# Patient Record
Sex: FEMALE | Race: WHITE | NOT HISPANIC OR LATINO | Employment: FULL TIME | URBAN - METROPOLITAN AREA
[De-identification: names, ages, dates, MRNs, and addresses within clinical notes are randomized per-mention and may not be internally consistent; named-entity substitution may affect disease eponyms.]

---

## 2017-01-24 NOTE — PROGRESS NOTES
"  SUBJECTIVE:                                                    Aileen Priest is a 56 year old female who presents to clinic today for the following health issues:    Gastrointestinal symptoms      Duration: 2 years ago was seen for GERD, but doesn't have many episodes anymore. Feels like her esophagus has pressure on it    Description:           REFLUX SYMPTOMS - nausea, cough and chest pain    Intensity:  mild    Accompanying signs and symptoms:  None  Non smoker  occasinal social alcohol use  rare NSAIDs    History  Previous {similar problem: YES    Aggravating factors: meals, caffeine, alcohol, lying down and stressful situations    Alleviating factors: Prilosec and Zantac    Other Therapies tried: None     Patient is also concerned about her blood pressure    Possible Hypertension       Outpatient blood pressures are being checked at work.  Results are 150/80's.    Low Salt Diet: no added salt     exercsing   Had a very stressful year  Problem list and histories reviewed & adjusted, as indicated.  Additional history: as documented    Problem list, Medication list, Allergies, and Medical/Social/Surgical histories reviewed in EPIC and updated as appropriate.    ROS:  Constitutional, HEENT, cardiovascular, pulmonary, gi and gu systems are negative, except as otherwise noted.    OBJECTIVE:                                                    /96 mmHg  Pulse 103  Temp(Src) 97.1  F (36.2  C) (Oral)  Ht 5' 3\" (1.6 m)  Wt 130 lb 4.8 oz (59.104 kg)  BMI 23.09 kg/m2  SpO2 97%  Body mass index is 23.09 kg/(m^2).  GENERAL: alert, no distress and fit  NECK: no adenopathy, no asymmetry, masses, or scars and thyroid normal to palpation  RESP: lungs clear to auscultation - no rales, rhonchi or wheezes  CV: regular rate and rhythm, normal S1 S2, no S3 or S4, no murmur, click or rub, no peripheral edema and peripheral pulses strong  ABDOMEN: soft, nontender, no hepatosplenomegaly, no masses and bowel sounds " normal  MS: no gross musculoskeletal defects noted, no edema  SKIN: no suspicious lesions or rashes  NEURO: Normal strength and tone, mentation intact and speech normal  PSYCH: mentation appears normal, affect normal/bright  LYMPH: no cervical, supraclavicular, axillary, or inguinal adenopathy    Diagnostic Test Results:  Results for orders placed or performed in visit on 01/27/17   CBC with platelets   Result Value Ref Range    WBC 7.2 4.0 - 11.0 10e9/L    RBC Count 4.22 3.8 - 5.2 10e12/L    Hemoglobin 13.3 11.7 - 15.7 g/dL    Hematocrit 39.1 35.0 - 47.0 %    MCV 93 78 - 100 fl    MCH 31.5 26.5 - 33.0 pg    MCHC 34.0 31.5 - 36.5 g/dL    RDW 12.9 10.0 - 15.0 %    Platelet Count 310 150 - 450 10e9/L     EKG - negative     ASSESSMENT/PLAN:                                                            1. Hypertension goal BP (blood pressure) < 140/90  Needs treat  - EKG 12-lead complete w/read - Clinics  - Comprehensive metabolic panel  - CBC with platelets  - TSH with free T4 reflex  - lisinopril (PRINIVIL/ZESTRIL) 10 MG tablet; Take 1 tablet (10 mg) by mouth daily  Dispense: 90 tablet; Refill: 3  Follow up in 1 month.   2. Gastroesophageal reflux disease, esophagitis presence not specified  Getting better on medications  Cut back alcohol  - H Pylori antigen, stool; Future  - omeprazole (PRILOSEC) 40 MG capsule; Take 1 capsule (40 mg) by mouth daily Take 30-60 minutes before a meal.  Dispense: 90 capsule; Refill: 1  - ranitidine (ZANTAC) 300 MG tablet; Take 1 tablet (300 mg) by mouth At Bedtime  Dispense: 90 tablet; Refill: 1  Follow up in 1 month.  If not improving do endoscopy  3. Screen for colon cancer  Will do    4. Need for hepatitis C screening test  OK test but very low risk  - Hepatitis C Screen Reflex to HCV RNA Quant and Genotype    5. Abdominal pain, epigastric  Take daily  - omeprazole (PRILOSEC) 40 MG capsule; Take 1 capsule (40 mg) by mouth daily Take 30-60 minutes before a meal.  Dispense: 90 capsule;  Refill: 1    Regular exercise  See Patient Instructions    Bucky Hunter MD  [facility] [place]

## 2017-01-27 ENCOUNTER — OFFICE VISIT (OUTPATIENT)
Dept: FAMILY MEDICINE | Facility: CLINIC | Age: 57
End: 2017-01-27
Payer: COMMERCIAL

## 2017-01-27 ENCOUNTER — TELEPHONE (OUTPATIENT)
Dept: FAMILY MEDICINE | Facility: CLINIC | Age: 57
End: 2017-01-27

## 2017-01-27 VITALS
HEART RATE: 103 BPM | OXYGEN SATURATION: 97 % | BODY MASS INDEX: 23.09 KG/M2 | SYSTOLIC BLOOD PRESSURE: 150 MMHG | WEIGHT: 130.3 LBS | DIASTOLIC BLOOD PRESSURE: 96 MMHG | TEMPERATURE: 97.1 F | HEIGHT: 63 IN

## 2017-01-27 DIAGNOSIS — I10 HYPERTENSION GOAL BP (BLOOD PRESSURE) < 140/90: ICD-10-CM

## 2017-01-27 DIAGNOSIS — R10.13 ABDOMINAL PAIN, EPIGASTRIC: ICD-10-CM

## 2017-01-27 DIAGNOSIS — Z11.59 NEED FOR HEPATITIS C SCREENING TEST: ICD-10-CM

## 2017-01-27 DIAGNOSIS — K21.9 GASTROESOPHAGEAL REFLUX DISEASE, ESOPHAGITIS PRESENCE NOT SPECIFIED: ICD-10-CM

## 2017-01-27 DIAGNOSIS — Z12.11 SCREEN FOR COLON CANCER: Primary | ICD-10-CM

## 2017-01-27 LAB
ERYTHROCYTE [DISTWIDTH] IN BLOOD BY AUTOMATED COUNT: 12.9 % (ref 10–15)
HCT VFR BLD AUTO: 39.1 % (ref 35–47)
HGB BLD-MCNC: 13.3 G/DL (ref 11.7–15.7)
MCH RBC QN AUTO: 31.5 PG (ref 26.5–33)
MCHC RBC AUTO-ENTMCNC: 34 G/DL (ref 31.5–36.5)
MCV RBC AUTO: 93 FL (ref 78–100)
PLATELET # BLD AUTO: 310 10E9/L (ref 150–450)
RBC # BLD AUTO: 4.22 10E12/L (ref 3.8–5.2)
WBC # BLD AUTO: 7.2 10E9/L (ref 4–11)

## 2017-01-27 PROCEDURE — 93000 ELECTROCARDIOGRAM COMPLETE: CPT | Performed by: FAMILY MEDICINE

## 2017-01-27 PROCEDURE — 80053 COMPREHEN METABOLIC PANEL: CPT | Performed by: FAMILY MEDICINE

## 2017-01-27 PROCEDURE — 84443 ASSAY THYROID STIM HORMONE: CPT | Performed by: FAMILY MEDICINE

## 2017-01-27 PROCEDURE — 99214 OFFICE O/P EST MOD 30 MIN: CPT | Performed by: FAMILY MEDICINE

## 2017-01-27 PROCEDURE — 36415 COLL VENOUS BLD VENIPUNCTURE: CPT | Performed by: FAMILY MEDICINE

## 2017-01-27 PROCEDURE — 86803 HEPATITIS C AB TEST: CPT | Performed by: FAMILY MEDICINE

## 2017-01-27 PROCEDURE — 85027 COMPLETE CBC AUTOMATED: CPT | Performed by: FAMILY MEDICINE

## 2017-01-27 RX ORDER — LISINOPRIL 10 MG/1
10 TABLET ORAL DAILY
Qty: 90 TABLET | Refills: 3 | Status: SHIPPED | OUTPATIENT
Start: 2017-01-27 | End: 2018-01-18

## 2017-01-27 RX ORDER — OMEPRAZOLE 40 MG/1
40 CAPSULE, DELAYED RELEASE ORAL DAILY
Qty: 90 CAPSULE | Refills: 1 | Status: SHIPPED | OUTPATIENT
Start: 2017-01-27 | End: 2017-04-06

## 2017-01-27 NOTE — NURSING NOTE
"Chief Complaint   Patient presents with     Gastric Problem       Initial /96 mmHg  Pulse 103  Temp(Src) 97.1  F (36.2  C) (Oral)  Ht 5' 3\" (1.6 m)  Wt 130 lb 4.8 oz (59.104 kg)  BMI 23.09 kg/m2  SpO2 97% Estimated body mass index is 23.09 kg/(m^2) as calculated from the following:    Height as of this encounter: 5' 3\" (1.6 m).    Weight as of this encounter: 130 lb 4.8 oz (59.104 kg).  BP completed using cuff size: regular    Trini Lewis MA      "

## 2017-01-28 LAB
ALBUMIN SERPL-MCNC: 3.9 G/DL (ref 3.4–5)
ALP SERPL-CCNC: 98 U/L (ref 40–150)
ALT SERPL W P-5'-P-CCNC: 22 U/L (ref 0–50)
ANION GAP SERPL CALCULATED.3IONS-SCNC: 8 MMOL/L (ref 3–14)
AST SERPL W P-5'-P-CCNC: 14 U/L (ref 0–45)
BILIRUB SERPL-MCNC: 0.4 MG/DL (ref 0.2–1.3)
BUN SERPL-MCNC: 14 MG/DL (ref 7–30)
CALCIUM SERPL-MCNC: 9.3 MG/DL (ref 8.5–10.1)
CHLORIDE SERPL-SCNC: 107 MMOL/L (ref 94–109)
CO2 SERPL-SCNC: 26 MMOL/L (ref 20–32)
CREAT SERPL-MCNC: 0.74 MG/DL (ref 0.52–1.04)
GFR SERPL CREATININE-BSD FRML MDRD: 81 ML/MIN/1.7M2
GLUCOSE SERPL-MCNC: 104 MG/DL (ref 70–99)
POTASSIUM SERPL-SCNC: 4 MMOL/L (ref 3.4–5.3)
PROT SERPL-MCNC: 7.4 G/DL (ref 6.8–8.8)
SODIUM SERPL-SCNC: 141 MMOL/L (ref 133–144)
TSH SERPL DL<=0.005 MIU/L-ACNC: 2.26 MU/L (ref 0.4–4)

## 2017-01-29 DIAGNOSIS — K21.9 GASTROESOPHAGEAL REFLUX DISEASE, ESOPHAGITIS PRESENCE NOT SPECIFIED: ICD-10-CM

## 2017-01-29 PROCEDURE — 87338 HPYLORI STOOL AG IA: CPT | Performed by: FAMILY MEDICINE

## 2017-01-30 LAB — HCV AB SERPL QL IA: NORMAL

## 2017-01-31 LAB
H PYLORI AG STL QL IA: NORMAL
MICRO REPORT STATUS: NORMAL
SPECIMEN SOURCE: NORMAL

## 2017-02-08 PROBLEM — I10 HYPERTENSION, GOAL BELOW 140/90: Status: ACTIVE | Noted: 2017-02-08

## 2017-02-28 ENCOUNTER — OFFICE VISIT (OUTPATIENT)
Dept: FAMILY MEDICINE | Facility: CLINIC | Age: 57
End: 2017-02-28
Payer: COMMERCIAL

## 2017-02-28 VITALS
WEIGHT: 130 LBS | BODY MASS INDEX: 23.04 KG/M2 | OXYGEN SATURATION: 99 % | TEMPERATURE: 97 F | HEART RATE: 99 BPM | DIASTOLIC BLOOD PRESSURE: 72 MMHG | SYSTOLIC BLOOD PRESSURE: 118 MMHG | HEIGHT: 63 IN

## 2017-02-28 DIAGNOSIS — I10 HYPERTENSION, GOAL BELOW 140/90: ICD-10-CM

## 2017-02-28 DIAGNOSIS — K21.9 GASTROESOPHAGEAL REFLUX DISEASE, ESOPHAGITIS PRESENCE NOT SPECIFIED: ICD-10-CM

## 2017-02-28 DIAGNOSIS — Z13.6 CARDIOVASCULAR SCREENING; LDL GOAL LESS THAN 160: Primary | ICD-10-CM

## 2017-02-28 PROCEDURE — 99214 OFFICE O/P EST MOD 30 MIN: CPT | Performed by: FAMILY MEDICINE

## 2017-02-28 NOTE — MR AVS SNAPSHOT
After Visit Summary   2/28/2017    Aileen Priest    MRN: [id number]           Patient Information     Date Of Birth          1960        Visit Information        Provider Department      2/28/2017 3:45 PM Bucky Hunter MD [facility] [place]        Today's Diagnoses     CARDIOVASCULAR SCREENING; LDL GOAL LESS THAN 160    -  1    Hypertension, goal below 140/90        Gastroesophageal reflux disease, esophagitis presence not specified           Follow-ups after your visit        Your next 10 appointments already scheduled     Mar 28, 2017  8:15 AM CDT   Colonoscopy with Rudy Morfin MD   [facility])    [address] [place] [id number] [phone]              Future tests that were ordered for you today     Open Future Orders        Priority Expected Expires Ordered    Lipid Profile Routine  2/28/2018 2/28/2017            Who to contact     If you have questions or need follow up information about today's clinic visit or your schedule please contact [facility] directly at [phone].  Normal or non-critical lab and imaging results will be communicated to you by [employer], letter or phone within 4 business days after the clinic has received the results. If you do not hear from us within 7 days, please contact the clinic through [employer] or phone. If you have a critical or abnormal lab result, we will notify you by phone as soon as possible.  Submit refill requests through [employer] or call your pharmacy and they will forward the refill request to us. Please allow 3 business days for your refill to be completed.          Additional Information About Your Visit        [employer] Information     [employer] gives you secure access to your electronic health record. If you see a primary care provider, you can also send messages to your care team and make appointments. If you have questions, please call  "your primary care clinic.  If you do not have a primary care provider, please call [phone] and they will assist you.        Care EveryWhere ID     This is your Care EveryWhere ID. This could be used by other organizations to access your [place] medical records  [id number]        Your Vitals Were     Pulse Temperature Height Pulse Oximetry BMI (Body Mass Index)       99 97  F (36.1  C) (Oral) 5' 3\" (1.6 m) 99% 23.03 kg/m2        Blood Pressure from Last 3 Encounters:   02/28/17 118/72   01/27/17 (!) 150/96   05/29/15 136/85    Weight from Last 3 Encounters:   02/28/17 130 lb (59 kg)   01/27/17 130 lb 4.8 oz (59.1 kg)   05/29/15 127 lb (57.6 kg)               Primary Care Provider Office Phone # Fax #    Bucky Hunter [phone]       [facility] [address] [place] [id number]        Thank you!     Thank you for choosing [facility]  for your care. Our goal is always to provide you with excellent care. Hearing back from our patients is one way we can continue to improve our services. Please take a few minutes to complete the written survey that you may receive in the mail after your visit with us. Thank you!             Your Updated Medication List - Protect others around you: Learn how to safely use, store and throw away your medicines at www.disposemymeds.org.          This list is accurate as of: 2/28/17  4:15 PM.  Always use your most recent med list.                   Brand Name Dispense Instructions for use    dexamethasone 0.1 MG/ML solution      Take  by mouth daily.       IBUPROFEN PO      Take 800 mg by mouth Reported on 2/28/2017       lisinopril 10 MG tablet    PRINIVIL/ZESTRIL    90 tablet    Take 1 tablet (10 mg) by mouth daily       omeprazole 40 MG capsule    priLOSEC    90 capsule    Take 1 capsule (40 mg) by mouth daily Take 30-60 minutes before a meal.       ranitidine 300 MG tablet    ZANTAC    90 tablet    Take 1 tablet " (300 mg) by mouth At Bedtime       VITAMIN D PO      Take 2,000 Units by mouth daily

## 2017-02-28 NOTE — PROGRESS NOTES
"  SUBJECTIVE:                                                    Aileen Priest is a 56 year old female who presents to clinic today for the following health issues:      Hypertension Follow-up      Outpatient blood pressures are being checked at home.  Results are 110/74, 128/79 103/74.    Low Salt Diet: no added salt       Amount of exercise or physical activity: 4-5 days/week for an average of 30-45 minutes    Problems taking medications regularly: No    Medication side effects: none  Diet: regular (no restrictions)    GERD much improved with zantac/ PPI    Problem list and histories reviewed & adjusted, as indicated.  Additional history: as documented    Labs reviewed in EPIC    Reviewed and updated as needed this visit by clinical staff  Tobacco  Allergies  Meds  Med Hx  Surg Hx  Fam Hx  Soc Hx      Reviewed and updated as needed this visit by Provider         ROS:  Constitutional, HEENT, cardiovascular, pulmonary, gi and gu systems are negative, except as otherwise noted.    OBJECTIVE:                                                    /72 (BP Location: Right arm, Patient Position: Chair, Cuff Size: Adult Regular)  Pulse 99  Temp 97  F (36.1  C) (Oral)  Ht 5' 3\" (1.6 m)  Wt 130 lb (59 kg)  SpO2 99%  BMI 23.03 kg/m2  Body mass index is 23.03 kg/(m^2).  GENERAL: healthy, alert and no distress  EYES: Eyes grossly normal to inspection, PERRL and conjunctivae and sclerae normal  HENT: ear canals and TM's normal, nose and mouth without ulcers or lesions  NECK: no adenopathy, no asymmetry, masses, or scars and thyroid normal to palpation  RESP: lungs clear to auscultation - no rales, rhonchi or wheezes  CV: regular rate and rhythm, normal S1 S2, no S3 or S4, no murmur, click or rub, no peripheral edema and peripheral pulses strong  ABDOMEN: soft, nontender, no hepatosplenomegaly, no masses and bowel sounds normal  LYMPH: no cervical, supraclavicular, axillary, or inguinal adenopathy    Diagnostic Test " Results:  Results for orders placed or performed in visit on 01/29/17   H Pylori antigen, stool   Result Value Ref Range    Specimen Description Feces     H Pylori Antigen       Negative for Helicobacter pylori antigen by enzyme immunoassay. A negative   result indicates the absence of H. pylori antigen or that the level of antigen   is below the level of detection.      Micro Report Status FINAL 01/31/2017         ASSESSMENT/PLAN:                                                            1. CARDIOVASCULAR SCREENING; LDL GOAL LESS THAN 160  Will do when fasting  - Lipid Profile; Future    2. Hypertension, goal below 140/90  Well controlled on lisinopril  Continue Follow up in 3 months.     3. Gastroesophageal reflux disease, esophagitis presence not specified  Well controlled   After 1 month cut back to just zantac then call me       Regular exercise  See Patient Instructions    Bucky Hunter MD  [facility]

## 2017-03-20 ENCOUNTER — TELEPHONE (OUTPATIENT)
Dept: GASTROENTEROLOGY | Facility: OUTPATIENT CENTER | Age: 57
End: 2017-03-20

## 2017-03-20 NOTE — TELEPHONE ENCOUNTER
Patient taking any blood thinners ? Ibuprofen prn    Heart disease ? denies    Lung disease ? denies      Sleep apnea ? denies    Diabetic ? denies    Kidney disease ? denies    Dialysis ? n/a    Electronic implanted medical devices ? denies    Are you taking any narcotic pain medication ? no  What is your daily dosage ?    PTSD ? n/a    Prep instructions reviewed with patient ? Pt. Declined review.  policy, MAC sedation plan reviewed. Advised pt. To have someone stay with her post exam    Pharmacy : [place]    Indication for procedure :microscopic colitis, screening colonoscopy  Referring provider : Dr. Bucky Hunter

## 2017-03-28 ENCOUNTER — TRANSFERRED RECORDS (OUTPATIENT)
Dept: HEALTH INFORMATION MANAGEMENT | Facility: CLINIC | Age: 57
End: 2017-03-28

## 2017-03-30 ENCOUNTER — TELEPHONE (OUTPATIENT)
Dept: ONCOLOGY | Facility: CLINIC | Age: 57
End: 2017-03-30

## 2017-03-30 NOTE — TELEPHONE ENCOUNTER
----- Message from Monet Oalkey GC sent at 3/29/2017 11:14 AM CDT -----  Regarding: RE: 3D mammogram  The 3D mammogram does not replace a breast MRI.      Aileen should come back to the cancer risk program and we will evaluate her family history again. We have many more genes available for testing than we did in 2010.    Please have her come to the cancer risk management program for an appointment with a genetic counselor to talk about genetic testing and breast cancer screening. The number for an appt is 8-.  Thank you,  Monet  ----- Message -----     From: Nelda Grande RN     Sent: 3/24/2017   1:59 PM       To: Monet Oakley GC  Subject: 3D mammogram                                     Hi Roque Gallagher was transferred to the triage line and was wondering if a 3D Mammogram would be better than getting an MRI.  You saw her way back in 2010 and she was recommended to get annual mammograms and MRIs.      Could you please follow up with her?    Thanks,  Henny Grande, RN  [facility] Triage  [phone]

## 2017-04-04 ENCOUNTER — PRE VISIT (OUTPATIENT)
Dept: GASTROENTEROLOGY | Facility: CLINIC | Age: 57
End: 2017-04-04

## 2017-04-04 ENCOUNTER — CARE COORDINATION (OUTPATIENT)
Dept: GASTROENTEROLOGY | Facility: CLINIC | Age: 57
End: 2017-04-04

## 2017-04-04 NOTE — TELEPHONE ENCOUNTER
1.  Date/reason for appt: 4/6/17 - New IBD  2.  Referring provider: Dr. Bucky Hunter  3.  Call to patient (Yes / No - short description): no, internal referral  4.  Previous care at / records requested from: [facility] -- Records, labs and referral in [facility], colonoscopy 3/28/17 report is in Epic

## 2017-04-05 ENCOUNTER — TRANSFERRED RECORDS (OUTPATIENT)
Dept: HEALTH INFORMATION MANAGEMENT | Facility: CLINIC | Age: 57
End: 2017-04-05

## 2017-04-05 ASSESSMENT — ENCOUNTER SYMPTOMS
BOWEL INCONTINENCE: 0
ABDOMINAL PAIN: 1
TASTE DISTURBANCE: 0
HEMOPTYSIS: 0
HEARTBURN: 1
DOUBLE VISION: 0
EYE WATERING: 0
DIARRHEA: 1
SHORTNESS OF BREATH: 0
RESPIRATORY PAIN: 0
COUGH DISTURBING SLEEP: 1
BLOATING: 1
NAUSEA: 1
RECTAL PAIN: 0
HOARSE VOICE: 0
SPUTUM PRODUCTION: 1
DYSPNEA ON EXERTION: 0
SINUS CONGESTION: 0
RECTAL BLEEDING: 0
COUGH: 1
SORE THROAT: 0
WHEEZING: 0
SINUS PAIN: 0
CONSTIPATION: 0
POSTURAL DYSPNEA: 0
EYE IRRITATION: 0
TROUBLE SWALLOWING: 0
EYE PAIN: 0
VOMITING: 0
NECK MASS: 0
EYE REDNESS: 1
JAUNDICE: 0
BLOOD IN STOOL: 0
SNORES LOUDLY: 0

## 2017-04-06 ENCOUNTER — TELEPHONE (OUTPATIENT)
Dept: GASTROENTEROLOGY | Facility: CLINIC | Age: 57
End: 2017-04-06

## 2017-04-06 ENCOUNTER — OFFICE VISIT (OUTPATIENT)
Dept: GASTROENTEROLOGY | Facility: CLINIC | Age: 57
End: 2017-04-06

## 2017-04-06 VITALS
WEIGHT: 127 LBS | BODY MASS INDEX: 22.5 KG/M2 | OXYGEN SATURATION: 97 % | DIASTOLIC BLOOD PRESSURE: 81 MMHG | HEIGHT: 63 IN | SYSTOLIC BLOOD PRESSURE: 119 MMHG | HEART RATE: 94 BPM

## 2017-04-06 DIAGNOSIS — Z87.2 HISTORY OF HIDRADENITIS SUPPURATIVA: ICD-10-CM

## 2017-04-06 DIAGNOSIS — L43.9 LICHEN PLANUS: ICD-10-CM

## 2017-04-06 DIAGNOSIS — K50.018 CROHN'S DISEASE OF SMALL INTESTINE WITH OTHER COMPLICATION (H): Primary | ICD-10-CM

## 2017-04-06 RX ORDER — LORAZEPAM 1 MG/1
1 TABLET ORAL ONCE
Qty: 1 TABLET | Refills: 0 | Status: SHIPPED | OUTPATIENT
Start: 2017-04-06 | End: 2017-04-06

## 2017-04-06 NOTE — TELEPHONE ENCOUNTER
Contacted patient to schedule colonoscopy at [facility] with Dr. Morfin in 2-3 weeks. See order    Monet Shah RN

## 2017-04-06 NOTE — LETTER
"4/6/2017       RE: Aileen Priest  [address]  [place] [address]     Dear Colleague,    Thank you for referring your patient, Aileen Priest, to the [facility] GASTROENTEROLOGY AND IBD at [facility]. Please see a copy of my visit note below.    GI CLINIC VISIT - NEW PATIENT    CC/REFERRING PROVIDER: Bucky Hunter  REASON FOR CONSULTATION: ileitis NOS on colonoscopy 3/2017 - suspicious for ileal Crohn's disease    HPI: 56 year old female w/ h/o suspected ileal Crohn's disease per 3/2017 ileocolonoscopy biopsies, Lichen Planus (oral/perianal involvement, in remission), Hidradenitis Suppurativa s/p excision/drainage 1975, presumed Collagenous Colitis per colonic biopsies 2006 (though suspecting evolving IBD presentation in retrospect following 3/2017 highly-suggestive biopsies of terminal ileum + bland colonic biopsies), presumed GERD (off PPI), lumbar spine surgery 2005, periorbital shingles 2015, chronic tobacco abuse (in remission), among other medical issues - presenting for evaluation of incidentally-found active ileitis on colonoscopy 3/28/17, concern for early/evolving ileal Crohn's disease. Pt is known to me from 3/28 colonoscopy, seen in f/u - interval confirmation of acute-on-chronic inflammation with pyloric metaplasia on ileal biopsies, very suggestive for ileal Crohn's disease (colon o/w endoscopically and histologically spared on segmental colonic biopsies). Reports having 3-4 variable consistency stools/day w/o blood (baseline), +marked urgency w/ insecurity at least 1-2x/wk. +multiple recurrent \"episodes\" of nocturnal abd pain and diarrhea (occurs at least 2-3x/yr). Denies any fecal incontinence or new perianal sxs. Denies any N/V/F/C/HA/NS or other const/syst/cardiopulmonary sxs, no BRBPR/melena/urinary changes, no unintentional wt loss or appetite/satiety changes. No other bowel/bladder habit changes, no dysphagia/odynophagia. No " jaundice/icterus/pruritus, no acholic stools/steatorrhea, no new lumps/bumps, no jt pain/oral ulcer/rash/eye sxs noted. Denies any recent travel/abx/other sick contact exposures, no recent NSAID use.    ROS: 10pt ROS performed and otherwise negative.    PERTINENT PAST MEDICAL HISTORY:  As noted above.    PREVIOUS ABDOMINAL/GYNECOLOGIC SURGERIES:  As noted above.    PREVIOUS ENDOSCOPY:  - Colonoscopy 3/28/17 ([facility]): +active ileal inflammation w/ ulceration, +mild diffuse colonic mucosal congestion but colon was o/w endoscopically-normal. +sigmoid diverticulosis, +IH.    PERTINENT MEDICATIONS:  Medications reviewed with patient today, see Medication List/Assessment for details.  No other NSAID/anticoagulation reported by patient.  No other OTC/herbal/supplements reported by patient.    SOCIAL HISTORY:  Former smoker (prior 1ppd q89-27apf), quit 1987. Reports having 3-6 drinks/wk, o/w negative. Works as L&D RN.    FAMILY HISTORY:  Father w/ Idiopathic Angioedema, mother w/ diverticulitis and breast CA. MGM w/ ovarian CA, brother w/ psoriasis, sister w/ hypothyroidism. No colon/panc/esophageal/other GI CA, no other HNPCC-related Marlon. No IBD/celiac, no other AI/liver disease.    PHYSICAL EXAMINATION (w/ RN Kristi present):  Vitals reviewed, AFVSS  Wt 127# today (stable)  Gen: aaox3, cooperative, pleasant, not diaphoretic, nad  HEENT: ncat, neck supple, no clad/sclad, normal op w/o ulcer/exudate, anicteric, mmm, no active oral lesions noted on this exam  Mallampati Score 1  Resp/CV without acute findings, not dyspneic/tachycardic  Abd: +nabs, soft, nt, nd, no peritoneal s/s noted. No ecchymoses, +lumbar spine surgical scar, +coccygeal/upper gluteal cleft scar from prior HS excision, no CVA/spinal tenderness noted.  Ext: no c/c/e  Skin: warm, perfused, no jaundice  Neuro: grossly intact, no asterixis noted  Perianal: No evidence of recurrent HS at scar, no active perianal LP or Crohn's-like tags    PERTINENT  STUDIES:  Lytes/LFT normal, cr 0.66, alb 3.8  wbc 7, hgb 13.2, plt 332, mcv 91  ESR/CRP negative  ferritin 64, iron profile normal  B12 588, folate 18.5  VitD 24 (normal)  TSH normal  TTG negative    Q-Gold TB negative 4/10/17  HAV Ab negative  HBsAb positive (c/w prior vax), sAg/cAb negative  HCV Ab negative  TPMT 24.5 (normal range)    ASSESSMENT/PLAN:    1. Likely early/evolving ileal Crohn's disease with clinical (but not nutritional/biochemical) impact at this time - warrants moving forward with consideration for Crohn's-directed therapy in the near-future  1. I'm glad that we had the opportunity to meet again today to discuss your recent colonoscopy results, particularly in light of the chronic active ileal inflammation noted on that exam - discussed the likelihood that this may represent early/evolving ileal Crohn's Disease, as well as the next steps to complete the diagnostic evaluation (particularly as this will have bearing on options for treatment).  - No evidence of histologic colonic involvement on 3/2017 biopsies, could represent evolving IBD presentation. Will continue to monitor recurrent colonic activity in ongoing care.  - Recommend moving forward with an MRI Enterography in the next 2-3 weeks to evaluate for more proximal Crohn's small bowel involvement as we discussed today. Will provide a single dose of Ativan to help with any associated claustrophobia.    2. Recommend moving forward with an Upper GI Endoscopy at [facility] under MAC sedation (scheduled with me) in the next 2-3 weeks, specifically to assess your upper GI concerns (chronic GERD) and to rule out Lichen Planus or Crohn's Disease upper GI involvement.    3. Recommend routine studies including nutritional and inflammatory markers as we discussed today.   - Will also obtain your immunosuppression pre-screening studies (Hepatitis serologies with exception of Hepatitis C, TB testing, Chest X-ray, TPMT level) as well. This will  help our next discussions about treatment options.    4. Recommend checking out the Crohn's & Colitis Foundation of [place] website (www.ccfa.org), particularly as this is an excellent resource for patients.  Please feel free to bring any questions you have to clinic for us to discuss together, or you can call/email us as well.    5. Continue to monitor for the danger signs/symptoms we reviewed together today:  worsening abdominal pain, worsening diarrhea, obstructive symptoms (nausea/vomiting, abdominal distention, inability to pass stool/flatus), blood mixed into stools, persistent fevers/chills, progressive anemia (particulary with iron deficiency), unexpected weight loss, etc.  - Contact us via [employer] should these symptoms occur, particularly as we may advise further evaluation accordingly.    2. Colorectal Cancer Screening  No PSC or known FH CRC, will readdress once current diagnostic evaluation is completed.    RTC 4-6 weeks, sooner if symptomatic.      Thank you for this consultation. It was a pleasure to participate in the care of this patient; please contact us with any further questions.    Rudy Morfin MD   of Medicine  [facility] - Department of Medicine  Division of Gastroenterology

## 2017-04-06 NOTE — MR AVS SNAPSHOT
After Visit Summary   4/6/2017    Aileen Priest    MRN: [id number]           Patient Information     Date Of Birth          1960        Visit Information        Provider Department      4/6/2017 2:15 PM Rudy Morfin MD [facility] Gastroenterology and IBD        Today's Diagnoses     Crohn's disease of small intestine with other complication (H)    -  1      Care Instructions    I've included a brief summary of our discussion and care plan from today's visit below.  Please review this information with your primary care provider.  _______________________________________________________________________      1. I'm glad that we had the opportunity to meet again today to discuss your recent colonoscopy results, particularly in light of the chronic active ileal inflammation noted on that exam - discussed the likelihood that this may represent early/evolving ileal Crohn's Disease, as well as the next steps to complete the diagnostic evaluation (particularly as this will have bearing on options for treatment).  - Recommend moving forward with an MRI Enterography in the next 2-3 weeks to evaluate for more proximal Crohn's small bowel involvement as we discussed today. Will provide a single dose of Ativan to help with any associated claustrophobia.    2. Recommend moving forward with an Upper GI Endoscopy at [facility] under MAC sedation (scheduled with me) in the next 2-3 weeks, specifically to assess your upper GI concerns (chronic GERD) and to rule out Lichen Planus or Crohn's Disease upper GI involvement.    3. Recommend routine studies including nutritional and inflammatory markers as we discussed today.   - Will also obtain your immunosuppression pre-screening studies (Hepatitis serologies with exception of Hepatitis C, TB testing, Chest X-ray, TPMT level) as well. This will help our next discussions about treatment options.    4. Recommend checking out the Crohn's & Colitis Foundation of  [place] website (www.ccfa.org), particularly as this is an excellent resource for patients.  Please feel free to bring any questions you have to clinic for us to discuss together, or you can call/email us as well.    5. Continue to monitor for the danger signs/symptoms we reviewed together today:  worsening abdominal pain, worsening diarrhea, obstructive symptoms (nausea/vomiting, abdominal distention, inability to pass stool/flatus), blood mixed into stools, persistent fevers/chills, progressive anemia (particulary with iron deficiency), unexpected weight loss, etc.  - Contact us via Zwipe should these symptoms occur, particularly as we may advise further evaluation accordingly.    6. Return to GI Clinic with me in 4-6 weeks to review your progress, sooner if symptomatic.   - If you are unable to schedule this follow-up appointment today, please contact Bethany Lyons at [phone] within the next week to help set up this necessary appointment.    _______________________________________________________________________    It was a pleasure seeing you in clinic today - please be in touch if there are any further questions that arise following today's visit.  During business hours, you may reach Clinic Nurse Triage Line at [phone].  For urgent/emergent questions after business hours, you may reach the on-call GI Fellow by contacting the [facility]  at [phone].    Any benign/non-urgent test results are usually communicated via letter or Authentiumhart message within 1-2 weeks after completion.  Urgent results (those that require a change in the previously-discussed care plan) are usually communicated via a phone call once available from our clinic staff to discuss the results and the next steps in your evaluation.    I recommend signing up for Zwipe access if you have not already done so and are comfortable with using a computer.  This allows for online access to your lab results and also  helps you communicate efficiently with my clinic should any questions arise in your care.    We have Financial Counseling services available through our clinic.  If you have questions about your insurance coverage or payment responsibilities, particularly before you undergo any tests or procedures, please let us know and we can arrange a consultation accordingly to help you make informed decisions about your healthcare.    Sincerely,    Rudy Morfin MD    [facility] - Department of Medicine  Division of Gastroenterology          Follow-ups after your visit        Additional Services     GASTROENTEROLOGY ADULT REF PROCEDURE ONLY       Last Lab Result: Creatinine (mg/dL)       Date                     Value                 01/27/2017               0.74             ----------  Body mass index is 22.5 kg/(m^2).     Needed:  No  Language:  English    Patient will be contacted to schedule procedure.     Please be aware that coverage of these services is subject to the terms and limitations of your health insurance plan.  Call member services at your health plan with any benefit or coverage questions.  Any procedures must be performed at a [facility] facility OR coordinated by your clinic's referral office.    Please bring the following with you to your appointment:    (1) Any X-Rays, CTs or MRIs which have been performed.  Contact the facility where they were done to arrange for  prior to your scheduled appointment.    (2) List of current medications   (3) This referral request   (4) Any documents/labs given to you for this referral                  Follow-up notes from your care team     Return in about 4 weeks (around 5/4/2017).      Your next 10 appointments already scheduled     May 08, 2017  3:45 PM CDT   [place] Bucky Hunter [place]   [facility] [place]    [address] [place] [address]  [id number] [phone]           Bring a current list of meds and any records pertaining to this visit.  For Physicals, please bring immunization records and any forms needing to be filled out.  Please arrive 10 minutes early to complete paperwork.              Future tests that were ordered for you today     Open Future Orders        Priority Expected Expires Ordered    Vitamin D Deficiency Routine  6/5/2017 4/6/2017    Vitamin B12 Routine  6/5/2017 4/6/2017    Folate Routine  6/5/2017 4/6/2017    Basic metabolic panel Routine  6/5/2017 4/6/2017    CBC with platelets differential Routine  6/5/2017 4/6/2017    Erythrocyte sedimentation rate auto Routine  6/5/2017 4/6/2017    Hepatic panel Routine  6/5/2017 4/6/2017    Tissue transglutaminase farhad IgA and IgG Routine  6/5/2017 4/6/2017    TPMT enzyme and phenotype Routine  6/5/2017 4/6/2017    Hepatitis B core antibody Routine  6/5/2017 4/6/2017    Hepatitis B Surface Antibody Routine  6/5/2017 4/6/2017    Hepatitis B surface antigen Routine  6/5/2017 4/6/2017    Hepatitis Antibody A IgG Routine  6/5/2017 4/6/2017    M Tuberculosis by Quantiferon Routine  6/5/2017 4/6/2017    X-ray Chest 2 vws* Routine 4/6/2017 4/6/2018 4/6/2017    CRP inflammation Routine  6/5/2017 4/6/2017    Ferritin Routine  6/5/2017 4/6/2017    Iron and iron binding capacity Routine  6/5/2017 4/6/2017    MRI Enterography Routine  4/6/2018 4/6/2017            Who to contact     Please call your clinic at [phone] to:    Ask questions about your health    Make or cancel appointments    Discuss your medicines    Learn about your test results    Speak to your doctor   If you have compliments or concerns about an experience at your clinic, or if you wish to file a complaint, please contact [facility] Patient Relations at [phone] or email us at [email or web address]         Additional Information About Your Visit        MyChart Information     MyChart  "gives you secure access to your electronic health record. If you see a primary care provider, you can also send messages to your care team and make appointments. If you have questions, please call your primary care clinic.  If you do not have a primary care provider, please call [phone] and they will assist you.      [employer] is an electronic gateway that provides easy, online access to your medical records. With [employer], you can request a clinic appointment, read your test results, renew a prescription or communicate with your care team.     To access your existing account, please contact your [facility] or call [phone] for assistance.        Care EveryWhere ID     This is your Care EveryWhere ID. This could be used by other organizations to access your [facility] medical records  [id number]        Your Vitals Were     Pulse Height Pulse Oximetry BMI (Body Mass Index)          94 1.6 m (5' 3\") 97% 22.5 kg/m2         Blood Pressure from Last 3 Encounters:   04/06/17 119/81   02/28/17 118/72   01/27/17 (!) 150/96    Weight from Last 3 Encounters:   04/06/17 57.6 kg (127 lb)   02/28/17 59 kg (130 lb)   01/27/17 59.1 kg (130 lb 4.8 oz)              We Performed the Following     GASTROENTEROLOGY ADULT REF PROCEDURE ONLY          Today's Medication Changes          These changes are accurate as of: 4/6/17  3:29 PM.  If you have any questions, ask your nurse or doctor.               Start taking these medicines.        Dose/Directions    LORazepam 1 MG tablet   Commonly known as:  ATIVAN   Used for:  Crohn's disease of small intestine with other complication (H)   Started by:  Rudy Morfin MD        Dose:  1 mg   Take 1 tablet (1 mg) by mouth once for 1 dose   Quantity:  1 tablet   Refills:  0         Stop taking these medicines if you haven't already. Please contact your care team if you have questions.     IBUPROFEN PO   Stopped by:  Rudy Morfin MD           omeprazole 40 " MG capsule   Commonly known as:  priLOSEC   Stopped by:  Rudy Morfin MD                Where to get your medicines      Some of these will need a paper prescription and others can be bought over the counter.  Ask your nurse if you have questions.     Bring a paper prescription for each of these medications     LORazepam 1 MG tablet                Primary Care Provider Office Phone # Fax #    Bucky Marlo Hunter [phone]       [address] [address]        Thank you!     Thank you for choosing [facility] GASTROENTEROLOGY AND IBD  for your care. Our goal is always to provide you with excellent care. Hearing back from our patients is one way we can continue to improve our services. Please take a few minutes to complete the written survey that you may receive in the mail after your visit with us. Thank you!             Your Updated Medication List - Protect others around you: Learn how to safely use, store and throw away your medicines at www.disposemymeds.org.          This list is accurate as of: 4/6/17  3:29 PM.  Always use your most recent med list.                   Brand Name Dispense Instructions for use    [facility] PO          dexamethasone 0.1 MG/ML solution      Take  by mouth daily.       lisinopril 10 MG tablet    PRINIVIL/ZESTRIL    90 tablet    Take 1 tablet (10 mg) by mouth daily       LORazepam 1 MG tablet    ATIVAN    1 tablet    Take 1 tablet (1 mg) by mouth once for 1 dose       ranitidine 300 MG tablet    ZANTAC    90 tablet    Take 1 tablet (300 mg) by mouth At Bedtime       VITAMIN D PO      Take 2,000 Units by mouth daily

## 2017-04-06 NOTE — PATIENT INSTRUCTIONS
I've included a brief summary of our discussion and care plan from today's visit below.  Please review this information with your primary care provider.  _______________________________________________________________________      1. I'm glad that we had the opportunity to meet again today to discuss your recent colonoscopy results, particularly in light of the chronic active ileal inflammation noted on that exam - discussed the likelihood that this may represent early/evolving ileal Crohn's Disease, as well as the next steps to complete the diagnostic evaluation (particularly as this will have bearing on options for treatment).  - Recommend moving forward with an MRI Enterography in the next 2-3 weeks to evaluate for more proximal Crohn's small bowel involvement as we discussed today. Will provide a single dose of Ativan to help with any associated claustrophobia.    2. Recommend moving forward with an Upper GI Endoscopy at [facility] under MAC sedation (scheduled with me) in the next 2-3 weeks, specifically to assess your upper GI concerns (chronic GERD) and to rule out Lichen Planus or Crohn's Disease upper GI involvement.    3. Recommend routine studies including nutritional and inflammatory markers as we discussed today.   - Will also obtain your immunosuppression pre-screening studies (Hepatitis serologies with exception of Hepatitis C, TB testing, Chest X-ray, TPMT level) as well. This will help our next discussions about treatment options.    4. Recommend checking out the Crohn's & Colitis Foundation of [place] website (www.ccfa.org), particularly as this is an excellent resource for patients.  Please feel free to bring any questions you have to clinic for us to discuss together, or you can call/email us as well.    5. Continue to monitor for the danger signs/symptoms we reviewed together today:  worsening abdominal pain, worsening diarrhea, obstructive symptoms (nausea/vomiting, abdominal  distention, inability to pass stool/flatus), blood mixed into stools, persistent fevers/chills, progressive anemia (particulary with iron deficiency), unexpected weight loss, etc.  - Contact us via ChangeTip should these symptoms occur, particularly as we may advise further evaluation accordingly.    6. Return to GI Clinic with me in 4-6 weeks to review your progress, sooner if symptomatic.   - If you are unable to schedule this follow-up appointment today, please contact Bethany Lyons at [phone] within the next week to help set up this necessary appointment.    _______________________________________________________________________    It was a pleasure seeing you in clinic today - please be in touch if there are any further questions that arise following today's visit.  During business hours, you may reach Clinic Nurse Triage Line at [phone].  For urgent/emergent questions after business hours, you may reach the on-call GI Fellow by contacting the [facility]  at [phone].    Any benign/non-urgent test results are usually communicated via letter or ChangeTip message within 1-2 weeks after completion.  Urgent results (those that require a change in the previously-discussed care plan) are usually communicated via a phone call once available from our clinic staff to discuss the results and the next steps in your evaluation.    I recommend signing up for ChangeTip access if you have not already done so and are comfortable with using a computer.  This allows for online access to your lab results and also helps you communicate efficiently with my clinic should any questions arise in your care.    We have Financial Counseling services available through our clinic.  If you have questions about your insurance coverage or payment responsibilities, particularly before you undergo any tests or procedures, please let us know and we can arrange a consultation accordingly to help you make informed  decisions about your healthcare.    Sincerely,    Rudy Morfin MD    [facility] - Department of Medicine  Division of Gastroenterology

## 2017-04-06 NOTE — NURSING NOTE
"Chief Complaint   Patient presents with     Consult     colonoscopy results       Vitals:    04/06/17 1419   BP: 119/81   Pulse: 94   SpO2: 97%   Weight: 57.6 kg (127 lb)   Height: 1.6 m (5' 3\")       Body mass index is 22.5 kg/(m^2).                         "

## 2017-04-08 ENCOUNTER — MYC MEDICAL ADVICE (OUTPATIENT)
Dept: FAMILY MEDICINE | Facility: CLINIC | Age: 57
End: 2017-04-08

## 2017-04-10 DIAGNOSIS — K50.018 CROHN'S DISEASE OF SMALL INTESTINE WITH OTHER COMPLICATION (H): ICD-10-CM

## 2017-04-10 LAB
BASOPHILS # BLD AUTO: 0.1 10E9/L (ref 0–0.2)
BASOPHILS NFR BLD AUTO: 0.7 %
CRP SERPL-MCNC: <2.9 MG/L (ref 0–8)
DIFFERENTIAL METHOD BLD: NORMAL
EOSINOPHIL # BLD AUTO: 0.3 10E9/L (ref 0–0.7)
EOSINOPHIL NFR BLD AUTO: 4.2 %
ERYTHROCYTE [DISTWIDTH] IN BLOOD BY AUTOMATED COUNT: 12.8 % (ref 10–15)
ERYTHROCYTE [SEDIMENTATION RATE] IN BLOOD BY WESTERGREN METHOD: 9 MM/H (ref 0–30)
FOLATE SERPL-MCNC: 18.5 NG/ML
HCT VFR BLD AUTO: 39 % (ref 35–47)
HGB BLD-MCNC: 13.2 G/DL (ref 11.7–15.7)
LYMPHOCYTES # BLD AUTO: 1.7 10E9/L (ref 0.8–5.3)
LYMPHOCYTES NFR BLD AUTO: 24.6 %
MCH RBC QN AUTO: 30.8 PG (ref 26.5–33)
MCHC RBC AUTO-ENTMCNC: 33.8 G/DL (ref 31.5–36.5)
MCV RBC AUTO: 91 FL (ref 78–100)
MONOCYTES # BLD AUTO: 0.7 10E9/L (ref 0–1.3)
MONOCYTES NFR BLD AUTO: 9.3 %
NEUTROPHILS # BLD AUTO: 4.3 10E9/L (ref 1.6–8.3)
NEUTROPHILS NFR BLD AUTO: 61.2 %
PLATELET # BLD AUTO: 332 10E9/L (ref 150–450)
RBC # BLD AUTO: 4.28 10E12/L (ref 3.8–5.2)
VIT B12 SERPL-MCNC: 588 PG/ML (ref 193–986)
WBC # BLD AUTO: 7 10E9/L (ref 4–11)

## 2017-04-10 PROCEDURE — 87340 HEPATITIS B SURFACE AG IA: CPT | Performed by: INTERNAL MEDICINE

## 2017-04-10 PROCEDURE — 82542 COL CHROMOTOGRAPHY QUAL/QUAN: CPT | Mod: 90 | Performed by: INTERNAL MEDICINE

## 2017-04-10 PROCEDURE — 80076 HEPATIC FUNCTION PANEL: CPT | Performed by: INTERNAL MEDICINE

## 2017-04-10 PROCEDURE — 82657 ENZYME CELL ACTIVITY: CPT | Mod: 90 | Performed by: INTERNAL MEDICINE

## 2017-04-10 PROCEDURE — 86704 HEP B CORE ANTIBODY TOTAL: CPT | Performed by: INTERNAL MEDICINE

## 2017-04-10 PROCEDURE — 83516 IMMUNOASSAY NONANTIBODY: CPT | Performed by: INTERNAL MEDICINE

## 2017-04-10 PROCEDURE — 86706 HEP B SURFACE ANTIBODY: CPT | Performed by: INTERNAL MEDICINE

## 2017-04-10 PROCEDURE — 83550 IRON BINDING TEST: CPT | Performed by: INTERNAL MEDICINE

## 2017-04-10 PROCEDURE — 36415 COLL VENOUS BLD VENIPUNCTURE: CPT | Performed by: INTERNAL MEDICINE

## 2017-04-10 PROCEDURE — 82306 VITAMIN D 25 HYDROXY: CPT | Performed by: INTERNAL MEDICINE

## 2017-04-10 PROCEDURE — 80048 BASIC METABOLIC PNL TOTAL CA: CPT | Performed by: INTERNAL MEDICINE

## 2017-04-10 PROCEDURE — 82746 ASSAY OF FOLIC ACID SERUM: CPT | Performed by: INTERNAL MEDICINE

## 2017-04-10 PROCEDURE — 86480 TB TEST CELL IMMUN MEASURE: CPT | Performed by: INTERNAL MEDICINE

## 2017-04-10 PROCEDURE — 86140 C-REACTIVE PROTEIN: CPT | Performed by: INTERNAL MEDICINE

## 2017-04-10 PROCEDURE — 85652 RBC SED RATE AUTOMATED: CPT | Performed by: INTERNAL MEDICINE

## 2017-04-10 PROCEDURE — 82728 ASSAY OF FERRITIN: CPT | Performed by: INTERNAL MEDICINE

## 2017-04-10 PROCEDURE — 82607 VITAMIN B-12: CPT | Performed by: INTERNAL MEDICINE

## 2017-04-10 PROCEDURE — 86708 HEPATITIS A ANTIBODY: CPT | Performed by: INTERNAL MEDICINE

## 2017-04-10 PROCEDURE — 85025 COMPLETE CBC W/AUTO DIFF WBC: CPT | Performed by: INTERNAL MEDICINE

## 2017-04-10 PROCEDURE — 83540 ASSAY OF IRON: CPT | Performed by: INTERNAL MEDICINE

## 2017-04-10 PROCEDURE — 99000 SPECIMEN HANDLING OFFICE-LAB: CPT | Performed by: INTERNAL MEDICINE

## 2017-04-11 LAB
ALBUMIN SERPL-MCNC: 3.8 G/DL (ref 3.4–5)
ALP SERPL-CCNC: 89 U/L (ref 40–150)
ALT SERPL W P-5'-P-CCNC: 19 U/L (ref 0–50)
ANION GAP SERPL CALCULATED.3IONS-SCNC: 6 MMOL/L (ref 3–14)
AST SERPL W P-5'-P-CCNC: 13 U/L (ref 0–45)
BILIRUB DIRECT SERPL-MCNC: 0.1 MG/DL (ref 0–0.2)
BILIRUB SERPL-MCNC: 0.4 MG/DL (ref 0.2–1.3)
BUN SERPL-MCNC: 13 MG/DL (ref 7–30)
CALCIUM SERPL-MCNC: 9.3 MG/DL (ref 8.5–10.1)
CHLORIDE SERPL-SCNC: 105 MMOL/L (ref 94–109)
CO2 SERPL-SCNC: 28 MMOL/L (ref 20–32)
CREAT SERPL-MCNC: 0.66 MG/DL (ref 0.52–1.04)
DEPRECATED CALCIDIOL+CALCIFEROL SERPL-MC: 24 UG/L (ref 20–75)
FERRITIN SERPL-MCNC: 64 NG/ML (ref 8–252)
GFR SERPL CREATININE-BSD FRML MDRD: NORMAL ML/MIN/1.7M2
GLUCOSE SERPL-MCNC: 89 MG/DL (ref 70–99)
HAV IGG SER QL IA: NORMAL
HBV CORE AB SERPL QL IA: NONREACTIVE
HBV SURFACE AB SERPL IA-ACNC: 8.61 M[IU]/ML
HBV SURFACE AG SERPL QL IA: NONREACTIVE
IRON SATN MFR SERPL: 33 % (ref 15–46)
IRON SERPL-MCNC: 107 UG/DL (ref 35–180)
POTASSIUM SERPL-SCNC: 3.8 MMOL/L (ref 3.4–5.3)
PROT SERPL-MCNC: 7.1 G/DL (ref 6.8–8.8)
SODIUM SERPL-SCNC: 139 MMOL/L (ref 133–144)
TIBC SERPL-MCNC: 328 UG/DL (ref 240–430)

## 2017-04-12 LAB
M TB TUBERC IFN-G BLD QL: NEGATIVE
M TB TUBERC IFN-G/MITOGEN IGNF BLD: 0.22 IU/ML
TTG IGA SER-ACNC: 1 U/ML
TTG IGG SER-ACNC: NORMAL U/ML

## 2017-04-13 ENCOUNTER — TELEPHONE (OUTPATIENT)
Dept: GASTROENTEROLOGY | Facility: OUTPATIENT CENTER | Age: 57
End: 2017-04-13

## 2017-04-14 ENCOUNTER — CARE COORDINATION (OUTPATIENT)
Dept: GASTROENTEROLOGY | Facility: CLINIC | Age: 57
End: 2017-04-14

## 2017-04-14 DIAGNOSIS — K21.9 GASTROESOPHAGEAL REFLUX DISEASE, ESOPHAGITIS PRESENCE NOT SPECIFIED: ICD-10-CM

## 2017-04-14 DIAGNOSIS — K21.9 GASTROESOPHAGEAL REFLUX DISEASE WITHOUT ESOPHAGITIS: Primary | ICD-10-CM

## 2017-04-14 RX ORDER — NICOTINE POLACRILEX 4 MG/1
40 GUM, CHEWING ORAL DAILY
Qty: 180 TABLET | Refills: 3 | Status: SHIPPED | OUTPATIENT
Start: 2017-04-14 | End: 2018-07-05

## 2017-04-14 NOTE — PROGRESS NOTES
Rudy Morfin MD Plumhoff, Laura, RN                   OK - continue to monitor symptoms for now, call on-call Fellow over the weekend if more acute symptoms develop.            Previous Messages       ----- Message -----      From: Yaquelin Berry, RN      Sent: 4/14/2017  10:04 AM        To: Rudy Morfin MD   Subject: RE: Worsening symptoms                           EGD 4/18 and MRI 4/21.     ----- Message -----      From: Rudy Morfin MD      Sent: 4/14/2017   9:59 AM        To: Yaquelin Berry RN   Subject: RE: Worsening symptoms                           Should resume omeprazole for now - when is she scheduled for her EGD and MRI?     Rudy

## 2017-04-14 NOTE — PROGRESS NOTES
Patient called to report Nausea, abdominal pain, diarrhea, and fatigue.    PCP discontinued Prilosec 40mg daily prior to colonoscopy the of March. Since then reports having burning sensation after eating, along with nausea.     In addition, she woke up around 2am with worsening abdominal pain accompanied by loose stools. After bowel movement, pain improves but this cycle has continued throughout the morning. 5 loose stools so far. No blood. She is also feeling fatigued.     Will update MD

## 2017-04-15 LAB — LAB SCANNED RESULT: NORMAL

## 2017-04-18 ENCOUNTER — DOCUMENTATION ONLY (OUTPATIENT)
Dept: GASTROENTEROLOGY | Facility: OUTPATIENT CENTER | Age: 57
End: 2017-04-18

## 2017-04-18 ENCOUNTER — TRANSFERRED RECORDS (OUTPATIENT)
Dept: HEALTH INFORMATION MANAGEMENT | Facility: CLINIC | Age: 57
End: 2017-04-18

## 2017-04-18 PROBLEM — K50.018 CROHN'S DISEASE OF SMALL INTESTINE WITH OTHER COMPLICATION (H): Status: ACTIVE | Noted: 2017-04-18

## 2017-04-18 PROBLEM — Z87.2 HISTORY OF HIDRADENITIS SUPPURATIVA: Status: ACTIVE | Noted: 2017-04-18

## 2017-04-18 PROBLEM — L43.9 LICHEN PLANUS: Status: ACTIVE | Noted: 2017-04-18

## 2017-04-20 ENCOUNTER — TELEPHONE (OUTPATIENT)
Dept: GASTROENTEROLOGY | Facility: CLINIC | Age: 57
End: 2017-04-20

## 2017-04-20 DIAGNOSIS — K50.118 CROHN'S DISEASE OF LARGE INTESTINE WITH OTHER COMPLICATION (H): Primary | ICD-10-CM

## 2017-04-21 ENCOUNTER — HOSPITAL ENCOUNTER (OUTPATIENT)
Dept: GENERAL RADIOLOGY | Facility: CLINIC | Age: 57
End: 2017-04-21
Attending: INTERNAL MEDICINE
Payer: COMMERCIAL

## 2017-04-21 ENCOUNTER — HOSPITAL ENCOUNTER (OUTPATIENT)
Dept: MRI IMAGING | Facility: CLINIC | Age: 57
Discharge: HOME | End: 2017-04-21
Attending: INTERNAL MEDICINE | Admitting: INTERNAL MEDICINE
Payer: COMMERCIAL

## 2017-04-21 DIAGNOSIS — K50.018 CROHN'S DISEASE OF SMALL INTESTINE WITH OTHER COMPLICATION (H): ICD-10-CM

## 2017-04-21 LAB — COPATH REPORT: NORMAL

## 2017-04-21 PROCEDURE — 25500064 ZZH RX 255 OP 636: Performed by: INTERNAL MEDICINE

## 2017-04-21 PROCEDURE — A9585 GADOBUTROL INJECTION: HCPCS | Performed by: INTERNAL MEDICINE

## 2017-04-21 PROCEDURE — 25000128 H RX IP 250 OP 636: Performed by: INTERNAL MEDICINE

## 2017-04-21 PROCEDURE — 72197 MRI PELVIS W/O & W/DYE: CPT

## 2017-04-21 PROCEDURE — 71020 XR CHEST 2 VW: CPT

## 2017-04-21 PROCEDURE — 25000132 ZZH RX MED GY IP 250 OP 250 PS 637: Performed by: RADIOLOGY

## 2017-04-21 RX ORDER — HYOSCYAMINE SULFATE 0.125 MG
125 TABLET ORAL ONCE
Status: COMPLETED | OUTPATIENT
Start: 2017-04-21 | End: 2017-04-21

## 2017-04-21 RX ORDER — GADOBUTROL 604.72 MG/ML
7.5 INJECTION INTRAVENOUS ONCE
Status: COMPLETED | OUTPATIENT
Start: 2017-04-21 | End: 2017-04-21

## 2017-04-21 RX ADMIN — SODIUM CHLORIDE 30 ML: 9 INJECTION, SOLUTION INTRAVENOUS at 08:56

## 2017-04-21 RX ADMIN — HYOSCYAMINE SULFATE 125 MCG: 0.12 TABLET ORAL at 08:58

## 2017-04-21 RX ADMIN — GADOBUTROL 5.7 ML: 604.72 INJECTION INTRAVENOUS at 08:55

## 2017-04-26 ASSESSMENT — ENCOUNTER SYMPTOMS
VOMITING: 0
INCREASED ENERGY: 0
BLOOD IN STOOL: 0
ALTERED TEMPERATURE REGULATION: 0
JAUNDICE: 0
DIARRHEA: 1
NAUSEA: 1
DECREASED APPETITE: 0
SINUS CONGESTION: 1
WEIGHT GAIN: 0
POLYDIPSIA: 0
CHILLS: 0
TROUBLE SWALLOWING: 0
FATIGUE: 1
BLOATING: 0
POLYPHAGIA: 0
HOARSE VOICE: 0
SINUS PAIN: 0
BOWEL INCONTINENCE: 0
NIGHT SWEATS: 1
EYE PAIN: 0
EYE WATERING: 0
FEVER: 0
CONSTIPATION: 0
SORE THROAT: 0
EYE REDNESS: 0
TASTE DISTURBANCE: 0
HEARTBURN: 1
HALLUCINATIONS: 0
EYE IRRITATION: 0
SMELL DISTURBANCE: 0
RECTAL BLEEDING: 0
ABDOMINAL PAIN: 1
WEIGHT LOSS: 0
DOUBLE VISION: 0
NECK MASS: 0
RECTAL PAIN: 0

## 2017-04-27 ENCOUNTER — TELEPHONE (OUTPATIENT)
Dept: GASTROENTEROLOGY | Facility: CLINIC | Age: 57
End: 2017-04-27

## 2017-05-01 ENCOUNTER — OFFICE VISIT (OUTPATIENT)
Dept: GASTROENTEROLOGY | Facility: CLINIC | Age: 57
End: 2017-05-01

## 2017-05-01 VITALS
BODY MASS INDEX: 22.5 KG/M2 | OXYGEN SATURATION: 95 % | SYSTOLIC BLOOD PRESSURE: 119 MMHG | HEIGHT: 63 IN | WEIGHT: 127 LBS | HEART RATE: 90 BPM | DIASTOLIC BLOOD PRESSURE: 77 MMHG

## 2017-05-01 DIAGNOSIS — K50.018 CROHN'S DISEASE OF SMALL INTESTINE WITH OTHER COMPLICATION (H): Primary | ICD-10-CM

## 2017-05-01 RX ORDER — BUDESONIDE 3 MG/1
9 CAPSULE, COATED PELLETS ORAL EVERY MORNING
Qty: 270 CAPSULE | Refills: 3 | Status: SHIPPED | OUTPATIENT
Start: 2017-05-01 | End: 2018-02-23

## 2017-05-01 RX ORDER — MESALAMINE 1.2 G/1
2400 TABLET, DELAYED RELEASE ORAL EVERY MORNING
Qty: 180 TABLET | Refills: 3 | Status: SHIPPED | OUTPATIENT
Start: 2017-05-01 | End: 2018-05-17

## 2017-05-01 ASSESSMENT — PAIN SCALES - GENERAL: PAINLEVEL: NO PAIN (0)

## 2017-05-01 NOTE — MR AVS SNAPSHOT
After Visit Summary   5/1/2017    Aileen Priest    MRN: [id number]           Patient Information     Date Of Birth          1960        Visit Information        Provider Department      5/1/2017 1:00 PM Rudy Morfin MD [facility] Gastroenterology and IBD        Today's Diagnoses     Crohn's disease of small intestine with other complication (H)    -  1      Care Instructions    I've included a brief summary of our discussion and care plan from today's visit below.  Please review this information with your primary care provider.  _______________________________________________________________________      1. It was a pleasure getting a chance to meet with you to discuss the new diagnosis of ileal-limited Crohn's disease, as well as the available options for treatment given the unique situation with very focal disease - excellent background nutritional/inflammatory markers to start, no evidence of more extensive disease by recent MR Enterography (reviewed results today).  - Recommend moving forward with Entocort (budesonide) 9mg daily as a trial induction agent in the setting of clinically-mild ileal Crohn's disease. Will plan to taper you off of this slowly once your mesalamine agent is effectively on-board.  - Recommend starting Lialda 2.4g daily as a trial maintenance agent for your clinically-mild ileal Crohn's disease. Would advise starting this ~2-3 weeks after start the budesonide given the small risk for a hypersensitivity reaction (contact us ASAP should this occur).  - Discussed the medication risks/benefits/alternatives with you today, including the short-term and long-term medication side effects and need for periodic monitoring if necessary. Will readdress role for immunosuppressive options depending on your ongoing clinical course.  - Discussed Preventive Care in IBD today, recommend arranging a DEXA scan through your PCP next week.     2. Recommend having the following studies  checked at least 1-2 times/year for disease and medication safety monitoring: BMP, LFT, CBC with differential, ESR/CRP.    3. Will readdress timing for follow-up colonoscopy to confirm ileal mucosal healing at the next visit.  - Will also readdress your GERD + Dyspepsia and role for chronic low-dose PPI (or other alternatives) in your ongoing care. Continue the dietary/lifestyle modifications for Chronic GERD + Dyspepsia as reviewed today.    4. Recommend contacting the Patient Assistance Program for [place] (discussed today) to see if they can help with your costs associated with the medication.    5. Continue to monitor for the danger signs/symptoms we reviewed together today:  worsening abdominal pain, worsening diarrhea, obstructive symptoms (nausea/vomiting, abdominal distention, inability to pass stool/flatus), blood mixed into stools, persistent fevers/chills, progressive anemia (particulary with iron deficiency), unexpected weight loss, etc.  - Contact us via [employer] should these symptoms occur, particularly as we may advise further evaluation accordingly.    6. Return to GI Clinic with me in 3-4 months to review your progress, sooner if symptomatic.   - If you are unable to schedule this follow-up appointment today, please contact Bethany Lyons at [phone] within the next week to help set up this necessary appointment.    PREVENTIVE CARE IN INFLAMMATORY BOWEL DISEASE    - Strongly recommend tobacco abstinence.    - Recommend considering daily calcium + Vitamin D supplementation.    - Recommend age-appropriate cancer surveillance:  - Yearly Dermatology visit for visual skin inspection if immunosuppressed, monthly skin self-check after bathing.  - Dysplasia surveillance colonoscopy every 1-2 years in patients with Crohn's colitis or ulcerative colitis >8-10 years since diagnosis.  - (For men and women ages 9-26) Consideration for HPV vaccination, should be discussed with your PCP further if you feel you'd  like to pursue this.  - (For women) Annual Pap smears if immunosuppressed, mammogram when deemed appropriate by your PCP.    - Recommend follow-up with your PCP to ensure the following vaccinations have been updated: Hepatitis A/B, Tdap, Pneumococcal pneumonia, yearly flu SHOT, Meningococcal meningitis (if college-age), HPV (all aged 18-26), etc.  - Would also recommend VZV and MMR titers be checked to confirm immunity.  - Live/attenuated vaccines (such as the INTRANASAL flu vaccine, MMR, Yellow Fever, or Varicella/VZV vaccine) should not be administered to immunosuppressed patients, except in very specific instances which you should discuss with a GI and Infectious Disease provider first.    - Family planning:  If you or your partner are planning to become pregnant, please schedule time with us to discuss issues surrounding IBD and Fertility/Pregnancy.      _______________________________________________________________________    It was a pleasure seeing you in clinic today - please be in touch if there are any further questions that arise following today's visit.  During business hours, you may reach Clinic Nurse Triage Line at [phone].  For urgent/emergent questions after business hours, you may reach the on-call GI Fellow by contacting the [facility]  at [phone].    Any benign/non-urgent test results are usually communicated via letter or Spritzhart message within 1-2 weeks after completion.  Urgent results (those that require a change in the previously-discussed care plan) are usually communicated via a phone call once available from our clinic staff to discuss the results and the next steps in your evaluation.    I recommend signing up for [employer] access if you have not already done so and are comfortable with using a computer.  This allows for online access to your lab results and also helps you communicate efficiently with my clinic should any questions arise in your care.    We  have Financial Counseling services available through our clinic.  If you have questions about your insurance coverage or payment responsibilities, particularly before you undergo any tests or procedures, please let us know and we can arrange a consultation accordingly to help you make informed decisions about your healthcare.    Sincerely,    Rudy Morfin MD    [facility] - Department of Medicine  Division of Gastroenterology          Follow-ups after your visit        Follow-up notes from your care team     Return in about 3 months (around 8/1/2017).      Your next 10 appointments already scheduled     May 08, 2017  3:45 PM CDT   Office Visit with Bucky Hunter MD   [facility] ([facility])    [address] [place] [id number] [phone]           Bring a current list of meds and any records pertaining to this visit.  For Physicals, please bring immunization records and any forms needing to be filled out.  Please arrive 10 minutes early to complete paperwork.              Who to contact     Please call your clinic at [phone] to:    Ask questions about your health    Make or cancel appointments    Discuss your medicines    Learn about your test results    Speak to your doctor   If you have compliments or concerns about an experience at your clinic, or if you wish to file a complaint, please contact [facility] Patient Relations at [phone] or email us at [email or web address]         Additional Information About Your Visit        MyChart Information     [employer] gives you secure access to your electronic health record. If you see a primary care provider, you can also send messages to your care team and make appointments. If you have questions, please call your primary care clinic.  If you do not have a primary care provider, please call [phone] and they will  "assist you.      [employer] is an electronic gateway that provides easy, online access to your medical records. With [employer], you can request a clinic appointment, read your test results, renew a prescription or communicate with your care team.     To access your existing account, please contact your [facility] or call [phone] for assistance.        Care EveryWhere ID     This is your Care EveryWhere ID. This could be used by other organizations to access your [facility] medical records  [id number]        Your Vitals Were     Pulse Height Pulse Oximetry BMI (Body Mass Index)          90 1.6 m (5' 3\") 95% 22.5 kg/m2         Blood Pressure from Last 3 Encounters:   05/01/17 119/77   04/06/17 119/81   02/28/17 118/72    Weight from Last 3 Encounters:   05/01/17 57.6 kg (127 lb)   04/06/17 57.6 kg (127 lb)   02/28/17 59 kg (130 lb)              Today, you had the following     No orders found for display         Today's Medication Changes          These changes are accurate as of: 5/1/17  1:51 PM.  If you have any questions, ask your nurse or doctor.               Start taking these medicines.        Dose/Directions    budesonide 3 MG EC capsule   Commonly known as:  ENTOCORT EC   Used for:  Crohn's disease of small intestine with other complication (H)   Started by:  Rudy Morfin MD        Dose:  9 mg   Take 3 capsules (9 mg) by mouth every morning   Quantity:  270 capsule   Refills:  3       mesalamine 1.2 G EC tablet   Commonly known as:  LIALDA   Used for:  Crohn's disease of small intestine with other complication (H)   Started by:  Rudy Morfin MD        Dose:  2400 mg   Take 2 tablets (2,400 mg) by mouth every morning   Quantity:  180 tablet   Refills:  3            Where to get your medicines      These medications were sent to [facility] [place], [place] - [address]  [address] [address]     Phone:  [phone]     budesonide " 3 MG EC capsule    mesalamine 1.2 G EC tablet                Primary Care Provider Office Phone # Fax #    Bucky Marlo Hunter [phone]       [facility] [address] [address]        Thank you!     Thank you for choosing [facility] GASTROENTEROLOGY AND IBD  for your care. Our goal is always to provide you with excellent care. Hearing back from our patients is one way we can continue to improve our services. Please take a few minutes to complete the written survey that you may receive in the mail after your visit with us. Thank you!             Your Updated Medication List - Protect others around you: Learn how to safely use, store and throw away your medicines at www.disposemymeds.org.          This list is accurate as of: 5/1/17  1:51 PM.  Always use your most recent med list.                   Brand Name Dispense Instructions for use    budesonide 3 MG EC capsule    ENTOCORT EC    270 capsule    Take 3 capsules (9 mg) by mouth every morning       [facility] PO          dexamethasone 0.1 MG/ML solution      Take  by mouth daily.       lisinopril 10 MG tablet    PRINIVIL/ZESTRIL    90 tablet    Take 1 tablet (10 mg) by mouth daily       mesalamine 1.2 G EC tablet    LIALDA    180 tablet    Take 2 tablets (2,400 mg) by mouth every morning       MULTIVITAMIN ADULT PO      Take 1 capsule by mouth daily       omeprazole 20 MG tablet     180 tablet    Take 2 tablets (40 mg) by mouth daily       ranitidine 300 MG tablet    ZANTAC    90 tablet    Take 1 tablet (300 mg) by mouth At Bedtime       VITAMIN D PO      Take 2,000 Units by mouth daily

## 2017-05-01 NOTE — PROGRESS NOTES
GI CLINIC VISIT    CC/REFERRING PROVIDER: Bucky Hunter  REASON FOR CONSULTATION: Crohn's disease    HPI: 56 year old female w/ h/o ileal Crohn's disease per 3/2017 ileocolonoscopy biopsies, Lichen Planus (oral/perianal involvement, in remission), Hidradenitis Suppurativa s/p excision/drainage 1975, prior presumed diagnosis of Collagenous Colitis per colonic biopsies 2006 (though suspecting evolving IBD presentation in retrospect following 3/2017 highly-suggestive biopsies of terminal ileum + bland colonic biopsies), presumed GERD (off PPI), lumbar spine surgery 2005, periorbital shingles 2015, chronic tobacco abuse (in remission), among other medical issues - presenting for f/u Crohn's disease. Reports having 3-4 variable consistency stools/day w/o blood (baseline), +intermittent marked urgency/insecurity passing flatus, +nocturnal loose stools assoc w/ abd discomfort as well (unchanged from baseline, not yet on therapy). No fecal incontinence or new perianal sxs noted. Denies any N/V/F/C/HA/NS or other const/syst/cardiopulmonary sxs, no BRBPR/melena/urinary changes, no unintentional wt loss or appetite/satiety changes. No other bowel/bladder habit changes, no dysphagia/odynophagia. No jaundice/icterus/pruritus, no acholic stools/steatorrhea, no new lumps/bumps, no jt pain/rash/eye sxs noted. +intermittent oral ulcers (baseline).    Extensive d/w pt and family today re: non-immunosuppressive and immunosuppressive therapies available for Crohn's disease, including role for enteric vs. systemic steroid therapy (usually short-term if at all possible).    ROS: 10pt ROS performed and otherwise negative.    PERTINENT PAST MEDICAL/SURGICAL HISTORY:  As noted above.    PERTINENT MEDICATIONS:  - omeprazole 20mg PO QDAY  Medications reviewed with patient today, see Medication List/Assessment for details.  No other NSAID/anticoagulation reported by patient.  No other OTC/herbal/supplements reported by  patient.    SOCIAL HISTORY: Tobacco: none.    PHYSICAL EXAMINATION:  Vitals reviewed, AFVSS  Wt 127# today (stable)  Gen: aaox3, cooperative, pleasant, not diaphoretic, nad  HEENT: ncat, neck supple, no clad/sclad, normal op w/o ulcer/exudate, anicteric, mmm  Resp/CV without acute findings, not dyspneic/tachycardic  Abd: +nabs, soft, nt, nd, no peritoneal s/s noted  Ext: no c/c/e  Skin: warm, perfused, no jaundice  Neuro: grossly intact, no asterixis noted    PERTINENT STUDIES:  Q-Gold TB negative 4/10/17  HAV Ab negative  HBsAb positive (c/w prior vax), sAg/cAb negative  HCV Ab negative  TPMT 24.5 (normal range)    MR Enterography 4/21/17  FINDINGS: There is mild bowel wall thickening and enhancement of a  short segment of terminal ileum approximately 3 cm in length (5-minute  postcontrast coronal series 17 image 24). There is no evidence for  bowel obstruction and no other abnormal areas of enhancement  identified. No evidence for abscess. There is no fistula identified.     No worrisome focal liver lesions identified. Normal-appearing spleen,  pancreas, adrenal glands, gallbladder and kidneys.     No periaortic or pelvic adenopathy or free fluid.         IMPRESSION: Very short segment of terminal ileum approximately 3 cm  with mild wall thickening and enhancement compatible with active  Crohn's. No evidence for bowel obstruction or abscess or fistula.    ASSESSMENT/PLAN:    1. Ileal Crohn's disease with focal inflammatory extent per MR Enterography 4/2017, noted in the absence of marked nutritional deficiencies but with active IBD symptoms - reviewed available medical treatment options, will move forward with trial of 5-ASA + enteric steroid with close monitoring of response given focally-limited disease per patient preference (not unreasonable given focality of disease)  1. It was a pleasure getting a chance to meet with you to discuss the new diagnosis of ileal-limited Crohn's disease, as well as the available  options for treatment given the unique situation with very focal disease - excellent background nutritional/inflammatory markers to start, no evidence of more extensive disease by recent MR Enterography (reviewed results today).  - Recommend moving forward with Entocort (budesonide) 9mg daily as a trial induction agent in the setting of clinically-mild ileal Crohn's disease. Will plan to taper you off of this slowly once your mesalamine agent is effectively on-board.  - Recommend starting Lialda 2.4g daily as a trial maintenance agent for your clinically-mild ileal Crohn's disease. Would advise starting this ~2-3 weeks after start the budesonide given the small risk for a hypersensitivity reaction (contact us ASAP should this occur).  - Discussed the medication risks/benefits/alternatives with you today, including the short-term and long-term medication side effects and need for periodic monitoring if necessary. Will readdress role for immunosuppressive options depending on your ongoing clinical course.  - Discussed Preventive Care in IBD today, recommend arranging a DEXA scan through your PCP next week.     2. Recommend having the following studies checked at least 1-2 times/year for disease and medication safety monitoring: BMP, LFT, CBC with differential, ESR/CRP.    3. Will readdress timing for follow-up colonoscopy to confirm ileal mucosal healing at the next visit.  - Will also readdress your GERD + Dyspepsia and role for chronic low-dose PPI (or other alternatives) in your ongoing care. Continue the dietary/lifestyle modifications for Chronic GERD + Dyspepsia as reviewed today.    4. Recommend contacting the Patient Assistance Program for Lialda (discussed today) to see if they can help with your costs associated with the medication.    5. Continue to monitor for the danger signs/symptoms we reviewed together today: worsening abdominal pain, worsening diarrhea, obstructive symptoms (nausea/vomiting, abdominal  distention, inability to pass stool/flatus), blood mixed into stools, persistent fevers/chills, progressive anemia (particulary with iron deficiency), unexpected weight loss, etc.  - Contact us via MyChart should these symptoms occur, particularly as we may advise further evaluation accordingly.    2. Colorectal Cancer Screening  No PSC or known FH CRC, will readdress once acute issues have stabilized.    RTC 3-4 months, sooner if symptomatic.      Thank you for this consultation. It was a pleasure to participate in the care of this patient; please contact us with any further questions.     Rudy Morfin MD   of Medicine  [facility] - Department of Medicine  Division of Gastroenterology

## 2017-05-01 NOTE — LETTER
5/1/2017       RE: Aileen Priest  [address]  [place] [address]     Dear Colleague,    Thank you for referring your patient, Aileen Priest, to the [facility] GASTROENTEROLOGY AND IBD at [facility]. Please see a copy of my visit note below.    GI CLINIC VISIT    CC/REFERRING PROVIDER: Bucky Hunter  REASON FOR CONSULTATION: Crohn's disease    HPI: 56 year old female w/ h/o ileal Crohn's disease per 3/2017 ileocolonoscopy biopsies, Lichen Planus (oral/perianal involvement, in remission), Hidradenitis Suppurativa s/p excision/drainage 1975, prior presumed diagnosis of Collagenous Colitis per colonic biopsies 2006 (though suspecting evolving IBD presentation in retrospect following 3/2017 highly-suggestive biopsies of terminal ileum + bland colonic biopsies), presumed GERD (off PPI), lumbar spine surgery 2005, periorbital shingles 2015, chronic tobacco abuse (in remission), among other medical issues - presenting for f/u Crohn's disease. Reports having 3-4 variable consistency stools/day w/o blood (baseline), +intermittent marked urgency/insecurity passing flatus, +nocturnal loose stools assoc w/ abd discomfort as well (unchanged from baseline, not yet on therapy). No fecal incontinence or new perianal sxs noted. Denies any N/V/F/C/HA/NS or other const/syst/cardiopulmonary sxs, no BRBPR/melena/urinary changes, no unintentional wt loss or appetite/satiety changes. No other bowel/bladder habit changes, no dysphagia/odynophagia. No jaundice/icterus/pruritus, no acholic stools/steatorrhea, no new lumps/bumps, no jt pain/rash/eye sxs noted. +intermittent oral ulcers (baseline).    Extensive d/w pt and family today re: non-immunosuppressive and immunosuppressive therapies available for Crohn's disease, including role for enteric vs. systemic steroid therapy (usually short-term if at all possible).    ROS: 10pt ROS performed and otherwise negative.    PERTINENT PAST  MEDICAL/SURGICAL HISTORY:  As noted above.    PERTINENT MEDICATIONS:  - omeprazole 20mg PO QDAY  Medications reviewed with patient today, see Medication List/Assessment for details.  No other NSAID/anticoagulation reported by patient.  No other OTC/herbal/supplements reported by patient.    SOCIAL HISTORY: Tobacco: none.    PHYSICAL EXAMINATION:  Vitals reviewed, AFVSS  Wt 127# today (stable)  Gen: aaox3, cooperative, pleasant, not diaphoretic, nad  HEENT: ncat, neck supple, no clad/sclad, normal op w/o ulcer/exudate, anicteric, mmm  Resp/CV without acute findings, not dyspneic/tachycardic  Abd: +nabs, soft, nt, nd, no peritoneal s/s noted  Ext: no c/c/e  Skin: warm, perfused, no jaundice  Neuro: grossly intact, no asterixis noted    PERTINENT STUDIES:  Q-Gold TB negative 4/10/17  HAV Ab negative  HBsAb positive (c/w prior vax), sAg/cAb negative  HCV Ab negative  TPMT 24.5 (normal range)    MR Enterography 4/21/17  FINDINGS: There is mild bowel wall thickening and enhancement of a  short segment of terminal ileum approximately 3 cm in length (5-minute  postcontrast coronal series 17 image 24). There is no evidence for  bowel obstruction and no other abnormal areas of enhancement  identified. No evidence for abscess. There is no fistula identified.     No worrisome focal liver lesions identified. Normal-appearing spleen,  pancreas, adrenal glands, gallbladder and kidneys.     No periaortic or pelvic adenopathy or free fluid.         IMPRESSION: Very short segment of terminal ileum approximately 3 cm  with mild wall thickening and enhancement compatible with active  Crohn's. No evidence for bowel obstruction or abscess or fistula.    ASSESSMENT/PLAN:    1. Ileal Crohn's disease with focal inflammatory extent per MR Enterography 4/2017, noted in the absence of marked nutritional deficiencies but with active IBD symptoms - reviewed available medical treatment options, will move forward with trial of 5-ASA + enteric  steroid with close monitoring of response given focally-limited disease per patient preference (not unreasonable given focality of disease)  1. It was a pleasure getting a chance to meet with you to discuss the new diagnosis of ileal-limited Crohn's disease, as well as the available options for treatment given the unique situation with very focal disease - excellent background nutritional/inflammatory markers to start, no evidence of more extensive disease by recent MR Enterography (reviewed results today).  - Recommend moving forward with Entocort (budesonide) 9mg daily as a trial induction agent in the setting of clinically-mild ileal Crohn's disease. Will plan to taper you off of this slowly once your mesalamine agent is effectively on-board.  - Recommend starting Lialda 2.4g daily as a trial maintenance agent for your clinically-mild ileal Crohn's disease. Would advise starting this ~2-3 weeks after start the budesonide given the small risk for a hypersensitivity reaction (contact us ASAP should this occur).  - Discussed the medication risks/benefits/alternatives with you today, including the short-term and long-term medication side effects and need for periodic monitoring if necessary. Will readdress role for immunosuppressive options depending on your ongoing clinical course.  - Discussed Preventive Care in IBD today, recommend arranging a DEXA scan through your PCP next week.     2. Recommend having the following studies checked at least 1-2 times/year for disease and medication safety monitoring: BMP, LFT, CBC with differential, ESR/CRP.    3. Will readdress timing for follow-up colonoscopy to confirm ileal mucosal healing at the next visit.  - Will also readdress your GERD + Dyspepsia and role for chronic low-dose PPI (or other alternatives) in your ongoing care. Continue the dietary/lifestyle modifications for Chronic GERD + Dyspepsia as reviewed today.    4. Recommend contacting the Patient Assistance  Program for Lialda (discussed today) to see if they can help with your costs associated with the medication.    5. Continue to monitor for the danger signs/symptoms we reviewed together today: worsening abdominal pain, worsening diarrhea, obstructive symptoms (nausea/vomiting, abdominal distention, inability to pass stool/flatus), blood mixed into stools, persistent fevers/chills, progressive anemia (particulary with iron deficiency), unexpected weight loss, etc.  - Contact us via MyChart should these symptoms occur, particularly as we may advise further evaluation accordingly.    2. Colorectal Cancer Screening  No PSC or known FH CRC, will readdress once acute issues have stabilized.    RTC 3-4 months, sooner if symptomatic.      Thank you for this consultation. It was a pleasure to participate in the care of this patient; please contact us with any further questions.     Rudy Morfin MD   of Medicine  [facility] - Department of Medicine  Division of Gastroenterology

## 2017-05-01 NOTE — PATIENT INSTRUCTIONS
I've included a brief summary of our discussion and care plan from today's visit below.  Please review this information with your primary care provider.  _______________________________________________________________________      1. It was a pleasure getting a chance to meet with you to discuss the new diagnosis of ileal-limited Crohn's disease, as well as the available options for treatment given the unique situation with very focal disease - excellent background nutritional/inflammatory markers to start, no evidence of more extensive disease by recent MR Enterography (reviewed results today).  - Recommend moving forward with Entocort (budesonide) 9mg daily as a trial induction agent in the setting of clinically-mild ileal Crohn's disease. Will plan to taper you off of this slowly once your mesalamine agent is effectively on-board.  - Recommend starting Lialda 2.4g daily as a trial maintenance agent for your clinically-mild ileal Crohn's disease. Would advise starting this ~2-3 weeks after start the budesonide given the small risk for a hypersensitivity reaction (contact us ASAP should this occur).  - Discussed the medication risks/benefits/alternatives with you today, including the short-term and long-term medication side effects and need for periodic monitoring if necessary. Will readdress role for immunosuppressive options depending on your ongoing clinical course.  - Discussed Preventive Care in IBD today, recommend arranging a DEXA scan through your PCP next week.     2. Recommend having the following studies checked at least 1-2 times/year for disease and medication safety monitoring: BMP, LFT, CBC with differential, ESR/CRP.    3. Will readdress timing for follow-up colonoscopy to confirm ileal mucosal healing at the next visit.  - Will also readdress your GERD + Dyspepsia and role for chronic low-dose PPI (or other alternatives) in your ongoing care. Continue the dietary/lifestyle modifications for Chronic  GERD + Dyspepsia as reviewed today.    4. Recommend contacting the Patient Assistance Program for Lialda (discussed today) to see if they can help with your costs associated with the medication.    5. Continue to monitor for the danger signs/symptoms we reviewed together today:  worsening abdominal pain, worsening diarrhea, obstructive symptoms (nausea/vomiting, abdominal distention, inability to pass stool/flatus), blood mixed into stools, persistent fevers/chills, progressive anemia (particulary with iron deficiency), unexpected weight loss, etc.  - Contact us via Wantstert should these symptoms occur, particularly as we may advise further evaluation accordingly.    6. Return to GI Clinic with me in 3-4 months to review your progress, sooner if symptomatic.   - If you are unable to schedule this follow-up appointment today, please contact Bethany Lyons at [phone] within the next week to help set up this necessary appointment.    PREVENTIVE CARE IN INFLAMMATORY BOWEL DISEASE    - Strongly recommend tobacco abstinence.    - Recommend considering daily calcium + Vitamin D supplementation.    - Recommend age-appropriate cancer surveillance:  - Yearly Dermatology visit for visual skin inspection if immunosuppressed, monthly skin self-check after bathing.  - Dysplasia surveillance colonoscopy every 1-2 years in patients with Crohn's colitis or ulcerative colitis >8-10 years since diagnosis.  - (For men and women ages 9-26) Consideration for HPV vaccination, should be discussed with your PCP further if you feel you'd like to pursue this.  - (For women) Annual Pap smears if immunosuppressed, mammogram when deemed appropriate by your PCP.    - Recommend follow-up with your PCP to ensure the following vaccinations have been updated: Hepatitis A/B, Tdap, Pneumococcal pneumonia, yearly flu SHOT, Meningococcal meningitis (if college-age), HPV (all aged 18-26), etc.  - Would also recommend VZV and MMR titers be checked to  confirm immunity.  - Live/attenuated vaccines (such as the INTRANASAL flu vaccine, MMR, Yellow Fever, or Varicella/VZV vaccine) should not be administered to immunosuppressed patients, except in very specific instances which you should discuss with a GI and Infectious Disease provider first.    - Family planning:  If you or your partner are planning to become pregnant, please schedule time with us to discuss issues surrounding IBD and Fertility/Pregnancy.      _______________________________________________________________________    It was a pleasure seeing you in clinic today - please be in touch if there are any further questions that arise following today's visit.  During business hours, you may reach Clinic Nurse Triage Line at [phone].  For urgent/emergent questions after business hours, you may reach the on-call GI Fellow by contacting the [facility]  at [phone].    Any benign/non-urgent test results are usually communicated via letter or CADFORCEt message within 1-2 weeks after completion.  Urgent results (those that require a change in the previously-discussed care plan) are usually communicated via a phone call once available from our clinic staff to discuss the results and the next steps in your evaluation.    I recommend signing up for [employer] access if you have not already done so and are comfortable with using a computer.  This allows for online access to your lab results and also helps you communicate efficiently with my clinic should any questions arise in your care.    We have Financial Counseling services available through our clinic.  If you have questions about your insurance coverage or payment responsibilities, particularly before you undergo any tests or procedures, please let us know and we can arrange a consultation accordingly to help you make informed decisions about your healthcare.    Sincerely,    Rudy oMrfin MD    [place]   [place] - Department of Medicine  Division of Gastroenterology

## 2017-05-01 NOTE — NURSING NOTE
"Chief Complaint   Patient presents with     RECHECK     Denies C/O       Vitals:    05/01/17 1253   BP: 119/77   Pulse: 90   SpO2: 95%   Weight: 57.6 kg (127 lb)   Height: 1.6 m (5' 3\")       Body mass index is 22.5 kg/(m^2).                         "

## 2017-05-08 ENCOUNTER — OFFICE VISIT (OUTPATIENT)
Dept: FAMILY MEDICINE | Facility: CLINIC | Age: 57
End: 2017-05-08
Payer: COMMERCIAL

## 2017-05-08 VITALS
DIASTOLIC BLOOD PRESSURE: 74 MMHG | HEART RATE: 74 BPM | TEMPERATURE: 98 F | OXYGEN SATURATION: 97 % | BODY MASS INDEX: 22.18 KG/M2 | SYSTOLIC BLOOD PRESSURE: 122 MMHG | WEIGHT: 125.2 LBS

## 2017-05-08 DIAGNOSIS — I10 HYPERTENSION, GOAL BELOW 140/90: Primary | ICD-10-CM

## 2017-05-08 DIAGNOSIS — K50.018 CROHN'S DISEASE OF SMALL INTESTINE WITH OTHER COMPLICATION (H): ICD-10-CM

## 2017-05-08 PROCEDURE — 99213 OFFICE O/P EST LOW 20 MIN: CPT | Performed by: FAMILY MEDICINE

## 2017-05-08 NOTE — MR AVS SNAPSHOT
After Visit Summary   5/8/2017    Aileen Priest    MRN: [id number]           Patient Information     Date Of Birth          1960        Visit Information        Provider Department      5/8/2017 3:45 PM Bucky Hunter MD [facility]        Today's Diagnoses     Hypertension, goal below 140/90    -  1    Crohn's disease of small intestine with other complication (H)           Follow-ups after your visit        Who to contact     If you have questions or need follow up information about today's clinic visit or your schedule please contact [facility] directly at [phone].  Normal or non-critical lab and imaging results will be communicated to you by Medical Depothart, letter or phone within 4 business days after the clinic has received the results. If you do not hear from us within 7 days, please contact the clinic through Medical Depothart or phone. If you have a critical or abnormal lab result, we will notify you by phone as soon as possible.  Submit refill requests through Keenjar or call your pharmacy and they will forward the refill request to us. Please allow 3 business days for your refill to be completed.          Additional Information About Your Visit        MyChart Information     Ke[employer]jar gives you secure access to your electronic health record. If you see a primary care provider, you can also send messages to your care team and make appointments. If you have questions, please call your primary care clinic.  If you do not have a primary care provider, please call [phone] and they will assist you.        Care EveryWhere ID     This is your Care EveryWhere ID. This could be used by other organizations to access your [facility] medical records  [id number]        Your Vitals Were     Pulse Temperature Pulse Oximetry BMI (Body Mass Index)          74 98  F (36.7  C) (Oral) 97% 22.18 kg/m2         Blood Pressure from Last 3 Encounters:   05/08/17 122/74   05/01/17  119/77   04/06/17 119/81    Weight from Last 3 Encounters:   05/08/17 125 lb 3.2 oz (56.8 kg)   05/01/17 127 lb (57.6 kg)   04/06/17 127 lb (57.6 kg)              Today, you had the following     No orders found for display         Today's Medication Changes          These changes are accurate as of: 5/8/17  4:32 PM.  If you have any questions, ask your nurse or doctor.               Stop taking these medicines if you haven't already. Please contact your care team if you have questions.     ranitidine 300 MG tablet   Commonly known as:  ZANTAC   Stopped by:  Bucky Hunter MD                    Primary Care Provider Office Phone # Fax #    Bucky Hunter [phone]       [facility] [address] [facility] [address]  [facility] [address]        Thank you!     Thank you for choosing [facility]  for your care. Our goal is always to provide you with excellent care. Hearing back from our patients is one way we can continue to improve our services. Please take a few minutes to complete the written survey that you may receive in the mail after your visit with us. Thank you!             Your Updated Medication List - Protect others around you: Learn how to safely use, store and throw away your medicines at www.disposemymeds.org.          This list is accurate as of: 5/8/17  4:32 PM.  Always use your most recent med list.                   Brand Name Dispense Instructions for use    budesonide 3 MG EC capsule    ENTOCORT EC    270 capsule    Take 3 capsules (9 mg) by mouth every morning       [facility] PO          dexamethasone 0.1 MG/ML solution      Take  by mouth daily.       lisinopril 10 MG tablet    PRINIVIL/ZESTRIL    90 tablet    Take 1 tablet (10 mg) by mouth daily       mesalamine 1.2 G EC tablet    LIALDA    180 tablet    Take 2 tablets (2,400 mg) by mouth every morning       MULTIVITAMIN ADULT PO      Take 1 capsule by mouth daily        omeprazole 20 MG tablet     180 tablet    Take 2 tablets (40 mg) by mouth daily       VITAMIN D PO      Take 2,000 Units by mouth daily

## 2017-05-08 NOTE — PROGRESS NOTES
"Chief Complaint   Patient presents with     Hypertension       Initial /74  Pulse 74  Temp 98  F (36.7  C) (Oral)  Wt 125 lb 3.2 oz (56.8 kg)  SpO2 97%  BMI 22.18 kg/m2 Estimated body mass index is 22.18 kg/(m^2) as calculated from the following:    Height as of 5/1/17: 5' 3\" (1.6 m).    Weight as of this encounter: 125 lb 3.2 oz (56.8 kg).  Medication Reconciliation: complete     Della Boston MA      "

## 2017-05-08 NOTE — PROGRESS NOTES
SUBJECTIVE:                                                    Aileen Priest is a 56 year old female who presents to clinic today for the following health issues:      Hypertension Follow-up      Outpatient blood pressures are not being checked.    Low Salt Diet: no added salt       Amount of exercise or physical activity: 6-7 days/week for an average of 30-45 minutes    Problems taking medications regularly: No    Medication side effects: none    Diet: regular (no restrictions)      Has newly diagnosed Crohns, starting medications , has had 5 lb weight loss ( less appetitie)      Problem list and histories reviewed & adjusted, as indicated.  Additional history: as documented    Labs reviewed in EPIC    Reviewed and updated as needed this visit by clinical staff  Tobacco  Allergies  Meds  Med Hx  Surg Hx  Fam Hx  Soc Hx      Reviewed and updated as needed this visit by Provider         ROS:  Constitutional, HEENT, cardiovascular, pulmonary, gi and gu systems are negative, except as otherwise noted.    OBJECTIVE:                                                    /74  Pulse 74  Temp 98  F (36.7  C) (Oral)  Wt 125 lb 3.2 oz (56.8 kg)  SpO2 97%  BMI 22.18 kg/m2  Body mass index is 22.18 kg/(m^2).  GENERAL: healthy, alert and no distress  EYES: Eyes grossly normal to inspection, PERRL and conjunctivae and sclerae normal  HENT: ear canals and TM's normal, nose and mouth without ulcers or lesions  NECK: no adenopathy, no asymmetry, masses, or scars and thyroid normal to palpation  RESP: lungs clear to auscultation - no rales, rhonchi or wheezes  CV: regular rate and rhythm, normal S1 S2, no S3 or S4, no murmur, click or rub, no peripheral edema and peripheral pulses strong  ABDOMEN: soft, nontender, no hepatosplenomegaly, no masses and bowel sounds normal  MS: no gross musculoskeletal defects noted, no edema  SKIN: no suspicious lesions or rashes  NEURO: Normal strength and tone, mentation intact and  speech normal  LYMPH: no cervical, supraclavicular, axillary, or inguinal adenopathy    Diagnostic Test Results:  none      ASSESSMENT/PLAN:                                                            1. Hypertension, goal below 140/90  Well controlled on lisinopril, continue and Follow up in 6 months.     2. Crohn's disease of small intestine with other complication (H)  Follow up with consultant as planned.       Regular exercise  See Patient Instructions    Bucky Hunter MD  [facility] [place]

## 2017-05-10 ENCOUNTER — RADIANT APPOINTMENT (OUTPATIENT)
Dept: MAMMOGRAPHY | Facility: CLINIC | Age: 57
End: 2017-05-10

## 2017-05-10 DIAGNOSIS — Z12.31 VISIT FOR SCREENING MAMMOGRAM: ICD-10-CM

## 2017-06-01 ENCOUNTER — TRANSFERRED RECORDS (OUTPATIENT)
Dept: HEALTH INFORMATION MANAGEMENT | Facility: CLINIC | Age: 57
End: 2017-06-01

## 2017-06-01 LAB — PAP SMEAR - HIM PATIENT REPORTED: NEGATIVE

## 2017-08-25 ENCOUNTER — OFFICE VISIT (OUTPATIENT)
Dept: PULMONOLOGY | Facility: CLINIC | Age: 57
End: 2017-08-25
Attending: INTERNAL MEDICINE
Payer: COMMERCIAL

## 2017-08-25 VITALS
BODY MASS INDEX: 21.79 KG/M2 | HEART RATE: 70 BPM | SYSTOLIC BLOOD PRESSURE: 132 MMHG | RESPIRATION RATE: 16 BRPM | DIASTOLIC BLOOD PRESSURE: 86 MMHG | OXYGEN SATURATION: 100 % | WEIGHT: 123 LBS

## 2017-08-25 DIAGNOSIS — K50.018 CROHN'S DISEASE OF SMALL INTESTINE WITH OTHER COMPLICATION (H): ICD-10-CM

## 2017-08-25 DIAGNOSIS — K50.018 CROHN'S DISEASE OF SMALL INTESTINE WITH OTHER COMPLICATION (H): Primary | ICD-10-CM

## 2017-08-25 LAB
ALBUMIN SERPL-MCNC: 3.3 G/DL (ref 3.4–5)
ALP SERPL-CCNC: 73 U/L (ref 40–150)
ALT SERPL W P-5'-P-CCNC: 31 U/L (ref 0–50)
ANION GAP SERPL CALCULATED.3IONS-SCNC: 6 MMOL/L (ref 3–14)
AST SERPL W P-5'-P-CCNC: 24 U/L (ref 0–45)
BASOPHILS # BLD AUTO: 0.1 10E9/L (ref 0–0.2)
BASOPHILS NFR BLD AUTO: 0.6 %
BILIRUB DIRECT SERPL-MCNC: 0.1 MG/DL (ref 0–0.2)
BILIRUB SERPL-MCNC: 0.5 MG/DL (ref 0.2–1.3)
BUN SERPL-MCNC: 12 MG/DL (ref 7–30)
CALCIUM SERPL-MCNC: 8.7 MG/DL (ref 8.5–10.1)
CHLORIDE SERPL-SCNC: 104 MMOL/L (ref 94–109)
CO2 SERPL-SCNC: 29 MMOL/L (ref 20–32)
CREAT SERPL-MCNC: 0.65 MG/DL (ref 0.52–1.04)
CRP SERPL-MCNC: <2.9 MG/L (ref 0–8)
DIFFERENTIAL METHOD BLD: NORMAL
EOSINOPHIL # BLD AUTO: 0.2 10E9/L (ref 0–0.7)
EOSINOPHIL NFR BLD AUTO: 1.6 %
ERYTHROCYTE [DISTWIDTH] IN BLOOD BY AUTOMATED COUNT: 13 % (ref 10–15)
ERYTHROCYTE [SEDIMENTATION RATE] IN BLOOD BY WESTERGREN METHOD: 6 MM/H (ref 0–30)
GFR SERPL CREATININE-BSD FRML MDRD: >90 ML/MIN/1.7M2
GLUCOSE SERPL-MCNC: 90 MG/DL (ref 70–99)
HCT VFR BLD AUTO: 40.7 % (ref 35–47)
HGB BLD-MCNC: 13.4 G/DL (ref 11.7–15.7)
IMM GRANULOCYTES # BLD: 0 10E9/L (ref 0–0.4)
IMM GRANULOCYTES NFR BLD: 0.2 %
LYMPHOCYTES # BLD AUTO: 1.4 10E9/L (ref 0.8–5.3)
LYMPHOCYTES NFR BLD AUTO: 13.7 %
MAGNESIUM SERPL-MCNC: 2.2 MG/DL (ref 1.6–2.3)
MCH RBC QN AUTO: 30.9 PG (ref 26.5–33)
MCHC RBC AUTO-ENTMCNC: 32.9 G/DL (ref 31.5–36.5)
MCV RBC AUTO: 94 FL (ref 78–100)
MONOCYTES # BLD AUTO: 0.7 10E9/L (ref 0–1.3)
MONOCYTES NFR BLD AUTO: 7.1 %
NEUTROPHILS # BLD AUTO: 7.6 10E9/L (ref 1.6–8.3)
NEUTROPHILS NFR BLD AUTO: 76.8 %
NRBC # BLD AUTO: 0 10*3/UL
NRBC BLD AUTO-RTO: 0 /100
PLATELET # BLD AUTO: 313 10E9/L (ref 150–450)
POTASSIUM SERPL-SCNC: 3.8 MMOL/L (ref 3.4–5.3)
PROT SERPL-MCNC: 6.4 G/DL (ref 6.8–8.8)
RBC # BLD AUTO: 4.33 10E12/L (ref 3.8–5.2)
SODIUM SERPL-SCNC: 140 MMOL/L (ref 133–144)
WBC # BLD AUTO: 9.9 10E9/L (ref 4–11)

## 2017-08-25 PROCEDURE — 99212 OFFICE O/P EST SF 10 MIN: CPT | Mod: ZF

## 2017-08-25 PROCEDURE — 83735 ASSAY OF MAGNESIUM: CPT | Performed by: INTERNAL MEDICINE

## 2017-08-25 PROCEDURE — 80076 HEPATIC FUNCTION PANEL: CPT | Performed by: INTERNAL MEDICINE

## 2017-08-25 PROCEDURE — 85652 RBC SED RATE AUTOMATED: CPT | Performed by: INTERNAL MEDICINE

## 2017-08-25 PROCEDURE — 36415 COLL VENOUS BLD VENIPUNCTURE: CPT | Performed by: INTERNAL MEDICINE

## 2017-08-25 PROCEDURE — 86140 C-REACTIVE PROTEIN: CPT | Performed by: INTERNAL MEDICINE

## 2017-08-25 PROCEDURE — 85025 COMPLETE CBC W/AUTO DIFF WBC: CPT | Performed by: INTERNAL MEDICINE

## 2017-08-25 PROCEDURE — 80048 BASIC METABOLIC PNL TOTAL CA: CPT | Performed by: INTERNAL MEDICINE

## 2017-08-25 ASSESSMENT — PAIN SCALES - GENERAL: PAINLEVEL: NO PAIN (0)

## 2017-08-25 NOTE — LETTER
"8/25/2017       RE: Aileen Priest  [address]  [place] [address]     Dear Colleague,    Thank you for referring your patient, Aileen Priest, to the [facility] at [facility]. Please see a copy of my visit note below.    GI CLINIC VISIT    CC/REFERRING PROVIDER: Bucky Hunter  REASON FOR CONSULTATION: Crohn's Disease    HPI: 56 year old female w/ h/o ileal Crohn's Disease per 3/2017 ileocolonoscopy biopsies currently on PO 5-ASA + Entocort, Lichen Planus (oral/perianal involvement), Hidradenitis Suppurativa s/p excision/drainage 1975, prior presumed diagnosis of Collagenous Colitis per colonic biopsies 2006 (though suspecting evolving IBD presentation in retrospect following 3/2017 highly-suggestive biopsies of terminal ileum + bland colonic biopsies), presumed GERD, lumbar spine surgery 2005, periorbital shingles 2015, chronic tobacco abuse (in remission), among other medical issues - presenting for f/u Crohn's disease. Doing well overall, reports marked improvement in intermittent \"flares\" (none) since starting on combination Lialda + Entocort (\"flares\" manifesting as severe cramping abd pain, loose stools, urgency). +intermittent substernal burning discomfort (usually during the day or early afternoon), +assoc w/ spasm-like mid-chest sensation in the absence of other typical assoc cardiac sxs. +some mild sleep disturbance w/ muscle cramps since starting Entocort. +new oral plaque/ulcer lesion in last wk (looks more like LP rather than typical IBD-associated ulcer). Denies any tenesmus or insecurity passing flatus, no fecal incontinence or new perianal/nocturnal sxs noted. Denies any N/V/F/C/HA/NS or other const/syst/cardiopulmonary sxs, no BRBPR/melena/urinary changes, no unintentional wt loss or appetite/satiety changes. No other bowel/bladder habit changes, no dysphagia/odynophagia. No jaundice/icterus/pruritus, no acholic " "stools/steatorrhea, no new lumps/bumps, no jt pain/rash/eye sxs noted.    ROS: 10pt ROS performed and otherwise negative.    PERTINENT PAST MEDICAL/SURGICAL HISTORY:  As noted above.    PERTINENT MEDICATIONS:  - Lialda 2.4g PO QDAY  - Entocort 9mg PO QDAY  - omeprazole 20mg PO QDAY  Medications reviewed with patient today, see Medication List/Assessment for details.  No other NSAID/anticoagulation reported by patient.  No other OTC/herbal/supplements reported by patient.    SOCIAL HISTORY: Tobacco: none.    PHYSICAL EXAMINATION:  Vitals reviewed, AFVSS  Wt 123# today (stable)  Gen: aaox3, cooperative, pleasant, not diaphoretic, nad  HEENT: ncat, neck supple, no clad/sclad, normal op w/o exudate, anicteric, mmm, +multi-rounded flat plaque-like lesion on R tip of tongue without neelam mucosal defect  Resp/CV without acute findings, not dyspneic/tachycardic  Abd: +nabs, soft, nt, nd, no peritoneal s/s noted  Ext: no c/c/e  Skin: warm, perfused, no jaundice  Neuro: grossly intact, no asterixis noted    PERTINENT STUDIES:  Labs ordered today, currently pending    ASSESSMENT/PLAN:    1. Ileal Crohn's Disease with excellent interval clinical response to PO 5-ASA + Entocort, continue to monitor for durable response with gradual steroid taper and reassessment of endoscopic healing in near-future - continue supportive care as ordered for now  1. It was wonderful getting a chance to see you again to discuss your ileal Crohn's Disease, particularly given the interval improvement in your intermittent \"flare symptoms\" since starting on the combination of Lialda + Entocort - this is great to see, keep up the good work!  - Continue Lialda 2.4g every morning as ordered for now.  - Recommend tapering down your Entocort by 1 capsule every month, may start on Entocort 6mg every morning (2 capsules) today. Continue to monitor for breakthrough symptoms or recurrent flares as we discussed today, particularly as this may impact our " decision-making regarding timing for colonoscopy and ongoing treatment options.  - Recommend moving forward with a diagnostic colonoscopy to confirm endoscopic mucosal response in 3-4 months, preferably once you've been off Entocort for at least a month to get a sense of the baseline ileal Crohn's Disease control on Lialda alone. My clinic staff will be in contact with you to help make these arrangements.  - Discussed Preventive Care in IBD today.    2. Recommend routine studies including nutritional and inflammatory markers as we discussed today.   - Recommend having the following studies checked at least 1-2 times/year for disease and medication safety monitoring: BMP, LFT, CBC with differential, ESR/CRP.    3. Discussed your intermittent substernal burning discomfort with associated presumed esophageal spasm today, recommend taking your low-dose omeprazole (20mg) every morning rather than in the afternoon for better response.  - Continue to monitor your symptoms, consider discussing with your PCP to see if a cardiac evaluation is necessary (particularly if these symptoms persist/worsen).  - Will consider role for further acid suppression optimization +/- Esophageal Manometry/pH study depending on your ongoing clinical course.    4. Continue to monitor for the danger signs/symptoms we reviewed together today:  worsening abdominal pain, worsening diarrhea, obstructive symptoms (nausea/vomiting, abdominal distention, inability to pass stool/flatus), blood mixed into stools, persistent fevers/chills, progressive anemia (particulary with iron deficiency), unexpected weight loss, etc.  - Contact us via [email or web address] should these symptoms occur, particularly as we may advise further evaluation accordingly.    2. Colorectal Cancer Screening  No PSC, no high-risk FH CRC (2nd cousin dx'd in 2017 w/ advanced CRC @55yo). Will readdress once acute issues have stabilized.    RTC 6 months with GI PA or me, sooner if symptomatic.       Thank you for this consultation. It was a pleasure to participate in the care of this patient; please contact us with any further questions.     Rudy Morfin MD   of Medicine  [facility] - Department of Medicine  Division of Gastroenterology

## 2017-08-25 NOTE — NURSING NOTE
Chief Complaint   Patient presents with     Gastrointestinal Problem     Patient is being seen for IBD follow up      Ivone Rothman CMA at 8:08 AM on 8/25/2017

## 2017-08-25 NOTE — PROGRESS NOTES
"GI CLINIC VISIT    CC/REFERRING PROVIDER: Bucky Hunter  REASON FOR CONSULTATION: Crohn's Disease    HPI: 56 year old female w/ h/o ileal Crohn's Disease per 3/2017 ileocolonoscopy biopsies currently on PO 5-ASA + Entocort, Lichen Planus (oral/perianal involvement), Hidradenitis Suppurativa s/p excision/drainage 1975, prior presumed diagnosis of Collagenous Colitis per colonic biopsies 2006 (though suspecting evolving IBD presentation in retrospect following 3/2017 highly-suggestive biopsies of terminal ileum + bland colonic biopsies), presumed GERD, lumbar spine surgery 2005, periorbital shingles 2015, chronic tobacco abuse (in remission), among other medical issues - presenting for f/u Crohn's disease. Doing well overall, reports marked improvement in intermittent \"flares\" (none) since starting on combination Lialda + Entocort (\"flares\" manifesting as severe cramping abd pain, loose stools, urgency). +intermittent substernal burning discomfort (usually during the day or early afternoon), +assoc w/ spasm-like mid-chest sensation in the absence of other typical assoc cardiac sxs. +some mild sleep disturbance w/ muscle cramps since starting Entocort. +new oral plaque/ulcer lesion in last wk (looks more like LP rather than typical IBD-associated ulcer). Denies any tenesmus or insecurity passing flatus, no fecal incontinence or new perianal/nocturnal sxs noted. Denies any N/V/F/C/HA/NS or other const/syst/cardiopulmonary sxs, no BRBPR/melena/urinary changes, no unintentional wt loss or appetite/satiety changes. No other bowel/bladder habit changes, no dysphagia/odynophagia. No jaundice/icterus/pruritus, no acholic stools/steatorrhea, no new lumps/bumps, no jt pain/rash/eye sxs noted.    ROS: 10pt ROS performed and otherwise negative.    PERTINENT PAST MEDICAL/SURGICAL HISTORY:  As noted above.    PERTINENT MEDICATIONS:  - Lialda 2.4g PO QDAY  - Entocort 9mg PO QDAY  - omeprazole 20mg PO QDAY  Medications reviewed " "with patient today, see Medication List/Assessment for details.  No other NSAID/anticoagulation reported by patient.  No other OTC/herbal/supplements reported by patient.    SOCIAL HISTORY: Tobacco: none.    PHYSICAL EXAMINATION:  Vitals reviewed, AFVSS  Wt 123# today (stable)  Gen: aaox3, cooperative, pleasant, not diaphoretic, nad  HEENT: ncat, neck supple, no clad/sclad, normal op w/o exudate, anicteric, mmm, +multi-rounded flat plaque-like lesion on R tip of tongue without neelam mucosal defect  Resp/CV without acute findings, not dyspneic/tachycardic  Abd: +nabs, soft, nt, nd, no peritoneal s/s noted  Ext: no c/c/e  Skin: warm, perfused, no jaundice  Neuro: grossly intact, no asterixis noted    PERTINENT STUDIES:  Labs ordered today, currently pending    ASSESSMENT/PLAN:    1. Ileal Crohn's Disease with excellent interval clinical response to PO 5-ASA + Entocort, continue to monitor for durable response with gradual steroid taper and reassessment of endoscopic healing in near-future - continue supportive care as ordered for now  1. It was wonderful getting a chance to see you again to discuss your ileal Crohn's Disease, particularly given the interval improvement in your intermittent \"flare symptoms\" since starting on the combination of Lialda + Entocort - this is great to see, keep up the good work!  - Continue Lialda 2.4g every morning as ordered for now.  - Recommend tapering down your Entocort by 1 capsule every month, may start on Entocort 6mg every morning (2 capsules) today. Continue to monitor for breakthrough symptoms or recurrent flares as we discussed today, particularly as this may impact our decision-making regarding timing for colonoscopy and ongoing treatment options.  - Recommend moving forward with a diagnostic colonoscopy to confirm endoscopic mucosal response in 3-4 months, preferably once you've been off Entocort for at least a month to get a sense of the baseline ileal Crohn's Disease " control on Lialda alone. My clinic staff will be in contact with you to help make these arrangements.  - Discussed Preventive Care in IBD today.    2. Recommend routine studies including nutritional and inflammatory markers as we discussed today.   - Recommend having the following studies checked at least 1-2 times/year for disease and medication safety monitoring: BMP, LFT, CBC with differential, ESR/CRP.    3. Discussed your intermittent substernal burning discomfort with associated presumed esophageal spasm today, recommend taking your low-dose omeprazole (20mg) every morning rather than in the afternoon for better response.  - Continue to monitor your symptoms, consider discussing with your PCP to see if a cardiac evaluation is necessary (particularly if these symptoms persist/worsen).  - Will consider role for further acid suppression optimization +/- Esophageal Manometry/pH study depending on your ongoing clinical course.    4. Continue to monitor for the danger signs/symptoms we reviewed together today:  worsening abdominal pain, worsening diarrhea, obstructive symptoms (nausea/vomiting, abdominal distention, inability to pass stool/flatus), blood mixed into stools, persistent fevers/chills, progressive anemia (particulary with iron deficiency), unexpected weight loss, etc.  - Contact us via [employer]t should these symptoms occur, particularly as we may advise further evaluation accordingly.    2. Colorectal Cancer Screening  No PSC, no high-risk FH CRC (2nd cousin dx'd in 2017 w/ advanced CRC @55yo). Will readdress once acute issues have stabilized.    RTC 6 months with GI PA or me, sooner if symptomatic.      Thank you for this consultation. It was a pleasure to participate in the care of this patient; please contact us with any further questions.     Rudy Morfin MD   of Medicine  [facility] - Department of Medicine  Division of Gastroenterology

## 2017-08-25 NOTE — PATIENT INSTRUCTIONS
"I've included a brief summary of our discussion and care plan from today's visit below.  Please review this information with your primary care provider.  _______________________________________________________________________      1. It was wonderful getting a chance to see you again to discuss your ileal Crohn's Disease, particularly given the interval improvement in your intermittent \"flare symptoms\" since starting on the combination of Lialda + Entocort - this is great to see, keep up the good work!  - Continue Lialda 2.4g every morning as ordered for now.  - Recommend tapering down your Entocort by 1 capsule every month, may start on Entocort 6mg every morning (2 capsules) today. Continue to monitor for breakthrough symptoms or recurrent flares as we discussed today, particularly as this may impact our decision-making regarding timing for colonoscopy and ongoing treatment options.  - Recommend moving forward with a diagnostic colonoscopy to confirm endoscopic mucosal response in 3-4 months, preferably once you've been off Entocort for at least a month to get a sense of the baseline ileal Crohn's Disease control on Lialda alone. My clinic staff will be in contact with you to help make these arrangements.  - Discussed Preventive Care in IBD today.    2. Recommend routine studies including nutritional and inflammatory markers as we discussed today.   - Recommend having the following studies checked at least 1-2 times/year for disease and medication safety monitoring: BMP, LFT, CBC with differential, ESR/CRP.    3. Discussed your intermittent substernal burning discomfort with associated presumed esophageal spasm today, recommend taking your low-dose omeprazole (20mg) every morning rather than in the afternoon for better response.  - Continue to monitor your symptoms, consider discussing with your PCP to see if a cardiac evaluation is necessary (particularly if these symptoms persist/worsen).  - Will consider role " for further acid suppression optimization +/- Esophageal Manometry study depending on your ongoing clinical course.    4. Continue to monitor for the danger signs/symptoms we reviewed together today:  worsening abdominal pain, worsening diarrhea, obstructive symptoms (nausea/vomiting, abdominal distention, inability to pass stool/flatus), blood mixed into stools, persistent fevers/chills, progressive anemia (particulary with iron deficiency), unexpected weight loss, etc.  - Contact us via [employer] should these symptoms occur, particularly as we may advise further evaluation accordingly.    5. Return to GI Clinic with my GI Physician Assistant (Benjamin Brooks) or me in 6 months to review your progress, sooner if symptomatic.   - If you are unable to schedule this follow-up appointment today, please contact Bethany Lyons at [phone] within the next week to help set up this necessary appointment.    PREVENTIVE CARE IN INFLAMMATORY BOWEL DISEASE    - Strongly recommend tobacco abstinence.    - Recommend considering daily calcium + Vitamin D supplementation.    - Recommend age-appropriate cancer surveillance:  - Yearly Dermatology visit for visual skin inspection if immunosuppressed, monthly skin self-check after bathing.  - Dysplasia surveillance colonoscopy every 1-2 years in patients with Crohn's colitis or ulcerative colitis >8-10 years since diagnosis.  - (For men and women ages 9-26) Consideration for HPV vaccination, should be discussed with your PCP further if you feel you'd like to pursue this.  - (For women) Annual Pap smears if immunosuppressed, mammogram when deemed appropriate by your PCP.    - Recommend follow-up with your PCP to ensure the following vaccinations have been updated: Hepatitis A/B, Tdap, Pneumococcal pneumonia, yearly flu SHOT, Meningococcal meningitis (if college-age), HPV (all aged 18-26), etc.  - Would also recommend VZV and MMR titers be checked to confirm immunity.  - Live/attenuated  vaccines (such as the INTRANASAL flu vaccine, MMR, Yellow Fever, or Varicella/VZV vaccine) should not be administered to immunosuppressed patients, except in very specific instances which you should discuss with a GI and Infectious Disease provider first.    - Family planning:  If you or your partner are planning to become pregnant, please schedule time with us to discuss issues surrounding IBD and Fertility/Pregnancy.      _______________________________________________________________________    It was a pleasure seeing you in clinic today - please be in touch if there are any further questions that arise following today's visit.  During business hours, you may reach Clinic Nurse Triage Line at [phone].  For urgent/emergent questions after business hours, you may reach the on-call GI Fellow by contacting the [facility]  at [phone].    Any benign/non-urgent test results are usually communicated via letter or [employer] message within 1-2 weeks after completion.  Urgent results (those that require a change in the previously-discussed care plan) are usually communicated via a phone call once available from our clinic staff to discuss the results and the next steps in your evaluation.    I recommend signing up for [employer] access if you have not already done so and are comfortable with using a computer.  This allows for online access to your lab results and also helps you communicate efficiently with my clinic should any questions arise in your care.    We have Financial Counseling services available through our clinic.  If you have questions about your insurance coverage or payment responsibilities, particularly before you undergo any tests or procedures, please let us know and we can arrange a consultation accordingly to help you make informed decisions about your healthcare.    Sincerely,    Rudy Morfin MD    [facility] - Department of  Medicine  Division of Gastroenterology

## 2017-08-25 NOTE — MR AVS SNAPSHOT
"              After Visit Summary   8/25/2017    Aileen Priest    MRN: [id number]           Patient Information     Date Of Birth          1960        Visit Information        Provider Department      8/25/2017 8:00 AM Rudy Morfin MD [facility]        Today's Diagnoses     Crohn's disease of small intestine with other complication (H)    -  1      Care Instructions    I've included a brief summary of our discussion and care plan from today's visit below.  Please review this information with your primary care provider.  _______________________________________________________________________      1. It was wonderful getting a chance to see you again to discuss your ileal Crohn's Disease, particularly given the interval improvement in your intermittent \"flare symptoms\" since starting on the combination of Lialda + Entocort - this is great to see, keep up the good work!  - Continue Lialda 2.4g every morning as ordered for now.  - Recommend tapering down your Entocort by 1 capsule every month, may start on Entocort 6mg every morning (2 capsules) today. Continue to monitor for breakthrough symptoms or recurrent flares as we discussed today, particularly as this may impact our decision-making regarding timing for colonoscopy and ongoing treatment options.  - Recommend moving forward with a diagnostic colonoscopy to confirm endoscopic mucosal response in 3-4 months, preferably once you've been off Entocort for at least a month to get a sense of the baseline ileal Crohn's Disease control on Lialda alone. My clinic staff will be in contact with you to help make these arrangements.  - Discussed Preventive Care in IBD today.    2. Recommend routine studies including nutritional and inflammatory markers as we discussed today.   - Recommend having the following studies checked at least 1-2 times/year for disease and medication safety monitoring: BMP, LFT, CBC with differential, " ESR/CRP.    3. Discussed your intermittent substernal burning discomfort with associated presumed esophageal spasm today, recommend taking your low-dose omeprazole (20mg) every morning rather than in the afternoon for better response.  - Continue to monitor your symptoms, consider discussing with your PCP to see if a cardiac evaluation is necessary (particularly if these symptoms persist/worsen).  - Will consider role for further acid suppression optimization +/- Esophageal Manometry study depending on your ongoing clinical course.    4. Continue to monitor for the danger signs/symptoms we reviewed together today:  worsening abdominal pain, worsening diarrhea, obstructive symptoms (nausea/vomiting, abdominal distention, inability to pass stool/flatus), blood mixed into stools, persistent fevers/chills, progressive anemia (particulary with iron deficiency), unexpected weight loss, etc.  - Contact us via deeplocal should these symptoms occur, particularly as we may advise further evaluation accordingly.    5. Return to GI Clinic with my GI Physician Assistant (Benjamin Brooks) or me in 6 months to review your progress, sooner if symptomatic.   - If you are unable to schedule this follow-up appointment today, please contact Bethany Lyons at [phone] within the next week to help set up this necessary appointment.    PREVENTIVE CARE IN INFLAMMATORY BOWEL DISEASE    - Strongly recommend tobacco abstinence.    - Recommend considering daily calcium + Vitamin D supplementation.    - Recommend age-appropriate cancer surveillance:  - Yearly Dermatology visit for visual skin inspection if immunosuppressed, monthly skin self-check after bathing.  - Dysplasia surveillance colonoscopy every 1-2 years in patients with Crohn's colitis or ulcerative colitis >8-10 years since diagnosis.  - (For men and women ages 9-26) Consideration for HPV vaccination, should be discussed with your PCP further if you feel you'd like to pursue this.  -  (For women) Annual Pap smears if immunosuppressed, mammogram when deemed appropriate by your PCP.    - Recommend follow-up with your PCP to ensure the following vaccinations have been updated: Hepatitis A/B, Tdap, Pneumococcal pneumonia, yearly flu SHOT, Meningococcal meningitis (if college-age), HPV (all aged 18-26), etc.  - Would also recommend VZV and MMR titers be checked to confirm immunity.  - Live/attenuated vaccines (such as the INTRANASAL flu vaccine, MMR, Yellow Fever, or Varicella/VZV vaccine) should not be administered to immunosuppressed patients, except in very specific instances which you should discuss with a GI and Infectious Disease provider first.    - Family planning:  If you or your partner are planning to become pregnant, please schedule time with us to discuss issues surrounding IBD and Fertility/Pregnancy.      _______________________________________________________________________    It was a pleasure seeing you in clinic today - please be in touch if there are any further questions that arise following today's visit.  During business hours, you may reach Clinic Nurse Triage Line at [phone].  For urgent/emergent questions after business hours, you may reach the on-call GI Fellow by contacting the [facility]  at [phone].    Any benign/non-urgent test results are usually communicated via letter or ACS Globalhart message within 1-2 weeks after completion.  Urgent results (those that require a change in the previously-discussed care plan) are usually communicated via a phone call once available from our clinic staff to discuss the results and the next steps in your evaluation.    I recommend signing up for [employer] access if you have not already done so and are comfortable with using a computer.  This allows for online access to your lab results and also helps you communicate efficiently with my clinic should any questions arise in your care.    We have Financial  Counseling services available through our clinic.  If you have questions about your insurance coverage or payment responsibilities, particularly before you undergo any tests or procedures, please let us know and we can arrange a consultation accordingly to help you make informed decisions about your healthcare.    Sincerely,    Rudy Morfin MD    [facility] - Department of Medicine  Division of Gastroenterology            Follow-ups after your visit        Follow-up notes from your care team     Return in about 6 months (around 2/25/2018).      Future tests that were ordered for you today     Open Future Orders        Priority Expected Expires Ordered    CRP inflammation Routine  10/24/2017 8/25/2017    Basic metabolic panel Routine  10/24/2017 8/25/2017    CBC with platelets differential Routine  10/24/2017 8/25/2017    Erythrocyte sedimentation rate auto Routine  10/24/2017 8/25/2017    Hepatic panel Routine  10/24/2017 8/25/2017    Magnesium Routine  10/24/2017 8/25/2017            Who to contact     If you have questions or need follow up information about today's clinic visit or your schedule please contact [facility] directly at [phone].  Normal or non-critical lab and imaging results will be communicated to you by RapidMinerhart, letter or phone within 4 business days after the clinic has received the results. If you do not hear from us within 7 days, please contact the clinic through RapidMinerhart or phone. If you have a critical or abnormal lab result, we will notify you by phone as soon as possible.  Submit refill requests through SoundTag or call your pharmacy and they will forward the refill request to us. Please allow 3 business days for your refill to be completed.          Additional Information About Your Visit        RapidMinerharNTN Buzztime Information     SoundTag gives you secure access to your electronic health record. If you see a primary care  provider, you can also send messages to your care team and make appointments. If you have questions, please call your primary care clinic.  If you do not have a primary care provider, please call [phone] and they will assist you.        Care EveryWhere ID     This is your Care EveryWhere ID. This could be used by other organizations to access your [place] medical records  [id number]        Your Vitals Were     Pulse Respirations Pulse Oximetry BMI (Body Mass Index)          70 16 100% 21.79 kg/m2         Blood Pressure from Last 3 Encounters:   08/25/17 132/86   05/08/17 122/74   05/01/17 119/77    Weight from Last 3 Encounters:   08/25/17 55.8 kg (123 lb)   05/08/17 56.8 kg (125 lb 3.2 oz)   05/01/17 57.6 kg (127 lb)               Primary Care Provider Office Phone # Fax #    Bucky Marlo Hunter [phone]       [address] [facility] [address]  [facility] [address]        Equal Access to Services     [facility] : Hadii aad ku hadasho Soomaali, waaxda luqadaha, qaybta kaalmada adeegyada, waxay shabbir haysaida nicole . So [facility] [place] [phone].    ATENCIÓN: Si habla español, tiene a guillory disposición servicios gratuitos de asistencia lingüística. [facility] [phone].    We comply with applicable federal civil rights laws and [place] laws. We do not discriminate on the basis of race, color, national origin, age, disability sex, sexual orientation or gender identity.            Thank you!     Thank you for choosing [facility]  for your care. Our goal is always to provide you with excellent care. Hearing back from our patients is one way we can continue to improve our services. Please take a few minutes to complete the written survey that you may receive in the mail after your visit with us. Thank you!             Your Updated Medication List - Protect others around you: Learn how to safely use, store and throw away your medicines at  www.disposemymeds.org.          This list is accurate as of: 8/25/17  8:37 AM.  Always use your most recent med list.                   Brand Name Dispense Instructions for use Diagnosis    budesonide 3 MG EC capsule    ENTOCORT EC    270 capsule    Take 3 capsules (9 mg) by mouth every morning    Crohn's disease of small intestine with other complication (H)       [facility] PO       Crohn's disease of small intestine with other complication (H)       dexamethasone 0.1 MG/ML solution      Take  by mouth daily.        lisinopril 10 MG tablet    PRINIVIL/ZESTRIL    90 tablet    Take 1 tablet (10 mg) by mouth daily    Hypertension goal BP (blood pressure) < 140/90       mesalamine 1.2 G EC tablet    LIALDA    180 tablet    Take 2 tablets (2,400 mg) by mouth every morning    Crohn's disease of small intestine with other complication (H)       MULTIVITAMIN ADULT PO      Take 1 capsule by mouth daily    Crohn's disease of small intestine with other complication (H)       omeprazole 20 MG tablet     180 tablet    Take 2 tablets (40 mg) by mouth daily    Gastroesophageal reflux disease, esophagitis presence not specified       VITAMIN D PO      Take 2,000 Units by mouth daily

## 2017-08-28 ENCOUNTER — TELEPHONE (OUTPATIENT)
Dept: GASTROENTEROLOGY | Facility: CLINIC | Age: 57
End: 2017-08-28

## 2017-08-28 ENCOUNTER — CARE COORDINATION (OUTPATIENT)
Dept: GASTROENTEROLOGY | Facility: CLINIC | Age: 57
End: 2017-08-28

## 2017-08-28 DIAGNOSIS — K50.00 CROHN'S DISEASE OF SMALL INTESTINE WITHOUT COMPLICATION (H): Primary | ICD-10-CM

## 2017-11-10 NOTE — PROGRESS NOTES
SUBJECTIVE:   Aileen Priest is a 57 year old female who presents to clinic today for the following health issues:    Hypertension Follow-up      Outpatient blood pressures are not being checked.    Low Salt Diet: low salt        Amount of exercise or physical activity: None    Problems taking medications regularly: No    Medication side effects: fatigue, possible blood in urine     Diet: low salt    URINARY TRACT SYMPTOMS ? Possible hematuria  No other symptoms   Onset: a few days ago     Description:   Painful urination (Dysuria): no   Blood in urine (Hematuria): YES- possibly. Pinkish color   Delay in urine (Hesitency): no     Intensity: n/a    Progression of Symptoms:  same    Accompanying Signs & Symptoms:  Fever/chills: no   Flank pain no   Nausea and vomiting: no   Any vaginal symptoms: none  Abdominal/Pelvic Pain: no     History:   History of frequent UTI's: no   History of kidney stones: no   Sexually Active: YES  Possibility of pregnancy: No    Precipitating factors:   None     Therapies Tried and outcome: Nothing tried           Problem list and histories reviewed & adjusted, as indicated.  Additional history: as documented    Labs reviewed in EPIC    Reviewed and updated as needed this visit by clinical staff     Reviewed and updated as needed this visit by Provider         ROS:  Constitutional, HEENT, cardiovascular, pulmonary, gi and gu systems are negative, except as otherwise noted.      OBJECTIVE:   /74 (BP Location: Right arm, Patient Position: Chair, Cuff Size: Adult Regular)  Pulse 75  Temp 97.7  F (36.5  C) (Oral)  Wt 126 lb (57.2 kg)  SpO2 96%  BMI 22.32 kg/m2  Body mass index is 22.32 kg/(m^2).  GENERAL: healthy, alert and no distress  NECK: no adenopathy, no asymmetry, masses, or scars and thyroid normal to palpation  RESP: lungs clear to auscultation - no rales, rhonchi or wheezes  CV: regular rate and rhythm, normal S1 S2, no S3 or S4, no murmur, click or rub, no peripheral  edema and peripheral pulses strong  ABDOMEN: soft, nontender, no hepatosplenomegaly, no masses and bowel sounds normal  SKIN: no suspicious lesions or rashes    Diagnostic Test Results:     Results for orders placed or performed in visit on 11/13/17   *UA reflex to Microscopic   Result Value Ref Range    Color Urine Yellow     Appearance Urine Clear     Glucose Urine Negative NEG^Negative mg/dL    Bilirubin Urine Negative NEG^Negative    Ketones Urine Negative NEG^Negative mg/dL    Specific Gravity Urine <=1.005 1.003 - 1.035    Blood Urine Negative NEG^Negative    pH Urine 6.5 5.0 - 7.0 pH    Protein Albumin Urine Negative NEG^Negative mg/dL    Urobilinogen Urine 0.2 0.2 - 1.0 EU/dL    Nitrite Urine Negative NEG^Negative    Leukocyte Esterase Urine Negative NEG^Negative    Source Midstream Urine    CBC with platelets   Result Value Ref Range    WBC 8.0 4.0 - 11.0 10e9/L    RBC Count 4.30 3.8 - 5.2 10e12/L    Hemoglobin 13.6 11.7 - 15.7 g/dL    Hematocrit 39.7 35.0 - 47.0 %    MCV 92 78 - 100 fl    MCH 31.6 26.5 - 33.0 pg    MCHC 34.3 31.5 - 36.5 g/dL    RDW 12.6 10.0 - 15.0 %    Platelet Count 312 150 - 450 10e9/L        ASSESSMENT/PLAN:             1. Hypertension, goal below 140/90  Well controlled  Continue lisinopril  Follow up in 1 month.   - Lipid panel reflex to direct LDL Fasting  - Comprehensive metabolic panel    2. Urine discoloration  No sihn of hematuria  - *UA reflex to Microscopic  - Urine Culture Aerobic Bacterial  - CBC with platelets    Regular exercise  See Patient Instructions    Bucky Hunter MD  [facility]

## 2017-11-13 ENCOUNTER — OFFICE VISIT (OUTPATIENT)
Dept: FAMILY MEDICINE | Facility: CLINIC | Age: 57
End: 2017-11-13
Payer: COMMERCIAL

## 2017-11-13 VITALS
OXYGEN SATURATION: 96 % | BODY MASS INDEX: 22.32 KG/M2 | SYSTOLIC BLOOD PRESSURE: 120 MMHG | DIASTOLIC BLOOD PRESSURE: 74 MMHG | HEART RATE: 75 BPM | TEMPERATURE: 97.7 F | WEIGHT: 126 LBS

## 2017-11-13 DIAGNOSIS — R39.89 URINE DISCOLORATION: ICD-10-CM

## 2017-11-13 DIAGNOSIS — I10 HYPERTENSION, GOAL BELOW 140/90: Primary | ICD-10-CM

## 2017-11-13 LAB
ALBUMIN UR-MCNC: NEGATIVE MG/DL
APPEARANCE UR: CLEAR
BILIRUB UR QL STRIP: NEGATIVE
COLOR UR AUTO: YELLOW
ERYTHROCYTE [DISTWIDTH] IN BLOOD BY AUTOMATED COUNT: 12.6 % (ref 10–15)
GLUCOSE UR STRIP-MCNC: NEGATIVE MG/DL
HCT VFR BLD AUTO: 39.7 % (ref 35–47)
HGB BLD-MCNC: 13.6 G/DL (ref 11.7–15.7)
HGB UR QL STRIP: NEGATIVE
KETONES UR STRIP-MCNC: NEGATIVE MG/DL
LEUKOCYTE ESTERASE UR QL STRIP: NEGATIVE
MCH RBC QN AUTO: 31.6 PG (ref 26.5–33)
MCHC RBC AUTO-ENTMCNC: 34.3 G/DL (ref 31.5–36.5)
MCV RBC AUTO: 92 FL (ref 78–100)
NITRATE UR QL: NEGATIVE
PH UR STRIP: 6.5 PH (ref 5–7)
PLATELET # BLD AUTO: 312 10E9/L (ref 150–450)
RBC # BLD AUTO: 4.3 10E12/L (ref 3.8–5.2)
SOURCE: NORMAL
SP GR UR STRIP: <=1.005 (ref 1–1.03)
UROBILINOGEN UR STRIP-ACNC: 0.2 EU/DL (ref 0.2–1)
WBC # BLD AUTO: 8 10E9/L (ref 4–11)

## 2017-11-13 PROCEDURE — 80061 LIPID PANEL: CPT | Performed by: FAMILY MEDICINE

## 2017-11-13 PROCEDURE — 87086 URINE CULTURE/COLONY COUNT: CPT | Performed by: FAMILY MEDICINE

## 2017-11-13 PROCEDURE — 99214 OFFICE O/P EST MOD 30 MIN: CPT | Performed by: FAMILY MEDICINE

## 2017-11-13 PROCEDURE — 85027 COMPLETE CBC AUTOMATED: CPT | Performed by: FAMILY MEDICINE

## 2017-11-13 PROCEDURE — 36415 COLL VENOUS BLD VENIPUNCTURE: CPT | Performed by: FAMILY MEDICINE

## 2017-11-13 PROCEDURE — 81003 URINALYSIS AUTO W/O SCOPE: CPT | Performed by: FAMILY MEDICINE

## 2017-11-13 PROCEDURE — 80053 COMPREHEN METABOLIC PANEL: CPT | Performed by: FAMILY MEDICINE

## 2017-11-13 NOTE — NURSING NOTE
"Chief Complaint   Patient presents with     Hypertension     Kidney Problem     blood in urine        Initial /74 (BP Location: Right arm, Patient Position: Chair, Cuff Size: Adult Regular)  Pulse 75  Temp 97.7  F (36.5  C) (Oral)  Wt 126 lb (57.2 kg)  SpO2 96%  BMI 22.32 kg/m2 Estimated body mass index is 22.32 kg/(m^2) as calculated from the following:    Height as of 5/1/17: 5' 3\" (1.6 m).    Weight as of this encounter: 126 lb (57.2 kg).  Medication Reconciliation: complete     Brenda Davenport CMA    "

## 2017-11-13 NOTE — MR AVS SNAPSHOT
After Visit Summary   11/13/2017    Aileen Priest    MRN: [id number]           Patient Information     Date Of Birth          1960        Visit Information        Provider Department      11/13/2017 4:00 PM Bucky Hunter MD [facility]        Today's Diagnoses     Hypertension, goal below 140/90    -  1    Urine discoloration           Follow-ups after your visit        Your next 10 appointments already scheduled     Dec 05, 2017  8:00 AM CST   Colonoscopy with Rudy Morfin MD   [facility] ([facility])    [address]  [address]  [facility] [id number] [phone]            Feb 23, 2018  9:20 AM CST   (Arrive by 9:05 AM)   RETURN INFLAMMATORY BOWEL DISEASE with Rudy Morfin MD   [facility] ([facility])    [address]  3rd Floor  [place] [address] [phone]              Who to contact     If you have questions or need follow up information about today's clinic visit or your schedule please contact [facility] directly at [phone].  Normal or non-critical lab and imaging results will be communicated to you by cinvolvehart, letter or phone within 4 business days after the clinic has received the results. If you do not hear from us within 7 days, please contact the clinic through cinvolvehart or phone. If you have a critical or abnormal lab result, we will notify you by phone as soon as possible.  Submit refill requests through Xiaoi Robert or call your pharmacy and they will forward the refill request to us. Please allow 3 business days for your refill to be completed.          Additional Information About Your Visit        MyChart Information     [employer] gives you secure access to your electronic health record. If you see a primary care provider, you can also send messages to your care team and make appointments. If you have questions,  please call your primary care clinic.  If you do not have a primary care provider, please call [phone] and they will assist you.        Care EveryWhere ID     This is your Care EveryWhere ID. This could be used by other organizations to access your [place] medical records  [id number]        Your Vitals Were     Pulse Temperature Pulse Oximetry BMI (Body Mass Index)          75 97.7  F (36.5  C) (Oral) 96% 22.32 kg/m2         Blood Pressure from Last 3 Encounters:   11/13/17 120/74   08/25/17 132/86   05/08/17 122/74    Weight from Last 3 Encounters:   11/13/17 126 lb (57.2 kg)   08/25/17 123 lb (55.8 kg)   05/08/17 125 lb 3.2 oz (56.8 kg)              We Performed the Following     *UA reflex to Microscopic     CBC with platelets     Comprehensive metabolic panel     Lipid panel reflex to direct LDL Fasting     Urine Culture Aerobic Bacterial        Primary Care Provider Office Phone # Fax #    Bucky Marlo Hunter [phone]       [address] [facility] [address]  [facility] [address]        Equal Access to Services     [facility] : Hadii mir campo Sonish, waaxda lusujatha, qaybta kaalmaquincy ruby, edda nicole . So [facility] [phone].    ATENCIÓN: Si habla español, tiene a guillory disposición servicios gratuitos de asistencia lingüística. [facility] [phone].    We comply with applicable federal civil rights laws and [place] laws. We do not discriminate on the basis of race, color, national origin, age, disability, sex, sexual orientation, or gender identity.            Thank you!     Thank you for choosing [facility]  for your care. Our goal is always to provide you with excellent care. Hearing back from our patients is one way we can continue to improve our services. Please take a few minutes to complete the written survey that you may receive in the mail after your visit with us. Thank you!             Your Updated Medication List - Protect  others around you: Learn how to safely use, store and throw away your medicines at www.disposemymeds.org.          This list is accurate as of: 11/13/17  5:39 PM.  Always use your most recent med list.                   Brand Name Dispense Instructions for use Diagnosis    budesonide 3 MG EC capsule    ENTOCORT EC    270 capsule    Take 3 capsules (9 mg) by mouth every morning    Crohn's disease of small intestine with other complication (H)       CITRACAL PLUS PO           [facility] PO       Crohn's disease of small intestine with other complication (H)       dexamethasone 0.1 MG/ML solution      Take  by mouth daily.        lisinopril 10 MG tablet    PRINIVIL/ZESTRIL    90 tablet    Take 1 tablet (10 mg) by mouth daily    Hypertension goal BP (blood pressure) < 140/90       mesalamine 1.2 G EC tablet    LIALDA    180 tablet    Take 2 tablets (2,400 mg) by mouth every morning    Crohn's disease of small intestine with other complication (H)       MULTIVITAMIN ADULT PO      Take 1 capsule by mouth daily    Crohn's disease of small intestine with other complication (H)       omeprazole 20 MG tablet     180 tablet    Take 2 tablets (40 mg) by mouth daily    Gastroesophageal reflux disease, esophagitis presence not specified       VITAMIN D PO      Take 2,000 Units by mouth daily

## 2017-11-14 ENCOUNTER — CARE COORDINATION (OUTPATIENT)
Dept: GASTROENTEROLOGY | Facility: CLINIC | Age: 57
End: 2017-11-14

## 2017-11-14 DIAGNOSIS — K50.818 CROHN'S DISEASE OF BOTH SMALL AND LARGE INTESTINE WITH OTHER COMPLICATION (H): Primary | ICD-10-CM

## 2017-11-14 LAB
ALBUMIN SERPL-MCNC: 3.1 G/DL (ref 3.4–5)
ALP SERPL-CCNC: 68 U/L (ref 40–150)
ALT SERPL W P-5'-P-CCNC: 29 U/L (ref 0–50)
ANION GAP SERPL CALCULATED.3IONS-SCNC: 11 MMOL/L (ref 3–14)
AST SERPL W P-5'-P-CCNC: 25 U/L (ref 0–45)
BACTERIA SPEC CULT: NO GROWTH
BILIRUB SERPL-MCNC: 0.3 MG/DL (ref 0.2–1.3)
BUN SERPL-MCNC: 17 MG/DL (ref 7–30)
CALCIUM SERPL-MCNC: 8.9 MG/DL (ref 8.5–10.1)
CHLORIDE SERPL-SCNC: 103 MMOL/L (ref 94–109)
CHOLEST SERPL-MCNC: 234 MG/DL
CO2 SERPL-SCNC: 24 MMOL/L (ref 20–32)
CREAT SERPL-MCNC: 1.17 MG/DL (ref 0.52–1.04)
GFR SERPL CREATININE-BSD FRML MDRD: 48 ML/MIN/1.7M2
GLUCOSE SERPL-MCNC: 94 MG/DL (ref 70–99)
HDLC SERPL-MCNC: 66 MG/DL
LDLC SERPL CALC-MCNC: 123 MG/DL
NONHDLC SERPL-MCNC: 168 MG/DL
POTASSIUM SERPL-SCNC: 4.1 MMOL/L (ref 3.4–5.3)
PROT SERPL-MCNC: 5.8 G/DL (ref 6.8–8.8)
SODIUM SERPL-SCNC: 138 MMOL/L (ref 133–144)
SPECIMEN SOURCE: NORMAL
TRIGL SERPL-MCNC: 226 MG/DL

## 2017-11-15 DIAGNOSIS — I10 HYPERTENSION GOAL BP (BLOOD PRESSURE) < 140/90: Primary | ICD-10-CM

## 2017-11-21 DIAGNOSIS — I10 HYPERTENSION GOAL BP (BLOOD PRESSURE) < 140/90: ICD-10-CM

## 2017-11-21 DIAGNOSIS — K50.818 CROHN'S DISEASE OF BOTH SMALL AND LARGE INTESTINE WITH OTHER COMPLICATION (H): ICD-10-CM

## 2017-11-21 LAB
EOSINOPHIL SPEC QL WRIGHT STN: NORMAL
SPECIMEN SOURCE: NORMAL

## 2017-11-21 PROCEDURE — 36415 COLL VENOUS BLD VENIPUNCTURE: CPT | Performed by: INTERNAL MEDICINE

## 2017-11-21 PROCEDURE — 89190 NASAL SMEAR FOR EOSINOPHILS: CPT | Performed by: INTERNAL MEDICINE

## 2017-11-21 PROCEDURE — 80048 BASIC METABOLIC PNL TOTAL CA: CPT | Performed by: FAMILY MEDICINE

## 2017-11-21 PROCEDURE — 36415 COLL VENOUS BLD VENIPUNCTURE: CPT | Performed by: FAMILY MEDICINE

## 2017-11-22 LAB
ANION GAP SERPL CALCULATED.3IONS-SCNC: 12 MMOL/L (ref 3–14)
BUN SERPL-MCNC: 14 MG/DL (ref 7–30)
CALCIUM SERPL-MCNC: 8.3 MG/DL (ref 8.5–10.1)
CHLORIDE SERPL-SCNC: 106 MMOL/L (ref 94–109)
CO2 SERPL-SCNC: 21 MMOL/L (ref 20–32)
CREAT SERPL-MCNC: 0.79 MG/DL (ref 0.52–1.04)
GFR SERPL CREATININE-BSD FRML MDRD: 75 ML/MIN/1.7M2
GLUCOSE SERPL-MCNC: 76 MG/DL (ref 70–99)
POTASSIUM SERPL-SCNC: 4.2 MMOL/L (ref 3.4–5.3)
SODIUM SERPL-SCNC: 139 MMOL/L (ref 133–144)

## 2017-11-27 ENCOUNTER — TELEPHONE (OUTPATIENT)
Dept: GASTROENTEROLOGY | Facility: OUTPATIENT CENTER | Age: 57
End: 2017-11-27

## 2017-11-29 ENCOUNTER — TELEPHONE (OUTPATIENT)
Dept: GASTROENTEROLOGY | Facility: OUTPATIENT CENTER | Age: 57
End: 2017-11-29

## 2017-11-29 DIAGNOSIS — K50.90 CROHN'S DISEASE (H): Primary | ICD-10-CM

## 2017-11-29 NOTE — TELEPHONE ENCOUNTER
Patient taking any blood thinners ? no    Heart disease ? denies    Lung disease ? denies      Sleep apnea ? denies    Diabetic ? denies    Kidney disease ? denies    Dialysis ? n/a    Electronic implanted medical devices ? denies    Are you taking any narcotic pain medication ?no   What is your daily dosage ?    PTSD ? n/a    Prep instructions reviewed with patient ? Patient declined review.  policy, MAC sedation plan reviewed. Advised patient to have someone stay with her post exam    Pharmacy : [facility]    Indication for procedure : Crohn's disease of small intestine without complication (H) [K50.00    Referring provider :Rudy Morfin MD

## 2017-12-05 ENCOUNTER — TRANSFERRED RECORDS (OUTPATIENT)
Dept: HEALTH INFORMATION MANAGEMENT | Facility: CLINIC | Age: 57
End: 2017-12-05

## 2017-12-05 ENCOUNTER — DOCUMENTATION ONLY (OUTPATIENT)
Dept: GASTROENTEROLOGY | Facility: OUTPATIENT CENTER | Age: 57
End: 2017-12-05

## 2017-12-06 ENCOUNTER — MYC MEDICAL ADVICE (OUTPATIENT)
Dept: PULMONOLOGY | Facility: CLINIC | Age: 57
End: 2017-12-06

## 2017-12-06 DIAGNOSIS — K50.80 CROHN'S DISEASE OF BOTH SMALL AND LARGE INTESTINE WITHOUT COMPLICATION (H): Primary | ICD-10-CM

## 2017-12-20 DIAGNOSIS — K50.80 CROHN'S DISEASE OF BOTH SMALL AND LARGE INTESTINE WITHOUT COMPLICATION (H): ICD-10-CM

## 2017-12-20 PROCEDURE — 80048 BASIC METABOLIC PNL TOTAL CA: CPT | Performed by: FAMILY MEDICINE

## 2017-12-20 PROCEDURE — 36415 COLL VENOUS BLD VENIPUNCTURE: CPT | Performed by: FAMILY MEDICINE

## 2017-12-21 LAB
ANION GAP SERPL CALCULATED.3IONS-SCNC: 9 MMOL/L (ref 3–14)
BUN SERPL-MCNC: 15 MG/DL (ref 7–30)
CALCIUM SERPL-MCNC: 8.9 MG/DL (ref 8.5–10.1)
CHLORIDE SERPL-SCNC: 105 MMOL/L (ref 94–109)
CO2 SERPL-SCNC: 26 MMOL/L (ref 20–32)
CREAT SERPL-MCNC: 0.82 MG/DL (ref 0.52–1.04)
GFR SERPL CREATININE-BSD FRML MDRD: 71 ML/MIN/1.7M2
GLUCOSE SERPL-MCNC: 78 MG/DL (ref 70–99)
POTASSIUM SERPL-SCNC: 4.2 MMOL/L (ref 3.4–5.3)
SODIUM SERPL-SCNC: 140 MMOL/L (ref 133–144)

## 2017-12-22 ENCOUNTER — MYC MEDICAL ADVICE (OUTPATIENT)
Dept: FAMILY MEDICINE | Facility: CLINIC | Age: 57
End: 2017-12-22

## 2017-12-22 DIAGNOSIS — I10 HYPERTENSION, GOAL BELOW 140/90: Primary | ICD-10-CM

## 2017-12-26 NOTE — TELEPHONE ENCOUNTER
Dr. Hunter    See the Mixpo message    Edelmira Carlota RN   [facility] [place][note 2]

## 2018-01-14 ENCOUNTER — TELEPHONE (OUTPATIENT)
Dept: FAMILY MEDICINE | Facility: CLINIC | Age: 58
End: 2018-01-14

## 2018-01-14 NOTE — TELEPHONE ENCOUNTER
PA required on: PANCHO 1.2 GM TBEC   Patient Insurance: [employer]  Insurance BIN: [id number]     Insurance PCN:   Insurance ID: [id number]  Insurance phone number: [phone]   Pharmacy NPI: [id number]    Please let us know if approved or denied, thank you!    Jocelin Gibbs    [facility]  [phone]

## 2018-01-16 ENCOUNTER — TELEPHONE (OUTPATIENT)
Dept: GASTROENTEROLOGY | Facility: CLINIC | Age: 58
End: 2018-01-16

## 2018-01-16 NOTE — TELEPHONE ENCOUNTER
Central Prior Authorization Team   Phone: [phone]    There is another encounter created by the nurse under Gastro, cancelling this encounter.     Medication: LIALDA  Insurance Company: [employer] - Phone [phone] Fax [phone]  Pharmacy Filling the Rx: [facility] [place], [place] - [address]  Filling Pharmacy Phone: [phone]  Filling Pharmacy Fax:    Start Date: 1/16/2018

## 2018-01-16 NOTE — TELEPHONE ENCOUNTER
Prior Authorization Specialty Medication Request    Medication/Dose: Lialda   Diagnosis and ICD: K50.018  New/Renewal/Insurance Change PA: Renewal    Important Lab Values:     Previously Tried and Failed Therapies:     Rationale:     Would you like to include any research articles?    If yes please include the hyperlink(s) below or fax @ [phone].    (Include Name and MRN)    If you received a fax notification from an outside Pharmacy;  Pharmacy Name:[facility]  Pharmacy #:  Pharmacy Fax:

## 2018-01-16 NOTE — TELEPHONE ENCOUNTER
Central Prior Authorization Team   Phone: [phone]      PA Initiation    Medication: Lialda 1.2G- PA Initiated  Insurance Company: [email or web address] - Phone [phone] Fax [phone]  Pharmacy Filling the Rx: [place], [place] - [address]  Filling Pharmacy Phone: [phone]  Filling Pharmacy Fax: [phone]  Start Date: 1/16/2018

## 2018-01-17 NOTE — TELEPHONE ENCOUNTER
Prior Authorization Not Needed per Insurance    Medication: Lialda 1.2G- PA Not Needed  Insurance Company: [employer] - Phone [phone] Fax [phone]  Expected CoPay: $33.00    Pharmacy Filling the Rx: [place], [place] - [address]  Pharmacy Notified: Yes  Patient Notified: No    Notified pharmacy that PA is not needed if processed through generic. Generic processed with $33 co-pay.

## 2018-01-18 DIAGNOSIS — I10 HYPERTENSION GOAL BP (BLOOD PRESSURE) < 140/90: ICD-10-CM

## 2018-01-18 RX ORDER — LISINOPRIL 10 MG/1
10 TABLET ORAL DAILY
Qty: 90 TABLET | Refills: 0 | Status: SHIPPED | OUTPATIENT
Start: 2018-01-18 | End: 2018-06-12

## 2018-01-18 NOTE — TELEPHONE ENCOUNTER
"Requested Prescriptions   Pending Prescriptions Disp Refills     lisinopril (PRINIVIL/ZESTRIL) 10 MG tablet 90 tablet 3    Last Written Prescription Date:  1/27/17  Last Fill Quantity: 90,  # refills: 3   Last Office Visit with [facility], P or [facility] prescribing provider:  11/13/17   Future Office Visit:      Sig: Take 1 tablet (10 mg) by mouth daily    ACE Inhibitors (Including Combos) Protocol Passed    1/18/2018 10:18 AM       Passed - Blood pressure under 140/90    BP Readings from Last 3 Encounters:   11/13/17 120/74   08/25/17 132/86   05/08/17 122/74                Passed - Recent or future visit with authorizing provider's specialty    Patient had office visit in the last year or has a visit in the next 30 days with authorizing provider.  See \"Patient Info\" tab in inbasket, or \"Choose Columns\" in Meds & Orders section of the refill encounter.            Passed - Patient is age 18 or older       Passed - No active pregnancy on record       Passed - Normal serum creatinine on file in past 12 months    Recent Labs   Lab Test  12/20/17   1415   CR  0.82            Passed - Normal serum potassium on file in past 12 months    Recent Labs   Lab Test  12/20/17   1415   POTASSIUM  4.2            Passed - No positive pregnancy test in past 12 months          "

## 2018-01-23 DIAGNOSIS — I10 HYPERTENSION, GOAL BELOW 140/90: ICD-10-CM

## 2018-01-23 PROCEDURE — 36415 COLL VENOUS BLD VENIPUNCTURE: CPT | Performed by: FAMILY MEDICINE

## 2018-01-23 PROCEDURE — 80048 BASIC METABOLIC PNL TOTAL CA: CPT | Performed by: FAMILY MEDICINE

## 2018-01-24 LAB
ANION GAP SERPL CALCULATED.3IONS-SCNC: 5 MMOL/L (ref 3–14)
BUN SERPL-MCNC: 16 MG/DL (ref 7–30)
CALCIUM SERPL-MCNC: 8.5 MG/DL (ref 8.5–10.1)
CHLORIDE SERPL-SCNC: 105 MMOL/L (ref 94–109)
CO2 SERPL-SCNC: 31 MMOL/L (ref 20–32)
CREAT SERPL-MCNC: 0.76 MG/DL (ref 0.52–1.04)
GFR SERPL CREATININE-BSD FRML MDRD: 79 ML/MIN/1.7M2
GLUCOSE SERPL-MCNC: 84 MG/DL (ref 70–99)
POTASSIUM SERPL-SCNC: 4.4 MMOL/L (ref 3.4–5.3)
SODIUM SERPL-SCNC: 141 MMOL/L (ref 133–144)

## 2018-02-08 ENCOUNTER — OFFICE VISIT (OUTPATIENT)
Dept: FAMILY MEDICINE | Facility: CLINIC | Age: 58
End: 2018-02-08
Payer: COMMERCIAL

## 2018-02-08 VITALS
HEART RATE: 81 BPM | BODY MASS INDEX: 22.62 KG/M2 | TEMPERATURE: 97.6 F | WEIGHT: 127.7 LBS | HEIGHT: 63 IN | OXYGEN SATURATION: 100 % | SYSTOLIC BLOOD PRESSURE: 119 MMHG | DIASTOLIC BLOOD PRESSURE: 84 MMHG

## 2018-02-08 DIAGNOSIS — R10.32 INGUINAL PAIN, LEFT: Primary | ICD-10-CM

## 2018-02-08 PROCEDURE — 99213 OFFICE O/P EST LOW 20 MIN: CPT | Performed by: FAMILY MEDICINE

## 2018-02-08 NOTE — MR AVS SNAPSHOT
After Visit Summary   2/8/2018    Aileen Priest    MRN: [id number]           Patient Information     Date Of Birth          1960        Visit Information        Provider Department      2/8/2018 4:00 PM Bucky Hunter MD [facility] [place]        Today's Diagnoses     Inguinal pain, left    -  1       Follow-ups after your visit        Additional Services     GENERAL SURG ADULT REFERRAL       Your provider has referred you to: [facility]: General Surgery Clinic [facility] [phone]   [email or web address]    Please be aware that coverage of these services is subject to the terms and limitations of your health insurance plan.  Call member services at your health plan with any benefit or coverage questions.      Please bring the following with you to your appointment:    (1) Any X-Rays, CTs or MRIs which have been performed.  Contact the facility where they were done to arrange for  prior to your scheduled appointment.   (2) List of current medications   (3) This referral request   (4) Any documents/labs given to you for this referral            [facility] PT, HAND, AND CHIROPRACTIC REFERRAL       **This order will print in the [facility] Scheduling Office**    Physical Therapy, Hand Therapy and Chiropractic Care are available through:    *[facility]  *[facility]  *[facility]    Call one number to schedule at any of the above locations: [phone].    Your provider has referred you to: Physical Therapy at [facility] or [facility]    Indication/Reason for Referral: Women's Health (Please Complete Special Programs SmartList)  Onset of Illness: 2017  Therapy Orders: Evaluate and Treat  Special Programs: None and Women's Health: inquinal pain and  As needed  Special Request: None    Jeanmarie Murry      Additional Comments for the Therapist or Chiropractor:     Please be aware that coverage of these  services is subject to the terms and limitations of your health insurance plan.  Call member services at your health plan with any benefit or coverage questions.      Please bring the following to your appointment:    *Your personal calendar for scheduling future appointments  *Comfortable clothing                  Your next 10 appointments already scheduled     Feb 23, 2018  9:20 AM CST   (Arrive by 9:05 AM)   RETURN INFLAMMATORY BOWEL DISEASE with Rudy Morfin MD   [facility] ([facility])    [address] [place] [address]  [address] [place] [id number] [phone]              Who to contact     If you have questions or need follow up information about today's clinic visit or your schedule please contact [facility] directly at [phone].  Normal or non-critical lab and imaging results will be communicated to you by MyChart, letter or phone within 4 business days after the clinic has received the results. If you do not hear from us within 7 days, please contact the clinic through MyChart or phone. If you have a critical or abnormal lab result, we will notify you by phone as soon as possible.  Submit refill requests through CardMunch or call your pharmacy and they will forward the refill request to us. Please allow 3 business days for your refill to be completed.          Additional Information About Your Visit        [employer] Information     CardMunch gives you secure access to your electronic health record. If you see a primary care provider, you can also send messages to your care team and make appointments. If you have questions, please call your primary care clinic.  If you do not have a primary care provider, please call [phone] and they will assist you.        Care EveryWhere ID     This is your Care EveryWhere ID. This could be used by other organizations to access your [facility] medical records  [id number]        Your Vitals  "Were     Pulse Temperature Height Pulse Oximetry BMI (Body Mass Index)       81 97.6  F (36.4  C) (Oral) 5' 2.75\" (1.594 m) 100% 22.8 kg/m2        Blood Pressure from Last 3 Encounters:   02/08/18 119/84   11/13/17 120/74   08/25/17 132/86    Weight from Last 3 Encounters:   02/08/18 127 lb 11.2 oz (57.9 kg)   11/13/17 126 lb (57.2 kg)   08/25/17 123 lb (55.8 kg)              We Performed the Following     GENERAL SURG ADULT REFERRAL     [facility] PT, HAND, AND CHIROPRACTIC REFERRAL          Today's Medication Changes          These changes are accurate as of 2/8/18  4:28 PM.  If you have any questions, ask your nurse or doctor.               Stop taking these medicines if you haven't already. Please contact your care team if you have questions.     VITAMIN D PO   Stopped by:  Bucky Hunter MD                    Primary Care Provider Office Phone # Fax #    Bucky Hunter [phone]       [address] [facility] [address]  [facility] [address]        Equal Access to Services     [facility]: Hadii aad ku hadasho Soamolali, waaxda luqadaha, qaybta kaalmada adeegyada, edda nicole . So [facility] [phone].    ATENCIÓN: Si habla español, tiene a guillory disposición servicios gratuitos de asistencia lingüística. Llame al [phone].    We comply with applicable federal civil rights laws and [place] laws. We do not discriminate on the basis of race, color, national origin, age, disability, sex, sexual orientation, or gender identity.            Thank you!     Thank you for choosing [facility]  for your care. Our goal is always to provide you with excellent care. Hearing back from our patients is one way we can continue to improve our services. Please take a few minutes to complete the written survey that you may receive in the mail after your visit with us. Thank you!             Your Updated Medication List - Protect others around you: Learn how to safely " use, store and throw away your medicines at www.disposemymeds.org.          This list is accurate as of 2/8/18  4:28 PM.  Always use your most recent med list.                   Brand Name Dispense Instructions for use Diagnosis    budesonide 3 MG EC capsule    ENTOCORT EC    270 capsule    Take 3 capsules (9 mg) by mouth every morning    Crohn's disease of small intestine with other complication (H)       CALCIUM + D3 PO           CITRACAL PLUS PO           [facility] DIGESTIVE HEALTH PO       Crohn's disease of small intestine with other complication (H)       dexamethasone 0.1 MG/ML solution      Take  by mouth daily.        lisinopril 10 MG tablet    PRINIVIL/ZESTRIL    90 tablet    Take 1 tablet (10 mg) by mouth daily    Hypertension goal BP (blood pressure) < 140/90       mesalamine 1.2 G EC tablet    LIALDA    180 tablet    Take 2 tablets (2,400 mg) by mouth every morning    Crohn's disease of small intestine with other complication (H)       MULTIVITAMIN ADULT PO      Take 1 capsule by mouth daily    Crohn's disease of small intestine with other complication (H)       omeprazole 20 MG tablet     180 tablet    Take 2 tablets (40 mg) by mouth daily    Gastroesophageal reflux disease, esophagitis presence not specified

## 2018-02-08 NOTE — NURSING NOTE
"Chief Complaint   Patient presents with     Hernia       Initial /84 (BP Location: Left arm, Patient Position: Chair, Cuff Size: Adult Regular)  Pulse 81  Temp 97.6  F (36.4  C) (Oral)  Ht 5' 2.75\" (1.594 m)  Wt 127 lb 11.2 oz (57.9 kg)  SpO2 100%  BMI 22.8 kg/m2 Estimated body mass index is 22.8 kg/(m^2) as calculated from the following:    Height as of this encounter: 5' 2.75\" (1.594 m).    Weight as of this encounter: 127 lb 11.2 oz (57.9 kg).  Medication Reconciliation: complete     Brenda Davenport CMA    "

## 2018-02-08 NOTE — PROGRESS NOTES
"  SUBJECTIVE:   Aileen Priest is a 57 year old female who presents to clinic today for the following health issues:    Concern - Possible Hernia   Onset: December     Description:   Possible hernia in left groin. Cramping pressure but no pain or bulge    Intensity: moderate    Progression of Symptoms:  Worsening & more frequent     Accompanying Signs & Symptoms:  None     Previous history of similar problem:   None     Precipitating factors:   Worsened by: Sitting      Alleviating factors:  Improved by: Standing     Therapies Tried and outcome: Nothing tried     Notes no  or GI symptoms   Is due for her annual/ pelvic   notes some vaginal dryness    Problem list and histories reviewed & adjusted, as indicated.  Additional history: none    Labs reviewed in EPIC    Reviewed and updated as needed this visit by clinical staff       Reviewed and updated as needed this visit by Provider         ROS:  Constitutional, HEENT, cardiovascular, pulmonary, gi and gu systems are negative, except as otherwise noted.    OBJECTIVE:     /84 (BP Location: Left arm, Patient Position: Chair, Cuff Size: Adult Regular)  Pulse 81  Temp 97.6  F (36.4  C) (Oral)  Ht 5' 2.75\" (1.594 m)  Wt 127 lb 11.2 oz (57.9 kg)  SpO2 100%  BMI 22.8 kg/m2  Body mass index is 22.8 kg/(m^2).  GENERAL: healthy, alert and no distress  NECK: no adenopathy, no asymmetry, masses, or scars and thyroid normal to palpation  RESP: lungs clear to auscultation - no rales, rhonchi or wheezes  CV: regular rate and rhythm, normal S1 S2, no S3 or S4, no murmur, click or rub, no peripheral edema and peripheral pulses strong  ABDOMEN: tenderness left inner inguinal ligament    No mass or bulge with valsalva, no organomegaly or masses and bowel sounds normal  MS: no gross musculoskeletal defects noted, no edema  SKIN: no suspicious lesions or rashes    Diagnostic Test Results:  none     ASSESSMENT/PLAN:       1. Inguinal pain, left  Strain vs early hernia  See " her gyn for exam  - GENERAL SURG ADULT REFERRAL  - [facility] PT, HAND, AND CHIROPRACTIC REFERRAL    Regular exercise  Follow up by phone if not better in 1 month    Bucky Hunter MD  [facility]

## 2018-02-23 ENCOUNTER — OFFICE VISIT (OUTPATIENT)
Dept: PULMONOLOGY | Facility: CLINIC | Age: 58
End: 2018-02-23
Attending: INTERNAL MEDICINE
Payer: COMMERCIAL

## 2018-02-23 VITALS
SYSTOLIC BLOOD PRESSURE: 153 MMHG | DIASTOLIC BLOOD PRESSURE: 93 MMHG | BODY MASS INDEX: 22.15 KG/M2 | OXYGEN SATURATION: 98 % | HEART RATE: 74 BPM | RESPIRATION RATE: 17 BRPM | HEIGHT: 63 IN | WEIGHT: 125 LBS

## 2018-02-23 DIAGNOSIS — K50.00 CROHN'S DISEASE OF SMALL INTESTINE WITHOUT COMPLICATION (H): Primary | ICD-10-CM

## 2018-02-23 PROCEDURE — G0463 HOSPITAL OUTPT CLINIC VISIT: HCPCS | Mod: ZF

## 2018-02-23 ASSESSMENT — PAIN SCALES - GENERAL: PAINLEVEL: NO PAIN (0)

## 2018-02-23 NOTE — MR AVS SNAPSHOT
After Visit Summary   2/23/2018    Aileen Priest    MRN: [id number]           Patient Information     Date Of Birth          1960        Visit Information        Provider Department      2/23/2018 9:20 AM Rudy Morfin MD [facility]        Care Instructions    I've included a brief summary of our discussion and care plan from today's visit below.  Please review this information with your primary care provider.  _______________________________________________________________________      1. It was wonderful getting a chance to meet with you to discuss your ileal Crohn's Disease, particularly given the excellent clinical + endoscopic response (entering remission) you've had thus far - this is great to see, keep up the good work!  - Continue Lialda 2.4g daily as ordered for now.  - Continue omeprazole 40mg, taken every morning, as ordered for now given your excellent clinical response of your symptomatic GERD. Will readdress planning for taper off acid suppression at the next visit, particularly given the upcoming oral surgeries and imminent NSAID exposures.  - Discussed NSAID, opioid pain management, and antibiotic planning for upcoming oral surgeries (periodontal surgery and grafting for bilateral periodontal disease/tissue retraction involving the molars). I am OK with very limited exposures in these regards if needed for best recovery outcomes, discussed danger signs/symptoms to monitor for that might warrant change in plan or reassessment today.  - Discussed Preventive Care in IBD, flu shot updated this season. Given ileal Crohn's Disease distribution, no colonic dysplasia surveillance required.  - Recommend repeat Colon Cancer Surveillance in 2027 unless symptomatic sooner.    2. Recommend having the following studies checked at least 1-2 times/year for disease and medication safety monitoring: BMP, LFT, CBC with differential, ESR/CRP.    3. Continue to  monitor for the danger signs/symptoms we reviewed together today:  worsening abdominal pain, worsening diarrhea, obstructive symptoms (nausea/vomiting, abdominal distention, inability to pass stool/flatus), blood mixed into stools, persistent fevers/chills, progressive anemia (particulary with iron deficiency), unexpected weight loss, etc.  - Contact us via [employer] Tele[employer] should these symptoms occur, particularly as we may advise further evaluation accordingly.    4. Return to GI Clinic with my GI Physician Assistant (Benjamin Brooks) or me in 6 months to review your progress, sooner if symptomatic.   - If you are unable to schedule this follow-up appointment today, please contact Bethany Lyons at [phone] within the next week to help set up this necessary appointment.    PREVENTIVE CARE IN INFLAMMATORY BOWEL DISEASE    - Strongly recommend tobacco abstinence.    - Recommend considering daily calcium + Vitamin D supplementation.    - Recommend age-appropriate cancer surveillance:  - Yearly Dermatology visit for visual skin inspection if immunosuppressed, monthly skin self-check after bathing.  - Dysplasia surveillance colonoscopy every 1-2 years in patients with Crohn's colitis or ulcerative colitis >8-10 years since diagnosis.  - (For men and women ages 9-26) Consideration for HPV vaccination, should be discussed with your PCP further if you feel you'd like to pursue this.  - (For women) Annual Pap smears if immunosuppressed, mammogram when deemed appropriate by your PCP.    - Recommend follow-up with your PCP to ensure the following vaccinations have been updated: Hepatitis A/B, Tdap, Pneumococcal pneumonia, yearly flu SHOT, Meningococcal meningitis (if college-age), HPV (all aged 18-26), etc.  - Would also recommend VZV and MMR titers be checked to confirm immunity.  - Live/attenuated vaccines (such as the INTRANASAL flu vaccine, MMR, Yellow Fever, or Varicella/VZV vaccine) should not be administered to immunosuppressed  patients, except in very specific instances which you should discuss with a GI and Infectious Disease provider first.    - Family planning:  If you or your partner are planning to become pregnant, please schedule time with us to discuss issues surrounding IBD and Fertility/Pregnancy.      _______________________________________________________________________    It was a pleasure seeing you in clinic today - please be in touch if there are any further questions that arise following today's visit.  During business hours, you may reach Clinic Nurse Triage Line at [phone].  For urgent/emergent questions after business hours, you may reach the on-call GI Fellow by contacting the [facility]  at [phone].    Any benign/non-urgent test results are usually communicated via letter or Mumaxu Networkhart message within 1-2 weeks after completion.  Urgent results (those that require a change in the previously-discussed care plan) are usually communicated via a phone call once available from our clinic staff to discuss the results and the next steps in your evaluation.    I recommend signing up for Kamida access if you have not already done so and are comfortable with using a computer.  This allows for online access to your lab results and also helps you communicate efficiently with my clinic should any questions arise in your care.    We have Financial Counseling services available through our clinic.  If you have questions about your insurance coverage or payment responsibilities, particularly before you undergo any tests or procedures, please let us know and we can arrange a consultation accordingly to help you make informed decisions about your healthcare.    Sincerely,    Rudy Morfin MD    [facility] - Department of Medicine  Division of Gastroenterology            Follow-ups after your visit        Follow-up notes from your care team     Return in about 6 months  "(around 8/23/2018).      Who to contact     If you have questions or need follow up information about today's clinic visit or your schedule please contact [facility] directly at [phone].  Normal or non-critical lab and imaging results will be communicated to you by RedZone Roboticshart, letter or phone within 4 business days after the clinic has received the results. If you do not hear from us within 7 days, please contact the clinic through RedZone Roboticshart or phone. If you have a critical or abnormal lab result, we will notify you by phone as soon as possible.  Submit refill requests through ScoreStreak or call your pharmacy and they will forward the refill request to us. Please allow 3 business days for your refill to be completed.          Additional Information About Your Visit        [employer]oticsharWantster Information     ScoreStreak gives you secure access to your electronic health record. If you see a primary care provider, you can also send messages to your care team and make appointments. If you have questions, please call your primary care clinic.  If you do not have a primary care provider, please call [phone] and they will assist you.        Care EveryWhere ID     This is your Care EveryWhere ID. This could be used by other organizations to access your [facility] medical records  [id number]        Your Vitals Were     Pulse Respirations Height Pulse Oximetry BMI (Body Mass Index)       74 17 1.594 m (5' 2.75\") 98% 22.32 kg/m2        Blood Pressure from Last 3 Encounters:   02/23/18 (!) 153/93   02/08/18 119/84   11/13/17 120/74    Weight from Last 3 Encounters:   02/23/18 56.7 kg (125 lb)   02/08/18 57.9 kg (127 lb 11.2 oz)   11/13/17 57.2 kg (126 lb)              Today, you had the following     No orders found for display       Primary Care Provider Office Phone # Fax #    Bucky Hunter [phone]       [address] [address]        Equal Access to " Services     [facility]: Hadii mir wong norrisredd Priyankali, waevertda luqadaha, qaybta kaalmada tung, edda nicole . So [facility] [phone].    ATENCIÓN: Si heliola teresa, tiene a guillory disposición servicios gratuitos de asistencia lingüística. Llame al [phone].    We comply with applicable federal civil rights laws and [place] laws. We do not discriminate on the basis of race, color, national origin, age, disability, sex, sexual orientation, or gender identity.            Thank you!     Thank you for choosing [facility]  for your care. Our goal is always to provide you with excellent care. Hearing back from our patients is one way we can continue to improve our services. Please take a few minutes to complete the written survey that you may receive in the mail after your visit with us. Thank you!             Your Updated Medication List - Protect others around you: Learn how to safely use, store and throw away your medicines at www.disposemymeds.org.          This list is accurate as of 2/23/18  9:41 AM.  Always use your most recent med list.                   Brand Name Dispense Instructions for use Diagnosis    CALCIUM + D3 PO           CITRACAL PLUS PO           [facility] DIGESTIVE HEALTH PO       Crohn's disease of small intestine with other complication (H)       dexamethasone 0.1 MG/ML solution      Take  by mouth daily.        lisinopril 10 MG tablet    PRINIVIL/ZESTRIL    90 tablet    Take 1 tablet (10 mg) by mouth daily    Hypertension goal BP (blood pressure) < 140/90       mesalamine 1.2 G EC tablet    LIALDA    180 tablet    Take 2 tablets (2,400 mg) by mouth every morning    Crohn's disease of small intestine with other complication (H)       MULTIVITAMIN ADULT PO      Take 1 capsule by mouth daily    Crohn's disease of small intestine with other complication (H)       omeprazole 20 MG tablet     180 tablet    Take 2 tablets (40 mg) by  mouth daily    Gastroesophageal reflux disease, esophagitis presence not specified

## 2018-02-23 NOTE — NURSING NOTE
Chief Complaint   Patient presents with     RECHECK     IBD Crohn's disease of small intestine with other complication    Al Cartagena, CMA

## 2018-02-23 NOTE — LETTER
2/23/2018       RE: Aileen Priest  [address]  [place] [address]     Dear Colleague,    Thank you for referring your patient, Aileen Priest, to the [facility] at [facility]. Please see a copy of my visit note below.    GI CLINIC VISIT    CC/REFERRING PROVIDER: Bucky Hunter  REASON FOR CONSULTATION: Crohn's Disease    HPI: 57 year old female w/ h/o ileal Crohn's Disease per 3/2017 ileocolonoscopy biopsies currently on PO 5-ASA + Entocort, Lichen Planus (oral/perianal involvement), Hidradenitis Suppurativa s/p excision/drainage 1975, prior presumed diagnosis of Collagenous Colitis per colonic biopsies 2006 (though suspecting evolving IBD presentation in retrospect following 3/2017 highly-suggestive biopsies of terminal ileum + bland colonic biopsies), presumed GERD, lumbar spine surgery 2005, periorbital shingles 2015, chronic tobacco abuse (in remission), among other medical issues - presenting for f/u Crohn's Disease. Doing well overall, reports having 2-3 soft formed BM/day w/o blood (baseline, much improved compared to prior to 5-ASA therapy). Tolerating Lialda well, no recent issues w/ recurrent Cr elevations (likely unrelated). Denies any tenesmus or insecurity passing flatus, no fecal incontinence or new perianal/nocturnal sxs noted. Denies any N/V/F/C/HA/NS or other const/syst/cardiopulmonary sxs, no BRBPR/melena/urinary changes, no unintentional wt loss or appetite/satiety changes. No other bowel/bladder habit changes, no dysphagia/odynophagia. No jaundice/icterus/pruritus, no acholic stools/steatorrhea, no new lumps/bumps, no jt pain/oral ulcer/rash/eye sxs noted.    Imminent periodontal surgeries in the coming months related to sequelae of LP on molar gum retraction. Discussed NSAID, pain mgmt, and abx planning as recommended per her Oral Surgeon, as well as considerations/precautions to observe.    +marked improvement  in symptomatic GERD since moving PPI to QAM, desires attempt at taper once further out from the upcoming surgeries (particularly given the likely NSAID exposure).    ROS: 10pt ROS performed and otherwise negative.    PERTINENT PAST MEDICAL/SURGICAL HISTORY:  As noted above.    PERTINENT MEDICATIONS:  - Lialda 2.4g PO QDAY  - omeprazole 20mg PO QAM  Medications reviewed with patient today, see Medication List/Assessment for details.  No other NSAID/anticoagulation reported by patient.  No other OTC/herbal/supplements reported by patient.    SOCIAL HISTORY: Tobacco: none.    PHYSICAL EXAMINATION:  Vitals reviewed, AFVSS  Wt 125# today (stable)  Gen: aaox3, cooperative, pleasant, not diaphoretic, nad  HEENT: ncat, neck supple, no clad/sclad, normal op w/o ulcer/exudate, anicteric, mmm, +marked gumline retraction involving the posterior lower molars  Resp/CV without acute findings, not dyspneic/tachycardic  Abd: +nabs, soft, nt, nd, no peritoneal s/s noted  Ext: no c/c/e  Skin: warm, perfused, no jaundice  Neuro: grossly intact, no asterixis noted    PERTINENT STUDIES:  Lytes normal, cr 0.76    Colonoscopy 12/5/17 ([facility]): Endoscopically-normal colon, +sigmoid diverticulosis, +IH/EH. Marked interval ileal healing w/ scant residual erythema, excellent documented endoscopic response.    ASSESSMENT/PLAN:    1. Ileal Crohn's Disease on PO 5-ASA monotherapy with excellent clinical + documented endoscopic response - continue supportive care as ordered for now  1. It was wonderful getting a chance to meet with you to discuss your ileal Crohn's Disease, particularly given the excellent clinical + endoscopic response (entering remission) you've had thus far - this is great to see, keep up the good work!  - Continue Lialda 2.4g daily as ordered for now.  - Continue omeprazole 40mg, taken every morning, as ordered for now given your excellent clinical response of your symptomatic GERD. Will readdress planning for taper off acid  suppression at the next visit, particularly given the upcoming oral surgeries and imminent NSAID exposures.  - Discussed NSAID, opioid pain management, and antibiotic planning for upcoming oral surgeries (periodontal surgery and grafting for bilateral periodontal disease/tissue retraction involving the molars). I am OK with very limited exposures in these regards if needed for best recovery outcomes, discussed danger signs/symptoms to monitor for that might warrant change in plan or reassessment today.  - Discussed Preventive Care in IBD, flu shot updated this season. Given ileal Crohn's Disease distribution, no colonic dysplasia surveillance required.  - Recommend repeat Colon Cancer Surveillance in 2027 unless symptomatic sooner.    2. Recommend having the following studies checked at least 1-2 times/year for disease and medication safety monitoring: BMP, LFT, CBC with differential, ESR/CRP.    3. Continue to monitor for the danger signs/symptoms we reviewed together today:  worsening abdominal pain, worsening diarrhea, obstructive symptoms (nausea/vomiting, abdominal distention, inability to pass stool/flatus), blood mixed into stools, persistent fevers/chills, progressive anemia (particulary with iron deficiency), unexpected weight loss, etc.  - Contact us via [employer] should these symptoms occur, particularly as we may advise further evaluation accordingly.    2. Colorectal Cancer Screening  No PSC, no high-risk FH CRC (2nd cousin dx'd in 2017 w/ advanced CRC @55yo). Given colonoscopy x2 in 2017 without adenomatous lesions, recommend repeat colonoscopy for routine screening in 2027 unless symptomatic sooner.    RTC 6 months with GI PA or me, sooner if symptomatic.      Thank you for this consultation. It was a pleasure to participate in the care of this patient; please contact us with any further questions.     Rudy Morfin MD   of Medicine  [facility] - Department of  Medicine  Division of Gastroenterology

## 2018-02-23 NOTE — PATIENT INSTRUCTIONS
I've included a brief summary of our discussion and care plan from today's visit below.  Please review this information with your primary care provider.  _______________________________________________________________________      1. It was wonderful getting a chance to meet with you to discuss your ileal Crohn's Disease, particularly given the excellent clinical + endoscopic response (entering remission) you've had thus far - this is great to see, keep up the good work!  - Continue Lialda 2.4g daily as ordered for now.  - Continue omeprazole 40mg, taken every morning, as ordered for now given your excellent clinical response of your symptomatic GERD. Will readdress planning for taper off acid suppression at the next visit, particularly given the upcoming oral surgeries and imminent NSAID exposures.  - Discussed NSAID, opioid pain management, and antibiotic planning for upcoming oral surgeries (periodontal surgery and grafting for bilateral periodontal disease/tissue retraction involving the molars). I am OK with very limited exposures in these regards if needed for best recovery outcomes, discussed danger signs/symptoms to monitor for that might warrant change in plan or reassessment today.  - Discussed Preventive Care in IBD, flu shot updated this season. Given ileal Crohn's Disease distribution, no colonic dysplasia surveillance required.  - Recommend repeat Colon Cancer Surveillance in 2027 unless symptomatic sooner.    2. Recommend having the following studies checked at least 1-2 times/year for disease and medication safety monitoring: BMP, LFT, CBC with differential, ESR/CRP.    3. Continue to monitor for the danger signs/symptoms we reviewed together today:  worsening abdominal pain, worsening diarrhea, obstructive symptoms (nausea/vomiting, abdominal distention, inability to pass stool/flatus), blood mixed into stools, persistent fevers/chills, progressive anemia (particulary with iron deficiency),  unexpected weight loss, etc.  - Contact us via [place] should these symptoms occur, particularly as we may advise further evaluation accordingly.    4. Return to GI Clinic with my GI Physician Assistant (Benjamin Brooks) or me in 6 months to review your progress, sooner if symptomatic.   - If you are unable to schedule this follow-up appointment today, please contact Bethany Lyons at [phone] within the next week to help set up this necessary appointment.    PREVENTIVE CARE IN INFLAMMATORY BOWEL DISEASE    - Strongly recommend tobacco abstinence.    - Recommend considering daily calcium + Vitamin D supplementation.    - Recommend age-appropriate cancer surveillance:  - Yearly Dermatology visit for visual skin inspection if immunosuppressed, monthly skin self-check after bathing.  - Dysplasia surveillance colonoscopy every 1-2 years in patients with Crohn's colitis or ulcerative colitis >8-10 years since diagnosis.  - (For men and women ages 9-26) Consideration for HPV vaccination, should be discussed with your PCP further if you feel you'd like to pursue this.  - (For women) Annual Pap smears if immunosuppressed, mammogram when deemed appropriate by your PCP.    - Recommend follow-up with your PCP to ensure the following vaccinations have been updated: Hepatitis A/B, Tdap, Pneumococcal pneumonia, yearly flu SHOT, Meningococcal meningitis (if college-age), HPV (all aged 18-26), etc.  - Would also recommend VZV and MMR titers be checked to confirm immunity.  - Live/attenuated vaccines (such as the INTRANASAL flu vaccine, MMR, Yellow Fever, or Varicella/VZV vaccine) should not be administered to immunosuppressed patients, except in very specific instances which you should discuss with a GI and Infectious Disease provider first.    - Family planning:  If you or your partner are planning to become pregnant, please schedule time with us to discuss issues surrounding IBD and  Fertility/Pregnancy.      _______________________________________________________________________    It was a pleasure seeing you in clinic today - please be in touch if there are any further questions that arise following today's visit.  During business hours, you may reach Clinic Nurse Triage Line at [phone].  For urgent/emergent questions after business hours, you may reach the on-call GI Fellow by contacting the [facility]  at [phone].    Any benign/non-urgent test results are usually communicated via letter or Next University[employer] message within 1-2 weeks after completion.  Urgent results (those that require a change in the previously-discussed care plan) are usually communicated via a phone call once available from our clinic staff to discuss the results and the next steps in your evaluation.    I recommend signing up for [employer] access if you have not already done so and are comfortable with using a computer.  This allows for online access to your lab results and also helps you communicate efficiently with my clinic should any questions arise in your care.    We have Financial Counseling services available through our clinic.  If you have questions about your insurance coverage or payment responsibilities, particularly before you undergo any tests or procedures, please let us know and we can arrange a consultation accordingly to help you make informed decisions about your healthcare.    Sincerely,    Rudy Morfin MD    [facility] - Department of Medicine  Division of Gastroenterology

## 2018-02-23 NOTE — PROGRESS NOTES
GI CLINIC VISIT    CC/REFERRING PROVIDER: Bucky Hunter  REASON FOR CONSULTATION: Crohn's Disease    HPI: 57 year old female w/ h/o ileal Crohn's Disease per 3/2017 ileocolonoscopy biopsies currently on PO 5-ASA + Entocort, Lichen Planus (oral/perianal involvement), Hidradenitis Suppurativa s/p excision/drainage 1975, prior presumed diagnosis of Collagenous Colitis per colonic biopsies 2006 (though suspecting evolving IBD presentation in retrospect following 3/2017 highly-suggestive biopsies of terminal ileum + bland colonic biopsies), presumed GERD, lumbar spine surgery 2005, periorbital shingles 2015, chronic tobacco abuse (in remission), among other medical issues - presenting for f/u Crohn's Disease. Doing well overall, reports having 2-3 soft formed BM/day w/o blood (baseline, much improved compared to prior to 5-ASA therapy). Tolerating Lialda well, no recent issues w/ recurrent Cr elevations (likely unrelated). Denies any tenesmus or insecurity passing flatus, no fecal incontinence or new perianal/nocturnal sxs noted. Denies any N/V/F/C/HA/NS or other const/syst/cardiopulmonary sxs, no BRBPR/melena/urinary changes, no unintentional wt loss or appetite/satiety changes. No other bowel/bladder habit changes, no dysphagia/odynophagia. No jaundice/icterus/pruritus, no acholic stools/steatorrhea, no new lumps/bumps, no jt pain/oral ulcer/rash/eye sxs noted.    Imminent periodontal surgeries in the coming months related to sequelae of LP on molar gum retraction. Discussed NSAID, pain mgmt, and abx planning as recommended per her Oral Surgeon, as well as considerations/precautions to observe.    +marked improvement in symptomatic GERD since moving PPI to QAM, desires attempt at taper once further out from the upcoming surgeries (particularly given the likely NSAID exposure).    ROS: 10pt ROS performed and otherwise negative.    PERTINENT PAST MEDICAL/SURGICAL HISTORY:  As noted above.    PERTINENT MEDICATIONS:  -  Lialda 2.4g PO QDAY  - omeprazole 20mg PO QAM  Medications reviewed with patient today, see Medication List/Assessment for details.  No other NSAID/anticoagulation reported by patient.  No other OTC/herbal/supplements reported by patient.    SOCIAL HISTORY: Tobacco: none.    PHYSICAL EXAMINATION:  Vitals reviewed, AFVSS  Wt 125# today (stable)  Gen: aaox3, cooperative, pleasant, not diaphoretic, nad  HEENT: ncat, neck supple, no clad/sclad, normal op w/o ulcer/exudate, anicteric, mmm, +marked gumline retraction involving the posterior lower molars  Resp/CV without acute findings, not dyspneic/tachycardic  Abd: +nabs, soft, nt, nd, no peritoneal s/s noted  Ext: no c/c/e  Skin: warm, perfused, no jaundice  Neuro: grossly intact, no asterixis noted    PERTINENT STUDIES:  Lytes normal, cr 0.76    Colonoscopy 12/5/17 ([facility]): Endoscopically-normal colon, +sigmoid diverticulosis, +IH/EH. Marked interval ileal healing w/ scant residual erythema, excellent documented endoscopic response.    ASSESSMENT/PLAN:    1. Ileal Crohn's Disease on PO 5-ASA monotherapy with excellent clinical + documented endoscopic response - continue supportive care as ordered for now  1. It was wonderful getting a chance to meet with you to discuss your ileal Crohn's Disease, particularly given the excellent clinical + endoscopic response (entering remission) you've had thus far - this is great to see, keep up the good work!  - Continue Lialda 2.4g daily as ordered for now.  - Continue omeprazole 40mg, taken every morning, as ordered for now given your excellent clinical response of your symptomatic GERD. Will readdress planning for taper off acid suppression at the next visit, particularly given the upcoming oral surgeries and imminent NSAID exposures.  - Discussed NSAID, opioid pain management, and antibiotic planning for upcoming oral surgeries (periodontal surgery and grafting for bilateral periodontal disease/tissue retraction involving the  molars). I am OK with very limited exposures in these regards if needed for best recovery outcomes, discussed danger signs/symptoms to monitor for that might warrant change in plan or reassessment today.  - Discussed Preventive Care in IBD, flu shot updated this season. Given ileal Crohn's Disease distribution, no colonic dysplasia surveillance required.  - Recommend repeat Colon Cancer Surveillance in 2027 unless symptomatic sooner.    2. Recommend having the following studies checked at least 1-2 times/year for disease and medication safety monitoring: BMP, LFT, CBC with differential, ESR/CRP.    3. Continue to monitor for the danger signs/symptoms we reviewed together today:  worsening abdominal pain, worsening diarrhea, obstructive symptoms (nausea/vomiting, abdominal distention, inability to pass stool/flatus), blood mixed into stools, persistent fevers/chills, progressive anemia (particulary with iron deficiency), unexpected weight loss, etc.  - Contact us via [employer] should these symptoms occur, particularly as we may advise further evaluation accordingly.    2. Colorectal Cancer Screening  No PSC, no high-risk FH CRC (2nd cousin dx'd in 2017 w/ advanced CRC @55yo). Given colonoscopy x2 in 2017 without adenomatous lesions, recommend repeat colonoscopy for routine screening in 2027 unless symptomatic sooner.    RTC 6 months with GI PA or me, sooner if symptomatic.      Thank you for this consultation. It was a pleasure to participate in the care of this patient; please contact us with any further questions.     Rudy Morfin MD   of Medicine  [facility] - Department of Medicine  Division of Gastroenterology

## 2018-02-28 ENCOUNTER — TELEPHONE (OUTPATIENT)
Dept: GASTROENTEROLOGY | Facility: CLINIC | Age: 58
End: 2018-02-28

## 2018-02-28 NOTE — TELEPHONE ENCOUNTER
Prior Authorization Retail Medication Request  Medication/Dose: Lialda  Diagnosis and ICD code: O11846  New/Renewal/Insurance Change PA: Insurance  Previously Tried and Failed Therapies: not sure     Insurance ID (if provided): [id number]  Insurance Phone (if provided): [phone]     Any additional info from fax request:      If you received a fax notification from an outside Pharmacy:  Pharmacy Name:[facility]   Pharmacy #:[phone]  Pharmacy Fax:[phone]

## 2018-02-28 NOTE — TELEPHONE ENCOUNTER
Prior Authorization Retail Medication Request  Medication/Dose: Lialda  Diagnosis and ICD code: [id number]  New/Renewal/Insurance Change PA: Insurance  Previously Tried and Failed Therapies: not sure    Insurance ID (if provided): [id number]  Insurance Phone (if provided): [phone]    Any additional info from fax request:     If you received a fax notification from an outside Pharmacy:  Pharmacy Name:[facility]   Pharmacy #:[phone]  Pharmacy Fax:[phone]

## 2018-03-01 NOTE — TELEPHONE ENCOUNTER
Correction-- No prior authorization required, there is a new generic of this medication which is covered by insurance. Pharmacy is dispensing the generic.     Cristobal AckermanD.  [facility]  [phone]

## 2018-03-21 ENCOUNTER — TRANSFERRED RECORDS (OUTPATIENT)
Dept: HEALTH INFORMATION MANAGEMENT | Facility: CLINIC | Age: 58
End: 2018-03-21

## 2018-04-06 ENCOUNTER — HEALTH MAINTENANCE LETTER (OUTPATIENT)
Age: 58
End: 2018-04-06

## 2018-04-18 ENCOUNTER — TRANSFERRED RECORDS (OUTPATIENT)
Dept: HEALTH INFORMATION MANAGEMENT | Facility: CLINIC | Age: 58
End: 2018-04-18

## 2018-05-17 DIAGNOSIS — K50.018 CROHN'S DISEASE OF SMALL INTESTINE WITH OTHER COMPLICATION (H): ICD-10-CM

## 2018-05-18 RX ORDER — MESALAMINE 1.2 G/1
2400 TABLET, DELAYED RELEASE ORAL EVERY MORNING
Qty: 180 TABLET | Refills: 3 | Status: SHIPPED | OUTPATIENT
Start: 2018-05-18 | End: 2019-02-04

## 2018-05-30 NOTE — PROGRESS NOTES
SUBJECTIVE:   Aileen Priest is a 57 year old female who presents to clinic today for the following health issues:    Hypertension Follow-up      Outpatient blood pressures are being checked at work.  Results are 120-130's/80's.    Low Salt Diet: low salt      Amount of exercise or physical activity: Stays active, gets 10k steps per day    Problems taking medications regularly: No    Medication side effects: none    Diet: low salt        Encounter Diagnoses   Name Primary?     Hypertension, goal below 140/90 Yes     Crohn's disease of small intestine with other complication (H), now Well controlled       Encounter for screening for HIV, low risk but works in [employer] around body fluids      Ganglion cyst of right foot painful when wearing certain shoes         Problem list and histories reviewed & adjusted, as indicated.  Additional history: as documented    Labs reviewed in EPIC    Reviewed and updated as needed this visit by clinical staff       Reviewed and updated as needed this visit by Provider         ROS:  Constitutional, HEENT, cardiovascular, pulmonary, gi and gu systems are negative, except as otherwise noted.    OBJECTIVE:     /72 (BP Location: Right arm, Patient Position: Sitting, Cuff Size: Adult Regular)  Pulse 98  Temp 97.8  F (36.6  C) (Oral)  Wt 123 lb 12.8 oz (56.2 kg)  SpO2 98%  BMI 22.11 kg/m2  Body mass index is 22.11 kg/(m^2).  GENERAL: healthy, alert and no distress  RESP: lungs clear to auscultation - no rales, rhonchi or wheezes  CV: regular rate and rhythm, normal S1 S2, no S3 or S4, no murmur, click or rub, no peripheral edema and peripheral pulses strong  ABDOMEN: soft, nontender, no hepatosplenomegaly, no masses and bowel sounds normal  MS: normal muscle tone and 2-3 cm rubbery mobile subcutaneous maass midfoot with mild tenderness to palpation \  SKIN: no suspicious lesions or rashes  NEURO: Normal strength and tone, mentation intact and speech normal  PSYCH: mentation  appears normal, affect normal/bright    Diagnostic Test Results:  Results for orders placed or performed in visit on 03/21/18   Eye Exam - HIM Scan    Narrative    [facility]       ASSESSMENT/PLAN:             1. Hypertension, goal below 140/90  Well controlled   - **Basic metabolic panel FUTURE anytime; Future    2. Crohn's disease of small intestine with other complication (H)  Well controlled   Follow up with consultant as planned.     3. Encounter for screening for HIV  sent  - **HIV Antigen Antibody Combo FUTURE anytime; Future    4. Ganglion cyst of right foot  Follow up with consultant as planned.   - PODIATRY/FOOT & ANKLE SURGERY REFERRAL    Regular exercise  See Patient Instructions    Bucky Hunter MD  [facility]

## 2018-05-31 ENCOUNTER — OFFICE VISIT (OUTPATIENT)
Dept: FAMILY MEDICINE | Facility: CLINIC | Age: 58
End: 2018-05-31
Payer: COMMERCIAL

## 2018-05-31 VITALS
OXYGEN SATURATION: 98 % | SYSTOLIC BLOOD PRESSURE: 112 MMHG | HEART RATE: 98 BPM | DIASTOLIC BLOOD PRESSURE: 72 MMHG | TEMPERATURE: 97.8 F | BODY MASS INDEX: 22.11 KG/M2 | WEIGHT: 123.8 LBS

## 2018-05-31 DIAGNOSIS — K50.018 CROHN'S DISEASE OF SMALL INTESTINE WITH OTHER COMPLICATION (H): ICD-10-CM

## 2018-05-31 DIAGNOSIS — I10 HYPERTENSION, GOAL BELOW 140/90: Primary | ICD-10-CM

## 2018-05-31 DIAGNOSIS — M67.471 GANGLION CYST OF RIGHT FOOT: ICD-10-CM

## 2018-05-31 DIAGNOSIS — Z11.4 ENCOUNTER FOR SCREENING FOR HIV: ICD-10-CM

## 2018-05-31 PROCEDURE — 99214 OFFICE O/P EST MOD 30 MIN: CPT | Performed by: FAMILY MEDICINE

## 2018-05-31 NOTE — MR AVS SNAPSHOT
After Visit Summary   5/31/2018    Aileen Priest    MRN: [id number]           Patient Information     Date Of Birth          1960        Visit Information        Provider Department      5/31/2018 3:30 PM Bucky Hunter MD [facility]        Today's Diagnoses     Hypertension, goal below 140/90    -  1    Crohn's disease of small intestine with other complication (H)        Encounter for screening for HIV        Ganglion cyst of right foot           Follow-ups after your visit        Additional Services     PODIATRY/FOOT & ANKLE SURGERY REFERRAL       Your provider has referred you to: [facility]: [facility] [phone]   [email or web address]    Please be aware that coverage of these services is subject to the terms and limitations of your health insurance plan.  Call member services at your health plan with any benefit or coverage questions.      Please bring the following to your appointment:  >>   Any x-rays, CTs or MRIs which have been performed.  Contact the facility where they were done to arrange for  prior to your scheduled appointment.    >>   List of current medications   >>   This referral request   >>   Any documents/labs given to you for this referral                  Your next 10 appointments already scheduled     Aug 27, 2018  9:20 AM CDT   (Arrive by 9:05 AM)   Return Visit with Rudy Morfin MD   [facility] ([facility])    [address] [place] [id number] [phone]              Future tests that were ordered for you today     Open Future Orders        Priority Expected Expires Ordered    **Basic metabolic panel FUTURE anytime Routine 5/31/2018 5/31/2019 5/31/2018    **HIV Antigen Antibody Combo FUTURE anytime Routine 5/31/2018 5/31/2019 5/31/2018            Who to contact     If you have questions or need follow  up information about today's clinic visit or your schedule please contact [facility] directly at [phone].  Normal or non-critical lab and imaging results will be communicated to you by [employer], letter or phone within 4 business days after the clinic has received the results. If you do not hear from us within 7 days, please contact the clinic through [employer] or phone. If you have a critical or abnormal lab result, we will notify you by phone as soon as possible.  Submit refill requests through [employer] or call your pharmacy and they will forward the refill request to us. Please allow 3 business days for your refill to be completed.          Additional Information About Your Visit        [employer] Information     [employer] gives you secure access to your electronic health record. If you see a primary care provider, you can also send messages to your care team and make appointments. If you have questions, please call your primary care clinic.  If you do not have a primary care provider, please call [phone] and they will assist you.        Care EveryWhere ID     This is your Care EveryWhere ID. This could be used by other organizations to access your [facility] medical records  [id number]        Your Vitals Were     Pulse Temperature Pulse Oximetry BMI (Body Mass Index)          98 97.8  F (36.6  C) (Oral) 98% 22.11 kg/m2         Blood Pressure from Last 3 Encounters:   05/31/18 112/72   02/23/18 (!) 153/93   02/08/18 119/84    Weight from Last 3 Encounters:   05/31/18 123 lb 12.8 oz (56.2 kg)   02/23/18 125 lb (56.7 kg)   02/08/18 127 lb 11.2 oz (57.9 kg)              We Performed the Following     PODIATRY/FOOT & ANKLE SURGERY REFERRAL          Today's Medication Changes          These changes are accurate as of 5/31/18  3:58 PM.  If you have any questions, ask your nurse or doctor.               Stop taking these medicines if you haven't already. Please contact your care team if you have  questions.     CITRACAL PLUS PO                    Primary Care Provider Office Phone # Fax #    Bucky Marlo Hunter [phone]       [address] [address]        Equal Access to Services     CHRISTIANO HOWARD : Hadii mir ku norriso Soomaali, waaxda luqadaha, qaybta kaalmada adeegyada, edda russellin hayaan afialionel gomes bonnie maier. So [facility] [phone].    ATENCIÓN: Si habla español, tiene a guillory disposición servicios gratuitos de asistencia lingüística. Llame al [phone].    We comply with applicable federal civil rights laws and [place] laws. We do not discriminate on the basis of race, color, national origin, age, disability, sex, sexual orientation, or gender identity.            Thank you!     Thank you for choosing [facility]  for your care. Our goal is always to provide you with excellent care. Hearing back from our patients is one way we can continue to improve our services. Please take a few minutes to complete the written survey that you may receive in the mail after your visit with us. Thank you!             Your Updated Medication List - Protect others around you: Learn how to safely use, store and throw away your medicines at www.disposemymeds.org.          This list is accurate as of 5/31/18  3:58 PM.  Always use your most recent med list.                   Brand Name Dispense Instructions for use Diagnosis    CALCIUM + D3 PO           [facility] DIGESTIVE HEALTH PO       Crohn's disease of small intestine with other complication (H)       dexamethasone 0.1 MG/ML solution      Take  by mouth daily.        GAVILYTE-G 236 g suspension   Generic drug:  polyethylene glycol      GaviLyte-G 236 gram-22.74 gram-6.74 gram-5.86 gram oral solution        lisinopril 10 MG tablet    PRINIVIL/ZESTRIL    90 tablet    Take 1 tablet (10 mg) by mouth daily    Hypertension goal BP (blood pressure) < 140/90       mesalamine 1.2 g EC tablet    LIALDA    180 tablet     Take 2 tablets (2,400 mg) by mouth every morning    Crohn's disease of small intestine with other complication (H)       MULTIVITAMIN ADULT PO      Take 1 capsule by mouth daily    Crohn's disease of small intestine with other complication (H)       omeprazole 20 MG tablet     180 tablet    Take 2 tablets (40 mg) by mouth daily    Gastroesophageal reflux disease, esophagitis presence not specified

## 2018-06-12 DIAGNOSIS — I10 HYPERTENSION GOAL BP (BLOOD PRESSURE) < 140/90: ICD-10-CM

## 2018-06-12 RX ORDER — LISINOPRIL 10 MG/1
10 TABLET ORAL DAILY
Qty: 90 TABLET | Refills: 1 | Status: SHIPPED | OUTPATIENT
Start: 2018-06-12 | End: 2018-12-12

## 2018-06-12 NOTE — TELEPHONE ENCOUNTER
Prescription approved per [facility] Refill Protocol.    Edelmira Van RN   [facility]

## 2018-06-12 NOTE — TELEPHONE ENCOUNTER
"Requested Prescriptions   Pending Prescriptions Disp Refills     lisinopril (PRINIVIL/ZESTRIL) 10 MG tablet 90 tablet 0    Last Written Prescription Date:  1/18/18  Last Fill Quantity: 90,  # refills: 0   Last office visit: 5/31/2018 with prescribing provider:  5/31/18   Future Office Visit:     Sig: Take 1 tablet (10 mg) by mouth daily    ACE Inhibitors (Including Combos) Protocol Passed    6/12/2018 10:11 AM       Passed - Blood pressure under 140/90 in past 12 months    BP Readings from Last 3 Encounters:   05/31/18 112/72   02/23/18 (!) 153/93   02/08/18 119/84                Passed - Recent (12 mo) or future (30 days) visit within the authorizing provider's specialty    Patient had office visit in the last 12 months or has a visit in the next 30 days with authorizing provider or within the authorizing provider's specialty.  See \"Patient Info\" tab in inbasket, or \"Choose Columns\" in Meds & Orders section of the refill encounter.           Passed - Patient is age 18 or older       Passed - No active pregnancy on record       Passed - Normal serum creatinine on file in past 12 months    Recent Labs   Lab Test  01/23/18   1547   CR  0.76            Passed - Normal serum potassium on file in past 12 months    Recent Labs   Lab Test  01/23/18   1547   POTASSIUM  4.4            Passed - No positive pregnancy test in past 12 months        "

## 2018-06-14 DIAGNOSIS — I10 HYPERTENSION, GOAL BELOW 140/90: ICD-10-CM

## 2018-06-14 DIAGNOSIS — Z11.4 ENCOUNTER FOR SCREENING FOR HIV: ICD-10-CM

## 2018-06-14 PROCEDURE — 80048 BASIC METABOLIC PNL TOTAL CA: CPT | Performed by: FAMILY MEDICINE

## 2018-06-14 PROCEDURE — 36415 COLL VENOUS BLD VENIPUNCTURE: CPT | Performed by: FAMILY MEDICINE

## 2018-06-14 PROCEDURE — 87389 HIV-1 AG W/HIV-1&-2 AB AG IA: CPT | Performed by: FAMILY MEDICINE

## 2018-06-15 LAB
ANION GAP SERPL CALCULATED.3IONS-SCNC: 7 MMOL/L (ref 3–14)
BUN SERPL-MCNC: 16 MG/DL (ref 7–30)
CALCIUM SERPL-MCNC: 8.8 MG/DL (ref 8.5–10.1)
CHLORIDE SERPL-SCNC: 106 MMOL/L (ref 94–109)
CO2 SERPL-SCNC: 27 MMOL/L (ref 20–32)
CREAT SERPL-MCNC: 0.68 MG/DL (ref 0.52–1.04)
GFR SERPL CREATININE-BSD FRML MDRD: 88 ML/MIN/1.7M2
GLUCOSE SERPL-MCNC: 108 MG/DL (ref 70–99)
HIV 1+2 AB+HIV1 P24 AG SERPL QL IA: NONREACTIVE
POTASSIUM SERPL-SCNC: 3.8 MMOL/L (ref 3.4–5.3)
SODIUM SERPL-SCNC: 140 MMOL/L (ref 133–144)

## 2018-06-19 ENCOUNTER — RADIANT APPOINTMENT (OUTPATIENT)
Dept: MAMMOGRAPHY | Facility: CLINIC | Age: 58
End: 2018-06-19
Payer: COMMERCIAL

## 2018-06-19 DIAGNOSIS — Z12.31 VISIT FOR SCREENING MAMMOGRAM: ICD-10-CM

## 2018-07-05 DIAGNOSIS — K21.9 GASTROESOPHAGEAL REFLUX DISEASE, ESOPHAGITIS PRESENCE NOT SPECIFIED: ICD-10-CM

## 2018-07-07 RX ORDER — NICOTINE POLACRILEX 4 MG/1
40 GUM, CHEWING ORAL DAILY
Qty: 180 TABLET | Refills: 3 | Status: SHIPPED | OUTPATIENT
Start: 2018-07-07 | End: 2018-08-27

## 2018-08-03 ENCOUNTER — MYC MEDICAL ADVICE (OUTPATIENT)
Dept: FAMILY MEDICINE | Facility: CLINIC | Age: 58
End: 2018-08-03

## 2018-08-06 NOTE — TELEPHONE ENCOUNTER
Dr. Hunter    See picture  Of pt daughter arm, her chart is cued up also    Please close this encounter when finished    Edelmira Van, RN   [facility]

## 2018-08-09 NOTE — TELEPHONE ENCOUNTER
Writer notes response - Closing encounter - no further actions needed at this time    Zac Medrano RN

## 2018-08-24 ENCOUNTER — TELEPHONE (OUTPATIENT)
Dept: GASTROENTEROLOGY | Facility: CLINIC | Age: 58
End: 2018-08-24

## 2018-08-27 ENCOUNTER — OFFICE VISIT (OUTPATIENT)
Dept: GASTROENTEROLOGY | Facility: CLINIC | Age: 58
End: 2018-08-27
Payer: COMMERCIAL

## 2018-08-27 VITALS
RESPIRATION RATE: 16 BRPM | HEIGHT: 63 IN | WEIGHT: 124.2 LBS | HEART RATE: 78 BPM | OXYGEN SATURATION: 98 % | DIASTOLIC BLOOD PRESSURE: 90 MMHG | SYSTOLIC BLOOD PRESSURE: 139 MMHG | BODY MASS INDEX: 22.01 KG/M2 | TEMPERATURE: 97.6 F

## 2018-08-27 DIAGNOSIS — K21.9 GASTROESOPHAGEAL REFLUX DISEASE WITHOUT ESOPHAGITIS: ICD-10-CM

## 2018-08-27 DIAGNOSIS — L43.9 LICHEN PLANUS: ICD-10-CM

## 2018-08-27 DIAGNOSIS — K50.00 CROHN'S DISEASE OF SMALL INTESTINE WITHOUT COMPLICATION (H): Primary | ICD-10-CM

## 2018-08-27 DIAGNOSIS — K21.9 GASTROESOPHAGEAL REFLUX DISEASE, ESOPHAGITIS PRESENCE NOT SPECIFIED: ICD-10-CM

## 2018-08-27 RX ORDER — OMEGA-3 FATTY ACIDS/FISH OIL 300-1000MG
CAPSULE ORAL
COMMUNITY
End: 2019-10-29

## 2018-08-27 RX ORDER — NICOTINE POLACRILEX 4 MG/1
20 GUM, CHEWING ORAL DAILY
Qty: 90 TABLET | Refills: 3 | Status: SHIPPED | OUTPATIENT
Start: 2018-08-27 | End: 2018-08-28 | Stop reason: DRUGHIGH

## 2018-08-27 ASSESSMENT — PAIN SCALES - GENERAL: PAINLEVEL: NO PAIN (0)

## 2018-08-27 NOTE — LETTER
8/27/2018       RE: Aileen Priest  [address]  [place] [address]     Dear Colleague,    Thank you for referring your patient, Aileen Priest, to the [facility] at [facility]. Please see a copy of my visit note below.    GI CLINIC VISIT    CC/REFERRING PROVIDER: Bucky Hunter  REASON FOR CONSULTATION: Crohn's Disease, GERD    HPI: 57 year old female w/ h/o ileal Crohn's Disease per 3/2017 ileocolonoscopy biopsies currently on PO 5-ASA, Lichen Planus (oral/perianal involvement), Hidradenitis Suppurativa s/p excision/drainage 1975, prior presumed diagnosis of Collagenous Colitis per colonic biopsies 2006 (though suspecting evolving IBD presentation in retrospect following 3/2017 highly-suggestive biopsies of terminal ileum + bland colonic biopsies), GERD, lumbar spine surgery 2005, periorbital shingles 2015, chronic tobacco abuse (in remission), among other medical issues - presenting for f/u Crohn's Disease. Reports having 2-3 soft formed BM/day w/o blood (baseline). +rare focal lower abd cramping/discomfort assoc w/ fatigue occurring 2x in the last 1-2 months, not assoc w/ diarrhea or other typical IBD sxs (overall much improved in character compared to prior to IBD diagnosis/treatment). Denies any tenesmus or insecurity passing flatus, no fecal incontinence or new perianal/nocturnal sxs noted. Denies any N/V/F/C/HA/NS or other const/syst/cardiopulmonary sxs, no BRBPR/melena/urinary changes, no unintentional wt loss or appetite/satiety changes. No other bowel/bladder habit changes, no dysphagia/odynophagia. No jaundice/icterus/pruritus, no acholic stools/steatorrhea, no new lumps/bumps, no jt pain/oral ulcer/rash/eye sxs noted.    ROS: 10pt ROS performed and otherwise negative.    PERTINENT PAST MEDICAL/SURGICAL HISTORY:  As noted above.    PERTINENT MEDICATIONS:  - Lialda (generic) 2.4g PO QDAY  - omeprazole 20mg PO QAM  -  MVI  Medications reviewed with patient today, see Medication List/Assessment for details.  No other NSAID/anticoagulation reported by patient.  No other OTC/herbal/supplements reported by patient.    SOCIAL HISTORY: Tobacco: none.    PHYSICAL EXAMINATION:  Vitals reviewed, AFVSS  Wt 124# today (stable)  Gen: aaox3, cooperative, pleasant, not diaphoretic, nad  HEENT: ncat, neck supple, no clad/sclad, normal op w/o ulcer/exudate, anicteric, mmm  Resp/CV without acute findings, not dyspneic/tachycardic  Abd: +nabs, soft, nt, nd, no peritoneal s/s noted  Ext: no c/c/e  Skin: warm, perfused, no jaundice  Neuro: grossly intact, no asterixis noted    PERTINENT STUDIES:  Labs ordered today, currently pending  Lytes normal, cr 0.68 on 6/14/18    ASSESSMENT/PLAN:    1. Ileal Crohn's Disease on PO 5-ASA monotherapy with excellent clinical + documented endoscopic response - continue supportive care as ordered for now  1. It was wonderful getting a chance to meet with you to discuss your ileal Crohn's Disease, particularly given the overall excellent clinical + endoscopic response you've had thus far - this is great to see, keep up the good work!  - Continue Lialda (generic) 2.4g daily as ordered for now.  - Continue omeprazole 20mg, taken every morning, as ordered for now given your excellent clinical response of your symptomatic GERD. Will readdress planning for taper trial off acid suppression in your ongoing care, particularly given the upcoming oral surgeries and imminent NSAID exposures. Recommend daily MVI as long-term acid suppression may be required, discussed risks/benefits of PPI therapy today.  - Discussed NSAID, opioid pain management, and antibiotic planning for upcoming oral surgeries (periodontal surgery and grafting for bilateral periodontal disease/tissue retraction involving the molars). I am OK with very limited exposures in these regards if needed for best recovery outcomes, discussed danger signs/symptoms  to monitor for that might warrant change in plan or reassessment today. I'm very glad that the last round of procedures went well, please keep us updated on your progress.  - Discussed Preventive Care in IBD, no need for colonic dysplasia surveillance given ileal-limited disease at this time.  - Recommend repeat Colon Cancer Surveillance in 2027, unless symptomatic sooner.     2. Recommend having the following studies checked at least 1-2 times/year for disease and medication safety monitoring: BMP, LFT, CBC with differential, ESR/CRP.  - Will order completion of remainder of routine studies today (recent BMP 6/2018 was excellent, does not need recheck today).     3. Continue to monitor for the danger signs/symptoms we reviewed together today:  worsening abdominal pain, worsening diarrhea, obstructive symptoms (nausea/vomiting, abdominal distention, inability to pass stool/flatus), blood mixed into stools, persistent fevers/chills, progressive anemia (particulary with iron deficiency), unexpected weight loss, etc.  - Contact us via [employer] should these symptoms occur, particularly as we may advise further evaluation accordingly.    2. Cancer Screening  No PSC, no high-risk FH CRC (2nd cousin dx'd in 2017 w/ advanced CRC @57yo). Given colonoscopy x2 in 2017 without adenomatous lesions, recommend repeat colonoscopy for routine screening in 2027 unless symptomatic sooner.    RTC 6-8 months with GI PA or me, sooner if symptomatic.      Thank you for this consultation. It was a pleasure to participate in the care of this patient; please contact us with any further questions.       Again, thank you for allowing me to participate in the care of your patient.      Sincerely,    Rudy Morfin MD

## 2018-08-27 NOTE — NURSING NOTE
Patient received AVS discharge information. Information reviewed with patient, all patient questions answered and patient verbalized understanding of information received. Patient escorted to the lobby to schedule follow up appointment in 6-8 mo.

## 2018-08-27 NOTE — PROGRESS NOTES
GI CLINIC VISIT    CC/REFERRING PROVIDER: Bucky Hunter  REASON FOR CONSULTATION: Crohn's Disease, GERD    HPI: 57 year old female w/ h/o ileal Crohn's Disease per 3/2017 ileocolonoscopy biopsies currently on PO 5-ASA, Lichen Planus (oral/perianal involvement), Hidradenitis Suppurativa s/p excision/drainage 1975, prior presumed diagnosis of Collagenous Colitis per colonic biopsies 2006 (though suspecting evolving IBD presentation in retrospect following 3/2017 highly-suggestive biopsies of terminal ileum + bland colonic biopsies), GERD, lumbar spine surgery 2005, periorbital shingles 2015, chronic tobacco abuse (in remission), among other medical issues - presenting for f/u Crohn's Disease. Reports having 2-3 soft formed BM/day w/o blood (baseline). +rare focal lower abd cramping/discomfort assoc w/ fatigue occurring 2x in the last 1-2 months, not assoc w/ diarrhea or other typical IBD sxs (overall much improved in character compared to prior to IBD diagnosis/treatment). Denies any tenesmus or insecurity passing flatus, no fecal incontinence or new perianal/nocturnal sxs noted. Denies any N/V/F/C/HA/NS or other const/syst/cardiopulmonary sxs, no BRBPR/melena/urinary changes, no unintentional wt loss or appetite/satiety changes. No other bowel/bladder habit changes, no dysphagia/odynophagia. No jaundice/icterus/pruritus, no acholic stools/steatorrhea, no new lumps/bumps, no jt pain/oral ulcer/rash/eye sxs noted.    ROS: 10pt ROS performed and otherwise negative.    PERTINENT PAST MEDICAL/SURGICAL HISTORY:  As noted above.    PERTINENT MEDICATIONS:  - Lialda (generic) 2.4g PO QDAY  - omeprazole 20mg PO QAM  - MVI  Medications reviewed with patient today, see Medication List/Assessment for details.  No other NSAID/anticoagulation reported by patient.  No other OTC/herbal/supplements reported by patient.    SOCIAL HISTORY: Tobacco: none.    PHYSICAL EXAMINATION:  Vitals reviewed, AFVSS  Wt 124# today (stable)  Gen:  aaox3, cooperative, pleasant, not diaphoretic, nad  HEENT: ncat, neck supple, no clad/sclad, normal op w/o ulcer/exudate, anicteric, mmm  Resp/CV without acute findings, not dyspneic/tachycardic  Abd: +nabs, soft, nt, nd, no peritoneal s/s noted  Ext: no c/c/e  Skin: warm, perfused, no jaundice  Neuro: grossly intact, no asterixis noted    PERTINENT STUDIES:  Labs ordered today, currently pending  Lytes normal, cr 0.68 on 6/14/18    ASSESSMENT/PLAN:    1. Ileal Crohn's Disease on PO 5-ASA monotherapy with excellent clinical + documented endoscopic response - continue supportive care as ordered for now  1. It was wonderful getting a chance to meet with you to discuss your ileal Crohn's Disease, particularly given the overall excellent clinical + endoscopic response you've had thus far - this is great to see, keep up the good work!  - Continue Lialda (generic) 2.4g daily as ordered for now.  - Continue omeprazole 20mg, taken every morning, as ordered for now given your excellent clinical response of your symptomatic GERD. Will readdress planning for taper trial off acid suppression in your ongoing care, particularly given the upcoming oral surgeries and imminent NSAID exposures. Recommend daily MVI as long-term acid suppression may be required, discussed risks/benefits of PPI therapy today.  - Discussed NSAID, opioid pain management, and antibiotic planning for upcoming oral surgeries (periodontal surgery and grafting for bilateral periodontal disease/tissue retraction involving the molars). I am OK with very limited exposures in these regards if needed for best recovery outcomes, discussed danger signs/symptoms to monitor for that might warrant change in plan or reassessment today. I'm very glad that the last round of procedures went well, please keep us updated on your progress.  - Discussed Preventive Care in IBD, no need for colonic dysplasia surveillance given ileal-limited disease at this time.  - Recommend  repeat Colon Cancer Surveillance in 2027, unless symptomatic sooner.     2. Recommend having the following studies checked at least 1-2 times/year for disease and medication safety monitoring: BMP, LFT, CBC with differential, ESR/CRP.  - Will order completion of remainder of routine studies today (recent BMP 6/2018 was excellent, does not need recheck today).     3. Continue to monitor for the danger signs/symptoms we reviewed together today:  worsening abdominal pain, worsening diarrhea, obstructive symptoms (nausea/vomiting, abdominal distention, inability to pass stool/flatus), blood mixed into stools, persistent fevers/chills, progressive anemia (particulary with iron deficiency), unexpected weight loss, etc.  - Contact us via [employer] should these symptoms occur, particularly as we may advise further evaluation accordingly.    2. Cancer Screening  No PSC, no high-risk FH CRC (2nd cousin dx'd in 2017 w/ advanced CRC @57yo). Given colonoscopy x2 in 2017 without adenomatous lesions, recommend repeat colonoscopy for routine screening in 2027 unless symptomatic sooner.    RTC 6-8 months with GI PA or me, sooner if symptomatic.      Thank you for this consultation. It was a pleasure to participate in the care of this patient; please contact us with any further questions.     Rudy Morfin MD   of Medicine  [facility] - Department of Medicine  Division of Gastroenterology

## 2018-08-27 NOTE — NURSING NOTE
"Chief Complaint   Patient presents with     Gastrointestinal Problem     6 month F/U for Crohn's Disease       Vitals:    08/27/18 0904   BP: 139/90   BP Location: Left arm   Patient Position: Sitting   Cuff Size: Adult Regular   Pulse: 78   Resp: 16   Temp: 97.6  F (36.4  C)   TempSrc: Oral   SpO2: 98%   Weight: 56.3 kg (124 lb 3.2 oz)   Height: 1.6 m (5' 2.99\")       Body mass index is 22.01 kg/(m^2).      Kim Villasenor LPN                          "

## 2018-08-27 NOTE — MR AVS SNAPSHOT
After Visit Summary   8/27/2018    Aileen Priest    MRN: [id number]           Patient Information     Date Of Birth          1960        Visit Information        Provider Department      8/27/2018 9:20 AM Rudy Morfin MD [facility]        Today's Diagnoses     Crohn's disease of small intestine without complication (H)    -  1    Lichen planus        Gastroesophageal reflux disease without esophagitis        Gastroesophageal reflux disease, esophagitis presence not specified          Care Instructions    I've included a brief summary of our discussion and care plan from today's visit below.  Please review this information with your primary care provider.  _______________________________________________________________________      1. It was wonderful getting a chance to meet with you to discuss your ileal Crohn's Disease, particularly given the overall excellent clinical + endoscopic response you've had thus far - this is great to see, keep up the good work!  - Continue Lialda (generic) 2.4g daily as ordered for now.  - Continue omeprazole 20mg, taken every morning, as ordered for now given your excellent clinical response of your symptomatic GERD. Will readdress planning for taper trial off acid suppression in your ongoing care, particularly given the upcoming oral surgeries and imminent NSAID exposures. Recommend daily MVI as long-term acid suppression may be required, discussed risks/benefits of PPI therapy today.  - Discussed NSAID, opioid pain management, and antibiotic planning for upcoming oral surgeries (periodontal surgery and grafting for bilateral periodontal disease/tissue retraction involving the molars). I am OK with very limited exposures in these regards if needed for best recovery outcomes, discussed danger signs/symptoms to monitor for that might warrant change in plan or reassessment today. I'm very glad that the last round of procedures went  well, please keep us updated on your progress.  - Discussed Preventive Care in IBD, no need for colonic dysplasia surveillance given ileal-limited disease at this time.  - Recommend repeat Colon Cancer Surveillance in 2027, unless symptomatic sooner.     2. Recommend having the following studies checked at least 1-2 times/year for disease and medication safety monitoring: BMP, LFT, CBC with differential, ESR/CRP.     3. Continue to monitor for the danger signs/symptoms we reviewed together today:  worsening abdominal pain, worsening diarrhea, obstructive symptoms (nausea/vomiting, abdominal distention, inability to pass stool/flatus), blood mixed into stools, persistent fevers/chills, progressive anemia (particulary with iron deficiency), unexpected weight loss, etc.  - Contact us via [employer] should these symptoms occur, particularly as we may advise further evaluation accordingly.    4. Return to GI Clinic with my GI Physician Assistants (Benjamin or Alice) or me in 6-8 months to review your progress, sooner if symptomatic.   - If you are unable to schedule this follow-up appointment today, please contact our  at [phone] within the next week to help set up this necessary appointment.    _______________________________________________________________________    It was a pleasure seeing you in clinic today - please be in touch if there are any further questions that arise following today's visit.  During business hours, you may reach Clinic Nurse Triage Line at [phone].  For urgent/emergent questions after business hours, you may reach the on-call GI Fellow by contacting the [facility]  at [phone].    Any benign/non-urgent test results are usually communicated via letter or [employer] message within 1-2 weeks after completion.  Urgent results (those that require a change in the previously-discussed care plan) are usually communicated via a phone call once available from  our clinic staff to discuss the results and the next steps in your evaluation.    I recommend signing up for [employer] access if you have not already done so and are comfortable with using a computer.  This allows for online access to your lab results and also helps you communicate efficiently with my clinic should any questions arise in your care.    We have Financial Counseling services available through our clinic.  If you have questions about your insurance coverage or payment responsibilities, particularly before you undergo any tests or procedures, please let us know and we can arrange a consultation accordingly to help you make informed decisions about your healthcare.    Sincerely,    Rudy Morfin MD    [facility] Department of Medicine  Division of Gastroenterology            Follow-ups after your visit        Follow-up notes from your care team     Return in about 6 months (around 2/27/2019).      Future tests that were ordered for you today     Open Future Orders        Priority Expected Expires Ordered    CBC with platelets differential Routine  10/26/2018 8/27/2018    Hepatic panel Routine  10/26/2018 8/27/2018    Erythrocyte sedimentation rate auto Routine  10/26/2018 8/27/2018    CRP inflammation Routine  10/26/2018 8/27/2018    Vitamin D Deficiency Routine  10/26/2018 8/27/2018    Magnesium Routine  10/26/2018 8/27/2018            Who to contact     Please call your clinic at [phone] to:    Ask questions about your health    Make or cancel appointments    Discuss your medicines    Learn about your test results    Speak to your doctor            Additional Information About Your Visit        [employer] Information     [employer] gives you secure access to your electronic health record. If you see a primary care provider, you can also send messages to your care team and make appointments. If you have questions, please call your primary care clinic.  If you do not  "have a primary care provider, please call [phone] and they will assist you.      [employer]vue is an electronic gateway that provides easy, online access to your medical records. With [employer], you can request a clinic appointment, read your test results, renew a prescription or communicate with your care team.     To access your existing account, please contact your [facility] or call [phone] for assistance.        Care EveryWhere ID     This is your Care EveryWhere ID. This could be used by other organizations to access your [facility] medical records  [id number]        Your Vitals Were     Pulse Temperature Respirations Height Pulse Oximetry BMI (Body Mass Index)    78 97.6  F (36.4  C) (Oral) 16 1.6 m (5' 2.99\") 98% 22.01 kg/m2       Blood Pressure from Last 3 Encounters:   08/27/18 139/90   05/31/18 112/72   02/23/18 (!) 153/93    Weight from Last 3 Encounters:   08/27/18 56.3 kg (124 lb 3.2 oz)   05/31/18 56.2 kg (123 lb 12.8 oz)   02/23/18 56.7 kg (125 lb)                 Today's Medication Changes          These changes are accurate as of 8/27/18  9:50 AM.  If you have any questions, ask your nurse or doctor.               These medicines have changed or have updated prescriptions.        Dose/Directions    omeprazole 20 MG tablet   This may have changed:  how much to take   Used for:  Gastroesophageal reflux disease, esophagitis presence not specified   Changed by:  Rudy Morfin MD        Dose:  20 mg   Take 1 tablet (20 mg) by mouth daily   Quantity:  90 tablet   Refills:  3            Where to get your medicines      These medications were sent to [facility] [place], [place] - [address]  [address], [facility] [address]     Phone:  [phone]     omeprazole 20 MG tablet                Primary Care Provider Office Phone # Fax #    Bucky Hunter [phone]       [address]  [facility] " [id number]        Equal Access to Services     [facility]: Hadii mir wong alber Min, waevertda luqanne, qaybta kaportillo afiawenceslaoquincy, waxmarylin shabbir acharyangalorena maier. So [facility] [phone].    ATENCIÓN: Si habla español, tiene a guillory disposición servicios gratuitos de asistencia lingüística. Llame al [phone].    We comply with applicable federal civil rights laws and [place] laws. We do not discriminate on the basis of race, color, national origin, age, disability, sex, sexual orientation, or gender identity.            Thank you!     Thank you for choosing [facility]  for your care. Our goal is always to provide you with excellent care. Hearing back from our patients is one way we can continue to improve our services. Please take a few minutes to complete the written survey that you may receive in the mail after your visit with us. Thank you!             Your Updated Medication List - Protect others around you: Learn how to safely use, store and throw away your medicines at www.disposemymeds.org.          This list is accurate as of 8/27/18  9:50 AM.  Always use your most recent med list.                   Brand Name Dispense Instructions for use Diagnosis    CALCIUM + D3 PO           [facility] PO       Crohn's disease of small intestine with other complication (H)       dexamethasone 0.1 MG/ML solution      Take  by mouth daily.        GAVILYTE-G 236 g suspension   Generic drug:  polyethylene glycol      GaviLyte-G 236 gram-22.74 gram-6.74 gram-5.86 gram oral solution        lisinopril 10 MG tablet    PRINIVIL/ZESTRIL    90 tablet    Take 1 tablet (10 mg) by mouth daily    Hypertension goal BP (blood pressure) < 140/90       mesalamine 1.2 g EC tablet    LIALDA    180 tablet    Take 2 tablets (2,400 mg) by mouth every morning    Crohn's disease of small intestine with other complication (H)       MULTIVITAMIN ADULT PO      Take 1 capsule by mouth daily     Crohn's disease of small intestine with other complication (H)       omega 3 1000 MG Caps      Omega 3    Crohn's disease of small intestine without complication (H), Lichen planus, Gastroesophageal reflux disease without esophagitis       omeprazole 20 MG tablet     90 tablet    Take 1 tablet (20 mg) by mouth daily    Gastroesophageal reflux disease, esophagitis presence not specified

## 2018-08-28 ENCOUNTER — TELEPHONE (OUTPATIENT)
Dept: GASTROENTEROLOGY | Facility: CLINIC | Age: 58
End: 2018-08-28

## 2018-08-28 RX ORDER — NICOTINE POLACRILEX 4 MG/1
40 GUM, CHEWING ORAL DAILY
Qty: 180 TABLET | Refills: 3 | Status: SHIPPED | OUTPATIENT
Start: 2018-08-28 | End: 2019-08-14

## 2018-08-28 NOTE — TELEPHONE ENCOUNTER
----- Message from Rudy Morfin MD sent at 8/28/2018 10:50 AM CDT -----  I thought she was on 40mg QDAY - OK to change script accordingly. Thanks!    We can readdress the taper once she through all of her oral surgeries.    Rudy    ----- Message -----     From: Louise Shaver RN     Sent: 8/28/2018  10:44 AM       To: MD Guilherme Howe Joan called and  let me know that she had been taking 40 mg of omeprazole, not the 20 that she reported (as was refilled) yesterday. Are you okay with increasing the script to 40 mg or would you like her to try 20 mg daily?    Louise

## 2018-08-28 NOTE — TELEPHONE ENCOUNTER
Patient made aware that the prescription was changed and sent to her pharmacy of choice. Patient verbalized understanding.

## 2018-09-18 DIAGNOSIS — L43.9 LICHEN PLANUS: ICD-10-CM

## 2018-09-18 DIAGNOSIS — K21.9 GASTROESOPHAGEAL REFLUX DISEASE WITHOUT ESOPHAGITIS: ICD-10-CM

## 2018-09-18 DIAGNOSIS — K50.00 CROHN'S DISEASE OF SMALL INTESTINE WITHOUT COMPLICATION (H): ICD-10-CM

## 2018-09-18 LAB
BASOPHILS # BLD AUTO: 0.1 10E9/L (ref 0–0.2)
BASOPHILS NFR BLD AUTO: 0.8 %
CRP SERPL-MCNC: <2.9 MG/L (ref 0–8)
DIFFERENTIAL METHOD BLD: NORMAL
EOSINOPHIL # BLD AUTO: 0.3 10E9/L (ref 0–0.7)
EOSINOPHIL NFR BLD AUTO: 4.2 %
ERYTHROCYTE [DISTWIDTH] IN BLOOD BY AUTOMATED COUNT: 13 % (ref 10–15)
ERYTHROCYTE [SEDIMENTATION RATE] IN BLOOD BY WESTERGREN METHOD: 7 MM/H (ref 0–30)
HCT VFR BLD AUTO: 40.5 % (ref 35–47)
HGB BLD-MCNC: 13.8 G/DL (ref 11.7–15.7)
LYMPHOCYTES # BLD AUTO: 2.1 10E9/L (ref 0.8–5.3)
LYMPHOCYTES NFR BLD AUTO: 27.7 %
MCH RBC QN AUTO: 31.4 PG (ref 26.5–33)
MCHC RBC AUTO-ENTMCNC: 34.1 G/DL (ref 31.5–36.5)
MCV RBC AUTO: 92 FL (ref 78–100)
MONOCYTES # BLD AUTO: 0.7 10E9/L (ref 0–1.3)
MONOCYTES NFR BLD AUTO: 9.5 %
NEUTROPHILS # BLD AUTO: 4.3 10E9/L (ref 1.6–8.3)
NEUTROPHILS NFR BLD AUTO: 57.8 %
PLATELET # BLD AUTO: 319 10E9/L (ref 150–450)
RBC # BLD AUTO: 4.39 10E12/L (ref 3.8–5.2)
WBC # BLD AUTO: 7.4 10E9/L (ref 4–11)

## 2018-09-18 PROCEDURE — 82306 VITAMIN D 25 HYDROXY: CPT | Performed by: INTERNAL MEDICINE

## 2018-09-18 PROCEDURE — 83735 ASSAY OF MAGNESIUM: CPT | Performed by: INTERNAL MEDICINE

## 2018-09-18 PROCEDURE — 85025 COMPLETE CBC W/AUTO DIFF WBC: CPT | Performed by: INTERNAL MEDICINE

## 2018-09-18 PROCEDURE — 80076 HEPATIC FUNCTION PANEL: CPT | Performed by: INTERNAL MEDICINE

## 2018-09-18 PROCEDURE — 86140 C-REACTIVE PROTEIN: CPT | Performed by: INTERNAL MEDICINE

## 2018-09-18 PROCEDURE — 36415 COLL VENOUS BLD VENIPUNCTURE: CPT | Performed by: INTERNAL MEDICINE

## 2018-09-18 PROCEDURE — 85652 RBC SED RATE AUTOMATED: CPT | Performed by: INTERNAL MEDICINE

## 2018-09-19 LAB
ALBUMIN SERPL-MCNC: 3.1 G/DL (ref 3.4–5)
ALP SERPL-CCNC: 83 U/L (ref 40–150)
ALT SERPL W P-5'-P-CCNC: 31 U/L (ref 0–50)
AST SERPL W P-5'-P-CCNC: 23 U/L (ref 0–45)
BILIRUB DIRECT SERPL-MCNC: <0.1 MG/DL (ref 0–0.2)
BILIRUB SERPL-MCNC: 0.3 MG/DL (ref 0.2–1.3)
DEPRECATED CALCIDIOL+CALCIFEROL SERPL-MC: 39 UG/L (ref 20–75)
MAGNESIUM SERPL-MCNC: 1.8 MG/DL (ref 1.6–2.3)
PROT SERPL-MCNC: 6.1 G/DL (ref 6.8–8.8)

## 2018-12-11 DIAGNOSIS — I10 HYPERTENSION GOAL BP (BLOOD PRESSURE) < 140/90: ICD-10-CM

## 2018-12-11 NOTE — TELEPHONE ENCOUNTER
"Requested Prescriptions   Pending Prescriptions Disp Refills     lisinopril (PRINIVIL/ZESTRIL) 10 MG tablet 90 tablet 1    Last Written Prescription Date:  6/12/18  Last Fill Quantity: 90,  # refills: 1   Last office visit: 5/31/2018 with prescribing provider:  12/6/18   Future Office Visit:   Next 5 appointments (look out 90 days)    Dec 28, 2018  3:45 PM CST  Office Visit with Bucky Hunter MD  [facility] [address] [place] [address] [phone]          Sig: Take 1 tablet (10 mg) by mouth daily    ACE Inhibitors (Including Combos) Protocol Failed - 12/11/2018  2:42 PM       Failed - Blood pressure under 140/90 in past 12 months    BP Readings from Last 3 Encounters:   08/27/18 139/90   05/31/18 112/72   02/23/18 (!) 153/93                Passed - Recent (12 mo) or future (30 days) visit within the authorizing provider's specialty    Patient had office visit in the last 12 months or has a visit in the next 30 days with authorizing provider or within the authorizing provider's specialty.  See \"Patient Info\" tab in inbasket, or \"Choose Columns\" in Meds & Orders section of the refill encounter.             Passed - Patient is age 18 or older       Passed - No active pregnancy on record       Passed - Normal serum creatinine on file in past 12 months    Recent Labs   Lab Test 06/14/18  1315   CR 0.68            Passed - Normal serum potassium on file in past 12 months    Recent Labs   Lab Test 06/14/18  1315   POTASSIUM 3.8            Passed - No positive pregnancy test in past 12 months          "

## 2018-12-12 RX ORDER — LISINOPRIL 10 MG/1
10 TABLET ORAL DAILY
Qty: 90 TABLET | Refills: 0 | Status: SHIPPED | OUTPATIENT
Start: 2018-12-12 | End: 2018-12-18

## 2018-12-17 NOTE — PROGRESS NOTES
SUBJECTIVE:   Aileen Priest is a 58 year old female who presents to clinic today for the following health issues:    Hypertension Follow-up      Outpatient blood pressures are being checked at work.  Results are /70's.    Low Salt Diet: no added salt      Amount of exercise or physical activity: Keeps busy    Problems taking medications regularly: No    Medication side effects: none    Diet: regular (no restrictions)          Dealing with family stress     Social History     Tobacco Use     Smoking status: Former Smoker     Packs/day: 1.00     Years: 10.00     Pack years: 10.00     Types: Cigarettes     Start date: 1976     Last attempt to quit: 1987     Years since quittin.9     Smokeless tobacco: Never Used   Substance Use Topics     Alcohol use: Yes     Alcohol/week: 0.6 - 1.8 oz     Types: 1 - 3 Cans of beer per week     Comment: socially        Problem list and histories reviewed & adjusted, as indicated.  Additional history: as documented    Labs reviewed in EPIC    Reviewed and updated as needed this visit by clinical staff       Reviewed and updated as needed this visit by Provider         ROS:  Constitutional, HEENT, cardiovascular, pulmonary, gi and gu systems are negative, except as otherwise noted.    OBJECTIVE:     /72 (BP Location: Right arm, Patient Position: Sitting, Cuff Size: Adult Regular)   Pulse 83   Temp 97.7  F (36.5  C) (Oral)   Wt 57.2 kg (126 lb 1.6 oz)   SpO2 96%   BMI 22.34 kg/m    Body mass index is 22.34 kg/m .  GENERAL: healthy, alert and no distress  NECK: no adenopathy, no asymmetry, masses, or scars and thyroid normal to palpation  RESP: lungs clear to auscultation - no rales, rhonchi or wheezes  CV: regular rate and rhythm, normal S1 S2, no S3 or S4, no murmur, click or rub, no peripheral edema and peripheral pulses strong  ABDOMEN: soft, nontender, no hepatosplenomegaly, no masses and bowel sounds normal  PSYCH: mentation appears normal, affect  normal/bright    Diagnostic Test Results:  Results for orders placed or performed in visit on 09/18/18   CBC with platelets differential   Result Value Ref Range    WBC 7.4 4.0 - 11.0 10e9/L    RBC Count 4.39 3.8 - 5.2 10e12/L    Hemoglobin 13.8 11.7 - 15.7 g/dL    Hematocrit 40.5 35.0 - 47.0 %    MCV 92 78 - 100 fl    MCH 31.4 26.5 - 33.0 pg    MCHC 34.1 31.5 - 36.5 g/dL    RDW 13.0 10.0 - 15.0 %    Platelet Count 319 150 - 450 10e9/L    Diff Method Automated Method     % Neutrophils 57.8 %    % Lymphocytes 27.7 %    % Monocytes 9.5 %    % Eosinophils 4.2 %    % Basophils 0.8 %    Absolute Neutrophil 4.3 1.6 - 8.3 10e9/L    Absolute Lymphocytes 2.1 0.8 - 5.3 10e9/L    Absolute Monocytes 0.7 0.0 - 1.3 10e9/L    Absolute Eosinophils 0.3 0.0 - 0.7 10e9/L    Absolute Basophils 0.1 0.0 - 0.2 10e9/L   Hepatic panel   Result Value Ref Range    Bilirubin Direct <0.1 0.0 - 0.2 mg/dL    Bilirubin Total 0.3 0.2 - 1.3 mg/dL    Albumin 3.1 (L) 3.4 - 5.0 g/dL    Protein Total 6.1 (L) 6.8 - 8.8 g/dL    Alkaline Phosphatase 83 40 - 150 U/L    ALT 31 0 - 50 U/L    AST 23 0 - 45 U/L   Erythrocyte sedimentation rate auto   Result Value Ref Range    Sed Rate 7 0 - 30 mm/h   CRP inflammation   Result Value Ref Range    CRP Inflammation <2.9 0.0 - 8.0 mg/L   Vitamin D Deficiency   Result Value Ref Range    Vitamin D Deficiency screening 39 20 - 75 ug/L   Magnesium   Result Value Ref Range    Magnesium 1.8 1.6 - 2.3 mg/dL       ASSESSMENT/PLAN:             1. Hypertension goal BP (blood pressure) < 140/90  Now Well controlled   - lisinopril (PRINIVIL/ZESTRIL) 10 MG tablet; Take 1 tablet (10 mg) by mouth daily  Dispense: 90 tablet; Refill: 3  - **Basic metabolic panel FUTURE  Do in 3 months  Follow up in 6 months.   2. Need for vaccination for zoster  Done  Follow up in 3 months.   - ADMIN 1st VACCINE    Regular exercise  See Patient Instructions    Bucky Hunter MD  [facility]

## 2018-12-18 ENCOUNTER — OFFICE VISIT (OUTPATIENT)
Dept: FAMILY MEDICINE | Facility: CLINIC | Age: 58
End: 2018-12-18
Payer: COMMERCIAL

## 2018-12-18 VITALS
WEIGHT: 126.1 LBS | OXYGEN SATURATION: 96 % | HEART RATE: 83 BPM | SYSTOLIC BLOOD PRESSURE: 106 MMHG | TEMPERATURE: 97.7 F | DIASTOLIC BLOOD PRESSURE: 72 MMHG | BODY MASS INDEX: 22.34 KG/M2

## 2018-12-18 DIAGNOSIS — Z23 NEED FOR VACCINATION FOR ZOSTER: ICD-10-CM

## 2018-12-18 DIAGNOSIS — I10 HYPERTENSION GOAL BP (BLOOD PRESSURE) < 140/90: Primary | ICD-10-CM

## 2018-12-18 PROCEDURE — 99214 OFFICE O/P EST MOD 30 MIN: CPT | Mod: 25 | Performed by: FAMILY MEDICINE

## 2018-12-18 PROCEDURE — 90471 IMMUNIZATION ADMIN: CPT | Performed by: FAMILY MEDICINE

## 2018-12-18 PROCEDURE — 90750 HZV VACC RECOMBINANT IM: CPT | Performed by: FAMILY MEDICINE

## 2018-12-18 RX ORDER — LISINOPRIL 10 MG/1
10 TABLET ORAL DAILY
Qty: 90 TABLET | Refills: 3 | Status: SHIPPED | OUTPATIENT
Start: 2018-12-18 | End: 2019-12-19

## 2018-12-18 RX ORDER — LISINOPRIL 10 MG/1
10 TABLET ORAL DAILY
Qty: 90 TABLET | Refills: 0 | Status: SHIPPED | OUTPATIENT
Start: 2018-12-18 | End: 2018-12-18

## 2019-01-24 ENCOUNTER — OFFICE VISIT (OUTPATIENT)
Dept: FAMILY MEDICINE | Facility: CLINIC | Age: 59
End: 2019-01-24
Payer: COMMERCIAL

## 2019-01-24 VITALS
OXYGEN SATURATION: 100 % | DIASTOLIC BLOOD PRESSURE: 89 MMHG | WEIGHT: 124.2 LBS | TEMPERATURE: 97.7 F | BODY MASS INDEX: 22.01 KG/M2 | SYSTOLIC BLOOD PRESSURE: 130 MMHG | HEART RATE: 76 BPM

## 2019-01-24 DIAGNOSIS — J98.01 ACUTE BRONCHOSPASM: ICD-10-CM

## 2019-01-24 DIAGNOSIS — H65.91 OME (OTITIS MEDIA WITH EFFUSION), RIGHT: Primary | ICD-10-CM

## 2019-01-24 PROCEDURE — 99214 OFFICE O/P EST MOD 30 MIN: CPT | Performed by: FAMILY MEDICINE

## 2019-01-24 RX ORDER — AZITHROMYCIN 250 MG/1
TABLET, FILM COATED ORAL
Qty: 6 TABLET | Refills: 0 | Status: SHIPPED | OUTPATIENT
Start: 2019-01-24 | End: 2019-01-29

## 2019-01-24 RX ORDER — PREDNISONE 20 MG/1
20 TABLET ORAL DAILY
Qty: 5 TABLET | Refills: 0 | Status: SHIPPED | OUTPATIENT
Start: 2019-01-24 | End: 2019-01-29

## 2019-01-24 NOTE — LETTER
[address] [address] [phone]          January 24, 2019    RE:  Aileen Priest                                                                                                                                                       [address] [place] [address]            To whom it may concern:    Aileen Priest is under my professional care for    OME (otitis media with effusion), right  Acute bronchospasm    She needs to rest at home tomorrow and may return to work next week      Sincerely,        Bucky Hunter MD

## 2019-01-24 NOTE — PROGRESS NOTES
SUBJECTIVE:   Aileen Priest is a 58 year old female who presents to clinic today for the following health issues:    Acute Illness   Acute illness concerns: cold, cough  Onset: 1+ week, sick on and off since late December     Fever: no    Chills/Sweats: no    Headache (location?): no    Sinus Pressure:YES    Conjunctivitis:  no    Ear Pain: YES: bilateral    Rhinorrhea: YES    Congestion: YES    Sore Throat: no     Cough: YES    Wheeze: YES    Decreased Appetite: YES    Nausea: no    Vomiting: no    Diarrhea:  no    Dysuria/Freq.: no    Fatigue/Achiness: YES    Sick/Strep Exposure: YES     Therapies Tried and outcome: Delsym       Non smoker, no history of RAD    Problem list and histories reviewed & adjusted, as indicated.  Additional history: as documented    Labs reviewed in EPIC    Reviewed and updated as needed this visit by clinical staff       Reviewed and updated as needed this visit by Provider         ROS:  Constitutional, HEENT, cardiovascular, pulmonary, gi and gu systems are negative, except as otherwise noted.    OBJECTIVE:     /89   Pulse 76   Temp 97.7  F (36.5  C) (Oral)   Wt 56.3 kg (124 lb 3.2 oz)   SpO2 100%   BMI 22.01 kg/m    Body mass index is 22.01 kg/m .  GENERAL: alert and fatigued  HENT: normal cephalic/atraumatic, right ear: erythematous, left ear: clear effusion, nose and mouth without ulcers or lesions, oropharynx clear and oral mucous membranes moist  NECK: no adenopathy, no asymmetry, masses, or scars and thyroid normal to palpation  RESP: no rales , rhonchi scattered and expiratory wheezes bilateral  CV: regular rate and rhythm, normal S1 S2, no S3 or S4, no murmur, click or rub, no peripheral edema and peripheral pulses strong  ABDOMEN: soft, nontender, no hepatosplenomegaly, no masses and bowel sounds normal  SKIN: no suspicious lesions or rashes  NEURO: Normal strength and tone, mentation intact and speech normal    Diagnostic Test Results:  none     ASSESSMENT/PLAN:              1. OME (otitis media with effusion), right  take  - azithromycin (ZITHROMAX) 250 MG tablet; Take 2 tablets (500 mg) by mouth daily for 1 day, THEN 1 tablet (250 mg) daily for 4 days.  Dispense: 6 tablet; Refill: 0    2. Acute bronchospasm  Rest, fluids  - predniSONE (DELTASONE) 20 MG tablet; Take 20 mg by mouth daily for 5 days.  Dispense: 5 tablet; Refill: 0    Follow up only if unimproved.     Bucky Hunter MD  [facility]

## 2019-01-28 ENCOUNTER — TELEPHONE (OUTPATIENT)
Dept: GASTROENTEROLOGY | Facility: CLINIC | Age: 59
End: 2019-01-28

## 2019-02-04 ENCOUNTER — OFFICE VISIT (OUTPATIENT)
Dept: GASTROENTEROLOGY | Facility: CLINIC | Age: 59
End: 2019-02-04
Payer: COMMERCIAL

## 2019-02-04 VITALS
HEART RATE: 83 BPM | DIASTOLIC BLOOD PRESSURE: 78 MMHG | SYSTOLIC BLOOD PRESSURE: 116 MMHG | WEIGHT: 126.6 LBS | BODY MASS INDEX: 22.43 KG/M2 | OXYGEN SATURATION: 97 % | TEMPERATURE: 97.9 F | HEIGHT: 63 IN

## 2019-02-04 DIAGNOSIS — K50.018 CROHN'S DISEASE OF SMALL INTESTINE WITH OTHER COMPLICATION (H): ICD-10-CM

## 2019-02-04 LAB
ALBUMIN SERPL-MCNC: 3.4 G/DL (ref 3.4–5)
ALP SERPL-CCNC: 92 U/L (ref 40–150)
ALT SERPL W P-5'-P-CCNC: 46 U/L (ref 0–50)
ANION GAP SERPL CALCULATED.3IONS-SCNC: 4 MMOL/L (ref 3–14)
AST SERPL W P-5'-P-CCNC: 29 U/L (ref 0–45)
BASOPHILS # BLD AUTO: 0.1 10E9/L (ref 0–0.2)
BASOPHILS NFR BLD AUTO: 1 %
BILIRUB DIRECT SERPL-MCNC: <0.1 MG/DL (ref 0–0.2)
BILIRUB SERPL-MCNC: 0.4 MG/DL (ref 0.2–1.3)
BUN SERPL-MCNC: 12 MG/DL (ref 7–30)
CALCIUM SERPL-MCNC: 8.6 MG/DL (ref 8.5–10.1)
CHLORIDE SERPL-SCNC: 106 MMOL/L (ref 94–109)
CO2 SERPL-SCNC: 27 MMOL/L (ref 20–32)
CREAT SERPL-MCNC: 0.6 MG/DL (ref 0.52–1.04)
CRP SERPL-MCNC: <2.9 MG/L (ref 0–8)
DIFFERENTIAL METHOD BLD: NORMAL
EOSINOPHIL # BLD AUTO: 0.1 10E9/L (ref 0–0.7)
EOSINOPHIL NFR BLD AUTO: 2.2 %
ERYTHROCYTE [DISTWIDTH] IN BLOOD BY AUTOMATED COUNT: 12.5 % (ref 10–15)
ERYTHROCYTE [SEDIMENTATION RATE] IN BLOOD BY WESTERGREN METHOD: 9 MM/H (ref 0–30)
FOLATE SERPL-MCNC: 31.4 NG/ML
GFR SERPL CREATININE-BSD FRML MDRD: >90 ML/MIN/{1.73_M2}
GLUCOSE SERPL-MCNC: 94 MG/DL (ref 70–99)
HCT VFR BLD AUTO: 39.5 % (ref 35–47)
HGB BLD-MCNC: 12.8 G/DL (ref 11.7–15.7)
IMM GRANULOCYTES # BLD: 0 10E9/L (ref 0–0.4)
IMM GRANULOCYTES NFR BLD: 0.2 %
LYMPHOCYTES # BLD AUTO: 1.7 10E9/L (ref 0.8–5.3)
LYMPHOCYTES NFR BLD AUTO: 27.6 %
MCH RBC QN AUTO: 30.1 PG (ref 26.5–33)
MCHC RBC AUTO-ENTMCNC: 32.4 G/DL (ref 31.5–36.5)
MCV RBC AUTO: 93 FL (ref 78–100)
MONOCYTES # BLD AUTO: 0.7 10E9/L (ref 0–1.3)
MONOCYTES NFR BLD AUTO: 12.2 %
NEUTROPHILS # BLD AUTO: 3.4 10E9/L (ref 1.6–8.3)
NEUTROPHILS NFR BLD AUTO: 56.8 %
NRBC # BLD AUTO: 0 10*3/UL
NRBC BLD AUTO-RTO: 0 /100
PLATELET # BLD AUTO: 302 10E9/L (ref 150–450)
POTASSIUM SERPL-SCNC: 4.1 MMOL/L (ref 3.4–5.3)
PROT SERPL-MCNC: 6.9 G/DL (ref 6.8–8.8)
RBC # BLD AUTO: 4.25 10E12/L (ref 3.8–5.2)
SODIUM SERPL-SCNC: 137 MMOL/L (ref 133–144)
VIT B12 SERPL-MCNC: 768 PG/ML (ref 193–986)
WBC # BLD AUTO: 6 10E9/L (ref 4–11)

## 2019-02-04 RX ORDER — MESALAMINE 1.2 G/1
2400 TABLET, DELAYED RELEASE ORAL 2 TIMES DAILY
Qty: 180 TABLET | Refills: 3 | Status: SHIPPED | OUTPATIENT
Start: 2019-02-04 | End: 2019-08-14

## 2019-02-04 ASSESSMENT — ENCOUNTER SYMPTOMS
FEVER: 0
BLOOD IN STOOL: 0
DYSPNEA ON EXERTION: 0
JAUNDICE: 0
POLYPHAGIA: 0
RECTAL PAIN: 0
VOMITING: 0
CONSTIPATION: 0
FATIGUE: 0
NECK MASS: 0
WEIGHT LOSS: 0
POLYDIPSIA: 0
POSTURAL DYSPNEA: 0
DECREASED LIBIDO: 0
BOWEL INCONTINENCE: 0
INCREASED ENERGY: 0
NAUSEA: 0
SMELL DISTURBANCE: 0
SINUS PAIN: 0
DECREASED APPETITE: 0
COUGH DISTURBING SLEEP: 1
WEIGHT GAIN: 0
BLOATING: 0
HALLUCINATIONS: 0
SPUTUM PRODUCTION: 1
HOARSE VOICE: 0
WHEEZING: 0
TROUBLE SWALLOWING: 0
HEMOPTYSIS: 0
ABDOMINAL PAIN: 0
SNORES LOUDLY: 0
CHILLS: 0
ALTERED TEMPERATURE REGULATION: 0
NIGHT SWEATS: 0
COUGH: 1
SORE THROAT: 0
HOT FLASHES: 1
HEARTBURN: 0
SHORTNESS OF BREATH: 0
DIARRHEA: 0

## 2019-02-04 ASSESSMENT — MIFFLIN-ST. JEOR: SCORE: 1118.22

## 2019-02-04 ASSESSMENT — PAIN SCALES - GENERAL: PAINLEVEL: NO PAIN (0)

## 2019-02-04 NOTE — PATIENT INSTRUCTIONS
Kelly-     It was a pleasure taking care of you today.  I've included a brief summary of our discussion and care plan from today's visit below.  Please review this information with your primary care provider.  ______________________________________________________________________    My recommendations are summarized as follows:    -- labs today including markers for inflammation. Will consider more urgent colonoscopy if inflammation markers are elevated     -- Pending results of recent labs and kidney functioning tests, increase mesalamine to 4 pills a day. You can take them all at once, or if you have difficulty tolerating this you may take 2 pills twice a day     -- let us know if your symptoms change or worsen     Return to GI Clinic in 2-3 months with  Benjamin Brooks PA-C or Dr. Morfin to review your progress.    ______________________________________________________________________    Who do I call with any questions after my visit?  Please be in touch if there are any further questions that arise following today's visit.  There are multiple ways to contact your gastroenterology care team.        During business hours, you may reach a Gastroenterology nurse at [phone]      To schedule or reschedule an appointment, please call [phone].       You can always send a secure message through [employer].  [employer] messages are answered by your nurse or doctor typically within 24 hours.  Please allow extra time on weekends and holidays.        For urgent/emergent questions after business hours, you may reach the on-call GI Fellow by contacting the [facility] at [phone].     How will I get the results of any tests ordered?    You will receive all of your results.  If you have signed up for [employer], any tests ordered at your visit will be available to you after your physician reviews them.  Typically this takes 1-2 weeks.  If there are urgent results that require a change in your care plan,  your physician or nurse will call you to discuss the next steps.      What is [employer]?  [employer] is a secure way for you to access all of your healthcare records from the [facility].  It is a web based computer program, so you can sign on to it from any location.  It also allows you to send secure messages to your care team.  I recommend signing up for [employer] access if you have not already done so and are comfortable with using a computer.      How to I schedule a follow-up visit?  If you did not schedule a follow-up visit today, please call [phone] to schedule a follow-up office visit.        Sincerely,    Alice Gonzalez PA-C  Division of Gastroenterology, Hepatology & Nutrition  [facility]

## 2019-02-04 NOTE — LETTER
"2/4/2019       RE: Aileen Priest  [address]  [place] [address]     Dear Colleague,    Thank you for referring your patient, Aileen Priest, to the [facility]. Please see a copy of my visit note below.    GI CLINIC VISIT    CC/REFERRING PROVIDER: Bucky Hunter  REASON FOR CONSULTATION: Crohn's Disease, GERD    HPI: 57 year old female w/ h/o ileal Crohn's Disease per 3/2017 ileocolonoscopy biopsies currently on PO 5-ASA, Lichen Planus (oral/perianal involvement), Hidradenitis Suppurativa s/p excision/drainage 1975, prior presumed diagnosis of Collagenous Colitis per colonic biopsies 2006 (though suspecting evolving IBD presentation in retrospect following 3/2017 highly-suggestive biopsies of terminal ileum + bland colonic biopsies), GERD, lumbar spine surgery 2005, periorbital shingles 2015, chronic tobacco abuse (in remission), among other medical issues - presenting for f/u Crohn's Disease.     Today the patient reports that she has had several \"flares\" which have occurred at the beginning of the month in June, August, October, December, and January.  When she has a flare, she will reports 6-8 hours of mid and low abdomen pain which she describes as sharp.  She will also have associated diarrhea with 6-8 episodes that day including diarrhea at night.  She denies any blood with these episodes, and will feel exhausted after this happens.  The episodes improve with time and she returns to baseline the next day.  At baseline, she is having 3 soft formed nonbloody bowel movements a day with occasional urgency.  Denies incontinence or nocturnal bowel movements.  Also reports one episode of a GI bug over the holidays in which she had nausea vomiting and diarrhea with incontinence which improved with time.  Patient reports that oral lichen planus has improved since having oral surgery.  She reports more frequent colds recently " and was recently on a Z-Maicol and prednisone for persistent symptoms.  Patient also reports sores on tongue which improved with steroid swish.  Has had increased recent family stressors with her son being diagnosed with MS, daughter getting  soon, relative with pancreatic cancer, and aging mother. Denies any tenesmus or insecurity passing flatus, no fecal incontinence or new perianal/nocturnal sxs noted. Denies any N/V/F/C/HA/NS or other const/syst/cardiopulmonary sxs, no BRBPR/melena/urinary changes, no unintentional wt loss satiety changes. No other bowel/bladder habit changes, no dysphagia/odynophagia. No jaundice/icterus/pruritus, no acholic stools/steatorrhea, no new lumps/bumps, no jt pain/oral ulcer/rash/eye sxs noted. Notes slight decrease in appetite which she attributes to old age, and possible hot flashes.      ROS: 10pt ROS performed and otherwise negative.    PERTINENT PAST MEDICAL/SURGICAL HISTORY:  As noted above.    PERTINENT MEDICATIONS:  - Lialda (generic) 2.4g PO QDAY  Medications reviewed with patient today, see Medication List/Assessment for details.  No other NSAID/anticoagulation reported by patient.  No other OTC/herbal/supplements reported by patient.    SOCIAL HISTORY: Tobacco: none.    PHYSICAL EXAMINATION:  Vitals reviewed, AFVSS  Wt 126# today (stable)  Gen: aaox3, cooperative, pleasant, not diaphoretic, nad  HEENT: ncat, neck supple, no clad/sclad, normal op w/o ulcer/exudate, anicteric, mmm  Resp/CV without acute findings, not dyspneic/tachycardic  Abd: +nabs, soft, nt, nd, no peritoneal s/s noted  Ext: no c/c/e  Skin: warm, perfused, no jaundice  Neuro: grossly intact, no asterixis noted    PERTINENT STUDIES:  Labs ordered today, currently pending  Lytes normal, cr 0.68 on 6/14/18    ASSESSMENT/PLAN:    1. Ileal Crohn's Disease on PO 5-ASA monotherapy (2.4 g Lialda daily)  Patient continues to take 2.4 g of Lialda daily and has had several episodes of questionable flares of loose  stools with abdominal pain.  Unusual that flares are short-lived and tend to occur at the beginning of the month however it is possible that symptoms may be caused by active disease.  We discussed options for possible treatment/evaluation of disease.  Patient will have routine labs today including inflammatory markers, vitamin B12, folate, CBC and BMP.  If labs are normal, we will plan to increase dose of Lialda to 2.4 g twice daily and to monitor for improvement in symptoms.  If significant derangement and lab findings, may need to reevaluate active disease with endoscopic evaluation at this time.  Otherwise, will consider colonoscopy vs. imaging at follow-up appointment in 2-3 months after increasing dose of Lialda. It is also possible that flare symptoms may be caused by increased life stressors. She was instructed to contact our clinic with any interim change or worsening symptoms at which point we will pursue imaging +/- colonoscopy.    2. Cancer Screening  No PSC, no high-risk FH CRC (2nd cousin dx'd in 2017 w/ advanced CRC @55yo). Given colonoscopy x2 in 2017 without adenomatous lesions, recommend repeat colonoscopy for routine screening in 2027 unless symptomatic sooner.    RTC 2-3 months with Benjamin Brooks PA-C or Dr. Morfin      Thank you for this consultation. It was a pleasure to participate in the care of this patient; please contact us with any further questions.     Alice Gonzalez PA-C  [facility] - Department of Medicine  Division of Gastroenterology    ATTENDING ATTESTATION:    REFERRING PROVIDER: Elsa  DATE SEEN: 2/4/19    Thank you for this consultation. It was a pleasure to participate in the care of this patient; please contact us with any further questions.    Patient was discussed, seen, and examined by me, Rudy Morfin. The plan of care and pertinent data/imaging were also reviewed with the GI Physician Assistant in clinic today. Agree with the joint assessment and plan as  delineated above.    Please contact me with any further questions.    Rudy Morfin MD    [facility] - Department of Medicine  Division of Gastroenterology

## 2019-02-04 NOTE — PROGRESS NOTES
"GI CLINIC VISIT    CC/REFERRING PROVIDER: Bucky Hunter  REASON FOR CONSULTATION: Crohn's Disease, GERD    HPI: 57 year old female w/ h/o ileal Crohn's Disease per 3/2017 ileocolonoscopy biopsies currently on PO 5-ASA, Lichen Planus (oral/perianal involvement), Hidradenitis Suppurativa s/p excision/drainage 1975, prior presumed diagnosis of Collagenous Colitis per colonic biopsies 2006 (though suspecting evolving IBD presentation in retrospect following 3/2017 highly-suggestive biopsies of terminal ileum + bland colonic biopsies), GERD, lumbar spine surgery 2005, periorbital shingles 2015, chronic tobacco abuse (in remission), among other medical issues - presenting for f/u Crohn's Disease.     Today the patient reports that she has had several \"flares\" which have occurred at the beginning of the month in June, August, October, December, and January.  When she has a flare, she will reports 6-8 hours of mid and low abdomen pain which she describes as sharp.  She will also have associated diarrhea with 6-8 episodes that day including diarrhea at night.  She denies any blood with these episodes, and will feel exhausted after this happens.  The episodes improve with time and she returns to baseline the next day.  At baseline, she is having 3 soft formed nonbloody bowel movements a day with occasional urgency.  Denies incontinence or nocturnal bowel movements.  Also reports one episode of a GI bug over the holidays in which she had nausea vomiting and diarrhea with incontinence which improved with time.  Patient reports that oral lichen planus has improved since having oral surgery.  She reports more frequent colds recently and was recently on a Z-Maicol and prednisone for persistent symptoms.  Patient also reports sores on tongue which improved with steroid swish.  Has had increased recent family stressors with her son being diagnosed with MS, daughter getting  soon, relative with pancreatic cancer, and aging " mother. Denies any tenesmus or insecurity passing flatus, no fecal incontinence or new perianal/nocturnal sxs noted. Denies any N/V/F/C/HA/NS or other const/syst/cardiopulmonary sxs, no BRBPR/melena/urinary changes, no unintentional wt loss satiety changes. No other bowel/bladder habit changes, no dysphagia/odynophagia. No jaundice/icterus/pruritus, no acholic stools/steatorrhea, no new lumps/bumps, no jt pain/oral ulcer/rash/eye sxs noted. Notes slight decrease in appetite which she attributes to old age, and possible hot flashes.      ROS: 10pt ROS performed and otherwise negative.    PERTINENT PAST MEDICAL/SURGICAL HISTORY:  As noted above.    PERTINENT MEDICATIONS:  - Lialda (generic) 2.4g PO QDAY  Medications reviewed with patient today, see Medication List/Assessment for details.  No other NSAID/anticoagulation reported by patient.  No other OTC/herbal/supplements reported by patient.    SOCIAL HISTORY: Tobacco: none.    PHYSICAL EXAMINATION:  Vitals reviewed, AFVSS  Wt 126# today (stable)  Gen: aaox3, cooperative, pleasant, not diaphoretic, nad  HEENT: ncat, neck supple, no clad/sclad, normal op w/o ulcer/exudate, anicteric, mmm  Resp/CV without acute findings, not dyspneic/tachycardic  Abd: +nabs, soft, nt, nd, no peritoneal s/s noted  Ext: no c/c/e  Skin: warm, perfused, no jaundice  Neuro: grossly intact, no asterixis noted    PERTINENT STUDIES:  Labs ordered today, currently pending  Lytes normal, cr 0.68 on 6/14/18    ASSESSMENT/PLAN:    1. Ileal Crohn's Disease on PO 5-ASA monotherapy (2.4 g Lialda daily)  Patient continues to take 2.4 g of Lialda daily and has had several episodes of questionable flares of loose stools with abdominal pain.  Unusual that flares are short-lived and tend to occur at the beginning of the month however it is possible that symptoms may be caused by active disease.  We discussed options for possible treatment/evaluation of disease.  Patient will have routine labs today  including inflammatory markers, vitamin B12, folate, CBC and BMP.  If labs are normal, we will plan to increase dose of Lialda to 2.4 g twice daily and to monitor for improvement in symptoms.  If significant derangement and lab findings, may need to reevaluate active disease with endoscopic evaluation at this time.  Otherwise, will consider colonoscopy vs. imaging at follow-up appointment in 2-3 months after increasing dose of Lialda. It is also possible that flare symptoms may be caused by increased life stressors. She was instructed to contact our clinic with any interim change or worsening symptoms at which point we will pursue imaging +/- colonoscopy.    2. Cancer Screening  No PSC, no high-risk FH CRC (2nd cousin dx'd in 2017 w/ advanced CRC @57yo). Given colonoscopy x2 in 2017 without adenomatous lesions, recommend repeat colonoscopy for routine screening in 2027 unless symptomatic sooner.    RTC 2-3 months with Benjamin Brooks PA-C or Dr. Morfin      Thank you for this consultation. It was a pleasure to participate in the care of this patient; please contact us with any further questions.     Alice Gonzalez PA-C  [facility] Department of Medicine  Division of Gastroenterology    ATTENDING ATTESTATION:    REFERRING PROVIDER: Elsa  DATE SEEN: 2/4/19    Thank you for this consultation. It was a pleasure to participate in the care of this patient; please contact us with any further questions.    Patient was discussed, seen, and examined by me, Rudy Morfin. The plan of care and pertinent data/imaging were also reviewed with the GI Physician Assistant in clinic today. Agree with the joint assessment and plan as delineated above.    Please contact me with any further questions.    Rudy Morfin MD    [facility] - Department of Medicine  Division of Gastroenterology

## 2019-02-04 NOTE — NURSING NOTE
"Chief Complaint   Patient presents with     RECHECK     Return patient, 6 months follow up for crohns       Vitals:    02/04/19 1619   BP: 116/78   Pulse: 83   Temp: 97.9  F (36.6  C)   TempSrc: Oral   SpO2: 97%   Weight: 57.4 kg (126 lb 9.6 oz)   Height: 1.6 m (5' 2.99\")       Body mass index is 22.43 kg/m .    ALEXUS Pineda                          "

## 2019-03-04 ENCOUNTER — OFFICE VISIT (OUTPATIENT)
Dept: FAMILY MEDICINE | Facility: CLINIC | Age: 59
End: 2019-03-04
Payer: COMMERCIAL

## 2019-03-04 VITALS
SYSTOLIC BLOOD PRESSURE: 133 MMHG | HEART RATE: 83 BPM | OXYGEN SATURATION: 96 % | WEIGHT: 124.2 LBS | DIASTOLIC BLOOD PRESSURE: 86 MMHG | TEMPERATURE: 98.4 F | BODY MASS INDEX: 22.01 KG/M2

## 2019-03-04 DIAGNOSIS — T14.8XXA SKIN ABRASION: Primary | ICD-10-CM

## 2019-03-04 PROCEDURE — 99213 OFFICE O/P EST LOW 20 MIN: CPT | Performed by: FAMILY MEDICINE

## 2019-03-04 NOTE — PROGRESS NOTES
SUBJECTIVE:   Aileen Priest is a 58 year old female who presents to clinic today for the following health issues:    Rash  Onset: over the weekend     Description:   Location: Anus   Character: , painful  open sores   Itching (Pruritis): no     Progression of Symptoms:  improving    Accompanying Signs & Symptoms:  Fever: no   Body aches or joint pain: no   Sore throat symptoms: no   Recent cold symptoms: no     History:   Previous similar rash: no     Precipitating factors:   Exposure to similar rash: no   New exposures: None   Recent travel: YES [place]    Alleviating factors:  topical    Therapies Tried and outcome: hydrocortisone then stopped          Problem list and histories reviewed & adjusted, as indicated.  Additional history: as documented    Labs reviewed in EPIC    Reviewed and updated as needed this visit by clinical staff       Reviewed and updated as needed this visit by Provider         ROS:  Constitutional, HEENT, cardiovascular, pulmonary, gi and gu systems are negative, except as otherwise noted.    OBJECTIVE:     /86   Pulse 83   Temp 98.4  F (36.9  C) (Oral)   Wt 56.3 kg (124 lb 3.2 oz)   SpO2 96%   BMI 22.01 kg/m    Body mass index is 22.01 kg/m .  GENERAL: alert and mild distress  ABDOMEN: soft, nontender, no hepatosplenomegaly, no masses and bowel sounds normal  RECTAL (female): normal sphincter tone, no rectal masses  SKIN: a 1 cm shallow abraided patch about cm cranial to rectum. No vesicles seen, not fluctuant, no drainage or redness  Looks like an abrasion    Diagnostic Test Results:  none     ASSESSMENT/PLAN:             1. Skin abrasion  vasoline   call asap if worse      See Patient Instructions    Bucky Hunter MD  [facility] [place]

## 2019-04-11 ENCOUNTER — TELEPHONE (OUTPATIENT)
Dept: GASTROENTEROLOGY | Facility: CLINIC | Age: 59
End: 2019-04-11

## 2019-04-11 NOTE — TELEPHONE ENCOUNTER
Called and left message for patient reminding of appointment scheduled on 4/16/19 at 120pm with [facility] GI clinic. Patient to arrive 15 min early. To reschedule or cancel patient to call [phone].    ALEXUS Pineda

## 2019-04-15 ASSESSMENT — ENCOUNTER SYMPTOMS
ABDOMINAL PAIN: 1
BLOOD IN STOOL: 0
MUSCLE WEAKNESS: 0
MYALGIAS: 0
HOT FLASHES: 1
BLOATING: 0
MUSCLE CRAMPS: 0
STIFFNESS: 0
CONSTIPATION: 0
BACK PAIN: 0
DECREASED LIBIDO: 0
VOMITING: 0
NAUSEA: 0
ARTHRALGIAS: 0
BOWEL INCONTINENCE: 0
HEARTBURN: 0
NECK PAIN: 1
RECTAL PAIN: 0

## 2019-04-16 ENCOUNTER — OFFICE VISIT (OUTPATIENT)
Dept: GASTROENTEROLOGY | Facility: CLINIC | Age: 59
End: 2019-04-16
Payer: COMMERCIAL

## 2019-04-16 VITALS
WEIGHT: 127.4 LBS | TEMPERATURE: 98.2 F | DIASTOLIC BLOOD PRESSURE: 70 MMHG | BODY MASS INDEX: 22.57 KG/M2 | RESPIRATION RATE: 16 BRPM | SYSTOLIC BLOOD PRESSURE: 123 MMHG | HEIGHT: 63 IN | HEART RATE: 78 BPM | OXYGEN SATURATION: 99 %

## 2019-04-16 DIAGNOSIS — K50.00 CROHN'S DISEASE OF SMALL INTESTINE WITHOUT COMPLICATION (H): Primary | ICD-10-CM

## 2019-04-16 SDOH — HEALTH STABILITY: MENTAL HEALTH: HOW OFTEN DO YOU HAVE 6 OR MORE DRINKS ON ONE OCCASION?: MONTHLY

## 2019-04-16 SDOH — HEALTH STABILITY: MENTAL HEALTH: HOW OFTEN DO YOU HAVE A DRINK CONTAINING ALCOHOL?: MONTHLY OR LESS

## 2019-04-16 SDOH — HEALTH STABILITY: MENTAL HEALTH: HOW MANY STANDARD DRINKS CONTAINING ALCOHOL DO YOU HAVE ON A TYPICAL DAY?: 1 OR 2

## 2019-04-16 ASSESSMENT — MIFFLIN-ST. JEOR: SCORE: 1118.75

## 2019-04-16 ASSESSMENT — PAIN SCALES - GENERAL: PAINLEVEL: NO PAIN (0)

## 2019-04-16 NOTE — PROGRESS NOTES
"GI CLINIC VISIT    CC/REFERRING PROVIDER: Bucky Hunter  REASON FOR CONSULTATION: Crohn's Disease, GERD    HPI: 58 year old female w/ h/o ileal Crohn's Disease per 3/2017 ileocolonoscopy biopsies currently on PO 5-ASA, Lichen Planus (oral/perianal involvement), Hidradenitis Suppurativa s/p excision/drainage 1975, prior presumed diagnosis of Collagenous Colitis per colonic biopsies 2006 (though suspecting evolving IBD presentation in retrospect following 3/2017 highly-suggestive biopsies of terminal ileum + bland colonic biopsies), GERD, lumbar spine surgery 2005, periorbital shingles 2015, chronic tobacco abuse (in remission), among other medical issues - presenting for f/u Crohn's Disease.     Last visit prompted an increase in 5ASA to 4.8g daily due to intermittent \"flares\" she had been experiencing, despite labs within normal limits.  Since the increased, she has had 2 instances of brief abdominal pain, and wonders if there were actually dietary related (one shortly after drinking a diet coke on an empty stomach).  The abdominal pain is resolved with passing gas or BM.  No change with oral intake.  She is currently having 3 stools per day, soft and formed without blood in the stool.  No urgency or nighttime stools.  She notes a \"tear\" by her anus and had this evaluated by PCP and by her GYN. It was thought to be due to lichen sclerosis (per GYN) and was prescribed a steroid cream, which she has not picked up yet.  She notes improvement already even with vaseline as instructed by her PCP. She did not have any pain with a BM associated with this finding.      She continues to have recurring sores on her tongue, improvement with steroid swish.  She sees an oral care provider (through department of Dentistry) for this and has had improvement.  Denies any tenesmus or insecurity passing flatus, no fecal incontinence or new perianal/nocturnal sxs noted. Denies any N/V/F/C/HA/NS or other const/syst/cardiopulmonary " "sxs, no BRBPR/melena/urinary changes, no unintentional wt loss satiety changes. No other bowel/bladder habit changes, no dysphagia/odynophagia. No jaundice/icterus/pruritus, no acholic stools/steatorrhea, no new lumps/bumps, no jt pain/rash/eye sxs noted.     ROS: 10pt ROS performed and otherwise negative.    PERTINENT PAST MEDICAL/SURGICAL HISTORY:  As noted above.    PERTINENT MEDICATIONS:  - Lialda (generic) 2.4g PO BID  Medications reviewed with patient today, see Medication List/Assessment for details.  No other NSAID/anticoagulation reported by patient.  No other OTC/herbal/supplements reported by patient.    SOCIAL HISTORY: Tobacco: none.    PHYSICAL EXAMINATION:  Vitals reviewed, AFVSS  Wt 127# today (stable)  /70 (BP Location: Left arm, Patient Position: Sitting, Cuff Size: Adult Regular)   Pulse 78   Temp 98.2  F (36.8  C) (Oral)   Resp 16   Ht 1.595 m (5' 2.8\")   Wt 57.8 kg (127 lb 6.4 oz)   SpO2 99%   BMI 22.72 kg/m    Gen: aaox3, cooperative, pleasant, not diaphoretic, nad  HEENT: ncat, neck supple, no clad/sclad, normal op w/o ulcer/exudate, anicteric, mmm  Resp/CV without acute findings, not dyspneic/tachycardic  Abd: +nabs, soft, nt, nd, no peritoneal s/s noted  Ext: no c/c/e  Skin: warm, perfused, no jaundice  Neuro: grossly intact, no asterixis noted    PERTINENT STUDIES:  2/2019  LFTs, CBC, Cr, lytes wnl    ASSESSMENT/PLAN:    1. Ileal Crohn's Disease on PO 5-ASA monotherapy (2.4 g Lialda daily)  Patient continues to take 2.4 g of Lialda BID with great improvement symptomatically.  Labs last visit were wnl, so a marker may be difficult to track.  Given improvement, will hold off on additional testing, but consider a fecal calprotectin vs. Repeat MRE +/-colonoscopy should symptoms reoccur.   -- Continue Lialda 4.8 g daily  -- Next endoscopic assessment: 2027 unless symptomatic sooner  -- Patient with IBD we recommend supplementation vitamin D 1000 units daily and calcium 500 mg twice " daily.  -- Vaccines/immunizations to be updated: Recommend yearly flu shot, pneumonia vaccines (Prevnar 13 then 8 weeks later Pneumovax 23 then 5 years later Pneumovax 23), tetanus every 10 years.   -- No NSAIDs (ibuprofen, or anything containing ibuprofen)   -- Try to cut down on diet coke  -- Follow up with oral care provider     2. Cancer Screening  No PSC, no high-risk FH CRC (2nd cousin dx'd in 2017 w/ advanced CRC @55yo). Given colonoscopy x2 in 2017 without adenomatous lesions, recommend repeat colonoscopy for routine screening in 2027 unless symptomatic sooner.    RTC 2-3 months with Dr. Morfin      Thank you for this consultation. It was a pleasure to participate in the care of this patient; please contact us with any further questions.       Benjamin Brooks PA-C  Division of Gastroenterology, Hepatology and Nutrition  [facility]         Answers for HPI/ROS submitted by the patient on 4/15/2019   General Symptoms: No  Skin Symptoms: No  HENT Symptoms: No  EYE SYMPTOMS: No  HEART SYMPTOMS: No  LUNG SYMPTOMS: No  INTESTINAL SYMPTOMS: Yes  URINARY SYMPTOMS: No  GYNECOLOGIC SYMPTOMS: Yes  BREAST SYMPTOMS: No  SKELETAL SYMPTOMS: Yes  BLOOD SYMPTOMS: No  NERVOUS SYSTEM SYMPTOMS: No  MENTAL HEALTH SYMPTOMS: No  Heart burn or indigestion: No  Nausea: No  Vomiting: No  Abdominal pain: Yes  Bloating: No  Constipation: No  Blood in stool: No  Black stools: No  Rectal or Anal pain: No  Fecal incontinence: No  Vomit with blood: No  Change in stools: No  Back pain: No  Muscle aches: No  Neck pain: Yes  Joint pain: No  Bone pain: No  Muscle cramps: No  Muscle weakness: No  Joint stiffness: No  Bone fracture: No  Bleeding or spotting between periods: No  Heavy or painful periods: No  Irregular periods: No  Vaginal discharge: No  Hot flashes: Yes  Vaginal dryness: No  Genital ulcers: No  Reduced libido: No  Painful intercourse: No  Difficulty with sexual arousal: No  Post-menopausal bleeding: No

## 2019-04-16 NOTE — LETTER
"4/16/2019       RE: Aileen Priest  [address]  [place] [address]     Dear Colleague,    Thank you for referring your patient, Aileen Priest, to the [facility] GASTROENTEROLOGY AND IBD CLINIC at [facility]. Please see a copy of my visit note below.    GI CLINIC VISIT    CC/REFERRING PROVIDER: Bucky Hunter  REASON FOR CONSULTATION: Crohn's Disease, GERD    HPI: 58 year old female w/ h/o ileal Crohn's Disease per 3/2017 ileocolonoscopy biopsies currently on PO 5-ASA, Lichen Planus (oral/perianal involvement), Hidradenitis Suppurativa s/p excision/drainage 1975, prior presumed diagnosis of Collagenous Colitis per colonic biopsies 2006 (though suspecting evolving IBD presentation in retrospect following 3/2017 highly-suggestive biopsies of terminal ileum + bland colonic biopsies), GERD, lumbar spine surgery 2005, periorbital shingles 2015, chronic tobacco abuse (in remission), among other medical issues - presenting for f/u Crohn's Disease.     Last visit prompted an increase in 5ASA to 4.8g daily due to intermittent \"flares\" she had been experiencing, despite labs within normal limits.  Since the increased, she has had 2 instances of brief abdominal pain, and wonders if there were actually dietary related (one shortly after drinking a diet coke on an empty stomach).  The abdominal pain is resolved with passing gas or BM.  No change with oral intake.  She is currently having 3 stools per day, soft and formed without blood in the stool.  No urgency or nighttime stools.  She notes a \"tear\" by her anus and had this evaluated by PCP and by her GYN. It was thought to be due to lichen sclerosis (per GYN) and was prescribed a steroid cream, which she has not picked up yet.  She notes improvement already even with vaseline as instructed by her PCP. She did not have any pain with a BM associated with this finding.      She continues to have recurring sores on her tongue, " "improvement with steroid swish.  She sees an oral care provider (through department of Dentistry) for this and has had improvement.  Denies any tenesmus or insecurity passing flatus, no fecal incontinence or new perianal/nocturnal sxs noted. Denies any N/V/F/C/HA/NS or other const/syst/cardiopulmonary sxs, no BRBPR/melena/urinary changes, no unintentional wt loss satiety changes. No other bowel/bladder habit changes, no dysphagia/odynophagia. No jaundice/icterus/pruritus, no acholic stools/steatorrhea, no new lumps/bumps, no jt pain/rash/eye sxs noted.     ROS: 10pt ROS performed and otherwise negative.    PERTINENT PAST MEDICAL/SURGICAL HISTORY:  As noted above.    PERTINENT MEDICATIONS:  - Lialda (generic) 2.4g PO BID  Medications reviewed with patient today, see Medication List/Assessment for details.  No other NSAID/anticoagulation reported by patient.  No other OTC/herbal/supplements reported by patient.    SOCIAL HISTORY: Tobacco: none.    PHYSICAL EXAMINATION:  Vitals reviewed, AFVSS  Wt 127# today (stable)  /70 (BP Location: Left arm, Patient Position: Sitting, Cuff Size: Adult Regular)   Pulse 78   Temp 98.2  F (36.8  C) (Oral)   Resp 16   Ht 1.595 m (5' 2.8\")   Wt 57.8 kg (127 lb 6.4 oz)   SpO2 99%   BMI 22.72 kg/m     Gen: aaox3, cooperative, pleasant, not diaphoretic, nad  HEENT: ncat, neck supple, no clad/sclad, normal op w/o ulcer/exudate, anicteric, mmm  Resp/CV without acute findings, not dyspneic/tachycardic  Abd: +nabs, soft, nt, nd, no peritoneal s/s noted  Ext: no c/c/e  Skin: warm, perfused, no jaundice  Neuro: grossly intact, no asterixis noted    PERTINENT STUDIES:  2/2019  LFTs, CBC, Cr, lytes wnl    ASSESSMENT/PLAN:    1. Ileal Crohn's Disease on PO 5-ASA monotherapy (2.4 g Lialda daily)  Patient continues to take 2.4 g of Lialda BID with great improvement symptomatically.  Labs last visit were wnl, so a marker may be difficult to track.  Given improvement, will hold off on " additional testing, but consider a fecal calprotectin vs. Repeat MRE +/-colonoscopy should symptoms reoccur.   -- Continue Lialda 4.8 g daily  -- Next endoscopic assessment: 2027 unless symptomatic sooner  -- Patient with IBD we recommend supplementation vitamin D 1000 units daily and calcium 500 mg twice daily.  -- Vaccines/immunizations to be updated: Recommend yearly flu shot, pneumonia vaccines (Prevnar 13 then 8 weeks later Pneumovax 23 then 5 years later Pneumovax 23), tetanus every 10 years.   -- No NSAIDs (ibuprofen, or anything containing ibuprofen)   -- Try to cut down on diet coke  -- Follow up with oral care provider     2. Cancer Screening  No PSC, no high-risk FH CRC (2nd cousin dx'd in 2017 w/ advanced CRC @55yo). Given colonoscopy x2 in 2017 without adenomatous lesions, recommend repeat colonoscopy for routine screening in 2027 unless symptomatic sooner.    RTC 2-3 months with Dr. Morfin      Thank you for this consultation. It was a pleasure to participate in the care of this patient; please contact us with any further questions.     Benjamin Brooks PA-C  Division of Gastroenterology, Hepatology and Nutrition  [facility]

## 2019-04-16 NOTE — PATIENT INSTRUCTIONS
It was a pleasure taking care of you today.  I've included a brief summary of our discussion and care plan from today's visit below.  Please review this information with your primary care provider.  ______________________________________________________________________    My recommendations are summarized as follows:    -- Continue Lialda 4.8 g daily   -- Next endoscopic assessment: 2027 unless symptomatic sooner  -- Patient with IBD we recommend supplementation vitamin D 1000 units daily and calcium 500 mg twice daily.  -- Vaccines/immunizations to be updated: Recommend yearly flu shot, pneumonia vaccines (Prevnar 13 then 8 weeks later Pneumovax 23 then 5 years later Pneumovax 23), tetanus every 10 years.   -- No NSAIDs (ibuprofen, or anything containing ibuprofen)   -- Try to cut down on diet coke  -- Follow up with oral care provider     For additional resources about inflammatory bowel disease visit [email or web address]    Return to GI Clinic in 6 months to review your progress.    ______________________________________________________________________    Who do I call with any questions after my visit?  Please be in touch if there are any further questions that arise following today's visit.  There are multiple ways to contact your gastroenterology care team.        During business hours, you may reach a Gastroenterology nurse at [phone], option 3.       To schedule or reschedule an appointment, please call [phone].       You can always send a secure message through [employer].  [employer] messages are answered by your nurse or doctor typically within 24 hours.  Please allow extra time on weekends and holidays.        For urgent/emergent questions after business hours, you may reach the on-call GI Fellow by contacting the [facility] at [phone].      In order for your refill to be processed in a timely fashion, it is your responsibility to ensure you follow the  recommendations from your provider regarding your laboratory studies and follow up appointments.       How will I get the results of any tests ordered?    You will receive all of your results.  If you have signed up for [employer], any tests ordered at your visit will be available to you after your physician reviews them.  Typically this takes 1-2 weeks.  If there are urgent results that require a change in your care plan, your physician or nurse will call you to discuss the next steps.      What is [email or web address]?  [email or web address] is a secure way for you to access all of your healthcare records from the [facility].  It is a web based computer program, so you can sign on to it from any location.  It also allows you to send secure messages to your care team.  I recommend signing up for [employer] access if you have not already done so and are comfortable with using a computer.      How to I schedule a follow-up visit?  If you did not schedule a follow-up visit today, please call [phone] to schedule a follow-up office visit.        Sincerely,    Benjamin Brooks PA-C  [facility]  Division of Gastroenterology

## 2019-04-16 NOTE — NURSING NOTE
"Chief Complaint   Patient presents with     Gastrointestinal Problem     Crohn's Disease       Vitals:    04/16/19 1320   BP: 123/70   BP Location: Left arm   Patient Position: Sitting   Cuff Size: Adult Regular   Pulse: 78   Resp: 16   Temp: 98.2  F (36.8  C)   TempSrc: Oral   SpO2: 99%   Weight: 57.8 kg (127 lb 6.4 oz)   Height: 1.595 m (5' 2.8\")       Body mass index is 22.72 kg/m .      Kim Villasenor LPN                          "

## 2019-05-08 ENCOUNTER — TRANSFERRED RECORDS (OUTPATIENT)
Dept: HEALTH INFORMATION MANAGEMENT | Facility: CLINIC | Age: 59
End: 2019-05-08

## 2019-08-14 DIAGNOSIS — K50.018 CROHN'S DISEASE OF SMALL INTESTINE WITH OTHER COMPLICATION (H): ICD-10-CM

## 2019-08-14 DIAGNOSIS — K21.9 GASTROESOPHAGEAL REFLUX DISEASE, ESOPHAGITIS PRESENCE NOT SPECIFIED: ICD-10-CM

## 2019-08-17 RX ORDER — OMEPRAZOLE 40 MG/1
40 CAPSULE, DELAYED RELEASE ORAL DAILY
Qty: 90 CAPSULE | Refills: 2 | Status: SHIPPED | OUTPATIENT
Start: 2019-08-17 | End: 2019-10-29

## 2019-08-19 RX ORDER — MESALAMINE 1.2 G/1
TABLET, DELAYED RELEASE ORAL
Qty: 180 TABLET | Refills: 3 | Status: SHIPPED | OUTPATIENT
Start: 2019-08-19 | End: 2019-10-29

## 2019-09-03 ENCOUNTER — OFFICE VISIT (OUTPATIENT)
Dept: FAMILY MEDICINE | Facility: CLINIC | Age: 59
End: 2019-09-03
Payer: COMMERCIAL

## 2019-09-03 ENCOUNTER — HOSPITAL ENCOUNTER (OUTPATIENT)
Dept: GENERAL RADIOLOGY | Facility: CLINIC | Age: 59
Discharge: HOME | End: 2019-09-03
Attending: FAMILY MEDICINE | Admitting: FAMILY MEDICINE
Payer: COMMERCIAL

## 2019-09-03 VITALS
TEMPERATURE: 97 F | HEART RATE: 77 BPM | WEIGHT: 125.5 LBS | SYSTOLIC BLOOD PRESSURE: 110 MMHG | HEIGHT: 63 IN | RESPIRATION RATE: 12 BRPM | BODY MASS INDEX: 22.24 KG/M2 | DIASTOLIC BLOOD PRESSURE: 76 MMHG | OXYGEN SATURATION: 98 %

## 2019-09-03 DIAGNOSIS — M54.50 ACUTE MIDLINE LOW BACK PAIN WITHOUT SCIATICA: Primary | ICD-10-CM

## 2019-09-03 DIAGNOSIS — M54.50 ACUTE MIDLINE LOW BACK PAIN WITHOUT SCIATICA: ICD-10-CM

## 2019-09-03 DIAGNOSIS — I10 HYPERTENSION, GOAL BELOW 140/90: ICD-10-CM

## 2019-09-03 DIAGNOSIS — M25.562 ACUTE PAIN OF LEFT KNEE: ICD-10-CM

## 2019-09-03 LAB
ALBUMIN UR-MCNC: NEGATIVE MG/DL
APPEARANCE UR: CLEAR
BILIRUB UR QL STRIP: NEGATIVE
COLOR UR AUTO: YELLOW
GLUCOSE UR STRIP-MCNC: NEGATIVE MG/DL
HGB UR QL STRIP: NEGATIVE
KETONES UR STRIP-MCNC: NEGATIVE MG/DL
LEUKOCYTE ESTERASE UR QL STRIP: NEGATIVE
NITRATE UR QL: NEGATIVE
PH UR STRIP: 6 PH (ref 5–7)
SOURCE: NORMAL
SP GR UR STRIP: <=1.005 (ref 1–1.03)
UROBILINOGEN UR STRIP-ACNC: 0.2 EU/DL (ref 0.2–1)

## 2019-09-03 PROCEDURE — 73523 X-RAY EXAM HIPS BI 5/> VIEWS: CPT

## 2019-09-03 PROCEDURE — 99214 OFFICE O/P EST MOD 30 MIN: CPT | Performed by: FAMILY MEDICINE

## 2019-09-03 PROCEDURE — 81003 URINALYSIS AUTO W/O SCOPE: CPT | Performed by: FAMILY MEDICINE

## 2019-09-03 ASSESSMENT — MIFFLIN-ST. JEOR: SCORE: 1105.77

## 2019-09-09 ENCOUNTER — THERAPY VISIT (OUTPATIENT)
Dept: PHYSICAL THERAPY | Facility: CLINIC | Age: 59
End: 2019-09-09
Payer: COMMERCIAL

## 2019-09-09 DIAGNOSIS — M54.50 LUMBAGO: ICD-10-CM

## 2019-09-09 DIAGNOSIS — M25.562 LEFT KNEE PAIN: ICD-10-CM

## 2019-09-09 DIAGNOSIS — M54.50 ACUTE MIDLINE LOW BACK PAIN WITHOUT SCIATICA: ICD-10-CM

## 2019-09-09 DIAGNOSIS — M25.562 ACUTE PAIN OF LEFT KNEE: ICD-10-CM

## 2019-09-09 PROCEDURE — 97161 PT EVAL LOW COMPLEX 20 MIN: CPT | Mod: GP | Performed by: PHYSICAL THERAPIST

## 2019-09-09 PROCEDURE — 97112 NEUROMUSCULAR REEDUCATION: CPT | Mod: GP | Performed by: PHYSICAL THERAPIST

## 2019-09-09 PROCEDURE — 97530 THERAPEUTIC ACTIVITIES: CPT | Mod: GP | Performed by: PHYSICAL THERAPIST

## 2019-09-09 ASSESSMENT — ACTIVITIES OF DAILY LIVING (ADL)
SWELLING: THE SYMPTOM AFFECTS MY ACTIVITY SLIGHTLY
HOW_WOULD_YOU_RATE_THE_CURRENT_FUNCTION_OF_YOUR_KNEE_DURING_YOUR_USUAL_DAILY_ACTIVITIES_ON_A_SCALE_FROM_0_TO_100_WITH_100_BEING_YOUR_LEVEL_OF_KNEE_FUNCTION_PRIOR_TO_YOUR_INJURY_AND_0_BEING_THE_INABILITY_TO_PERFORM_ANY_OF_YOUR_USUAL_DAILY_ACTIVITIES?: 50
GO DOWN STAIRS: ACTIVITY IS NOT DIFFICULT
PAIN: THE SYMPTOM AFFECTS MY ACTIVITY SLIGHTLY
AS_A_RESULT_OF_YOUR_KNEE_INJURY,_HOW_WOULD_YOU_RATE_YOUR_CURRENT_LEVEL_OF_DAILY_ACTIVITY?: NEARLY NORMAL
LIMPING: THE SYMPTOM AFFECTS MY ACTIVITY SLIGHTLY
GIVING WAY, BUCKLING OR SHIFTING OF KNEE: THE SYMPTOM AFFECTS MY ACTIVITY SLIGHTLY
HOW_WOULD_YOU_RATE_THE_OVERALL_FUNCTION_OF_YOUR_KNEE_DURING_YOUR_USUAL_DAILY_ACTIVITIES?: NEARLY NORMAL
KNEE_ACTIVITY_OF_DAILY_LIVING_SUM: 54
SQUAT: ACTIVITY IS MINIMALLY DIFFICULT
GO UP STAIRS: ACTIVITY IS MINIMALLY DIFFICULT
SIT WITH YOUR KNEE BENT: ACTIVITY IS NOT DIFFICULT
WALK: ACTIVITY IS MINIMALLY DIFFICULT
WEAKNESS: THE SYMPTOM AFFECTS MY ACTIVITY SLIGHTLY
KNEEL ON THE FRONT OF YOUR KNEE: ACTIVITY IS MINIMALLY DIFFICULT
KNEE_ACTIVITY_OF_DAILY_LIVING_SCORE: 77.14
STIFFNESS: I DO NOT HAVE THE SYMPTOM
RAW_SCORE: 54
RISE FROM A CHAIR: ACTIVITY IS MINIMALLY DIFFICULT
STAND: ACTIVITY IS MINIMALLY DIFFICULT

## 2019-09-09 NOTE — PROGRESS NOTES
[facility] Initial Evaluation  Subjective:    Aileen Priest being seen for right low back and hip pain and intermittent left knee pain.   Problem began 8/16/2019. Where condition occurred: for unknown reasons.Problem occurred: recurrent pain for years, recently flared up when in a twisting and bending for a prolonged period  and reported as 4/10 on pain scale. General health as reported by patient is good. Pertinent medical history includes:  Menopausal.    Surgeries include:  Orthopedic surgery and other. Other surgery history details: pilonidal cyst, growth on toe, T+A, discectomy L5-S1 in 2005.  Current medications:  Anti-inflammatory, high blood pressure medication and other. Other medications details: tylenol.   Primary job tasks include:  Lifting/carrying, prolonged standing and repetitive tasks.  Pain is described as aching and other and is constant. Pain is the same all the time. Since onset symptoms are gradually improving. Special tests:  X-ray. Previous treatment includes physical therapy and surgery. There was significant improvement following previous treatment.   Patient is RN labor and delivery. Restrictions include:  Working in normal job without restrictions.    Barriers include:  None as reported by patient.    Regular exercise: 14-15 miles on road bicycle - flares up her knee  Objective:  LUMBAR:    Posture: slight slouched posture with forward head and rounded shoulders in sitting    Neurological: All results within normal limits unless otherwise noted.    Motor Deficit:  Myotomes L R   L1-2 (hip flexion) 5/5 5/5   L3 (knee extension) 5/5 5/5   L4 (ankle DF) 5/5 5/5   L5 (g. toe ext) 5/5 5/5   S1 (ankle PF or knee flex) 5/5 5/5     Sensory Deficit, Reflexes: Intact to light touch sensation in all LE dermatomes. Achilles and patellar reflexes 1+ B.    Dural Signs:   L R   SLR - -       AROM: (Major, Moderate, Minimal or Nil loss)  Movement Loss [place] Mod Min Nil Pain   Flexion   x   x   Extension  x   x   Side Gliding L    x    Side Gliding R    x      Repeated movement testing:   (During: produces, abolishes, increases, decreases, no effect, centralizing, peripheralizing; After: better, worse, no better, no worse, no effect, centralized, peripheralized)    Pre-test Symptoms Standin/10 pain at left hip   Symptoms During Symptoms After ROM increased ROM decreased No Effect   FIS increased No worse   x   Rep FIS increased No worse   x   EIS centralizing No better   x   Rep EIS centralizing centralized x         KNEE:    PROM:   L  R   Hyperextension 0 0   Extension 0 0   Flexion 135 140   Hip PROM:  WNL  Special tests:   L R   Anterior Drawer - -   Posterior Drawer - -   Lachman's - -   Valgus 0 degrees - -   Valgus 30 degrees - -   Varus 0 degrees - -   Varus 30 degrees - -   Daan's  -   Appley's - -   Patellar Compression + -   Functional: SLR: reduced control with SLR and demonstrates lag when fatigued  Hip Strength:   L ER:4/5  L ABD 3/5    Assessment/Plan:    Patient is a 58 year old female with lumbar and left side knee complaints.    Patient has the following significant findings with corresponding treatment plan.                Diagnosis 1:  Low back and left knee pain  Pain -  hot/cold therapy, manual therapy, splint/taping/bracing/orthotics, self management, education, directional preference exercise and home program  Decreased ROM/flexibility - manual therapy and therapeutic exercise  Decreased strength - therapeutic exercise and therapeutic activities  Impaired balance - neuro re-education and therapeutic activities  Decreased proprioception - neuro re-education and therapeutic activities  Impaired gait - gait training  Impaired muscle performance - neuro re-education  Decreased function - therapeutic activities    Therapy Evaluation Codes:   1) History comprised of:   Personal factors that impact the plan of care:      Anxiety and Time since onset of symptoms.     Comorbidity factors that impact the plan of care are:      High blood pressure.     Medications impacting care: High blood pressure and Pain.  2) Examination of Body Systems comprised of:   Body structures and functions that impact the plan of care:      Hip, Knee and Lumbar spine.   Activity limitations that impact the plan of care are:      Driving, Dressing, Jumping, Lifting, Squatting/kneeling, Stairs, Standing, Walking, Working and Laying down.  3) Clinical presentation characteristics are:   Stable/Uncomplicated.  4) Decision-Making    Low complexity using standardized patient assessment instrument and/or measureable assessment of functional outcome.  Cumulative Therapy Evaluation is: Low complexity.    Previous and current functional limitations:  (See Goal Flow Sheet for this information)    Short term and Long term goals: (See Goal Flow Sheet for this information)     Communication ability:  Patient appears to be able to clearly communicate and understand verbal and written communication and follow directions correctly.  Treatment Explanation - The following has been discussed with the patient:   RX ordered/plan of care  Anticipated outcomes  Possible risks and side effects  This patient would benefit from PT intervention to resume normal activities.   Rehab potential is good.    Frequency:  1 X week, once daily  Duration:  for 8 weeks  Discharge Plan:  Achieve all LTG.  Independent in home treatment program.  Reach maximal therapeutic benefit.    Please refer to the daily flowsheet for treatment today, total treatment time and time spent performing 1:1 timed codes.

## 2019-09-16 ENCOUNTER — THERAPY VISIT (OUTPATIENT)
Dept: PHYSICAL THERAPY | Facility: CLINIC | Age: 59
End: 2019-09-16
Payer: COMMERCIAL

## 2019-09-16 DIAGNOSIS — M25.562 LEFT KNEE PAIN: ICD-10-CM

## 2019-09-16 DIAGNOSIS — M54.50 LUMBAGO: ICD-10-CM

## 2019-09-16 PROCEDURE — 97112 NEUROMUSCULAR REEDUCATION: CPT | Mod: GP | Performed by: PHYSICAL THERAPIST

## 2019-09-16 PROCEDURE — 97530 THERAPEUTIC ACTIVITIES: CPT | Mod: GP | Performed by: PHYSICAL THERAPIST

## 2019-09-16 PROCEDURE — 97110 THERAPEUTIC EXERCISES: CPT | Mod: GP | Performed by: PHYSICAL THERAPIST

## 2019-09-19 ENCOUNTER — TELEPHONE (OUTPATIENT)
Dept: GASTROENTEROLOGY | Facility: CLINIC | Age: 59
End: 2019-09-19

## 2019-09-19 NOTE — TELEPHONE ENCOUNTER
[facility] Call Center    Phone Message    May a detailed message be left on voicemail: yes    Reason for Call: Symptoms or Concerns     If patient has red-flag symptoms, warm transfer to triage line    Current symptom or concern: The pt is having back problems and wants to take ibuprofen? Please call pt to discuss. Thanks.    Symptoms have been present for:  ? week(s)    Has patient previously been seen for this? No    By : no    Date: no    Are there any new or worsening symptoms? Yes: see above      Action Taken: Message routed to:  [facility] ([facility] danny gi

## 2019-09-19 NOTE — TELEPHONE ENCOUNTER
Pt is experiencing low back pain. Is going to physical therapy. Pt asking to take NSAIDS and alternate with tylenol. Pt states 3 years ago she had some gum dental  work done and Dr. Morfin okayed alternating tylenol and NSAIDs for 3 days. Suggested she ask her primary care provider for alternative such as muscle relaxant or ?  Pt said she did not want to go down that route. Will route to Dr. Landeros

## 2019-09-20 NOTE — TELEPHONE ENCOUNTER
Left the message below. Left my name and numberl       Rudy Morfin MD [place], Ann RN   Caller: Unspecified (Yesterday, 10:47 AM)             That's fine - monitor for IBD flare or oral ulcerations, limit overall use as able. Short-term use should hopefully be OK

## 2019-09-24 ENCOUNTER — ANCILLARY PROCEDURE (OUTPATIENT)
Dept: MAMMOGRAPHY | Facility: CLINIC | Age: 59
End: 2019-09-24
Attending: SPECIALIST
Payer: COMMERCIAL

## 2019-09-24 DIAGNOSIS — Z12.31 VISIT FOR SCREENING MAMMOGRAM: ICD-10-CM

## 2019-09-26 ENCOUNTER — THERAPY VISIT (OUTPATIENT)
Dept: PHYSICAL THERAPY | Facility: CLINIC | Age: 59
End: 2019-09-26
Payer: COMMERCIAL

## 2019-09-26 DIAGNOSIS — M25.562 LEFT KNEE PAIN: ICD-10-CM

## 2019-09-26 DIAGNOSIS — M54.50 LUMBAGO: ICD-10-CM

## 2019-09-26 PROCEDURE — 97112 NEUROMUSCULAR REEDUCATION: CPT | Mod: GP | Performed by: PHYSICAL THERAPIST

## 2019-09-26 PROCEDURE — 97110 THERAPEUTIC EXERCISES: CPT | Mod: GP | Performed by: PHYSICAL THERAPIST

## 2019-09-28 ENCOUNTER — HEALTH MAINTENANCE LETTER (OUTPATIENT)
Age: 59
End: 2019-09-28

## 2019-10-17 ENCOUNTER — THERAPY VISIT (OUTPATIENT)
Dept: PHYSICAL THERAPY | Facility: CLINIC | Age: 59
End: 2019-10-17
Payer: COMMERCIAL

## 2019-10-17 DIAGNOSIS — M25.562 LEFT KNEE PAIN: ICD-10-CM

## 2019-10-17 DIAGNOSIS — M54.50 LUMBAGO: ICD-10-CM

## 2019-10-17 PROCEDURE — 97110 THERAPEUTIC EXERCISES: CPT | Mod: GP | Performed by: PHYSICAL THERAPIST

## 2019-10-17 PROCEDURE — 97112 NEUROMUSCULAR REEDUCATION: CPT | Mod: GP | Performed by: PHYSICAL THERAPIST

## 2019-10-23 ENCOUNTER — TELEPHONE (OUTPATIENT)
Dept: GASTROENTEROLOGY | Facility: CLINIC | Age: 59
End: 2019-10-23

## 2019-10-23 NOTE — TELEPHONE ENCOUNTER
Called and left message for patient reminding of appointment scheduled on Tuesday October 29th at 12:40pm GI clinic. Instructed patient to arrive 15 min early and to reschedule or cancel appointment to call [phone].      Emilia Glynn, EMT

## 2019-10-28 ASSESSMENT — ENCOUNTER SYMPTOMS
POOR WOUND HEALING: 1
CHILLS: 0
RECTAL PAIN: 0
POLYPHAGIA: 0
INCREASED ENERGY: 0
HALLUCINATIONS: 0
POLYDIPSIA: 0
DECREASED APPETITE: 0
HEARTBURN: 0
MYALGIAS: 1
BLOOD IN STOOL: 0
SKIN CHANGES: 0
JOINT SWELLING: 0
ALTERED TEMPERATURE REGULATION: 0
BACK PAIN: 1
JAUNDICE: 0
FEVER: 0
WEIGHT GAIN: 0
STIFFNESS: 0
DIARRHEA: 0
NECK PAIN: 0
NIGHT SWEATS: 0
ARTHRALGIAS: 0
MUSCLE CRAMPS: 1
ABDOMINAL PAIN: 1
BOWEL INCONTINENCE: 0
NAIL CHANGES: 0
CONSTIPATION: 0
VOMITING: 0
FATIGUE: 0
NAUSEA: 0
BLOATING: 0
WEIGHT LOSS: 0

## 2019-10-29 ENCOUNTER — THERAPY VISIT (OUTPATIENT)
Dept: PHYSICAL THERAPY | Facility: CLINIC | Age: 59
End: 2019-10-29
Payer: COMMERCIAL

## 2019-10-29 ENCOUNTER — OFFICE VISIT (OUTPATIENT)
Dept: GASTROENTEROLOGY | Facility: CLINIC | Age: 59
End: 2019-10-29
Payer: COMMERCIAL

## 2019-10-29 VITALS
OXYGEN SATURATION: 97 % | DIASTOLIC BLOOD PRESSURE: 84 MMHG | HEIGHT: 63 IN | WEIGHT: 124 LBS | SYSTOLIC BLOOD PRESSURE: 136 MMHG | HEART RATE: 85 BPM | BODY MASS INDEX: 21.97 KG/M2

## 2019-10-29 DIAGNOSIS — K21.9 GASTROESOPHAGEAL REFLUX DISEASE, ESOPHAGITIS PRESENCE NOT SPECIFIED: ICD-10-CM

## 2019-10-29 DIAGNOSIS — K50.018 CROHN'S DISEASE OF SMALL INTESTINE WITH OTHER COMPLICATION (H): ICD-10-CM

## 2019-10-29 DIAGNOSIS — M54.50 LUMBAGO: ICD-10-CM

## 2019-10-29 DIAGNOSIS — M25.562 LEFT KNEE PAIN: ICD-10-CM

## 2019-10-29 PROCEDURE — 97112 NEUROMUSCULAR REEDUCATION: CPT | Mod: GP | Performed by: PHYSICAL THERAPIST

## 2019-10-29 PROCEDURE — 97140 MANUAL THERAPY 1/> REGIONS: CPT | Mod: GP | Performed by: PHYSICAL THERAPIST

## 2019-10-29 PROCEDURE — 97110 THERAPEUTIC EXERCISES: CPT | Mod: GP | Performed by: PHYSICAL THERAPIST

## 2019-10-29 RX ORDER — OMEPRAZOLE 40 MG/1
40 CAPSULE, DELAYED RELEASE ORAL DAILY
Qty: 90 CAPSULE | Refills: 2 | Status: SHIPPED | OUTPATIENT
Start: 2019-10-29 | End: 2020-06-24

## 2019-10-29 RX ORDER — MESALAMINE 1.2 G/1
4800 TABLET, DELAYED RELEASE ORAL DAILY
Qty: 360 TABLET | Refills: 3 | Status: SHIPPED | OUTPATIENT
Start: 2019-10-29 | End: 2019-12-19

## 2019-10-29 ASSESSMENT — PAIN SCALES - GENERAL: PAINLEVEL: MODERATE PAIN (4)

## 2019-10-29 ASSESSMENT — MIFFLIN-ST. JEOR: SCORE: 1098.97

## 2019-10-29 NOTE — NURSING NOTE
"Chief Complaint   Patient presents with     Crohns     6 month clinic f/u       Vitals:    10/29/19 1227   BP: 136/84   BP Location: Left arm   Patient Position: Sitting   Cuff Size: Adult Regular   Pulse: 85   SpO2: 97%   Weight: 124 lb   Height: 5' 2.52\"       Body mass index is 22.3 kg/m .    Sai Josue LPN                  "

## 2019-10-29 NOTE — LETTER
"10/29/2019       RE: Aileen Priest  [address]  [place] [address]     Dear Colleague,    Thank you for referring your patient, Aileen Priest, to the [facility] GASTROENTEROLOGY AND IBD CLINIC at [facility]. Please see a copy of my visit note below.    GI CLINIC VISIT    CC/REFERRING PROVIDER: Bucky Hunter  REASON FOR CONSULTATION: Crohn's Disease, GERD    HPI: 58 year old female w/ h/o ileal Crohn's Disease per 3/2017 ileocolonoscopy biopsies currently on PO 5-ASA, Lichen Planus (oral/perianal involvement), Hidradenitis Suppurativa s/p excision/drainage 1975, prior presumed diagnosis of Collagenous Colitis per colonic biopsies 2006 (though suspecting evolving IBD presentation in retrospect following 3/2017 highly-suggestive biopsies of terminal ileum + bland colonic biopsies), GERD, lumbar spine surgery 2005, periorbital shingles 2015, chronic tobacco abuse (in remission), among other medical issues - presenting for f/u Crohn's Disease.     8/2018 prompted an increase in 5ASA to 4.8g daily due to intermittent \"flares\" she had been experiencing, despite labs within normal limits.  Since the increased, she has had great symptomatic relieve and reports one incidence of loose stool, otherwise no episodes concerning for flares.  She is currently having 3 stools per day, soft and formed without blood in the stool.  No urgency or nighttime stools.      Last visit, she had noted a \"tear\" by her anus and had this evaluated by PCP and by her GYN. It was thought to be due to lichen sclerosis (per GYN) and was prescribed a steroid cream.  She notes improvement already even with vaseline as instructed by her PCP. She did not have any pain with a BM associated with this finding.      She had noted a lump on right side of buttocks that has been very bothersome, inhibiting much of her activity.  It is adjacent to previous pilonidal cyst repair. No drainage and no fevers. " "    Denies any tenesmus or insecurity passing flatus, no fecal incontinence or new perianal/nocturnal sxs noted. Denies any N/V/F/C/HA/NS or other const/syst/cardiopulmonary sxs, no BRBPR/melena/urinary changes, no unintentional wt loss satiety changes. No other bowel/bladder habit changes, no dysphagia/odynophagia. No jaundice/icterus/pruritus, no acholic stools/steatorrhea, no new lumps/bumps, no jt pain/rash/eye sxs noted.     ROS: 10pt ROS performed and otherwise negative.    PERTINENT PAST MEDICAL/SURGICAL HISTORY:  As noted above.    PERTINENT MEDICATIONS:  - Lialda (generic) 4.8 g per day  Medications reviewed with patient today, see Medication List/Assessment for details.  No other NSAID/anticoagulation reported by patient.  No other OTC/herbal/supplements reported by patient.    SOCIAL HISTORY: Tobacco: none.    PHYSICAL EXAMINATION:  Vitals reviewed, AFVSS  Wt 124# today (stable)  /84 (BP Location: Left arm, Patient Position: Sitting, Cuff Size: Adult Regular)   Pulse 85   Ht 5' 2.52\"   Wt 124 lb   SpO2 97%   BMI 22.30 kg/m     Gen: aaox3, cooperative, pleasant, not diaphoretic, nad  HEENT: ncat, neck supple, no clad/sclad, normal op w/o ulcer/exudate, anicteric, mmm  Resp/CV without acute findings, not dyspneic/tachycardic  Abd: +nabs, soft, nt, nd, no peritoneal s/s noted  Ext: no c/c/e  Skin: warm, perfused, no jaundice  Neuro: grossly intact, no asterixis noted    PERTINENT STUDIES:  2/2019  LFTs, CBC, Cr, lytes wnl    ASSESSMENT/PLAN:    1. Ileal Crohn's Disease on PO 5-ASA monotherapy (2.4 g Lialda daily)  Patient continues to take 2.4 g of Lialda BID with great improvement symptomatically.  Labs last visit were wnl, so a marker may be difficult to track.  Given improvement, will hold off on additional testing, but consider a fecal calprotectin in the future.  Due to concern for mass adjacent to pilonidal cyst repair will obtain a pelvic MRI to ensure no other GI process may be " contributing to this.  -- Continue Lialda 4.8 g daily  -- MRI pelvis  -- Next endoscopic assessment: 2027 unless symptomatic sooner  -- Patient with IBD we recommend supplementation vitamin D 1000 units daily and calcium 500 mg twice daily.  -- Vaccines/immunizations to be updated: Recommend yearly flu shot, pneumonia vaccines (Prevnar 13 then 8 weeks later Pneumovax 23 then 5 years later Pneumovax 23), tetanus every 10 years.   -- No NSAIDs (ibuprofen, or anything containing ibuprofen)     2. Cancer Screening  No PSC, no high-risk FH CRC (2nd cousin dx'd in 2017 w/ advanced CRC @57yo). Given colonoscopy x2 in 2017 without adenomatous lesions, recommend repeat colonoscopy for routine screening in 2027 unless symptomatic sooner.    RTC 1 year     Thank you for this consultation. It was a pleasure to participate in the care of this patient; please contact us with any further questions.     Benjamin Brooks PA-C  Division of Gastroenterology, Hepatology and Nutrition  [facility]

## 2019-10-29 NOTE — PROGRESS NOTES
"GI CLINIC VISIT    CC/REFERRING PROVIDER: Bucky Hunter  REASON FOR CONSULTATION: Crohn's Disease, GERD    HPI: 58 year old female w/ h/o ileal Crohn's Disease per 3/2017 ileocolonoscopy biopsies currently on PO 5-ASA, Lichen Planus (oral/perianal involvement), Hidradenitis Suppurativa s/p excision/drainage 1975, prior presumed diagnosis of Collagenous Colitis per colonic biopsies 2006 (though suspecting evolving IBD presentation in retrospect following 3/2017 highly-suggestive biopsies of terminal ileum + bland colonic biopsies), GERD, lumbar spine surgery 2005, periorbital shingles 2015, chronic tobacco abuse (in remission), among other medical issues - presenting for f/u Crohn's Disease.     8/2018 prompted an increase in 5ASA to 4.8g daily due to intermittent \"flares\" she had been experiencing, despite labs within normal limits.  Since the increased, she has had great symptomatic relieve and reports one incidence of loose stool, otherwise no episodes concerning for flares.  She is currently having 3 stools per day, soft and formed without blood in the stool.  No urgency or nighttime stools.      Last visit, she had noted a \"tear\" by her anus and had this evaluated by PCP and by her GYN. It was thought to be due to lichen sclerosis (per GYN) and was prescribed a steroid cream.  She notes improvement already even with vaseline as instructed by her PCP. She did not have any pain with a BM associated with this finding.      She had noted a lump on right side of buttocks that has been very bothersome, inhibiting much of her activity.  It is adjacent to previous pilonidal cyst repair. No drainage and no fevers.     Denies any tenesmus or insecurity passing flatus, no fecal incontinence or new perianal/nocturnal sxs noted. Denies any N/V/F/C/HA/NS or other const/syst/cardiopulmonary sxs, no BRBPR/melena/urinary changes, no unintentional wt loss satiety changes. No other bowel/bladder habit changes, no " "dysphagia/odynophagia. No jaundice/icterus/pruritus, no acholic stools/steatorrhea, no new lumps/bumps, no jt pain/rash/eye sxs noted.     ROS: 10pt ROS performed and otherwise negative.    PERTINENT PAST MEDICAL/SURGICAL HISTORY:  As noted above.    PERTINENT MEDICATIONS:  - Lialda (generic) 4.8 g per day  Medications reviewed with patient today, see Medication List/Assessment for details.  No other NSAID/anticoagulation reported by patient.  No other OTC/herbal/supplements reported by patient.    SOCIAL HISTORY: Tobacco: none.    PHYSICAL EXAMINATION:  Vitals reviewed, AFVSS  Wt 124# today (stable)  /84 (BP Location: Left arm, Patient Position: Sitting, Cuff Size: Adult Regular)   Pulse 85   Ht 5' 2.52\"   Wt 124 lb   SpO2 97%   BMI 22.30 kg/m    Gen: aaox3, cooperative, pleasant, not diaphoretic, nad  HEENT: ncat, neck supple, no clad/sclad, normal op w/o ulcer/exudate, anicteric, mmm  Resp/CV without acute findings, not dyspneic/tachycardic  Abd: +nabs, soft, nt, nd, no peritoneal s/s noted  Ext: no c/c/e  Skin: warm, perfused, no jaundice  Neuro: grossly intact, no asterixis noted    PERTINENT STUDIES:  2/2019  LFTs, CBC, Cr, lytes wnl    ASSESSMENT/PLAN:    1. Ileal Crohn's Disease on PO 5-ASA monotherapy (2.4 g Lialda daily)  Patient continues to take 2.4 g of Lialda BID with great improvement symptomatically.  Labs last visit were wnl, so a marker may be difficult to track.  Given improvement, will hold off on additional testing, but consider a fecal calprotectin in the future.  Due to concern for mass adjacent to pilonidal cyst repair will obtain a pelvic MRI to ensure no other GI process may be contributing to this.  -- Continue Lialda 4.8 g daily  -- MRI pelvis  -- Next endoscopic assessment: 2027 unless symptomatic sooner  -- Patient with IBD we recommend supplementation vitamin D 1000 units daily and calcium 500 mg twice daily.  -- Vaccines/immunizations to be updated: Recommend yearly flu " shot, pneumonia vaccines (Prevnar 13 then 8 weeks later Pneumovax 23 then 5 years later Pneumovax 23), tetanus every 10 years.   -- No NSAIDs (ibuprofen, or anything containing ibuprofen)     2. Cancer Screening  No PSC, no high-risk FH CRC (2nd cousin dx'd in 2017 w/ advanced CRC @55yo). Given colonoscopy x2 in 2017 without adenomatous lesions, recommend repeat colonoscopy for routine screening in 2027 unless symptomatic sooner.    RTC 1 year     Thank you for this consultation. It was a pleasure to participate in the care of this patient; please contact us with any further questions.       Benjamin Brooks PA-C  Division of Gastroenterology, Hepatology and Nutrition  [facility]       Answers for HPI/ROS submitted by the patient on 10/28/2019   General Symptoms: Yes  Skin Symptoms: Yes  HENT Symptoms: No  EYE SYMPTOMS: No  HEART SYMPTOMS: No  LUNG SYMPTOMS: No  INTESTINAL SYMPTOMS: Yes  URINARY SYMPTOMS: No  GYNECOLOGIC SYMPTOMS: No  BREAST SYMPTOMS: No  SKELETAL SYMPTOMS: Yes  BLOOD SYMPTOMS: No  NERVOUS SYSTEM SYMPTOMS: No  MENTAL HEALTH SYMPTOMS: No  Fever: No  Loss of appetite: No  Weight loss: No  Weight gain: No  Fatigue: No  Night sweats: No  Chills: No  Increased stress: Yes  Excessive hunger: No  Excessive thirst: No  Feeling hot or cold when others believe the temperature is normal: No  Loss of height: No  Post-operative complications: No  Surgical site pain: No  Hallucinations: No  Change in or Loss of Energy: No  Hyperactivity: No  Confusion: No  Changes in hair: No  Changes in moles/birth marks: No  Itching: No  Rashes: No  Changes in nails: No  Acne: No  Hair in places you don't want it: No  Change in facial hair: No  Warts: No  Non-healing sores: Yes  Scarring: No  Flaking of skin: No  Color changes of hands/feet in cold : No  Sun sensitivity: No  Skin thickening: No  Heart burn or indigestion: No  Nausea: No  Vomiting: No  Abdominal pain: Yes  Bloating: No  Constipation: No  Diarrhea:  No  Blood in stool: No  Black stools: No  Rectal or Anal pain: No  Fecal incontinence: No  Yellowing of skin or eyes: No  Change in stools: No  Back pain: Yes  Muscle aches: Yes  Neck pain: No  Swollen joints: No  Joint pain: No  Bone pain: No  Muscle cramps: Yes  Joint stiffness: No  Bone fracture: No

## 2019-10-29 NOTE — PROGRESS NOTES
PROGRESS  REPORT    Progress reporting period is from 9/9/19 to 10/29.       SUBJECTIVE  Subjective changes noted by patient:  Pain is a little better overall, especially in her knee and low back pain. She is most tender right over a bump along the right side of her tail bone. She tried to roll this out with a tennis ball and got a small bruise. She reports she feels like she is getting stronger. She continues to experience right hip pain and leg fatigue with walking, sitting, and standing for more than 30 minutes.  Current pain level is 2-4/10.     Previous pain level was   4/10.   Changes in function:  Yes (See Goal flowsheet attached for changes in current functional level)  Adverse reaction to treatment or activity: None    OBJECTIVE  Changes noted in objective findings:  Yes, AROM: (Major, Moderate, Minimal or Nil loss)  Movement Loss Corby Mod Min Nil Pain   Flexion     x      Extension      x     Side Gliding L       x     Side Gliding R       x        Functional:   Hip Strength:   R ER:4+/5  R ABD 4/5  R hip ext: 4/5     Observation: 1-2 cm x 3 cm raised lump at the right side of her tail bone. She has a small amount of bruising over the spot.    ASSESSMENT/PLAN  Updated problem list and treatment plan: Diagnosis 1: low back and left knee pain  Pain -  hot/cold therapy, manual therapy, education and home program  Decreased strength - therapeutic exercise and therapeutic activities  Decreased proprioception - neuro re-education and therapeutic activities  Impaired muscle performance - neuro re-education  Decreased function - therapeutic activities  STG/LTGs have been met or progress has been made towards goals:  Yes (See Goal flow sheet completed today.)  Assessment of Progress: The patient's condition is improving slowly.  Self Management Plans:  Patient has been instructed in a home treatment program.  I have re-evaluated this patient and find that the nature, scope, duration and intensity of the therapy is  appropriate for the medical condition of the patient.  Aileen continues to require the following intervention to meet STG and LTG's:  PT    Recommendations:  This patient would benefit from continued therapy.     Frequency:  2 X a month, once daily  Duration:  for 2 months    This patient would benefit from further evaluation regarding unchanged small lump at her right tail bone.    Please refer to the daily flowsheet for treatment today, total treatment time and time spent performing 1:1 timed codes.

## 2019-10-29 NOTE — PATIENT INSTRUCTIONS
It was a pleasure taking care of you today.  I've included a brief summary of our discussion and care plan from today's visit below.  Please review this information with your primary care provider.  ______________________________________________________________________    My recommendations are summarized as follows:    -- Pelvic MRI to be scheduled (Can call Louise to schedule [phone])  -- Continue lialda 4.8 g daily   -- Next endoscopic assessment: 2027  -- Patient with IBD we recommend supplementation vitamin D 1000 units daily and calcium 500 mg twice daily.  -- Vaccines/immunizations to be updated: Recommend yearly flu shot, pneumonia vaccines (Prevnar 13 then 8 weeks later Pneumovax 23 then 5 years later Pneumovax 23), tetanus every 10 years.  -- No NSAIDs (ibuprofen, or anything containing ibuprofen)     For additional resources about inflammatory bowel disease visit [email or web address]      Return to GI Clinic in 1 year to review your progress.    ______________________________________________________________________    Who do I call with any questions after my visit?  Please be in touch if there are any further questions that arise following today's visit.  There are multiple ways to contact your gastroenterology care team.        During business hours, you may reach a Gastroenterology nurse at [phone]. (Louise)       To schedule or reschedule an appointment, please call [phone].       You can always send a secure message through [employer].  [employer] messages are answered by your nurse or doctor typically within 24 hours.  Please allow extra time on weekends and holidays.        For urgent/emergent questions after business hours, you may reach the on-call GI Fellow by contacting the [facility] at [phone].      In order for your refill to be processed in a timely fashion, it is your responsibility to ensure you follow the recommendations from your  provider regarding your laboratory studies and follow up appointments.       How will I get the results of any tests ordered?    You will receive all of your results.  If you have signed up for [employer], any tests ordered at your visit will be available to you after your physician reviews them.  Typically this takes 1-2 weeks.  If there are urgent results that require a change in your care plan, your physician or nurse will call you to discuss the next steps.      What is [employer]?  [employer] is a secure way for you to access all of your healthcare records from the [facility].  It is a web based computer program, so you can sign on to it from any location.  It also allows you to send secure messages to your care team.  I recommend signing up for [employer] access if you have not already done so and are comfortable with using a computer.      How to I schedule a follow-up visit?  If you did not schedule a follow-up visit today, please call [phone] to schedule a follow-up office visit.        Sincerely,    Benjamin Brooks PA-C  [facility]  Division of Gastroenterology

## 2019-11-06 ENCOUNTER — HOSPITAL ENCOUNTER (OUTPATIENT)
Dept: MRI IMAGING | Facility: CLINIC | Age: 59
Discharge: HOME | End: 2019-11-06
Attending: PHYSICIAN ASSISTANT | Admitting: PHYSICIAN ASSISTANT
Payer: COMMERCIAL

## 2019-11-06 DIAGNOSIS — K50.018 CROHN'S DISEASE OF SMALL INTESTINE WITH OTHER COMPLICATION (H): ICD-10-CM

## 2019-11-06 PROCEDURE — A9585 GADOBUTROL INJECTION: HCPCS | Performed by: PHYSICIAN ASSISTANT

## 2019-11-06 PROCEDURE — 72197 MRI PELVIS W/O & W/DYE: CPT

## 2019-11-06 PROCEDURE — 25500064 ZZH RX 255 OP 636: Performed by: PHYSICIAN ASSISTANT

## 2019-11-06 RX ORDER — GADOBUTROL 604.72 MG/ML
7.5 INJECTION INTRAVENOUS ONCE
Status: COMPLETED | OUTPATIENT
Start: 2019-11-06 | End: 2019-11-06

## 2019-11-06 RX ADMIN — GADOBUTROL 5.6 ML: 604.72 INJECTION INTRAVENOUS at 18:04

## 2019-11-08 ENCOUNTER — MYC MEDICAL ADVICE (OUTPATIENT)
Dept: FAMILY MEDICINE | Facility: CLINIC | Age: 59
End: 2019-11-08

## 2019-11-08 NOTE — TELEPHONE ENCOUNTER
Dr. Hunter    Please see patients my chart message.    Thanks  Marlin Mendoza RN   [facility]

## 2019-11-19 ENCOUNTER — OFFICE VISIT (OUTPATIENT)
Dept: FAMILY MEDICINE | Facility: CLINIC | Age: 59
End: 2019-11-19
Payer: COMMERCIAL

## 2019-11-19 VITALS
TEMPERATURE: 98.7 F | DIASTOLIC BLOOD PRESSURE: 78 MMHG | HEIGHT: 62 IN | BODY MASS INDEX: 22.69 KG/M2 | WEIGHT: 123.3 LBS | HEART RATE: 70 BPM | SYSTOLIC BLOOD PRESSURE: 112 MMHG | OXYGEN SATURATION: 97 %

## 2019-11-19 DIAGNOSIS — K50.00 CROHN'S DISEASE OF SMALL INTESTINE WITHOUT COMPLICATION (H): ICD-10-CM

## 2019-11-19 DIAGNOSIS — I10 HYPERTENSION, GOAL BELOW 140/90: ICD-10-CM

## 2019-11-19 DIAGNOSIS — Z23 NEED FOR SHINGLES VACCINE: ICD-10-CM

## 2019-11-19 DIAGNOSIS — M71.38 SYNOVIAL CYST OF SACRAL REGION: Primary | ICD-10-CM

## 2019-11-19 DIAGNOSIS — M54.50 ACUTE BILATERAL LOW BACK PAIN WITHOUT SCIATICA: ICD-10-CM

## 2019-11-19 PROCEDURE — 36415 COLL VENOUS BLD VENIPUNCTURE: CPT | Performed by: FAMILY MEDICINE

## 2019-11-19 PROCEDURE — 90471 IMMUNIZATION ADMIN: CPT | Performed by: FAMILY MEDICINE

## 2019-11-19 PROCEDURE — 80053 COMPREHEN METABOLIC PANEL: CPT | Performed by: FAMILY MEDICINE

## 2019-11-19 PROCEDURE — 90750 HZV VACC RECOMBINANT IM: CPT | Performed by: FAMILY MEDICINE

## 2019-11-19 PROCEDURE — 99214 OFFICE O/P EST MOD 30 MIN: CPT | Mod: 25 | Performed by: FAMILY MEDICINE

## 2019-11-19 ASSESSMENT — MIFFLIN-ST. JEOR: SCORE: 1082.54

## 2019-11-19 NOTE — PROGRESS NOTES
Subjective     Aileen Priest is a 59 year old female who presents to clinic today for the following health issues:    HPI   Cyst   Onset: September 2019    Description:   Location: tailbone by the right side   Character: oval, getting better but was painful   Itching (Pruritis): no     Progression of Symptoms:  improving    Accompanying Signs & Symptoms:  Fever: no   Body aches or joint pain: no   Sore throat symptoms: no   Recent cold symptoms: no     History:   Previous similar rash: no    Precipitating factors:   Exposure to similar rash: no   New exposures: None   Recent travel: no     Alleviating factors:  none    Therapies Tried and outcome: PT for the last 2 months for it.   Hypertension Follow-up      Do you check your blood pressure regularly outside of the clinic? Yes     Are you following a low salt diet? Yes    Are your blood pressures ever more than 140 on the top number (systolic) OR more   than 90 on the bottom number (diastolic), for example 140/90? No    Encounter Diagnoses   Name Primary?     Synovial cyst of sacral region Yes     Crohn's disease of small intestine without complication (H)  Well controlled with GI      Hypertension, goal below 140/90      Need for shingles vaccine      Acute bilateral low back pain without sciatica, much better with PHYSICAL THERAPY           Current Outpatient Medications   Medication Sig Dispense Refill     Calcium Carb-Cholecalciferol (CALCIUM + D3 PO)        dexamethasone 0.5 MG/5ML solution Take  by mouth daily.       Lactobacillus-Inulin ([facility] DIGESTIVE HEALTH PO)        lisinopril (PRINIVIL/ZESTRIL) 10 MG tablet Take 1 tablet (10 mg) by mouth daily 90 tablet 3     mesalamine (LIALDA) 1.2 g EC tablet Take 4 tablets (4,800 mg) by mouth daily 360 tablet 3     Multiple Vitamins-Minerals (MULTIVITAMIN ADULT PO) Take 1 capsule by mouth daily       omeprazole (PRILOSEC) 40 MG DR capsule Take 1 capsule (40 mg) by mouth daily 90 capsule 2      reviewed MRI  "    Reviewed and updated as needed this visit by Provider         Review of Systems   ROS COMP: Constitutional, HEENT, cardiovascular, pulmonary, gi and gu systems are negative, except as otherwise noted.      Objective    /78   Pulse 70   Temp 98.7  F (37.1  C) (Oral)   Ht 1.575 m (5' 2\")   Wt 55.9 kg (123 lb 4.8 oz)   SpO2 97%   BMI 22.55 kg/m    Body mass index is 22.55 kg/m .  Physical Exam   GENERAL: healthy, alert and no distress  NECK: no adenopathy, no asymmetry, masses, or scars and thyroid normal to palpation  RESP: lungs clear to auscultation - no rales, rhonchi or wheezes  CV: regular rate and rhythm, normal S1 S2, no S3 or S4, no murmur, click or rub, no peripheral edema and peripheral pulses strong  ABDOMEN: soft, nontender, no hepatosplenomegaly, no masses and bowel sounds normal  MS: no gross musculoskeletal defects noted, no edema  SKIN: no suspicious lesions or rashes  NEURO: Normal strength and tone, mentation intact and speech normal    Diagnostic Test Results:  Labs reviewed in Epic        Assessment & Plan     1. Synovial cyst of sacral region  Reviewed MRI, no need for teratment now    2. Crohn's disease of small intestine without complication (H)  Well controlled   - Comprehensive metabolic panel    3. Hypertension, goal below 140/90  Well controlled   - Comprehensive metabolic panel    4. Need for shingles vaccine  done    5. Acute bilateral low back pain without sciatica  Improving, contiue PHYSICAL THERAPY          Regular exercise  See Patient Instructions    No follow-ups on file.    Bucky Hunter MD  [facility]      "

## 2019-11-20 ENCOUNTER — TELEPHONE (OUTPATIENT)
Dept: FAMILY MEDICINE | Facility: CLINIC | Age: 59
End: 2019-11-20

## 2019-11-20 LAB
ALBUMIN SERPL-MCNC: 3.1 G/DL (ref 3.4–5)
ALP SERPL-CCNC: 89 U/L (ref 40–150)
ALT SERPL W P-5'-P-CCNC: 92 U/L (ref 0–50)
ANION GAP SERPL CALCULATED.3IONS-SCNC: 4 MMOL/L (ref 3–14)
AST SERPL W P-5'-P-CCNC: 72 U/L (ref 0–45)
BILIRUB SERPL-MCNC: 0.2 MG/DL (ref 0.2–1.3)
BUN SERPL-MCNC: 18 MG/DL (ref 7–30)
CALCIUM SERPL-MCNC: 8.9 MG/DL (ref 8.5–10.1)
CHLORIDE SERPL-SCNC: 106 MMOL/L (ref 94–109)
CO2 SERPL-SCNC: 26 MMOL/L (ref 20–32)
CREAT SERPL-MCNC: 1.29 MG/DL (ref 0.52–1.04)
GFR SERPL CREATININE-BSD FRML MDRD: 45 ML/MIN/{1.73_M2}
GLUCOSE SERPL-MCNC: 92 MG/DL (ref 70–99)
POTASSIUM SERPL-SCNC: 4.2 MMOL/L (ref 3.4–5.3)
PROT SERPL-MCNC: 6.5 G/DL (ref 6.8–8.8)
SODIUM SERPL-SCNC: 136 MMOL/L (ref 133–144)

## 2019-11-21 NOTE — TELEPHONE ENCOUNTER
Please call patient  Labs show a drop in kidney function and an increase in LFTs   I do not know if this is from the mesalamine or the lisinopril   I want her to cut her lisinopril dose back to 5 mg   avoid alcohol and tylenol for 1 month and see me in 1 month

## 2019-11-21 NOTE — TELEPHONE ENCOUNTER
Called patient, left M for return call to clinic. Sending patient MyChart message with providers instructions as well. Angelica Lowry RN on 11/21/2019 at 8:25 AM

## 2019-11-24 DIAGNOSIS — R79.89 ELEVATED SERUM CREATININE: Primary | ICD-10-CM

## 2019-11-24 NOTE — PROGRESS NOTES
Recent labs show elevated creatinine and slightly abnormal LFTs. Based on symptoms with concern for active flares (patient reported, despite normal labs) dose was increased Spring 2019.   Normal Creatinine 2/2019 prior to dose increase. Labs with primary care show elevated creatinine (0.6--> 1.29). LFT slightly increased, AST 72 and ALT 92. Normal alk phos.     PLAN:  Given patient has ileal disease, I question if Lialda would be the most effective treatment for her IBD, and given these labs, we will plan to HOLD lialda with repeat labs in approx 1 month.We will also obtain a UA. I will also plan to refer to nephrology at this time for formal evaluation.      Benjamin Brooks PA-C  Division of Gastroenterology, Hepatology and Nutrition  [facility]

## 2019-11-25 DIAGNOSIS — R79.89 ELEVATED SERUM CREATININE: ICD-10-CM

## 2019-11-25 PROCEDURE — 81003 URINALYSIS AUTO W/O SCOPE: CPT | Performed by: PHYSICIAN ASSISTANT

## 2019-11-26 ENCOUNTER — MYC MEDICAL ADVICE (OUTPATIENT)
Dept: FAMILY MEDICINE | Facility: CLINIC | Age: 59
End: 2019-11-26

## 2019-11-26 DIAGNOSIS — K50.00 CROHN'S DISEASE OF SMALL INTESTINE WITHOUT COMPLICATION (H): Primary | ICD-10-CM

## 2019-11-26 NOTE — PROGRESS NOTES
Discussed case with Dr. Pollock.  Will proceed with colonoscopy now to assess disease state as we are going to hold 5ASA at this time with new elevated creatinine.       Benjamin Brooks PA-C  Division of Gastroenterology, Hepatology and Nutrition  [facility]

## 2019-11-27 ENCOUNTER — TELEPHONE (OUTPATIENT)
Dept: GASTROENTEROLOGY | Facility: CLINIC | Age: 59
End: 2019-11-27

## 2019-11-27 NOTE — TELEPHONE ENCOUNTER
REID Lott. Please see patients my chart message.    Marlin Mendoza RN   [facility]

## 2019-11-27 NOTE — TELEPHONE ENCOUNTER
Patient Name: Aileen Priest   : 1960  MRN: [id number]     : [x] N/A    Additional Information regarding appointment:      Patient scheduled for:   [x] Colonoscopy   Indication for procedure. [x]   Crohn's disease of small intestine without complication (H) [K50.00]  - Primary           Sedation Type: [x] Conscious Sedation     Procedure Provider:  Dr. Pollock      Referring Provider. Benjamin Brooks PA-C    Arrival time verified: 9:45 am / Tues / 12/3/19    Facility location verified:   [x][address], 5th floor     Prep Type:     [x]Golytely & dulcolax eRx: sent to [place], [place] - [address];  [phone]        Anticoagulants or blood thinners: [x]None    Electronic implanted devices: [x] No      Additional Information:    _______________________________________________      Instructions given: [x] Rec'd & Read   [x] Reviewed       [x] sent via [employer] - This includes: Golytely  instructions, Conscious Sedation policy, procedure date/time/location/provider.       [x] sent via eMail - eMail address confirmed: This includes Golytely  instructions,  Conscious Sedation, procedure date/time/location/provider.     Pre procedure teaching completed: [x] Yes - Reviewed    [x] No questions regarding Sedation as ordered    Transportation from procedure & responsible adult to be with patient following procedure for a minimum of 6 hrs (Conscious Sedation) 24 hrs (MAC): [x] Yes sister - confirmed will have post-procedure companionship as required    Ivone Husain RN,   [facility]

## 2019-12-03 ENCOUNTER — HOSPITAL ENCOUNTER (OUTPATIENT)
Facility: AMBULATORY SURGERY CENTER | Age: 59
End: 2019-12-03
Attending: INTERNAL MEDICINE
Payer: COMMERCIAL

## 2019-12-03 ENCOUNTER — MYC MEDICAL ADVICE (OUTPATIENT)
Dept: FAMILY MEDICINE | Facility: CLINIC | Age: 59
End: 2019-12-03

## 2019-12-03 VITALS
OXYGEN SATURATION: 99 % | SYSTOLIC BLOOD PRESSURE: 102 MMHG | HEIGHT: 63 IN | BODY MASS INDEX: 22.15 KG/M2 | WEIGHT: 125 LBS | RESPIRATION RATE: 16 BRPM | HEART RATE: 54 BPM | TEMPERATURE: 97.4 F | DIASTOLIC BLOOD PRESSURE: 65 MMHG

## 2019-12-03 LAB — COLONOSCOPY: NORMAL

## 2019-12-03 RX ORDER — FLUMAZENIL 0.1 MG/ML
0.2 INJECTION, SOLUTION INTRAVENOUS
Status: ACTIVE | OUTPATIENT
Start: 2019-12-03 | End: 2019-12-03

## 2019-12-03 RX ORDER — FENTANYL CITRATE 50 UG/ML
INJECTION, SOLUTION INTRAMUSCULAR; INTRAVENOUS PRN
Status: DISCONTINUED | OUTPATIENT
Start: 2019-12-03 | End: 2019-12-03 | Stop reason: HOSPADM

## 2019-12-03 RX ORDER — LIDOCAINE 40 MG/G
CREAM TOPICAL
Status: DISCONTINUED | OUTPATIENT
Start: 2019-12-03 | End: 2019-12-03 | Stop reason: HOSPADM

## 2019-12-03 RX ORDER — NALOXONE HYDROCHLORIDE 0.4 MG/ML
.1-.4 INJECTION, SOLUTION INTRAMUSCULAR; INTRAVENOUS; SUBCUTANEOUS
Status: DISCONTINUED | OUTPATIENT
Start: 2019-12-03 | End: 2019-12-04 | Stop reason: HOSPADM

## 2019-12-03 RX ORDER — ONDANSETRON 2 MG/ML
4 INJECTION INTRAMUSCULAR; INTRAVENOUS
Status: DISCONTINUED | OUTPATIENT
Start: 2019-12-03 | End: 2019-12-03 | Stop reason: HOSPADM

## 2019-12-03 RX ORDER — ONDANSETRON 4 MG/1
4 TABLET, ORALLY DISINTEGRATING ORAL EVERY 6 HOURS PRN
Status: DISCONTINUED | OUTPATIENT
Start: 2019-12-03 | End: 2019-12-04 | Stop reason: HOSPADM

## 2019-12-03 RX ORDER — ONDANSETRON 2 MG/ML
4 INJECTION INTRAMUSCULAR; INTRAVENOUS EVERY 6 HOURS PRN
Status: DISCONTINUED | OUTPATIENT
Start: 2019-12-03 | End: 2019-12-04 | Stop reason: HOSPADM

## 2019-12-03 ASSESSMENT — MIFFLIN-ST. JEOR: SCORE: 1106.13

## 2019-12-03 NOTE — OR NURSING
Procedure: Colonoscopy  Sedation: Conscious sedation (125 mg fentanyl, 4 mcg versed)  O2: 2-5 LPM NC during procedure  Tolerated: VS stable during and post procedure. Not c/o abdominal or chest pain  Report: Given to recovery RN  Pt to recovery area in stable condition, accompanied by RN    Rosemarie Park RN

## 2019-12-03 NOTE — TELEPHONE ENCOUNTER
Routing to Provider.     Please see patient's MyChart message regarding remission.    Angelica Lowry RN on 12/3/2019 at 2:31 PM

## 2019-12-05 ENCOUNTER — THERAPY VISIT (OUTPATIENT)
Dept: PHYSICAL THERAPY | Facility: CLINIC | Age: 59
End: 2019-12-05
Payer: COMMERCIAL

## 2019-12-05 DIAGNOSIS — M25.562 LEFT KNEE PAIN: ICD-10-CM

## 2019-12-05 DIAGNOSIS — M54.50 ACUTE BILATERAL LOW BACK PAIN WITHOUT SCIATICA: ICD-10-CM

## 2019-12-05 PROCEDURE — 97530 THERAPEUTIC ACTIVITIES: CPT | Mod: GP | Performed by: PHYSICAL THERAPIST

## 2019-12-05 PROCEDURE — 97112 NEUROMUSCULAR REEDUCATION: CPT | Mod: GP | Performed by: PHYSICAL THERAPIST

## 2019-12-05 ASSESSMENT — ACTIVITIES OF DAILY LIVING (ADL)
SIT WITH YOUR KNEE BENT: ACTIVITY IS NOT DIFFICULT
PAIN: I DO NOT HAVE THE SYMPTOM
HOW_WOULD_YOU_RATE_THE_CURRENT_FUNCTION_OF_YOUR_KNEE_DURING_YOUR_USUAL_DAILY_ACTIVITIES_ON_A_SCALE_FROM_0_TO_100_WITH_100_BEING_YOUR_LEVEL_OF_KNEE_FUNCTION_PRIOR_TO_YOUR_INJURY_AND_0_BEING_THE_INABILITY_TO_PERFORM_ANY_OF_YOUR_USUAL_DAILY_ACTIVITIES?: 99
GO DOWN STAIRS: ACTIVITY IS NOT DIFFICULT
SQUAT: ACTIVITY IS NOT DIFFICULT
RISE FROM A CHAIR: ACTIVITY IS NOT DIFFICULT
WALK: ACTIVITY IS NOT DIFFICULT
KNEE_ACTIVITY_OF_DAILY_LIVING_SUM: 70
KNEE_ACTIVITY_OF_DAILY_LIVING_SCORE: 100
STIFFNESS: I DO NOT HAVE THE SYMPTOM
WEAKNESS: I DO NOT HAVE THE SYMPTOM
SWELLING: I DO NOT HAVE THE SYMPTOM
RAW_SCORE: 70
KNEEL ON THE FRONT OF YOUR KNEE: ACTIVITY IS NOT DIFFICULT
GIVING WAY, BUCKLING OR SHIFTING OF KNEE: I DO NOT HAVE THE SYMPTOM
STAND: ACTIVITY IS NOT DIFFICULT
GO UP STAIRS: ACTIVITY IS NOT DIFFICULT
AS_A_RESULT_OF_YOUR_KNEE_INJURY,_HOW_WOULD_YOU_RATE_YOUR_CURRENT_LEVEL_OF_DAILY_ACTIVITY?: NORMAL
LIMPING: I DO NOT HAVE THE SYMPTOM
HOW_WOULD_YOU_RATE_THE_OVERALL_FUNCTION_OF_YOUR_KNEE_DURING_YOUR_USUAL_DAILY_ACTIVITIES?: NORMAL

## 2019-12-05 NOTE — PROGRESS NOTES
DISCHARGE REPORT    Progress reporting period is from 10/29/19 to 12/5/19.       SUBJECTIVE  Subjective changes noted by patient:  Kelly has attended 6 visits of physical therapy. She reports an 80% improvement in her symptoms. She had an MRI to look at the cyst. It was determined that this is benign and that she can get this removed in the future if this continues to be problematic. She feels confident she can manage current symptoms with her HEP.     Current pain level is 1/10  .     Changes in function:  Yes (See Goal flowsheet attached for changes in current functional level)  Adverse reaction to treatment or activity: None    OBJECTIVE     AROM: (Major, Moderate, Minimal or Nil loss)  Movement Loss Corby Mod Min Nil Pain   Flexion       x    Extension       x    Side Gliding L       x     Side Gliding R       x        Hip Strength:   L ER:5/5  L ABD: 4+/5  Hip EXT 5/5 B          ASSESSMENT/PLAN  Updated problem list and treatment plan: Diagnosis 1:  Low back and left knee pain  Pain -  home program  Decreased strength - therapeutic exercise, therapeutic activities and home program  Impaired muscle performance - neuro re-education and home program  Decreased function - therapeutic activities and home program  STG/LTGs have been met or progress has been made towards goals:  Yes (See Goal flow sheet completed today.)  Assessment of Progress: The patient's condition is improving.  Self Management Plans:  Patient is independent in a home treatment program.  I have re-evaluated this patient and find that the nature, scope, duration and intensity of the therapy is appropriate for the medical condition of the patient.  Aileen continues to require the following intervention to meet STG and LTG's:  PT intervention is no longer required to meet STG/LTG.    Recommendations:  This patient is ready to be discharged from therapy and continue their home treatment program.    Please refer to the daily flowsheet for treatment  today, total treatment time and time spent performing 1:1 timed codes.

## 2019-12-09 LAB — COPATH REPORT: NORMAL

## 2019-12-17 DIAGNOSIS — R79.89 ELEVATED LFTS: ICD-10-CM

## 2019-12-17 PROCEDURE — 80053 COMPREHEN METABOLIC PANEL: CPT | Performed by: FAMILY MEDICINE

## 2019-12-17 PROCEDURE — 36415 COLL VENOUS BLD VENIPUNCTURE: CPT | Performed by: FAMILY MEDICINE

## 2019-12-18 LAB
ALBUMIN SERPL-MCNC: 3.1 G/DL (ref 3.4–5)
ALP SERPL-CCNC: 81 U/L (ref 40–150)
ALT SERPL W P-5'-P-CCNC: 83 U/L (ref 0–50)
ANION GAP SERPL CALCULATED.3IONS-SCNC: 6 MMOL/L (ref 3–14)
AST SERPL W P-5'-P-CCNC: 54 U/L (ref 0–45)
BILIRUB SERPL-MCNC: 0.2 MG/DL (ref 0.2–1.3)
BUN SERPL-MCNC: 13 MG/DL (ref 7–30)
CALCIUM SERPL-MCNC: 8.6 MG/DL (ref 8.5–10.1)
CHLORIDE SERPL-SCNC: 107 MMOL/L (ref 94–109)
CO2 SERPL-SCNC: 25 MMOL/L (ref 20–32)
CREAT SERPL-MCNC: 0.68 MG/DL (ref 0.52–1.04)
GFR SERPL CREATININE-BSD FRML MDRD: >90 ML/MIN/{1.73_M2}
GLUCOSE SERPL-MCNC: 91 MG/DL (ref 70–99)
POTASSIUM SERPL-SCNC: 3.8 MMOL/L (ref 3.4–5.3)
PROT SERPL-MCNC: 6.3 G/DL (ref 6.8–8.8)
SODIUM SERPL-SCNC: 138 MMOL/L (ref 133–144)

## 2019-12-19 ENCOUNTER — OFFICE VISIT (OUTPATIENT)
Dept: FAMILY MEDICINE | Facility: CLINIC | Age: 59
End: 2019-12-19
Payer: COMMERCIAL

## 2019-12-19 VITALS
WEIGHT: 122 LBS | DIASTOLIC BLOOD PRESSURE: 62 MMHG | TEMPERATURE: 97.9 F | OXYGEN SATURATION: 97 % | BODY MASS INDEX: 21.61 KG/M2 | SYSTOLIC BLOOD PRESSURE: 108 MMHG | HEART RATE: 75 BPM

## 2019-12-19 DIAGNOSIS — R79.89 ELEVATED LFTS: ICD-10-CM

## 2019-12-19 DIAGNOSIS — I10 HYPERTENSION GOAL BP (BLOOD PRESSURE) < 140/90: Primary | ICD-10-CM

## 2019-12-19 DIAGNOSIS — G25.81 RESTLESS LEGS SYNDROME: ICD-10-CM

## 2019-12-19 DIAGNOSIS — N95.1 VAGINAL DRYNESS, MENOPAUSAL: ICD-10-CM

## 2019-12-19 PROCEDURE — 99214 OFFICE O/P EST MOD 30 MIN: CPT | Performed by: FAMILY MEDICINE

## 2019-12-19 RX ORDER — LISINOPRIL 10 MG/1
10 TABLET ORAL DAILY
Qty: 90 TABLET | Refills: 3 | Status: SHIPPED | OUTPATIENT
Start: 2019-12-19 | End: 2020-07-31

## 2019-12-19 NOTE — PROGRESS NOTES
Subjective     Aileen Priest is a 59 year old female who presents to clinic today for the following health issues:    HPI   Hypertension Follow-up      Do you check your blood pressure regularly outside of the clinic? No     Are you following a low salt diet? Yes    Are your blood pressures ever more than 140 on the top number (systolic) OR more   than 90 on the bottom number (diastolic), for example 140/90? No      How many servings of fruits and vegetables do you eat daily?  2-3    On average, how many sweetened beverages do you drink each day (Examples: soda, juice, sweet tea, etc.  Do NOT count diet or artificially sweetened beverages)?   0    How many days per week do you miss taking your medication? 0    Encounter Diagnoses   Name Primary?     Hypertension goal BP (blood pressure) < 140/90  On low dose lisinopril Yes     Restless legs syndrome, worse recently  Had been under a lot of stress with home/ work life      Elevated LFTs, improved off alcohol and off GI meds      Having more problems with vaginal dryness, will try lubricant/ see OB    Current Outpatient Medications   Medication Sig Dispense Refill     Calcium Carb-Cholecalciferol (CALCIUM + D3 PO)        dexamethasone 0.5 MG/5ML solution Take  by mouth daily.       Lactobacillus-Inulin (CULTUREE DIGESTIVE HEALTH PO)        lisinopril (PRINIVIL/ZESTRIL) 10 MG tablet Take 1 tablet (10 mg) by mouth daily 90 tablet 3     Multiple Vitamins-Minerals (MULTIVITAMIN ADULT PO) Take 1 capsule by mouth daily       omeprazole (PRILOSEC) 40 MG DR capsule Take 1 capsule (40 mg) by mouth daily 90 capsule 2       Reviewed and updated as needed this visit by Provider         Review of Systems   ROS COMP: Constitutional, HEENT, cardiovascular, pulmonary, gi and gu systems are negative, except as otherwise noted.      Objective    Pulse 75   Temp 97.9  F (36.6  C) (Oral)   Wt 55.3 kg (122 lb)   SpO2 97%   BMI 21.61 kg/m    Body mass index is 21.61 kg/m .  Physical  Exam   GENERAL: healthy, alert and no distress  NECK: no adenopathy, no asymmetry, masses, or scars and thyroid normal to palpation  RESP: lungs clear to auscultation - no rales, rhonchi or wheezes  CV: regular rate and rhythm, normal S1 S2, no S3 or S4, no murmur, click or rub, no peripheral edema and peripheral pulses strong  ABDOMEN: soft, nontender, no hepatosplenomegaly, no masses and bowel sounds normal  SKIN: no suspicious lesions or rashes  NEURO: Normal strength and tone, mentation intact and speech normal  PSYCH: mentation appears normal, affect normal/bright    Diagnostic Test Results:  Labs reviewed in Epic        Assessment & Plan     1. Hypertension goal BP (blood pressure) < 140/90  Well controlled   - lisinopril (PRINIVIL/ZESTRIL) 10 MG tablet; Take 1 tablet (10 mg) by mouth daily  Dispense: 90 tablet; Refill: 3  - **Comprehensive metabolic panel FUTURE anytime; Future    2. Restless legs syndrome  Try stretching, if not better add mirapex    3. Elevated LFTs  Improved  Follow up in 3 months.   - **Comprehensive metabolic panel FUTURE anytime; Future    4. Vaginal dryness, menopausal  Try lub./ vibrater/ consider HRT         Regular exercise  See Patient Instructions    No follow-ups on file.    Bucky Hunter MD  [facility]

## 2020-02-11 ENCOUNTER — MYC MEDICAL ADVICE (OUTPATIENT)
Dept: FAMILY MEDICINE | Facility: CLINIC | Age: 60
End: 2020-02-11

## 2020-02-11 DIAGNOSIS — R79.89 ELEVATED SERUM CREATININE: Primary | ICD-10-CM

## 2020-02-11 NOTE — TELEPHONE ENCOUNTER
Dr Hunter    See pt MyChart message    There is a CMP ordered, did you want any other labs    Edelmira Van RN   [facility]

## 2020-03-17 DIAGNOSIS — I10 HYPERTENSION GOAL BP (BLOOD PRESSURE) < 140/90: ICD-10-CM

## 2020-03-17 DIAGNOSIS — R79.89 ELEVATED LFTS: ICD-10-CM

## 2020-03-17 PROCEDURE — 36415 COLL VENOUS BLD VENIPUNCTURE: CPT | Performed by: FAMILY MEDICINE

## 2020-03-17 PROCEDURE — 80053 COMPREHEN METABOLIC PANEL: CPT | Performed by: FAMILY MEDICINE

## 2020-03-18 LAB
ALBUMIN SERPL-MCNC: 3.2 G/DL (ref 3.4–5)
ALP SERPL-CCNC: 77 U/L (ref 40–150)
ALT SERPL W P-5'-P-CCNC: 39 U/L (ref 0–50)
ANION GAP SERPL CALCULATED.3IONS-SCNC: 5 MMOL/L (ref 3–14)
AST SERPL W P-5'-P-CCNC: 24 U/L (ref 0–45)
BILIRUB SERPL-MCNC: 0.4 MG/DL (ref 0.2–1.3)
BUN SERPL-MCNC: 20 MG/DL (ref 7–30)
CALCIUM SERPL-MCNC: 8.2 MG/DL (ref 8.5–10.1)
CHLORIDE SERPL-SCNC: 103 MMOL/L (ref 94–109)
CO2 SERPL-SCNC: 25 MMOL/L (ref 20–32)
CREAT SERPL-MCNC: 0.71 MG/DL (ref 0.52–1.04)
GFR SERPL CREATININE-BSD FRML MDRD: >90 ML/MIN/{1.73_M2}
GLUCOSE SERPL-MCNC: 90 MG/DL (ref 70–99)
POTASSIUM SERPL-SCNC: 4.1 MMOL/L (ref 3.4–5.3)
PROT SERPL-MCNC: 6.5 G/DL (ref 6.8–8.8)
SODIUM SERPL-SCNC: 133 MMOL/L (ref 133–144)

## 2020-03-27 ENCOUNTER — OFFICE VISIT (OUTPATIENT)
Dept: URGENT CARE | Facility: URGENT CARE | Age: 60
End: 2020-03-27
Payer: COMMERCIAL

## 2020-03-27 ENCOUNTER — RESULTS ONLY (OUTPATIENT)
Dept: LAB | Age: 60
End: 2020-03-27

## 2020-03-27 DIAGNOSIS — R68.89 FLU-LIKE SYMPTOMS: Primary | ICD-10-CM

## 2020-03-27 PROCEDURE — 99207 ZZC NO BILLABLE SERVICE THIS VISIT: CPT | Performed by: FAMILY MEDICINE

## 2020-03-27 NOTE — PATIENT INSTRUCTIONS
[facility] Employee Health team will receive your Covid 19 test results in the next 1-4 days.  Once your results are received, Employee Health will call you with your test result and discuss your Return to Work timeline and criteria.

## 2020-03-28 LAB
SARS-COV-2 RNA SPEC QL NAA+PROBE: NOT DETECTED
SPECIMEN SOURCE: NORMAL

## 2020-03-30 ENCOUNTER — VIRTUAL VISIT (OUTPATIENT)
Dept: FAMILY MEDICINE | Facility: CLINIC | Age: 60
End: 2020-03-30
Payer: COMMERCIAL

## 2020-03-30 DIAGNOSIS — J06.9 VIRAL URI WITH COUGH: Primary | ICD-10-CM

## 2020-03-30 PROCEDURE — 99213 OFFICE O/P EST LOW 20 MIN: CPT | Mod: TEL | Performed by: FAMILY MEDICINE

## 2020-03-30 NOTE — LETTER
[address] [address]  [phone]          March 30, 2020    RE:  Aileen Priest                                                                                                                                                       [address] [place] [address]            To whom it may concern:    Aileen Priest is under my professional care for Viral URI with cough   She needs to rest at home for this week and should be able return to work next week.    Sincerely,        Bucky Hunter MD

## 2020-03-30 NOTE — PROGRESS NOTES
"Subjective     Aileen Priest is a 59 year old female who is being evaluated via a billable telephone visit.      The patient has been notified of following:     \"This telephone visit will be conducted via a call between you and your physician/provider. We have found that certain health care needs can be provided without the need for a physical exam.  This service lets us provide the care you need with a short phone conversation.  If a prescription is necessary we can send it directly to your pharmacy.  If lab work is needed we can place an order for that and you can then stop by our lab to have the test done at a later time.    If during the course of the call the physician/provider feels a telephone visit is not appropriate, you will not be charged for this service.\"     Physician has received verbal consent for a Telephone Visit from the patient? Yes    Aileen Priest complains of   Chief Complaint   Patient presents with     Cough     cough and congestion     SUBJECTIVE: Aileen is a 59 year old female presenting with aching, cough dry, fatigue, headache, nasal congestion and post nasal drip.  Onset of symptoms was 7 days ago. Was much worse on Tuesday. Called [facility], got Covid test ( was negative )    Course of illness is improving but stil with marked fatigue, dry cough./ congestion   works in [employer] .  Treatment measures tried include Fluids and Rest.  Predisposing factors include None.    Past Medical History:   Diagnosis Date     Collagenous colitis      Lichen planus      Lichen planus        Current Outpatient Medications   Medication Sig Dispense Refill     Calcium Carb-Cholecalciferol (CALCIUM + D3 PO)        dexamethasone 0.5 MG/5ML solution Take  by mouth daily.       Lactobacillus-Inulin ([facility] DIGESTIVE HEALTH PO)        lisinopril (PRINIVIL/ZESTRIL) 10 MG tablet Take 1 tablet (10 mg) by mouth daily 90 tablet 3     Multiple Vitamins-Minerals (MULTIVITAMIN ADULT PO) Take 1 capsule by mouth daily   "     omeprazole (PRILOSEC) 40 MG DR capsule Take 1 capsule (40 mg) by mouth daily 90 capsule 2          ALLERGIES  Sulfa drugs        Patient Active Problem List   Diagnosis     Leg weakness     Low back pain     CARDIOVASCULAR SCREENING; LDL GOAL LESS THAN 160     Nasal folliculitis     Hypertension, goal below 140/90     Crohn's disease of small intestine with other complication (H)     History of hidradenitis suppurativa     Lichen planus     Crohn's disease of small intestine without complication (H)     Past Surgical History:   Procedure Laterality Date     BACK SURGERY      Lumbar discectomy L5 S1     C NONSPECIFIC PROCEDURE       X 2     C NONSPECIFIC PROCEDURE      removal of growth from toe     COLONOSCOPY N/A 12/3/2019    Procedure: COLONOSCOPY, WITH POLYPECTOMY AND BIOPSY;  Surgeon: Kassy Pollock MD;  Location: [facility] OR     HC DRAIN PILONIDAL CYST SIMPLE       HC REMOVAL OF TONSILS,12+ Y/O         Social History     Tobacco Use     Smoking status: Former Smoker     Packs/day: 1.00     Years: 10.00     Pack years: 10.00     Types: Cigarettes     Start date: 1976     Last attempt to quit: 1987     Years since quittin.2     Smokeless tobacco: Never Used   Substance Use Topics     Alcohol use: Yes     Alcohol/week: 1.0 - 3.0 standard drinks     Types: 1 - 3 Cans of beer per week     Frequency: Monthly or less     Drinks per session: 1 or 2     Binge frequency: Monthly     Comment: socially     Family History   Problem Relation Age of Onset     Breast Cancer Mother      Hypertension Father      Cancer Maternal Grandmother      Heart Disease Maternal Grandfather      Cancer Paternal Grandmother      Respiratory Paternal Grandfather      Asthma Brother      Depression Sister      Psychotic Disorder Sister      Asthma Son      Depression Son      Thyroid Disease Sister            Reviewed and updated as needed this visit by Provider         Review of Systems   ROS COMP: Constitutional, HEENT,  cardiovascular, pulmonary, gi and gu systems are negative, except as otherwise noted.          Diagnostic Test Results:  Labs reviewed in Epic       .Assessment/Plan:  1. Viral URI with cough  Slowly recovering but still likely contagious  Needs to be off work this week    Follow up only if unimproved.   PLAN:  Tylenol, Saline gargles, Saline nasal spray and Vaporizer  Follow up by phone in 5 days if not improving.  Phone call duration:  10 minutes  letter printed  Bucky Hunter MD

## 2020-05-27 ENCOUNTER — APPOINTMENT (OUTPATIENT)
Dept: LAB | Facility: CLINIC | Age: 60
End: 2020-05-27
Payer: COMMERCIAL

## 2020-05-27 ENCOUNTER — RESULTS ONLY (OUTPATIENT)
Dept: LAB | Age: 60
End: 2020-05-27

## 2020-05-28 LAB
COVID-19 SPIKE RBD ABY TITER: NORMAL
COVID-19 SPIKE RBD ABY: NEGATIVE

## 2020-06-21 DIAGNOSIS — K21.9 GASTROESOPHAGEAL REFLUX DISEASE, ESOPHAGITIS PRESENCE NOT SPECIFIED: ICD-10-CM

## 2020-06-24 RX ORDER — OMEPRAZOLE 40 MG/1
CAPSULE, DELAYED RELEASE ORAL
Qty: 90 CAPSULE | Refills: 0 | Status: SHIPPED | OUTPATIENT
Start: 2020-06-24 | End: 2020-11-14

## 2020-07-30 NOTE — PROGRESS NOTES
Subjective     Aileen Priest is a 59 year old female who presents to clinic today for the following health issues:    HPI       Joint Pain    Onset: february/March 2020    Description:   Location: back and left hip- hip/leg pain not constant   Character: Dull ache    Intensity: moderate    Progression of Symptoms: worse    Accompanying Signs & Symptoms:  Other symptoms: When back is flaring pelvis will hurt. Saw Chiropractor in may and doctor there suggested patient get imaging done.     History:   Previous similar pain: no       Precipitating factors:   Trauma or overuse: no     Alleviating factors:  Improved by: ibuprofen, heat, and ice have helped back    Therapies Tried and outcome: ibuprofen, heat, ice- improvement to back but not hip      Hypertension Follow-up      Do you check your blood pressure regularly outside of the clinic? Yes     Are you following a low salt diet? Yes    Are your blood pressures ever more than 140 on the top number (systolic) OR more   than 90 on the bottom number (diastolic), for example 140/90? No     concerned about her work/ is over 60 at at higher risk for covid      Current Outpatient Medications   Medication Sig Dispense Refill     Calcium Carb-Cholecalciferol (CALCIUM + D3 PO)        dexamethasone 0.5 MG/5ML solution Take  by mouth daily.       Lactobacillus-Inulin (CULTURELLE DIGESTIVE HEALTH PO)        lisinopril (ZESTRIL) 5 MG tablet Take 5 mg by mouth daily       Multiple Vitamins-Minerals (MULTIVITAMIN ADULT PO) Take 1 capsule by mouth daily       omeprazole (PRILOSEC) 40 MG DR capsule TAKE ONE CAPSULE BY MOUTH ONCE DAILY 90 capsule 0       Reviewed and updated as needed this visit by Provider         Review of Systems   Constitutional, HEENT, cardiovascular, pulmonary, gi and gu systems are negative, except as otherwise noted.      Objective    /80   Temp 98.1  F (36.7  C) (Temporal)   Wt 54 kg (119 lb)   LMP  (LMP Unknown)   Breastfeeding No   BMI 21.08  kg/m    Body mass index is 21.08 kg/m .  Physical Exam   GENERAL: alert, mild distress and fit  CV: regular rate and rhythm, normal S1 S2, no S3 or S4, no murmur, click or rub, no peripheral edema and peripheral pulses strong  MS: normal muscle tone, peripheral pulses normal , normal  gait noted. Lumbosacral spine area reveals no local tenderness or mass. Painful but intact LS ROM noted. Straight leg raise is negative  on both sides. DTR's, motor strength and sensation normal, including heel and toe gait.     Left hip shows normal abduction, flexion limited by pain  Hip and  Lumbar spine X-Ray: no fracture or dislocation noted.     SKIN: no suspicious lesions or rashes  NEURO: Normal strength and tone, mentation intact and speech normal  BACK: no CVA tenderness, no paralumbar tenderness  ASSESSMENT:      Encounter Diagnoses   Name Primary?     Hip pain, left Yes     Acute bilateral low back pain with left-sided sciatica      Hypertension goal BP (blood pressure) < 140/90                   Assessment & Plan     1. Hip pain, left  Ice, rest  - XR Pelvis and Hip Left 1 View; Future  - [facility], [facility] CHIR[facility]RACT[facility] REFERRAL; Future    2. Acute bilateral low back pain with left-sided sciatica     For acute pain, rest, intermittent application of cold packs (later, may switch to heat, but do not sleep on heating pad), analgesics and muscle relaxants are recommended. Discussed longer term treatment plan of prn NSAID's and discussed a home back care exercise program with flexion exercise routine. Proper lifting with avoidance of heavy lifting discussed.   try Physical Therapy and consdier further XRay studies if not improving. Call or return to clinic prn if these symptoms worsen or fail to improve as anticipated.      - [facility], AND CHIROPRACTIC REFERRAL; Future    3. Hypertension goal BP (blood pressure) < 140/90  Well controlled     - Basic metabolic panel   note for work/ should not work with covid patient     See  Patient Instructions    No follow-ups on file.    Bucky Hunter MD  [facility] PRIMARY [facility]

## 2020-07-31 ENCOUNTER — HOSPITAL ENCOUNTER (OUTPATIENT)
Dept: GENERAL RADIOLOGY | Facility: CLINIC | Age: 60
End: 2020-07-31
Attending: FAMILY MEDICINE
Payer: COMMERCIAL

## 2020-07-31 ENCOUNTER — OFFICE VISIT (OUTPATIENT)
Dept: FAMILY MEDICINE | Facility: CLINIC | Age: 60
End: 2020-07-31
Payer: COMMERCIAL

## 2020-07-31 VITALS
WEIGHT: 119 LBS | DIASTOLIC BLOOD PRESSURE: 80 MMHG | SYSTOLIC BLOOD PRESSURE: 134 MMHG | BODY MASS INDEX: 21.08 KG/M2 | TEMPERATURE: 98.1 F

## 2020-07-31 DIAGNOSIS — M54.42 ACUTE BILATERAL LOW BACK PAIN WITH LEFT-SIDED SCIATICA: ICD-10-CM

## 2020-07-31 DIAGNOSIS — M25.552 HIP PAIN, LEFT: Primary | ICD-10-CM

## 2020-07-31 DIAGNOSIS — M25.552 HIP PAIN, LEFT: ICD-10-CM

## 2020-07-31 DIAGNOSIS — I10 HYPERTENSION GOAL BP (BLOOD PRESSURE) < 140/90: ICD-10-CM

## 2020-07-31 LAB
ANION GAP SERPL CALCULATED.3IONS-SCNC: 7 MMOL/L (ref 3–14)
BUN SERPL-MCNC: 12 MG/DL (ref 7–30)
CALCIUM SERPL-MCNC: 8.8 MG/DL (ref 8.5–10.1)
CHLORIDE SERPL-SCNC: 99 MMOL/L (ref 94–109)
CO2 SERPL-SCNC: 24 MMOL/L (ref 20–32)
CREAT SERPL-MCNC: 0.59 MG/DL (ref 0.52–1.04)
GFR SERPL CREATININE-BSD FRML MDRD: >90 ML/MIN/{1.73_M2}
GLUCOSE SERPL-MCNC: 89 MG/DL (ref 70–99)
POTASSIUM SERPL-SCNC: 4 MMOL/L (ref 3.4–5.3)
SODIUM SERPL-SCNC: 130 MMOL/L (ref 133–144)

## 2020-07-31 PROCEDURE — 73502 X-RAY EXAM HIP UNI 2-3 VIEWS: CPT

## 2020-07-31 PROCEDURE — 80048 BASIC METABOLIC PNL TOTAL CA: CPT | Performed by: FAMILY MEDICINE

## 2020-07-31 PROCEDURE — 72100 X-RAY EXAM L-S SPINE 2/3 VWS: CPT

## 2020-07-31 PROCEDURE — 36415 COLL VENOUS BLD VENIPUNCTURE: CPT | Performed by: FAMILY MEDICINE

## 2020-07-31 PROCEDURE — 99214 OFFICE O/P EST MOD 30 MIN: CPT | Performed by: FAMILY MEDICINE

## 2020-07-31 RX ORDER — LISINOPRIL 5 MG/1
5 TABLET ORAL DAILY
COMMUNITY
End: 2021-08-10

## 2020-08-19 ENCOUNTER — THERAPY VISIT (OUTPATIENT)
Dept: PHYSICAL THERAPY | Facility: CLINIC | Age: 60
End: 2020-08-19
Attending: FAMILY MEDICINE
Payer: COMMERCIAL

## 2020-08-19 DIAGNOSIS — M25.552 HIP PAIN, LEFT: ICD-10-CM

## 2020-08-19 DIAGNOSIS — M54.42 ACUTE BILATERAL LOW BACK PAIN WITH LEFT-SIDED SCIATICA: ICD-10-CM

## 2020-08-19 PROCEDURE — 97140 MANUAL THERAPY 1/> REGIONS: CPT | Mod: GP | Performed by: PHYSICAL THERAPIST

## 2020-08-19 PROCEDURE — 97110 THERAPEUTIC EXERCISES: CPT | Mod: GP | Performed by: PHYSICAL THERAPIST

## 2020-08-19 ASSESSMENT — ACTIVITIES OF DAILY LIVING (ADL)
HOS_ADL_SCORE(%): 82.35
RECREATIONAL_ACTIVITIES: SLIGHT DIFFICULTY
DEEP_SQUATTING: SLIGHT DIFFICULTY
HOS_ADL_COUNT: 17
GETTING_INTO_AND_OUT_OF_A_BATHTUB: SLIGHT DIFFICULTY
GOING_UP_1_FLIGHT_OF_STAIRS: NO DIFFICULTY AT ALL
HOS_ADL_ITEM_SCORE_TOTAL: 56
WALKING_DOWN_STEEP_HILLS: NO DIFFICULTY AT ALL
STEPPING_UP_AND_DOWN_CURBS: NO DIFFICULTY AT ALL
PUTTING_ON_SOCKS_AND_SHOES: SLIGHT DIFFICULTY
WALKING_15_MINUTES_OR_GREATER: NO DIFFICULTY AT ALL
TWISTING/PIVOTING_ON_INVOLVED_LEG: SLIGHT DIFFICULTY
ROLLING_OVER_IN_BED: SLIGHT DIFFICULTY
WALKING_INITIALLY: MODERATE DIFFICULTY
GETTING_INTO_AND_OUT_OF_AN_AVERAGE_CAR: SLIGHT DIFFICULTY
WALKING_APPROXIMATELY_10_MINUTES: NO DIFFICULTY AT ALL
HOS_ADL_HIGHEST_POTENTIAL_SCORE: 68
GOING_DOWN_1_FLIGHT_OF_STAIRS: NO DIFFICULTY AT ALL
STANDING_FOR_15_MINUTES: SLIGHT DIFFICULTY
HEAVY_WORK: SLIGHT DIFFICULTY
SITTING_FOR_15_MINUTES: NO DIFFICULTY AT ALL
WALKING_UP_STEEP_HILLS: SLIGHT DIFFICULTY
LIGHT_TO_MODERATE_WORK: SLIGHT DIFFICULTY

## 2020-08-19 NOTE — PROGRESS NOTES
Physical Therapy Initial Evaluation  August 18, 2020     MD Instructions/Precautions/Restrictions: PT eval and treat.     Therapist Impression:   Aileen Priest presents with findings consistent with left hip pain, with related impairments limiting her ability to sitting low surfaces and sleeping. Skilled PT services are necessary in order to reduce impairments and improve independent function.     Subjective:   C/C: acute left hip pain>low back pain. Hip pain can be present at the same time as back pain or isolated but low back pain never occurs by itself. Since onset, symptoms are worsening.    DOI/onset: 7/31/20 (MD's orders)  Red Flags: no rapid weight changes; denies bowel and bladder changes  Location: posterior left hip and no radiation down the leg; hx of lumbar discectomy 2005 Quality: deep, sharp, ache  Frequency:intermittent worse morning . Pain scale: 2/10.   Previous Treatment: chiropractor Effect of Previous Treatment: no change  Worsened by: sitting on toilet, laying on the left hip, cat/cow.  Alleviated by: moving around throughout the day  General health as reported by patient: good  Pertinent medical/surgical history: Refer to health history in EMR. Imaging: x-rays of lumbar and hip. Current occupational status: labor and deliver nurse. Current Exercise Regimen: gardening, core exercises Patient's goals are: decrease pain, go back to riding bike, sleeping and no pain when wake up . Return to MD:  PRN.     Objective:  LUMBAR Screen:    AROM: (Major, Moderate, Minimal or Nil loss)  Movement Loss Corby Mod Min Nil Pain   Flexion    x    Extension   x  Increase low back pain   Left Ruslan-bending    x    Right Side-bending    x      Sensory Deficit: normal  Reflexes: +1 patellar and achilles bilaterally    Dural Signs:   L R   Slump     SLR - -     SIJ Screen:  Distraction:(-)  Thigh Thrust:(-)    Quadruped: lateral shift; decreased hip flexion on the left    Squat:  LEFT: increase pain at end-range; feels  "\"fatigued\" after 10 reps    PROM:   L  R   Flexion 120 120   Extension 10; compensate with lumbar extension 10   Abduction     IR 90/90 supine 45 increase pain at endrange 45   ER 90/90 supine 25 increase pain at endrange 25     Strength:   L R   HIP     Flex 5/5 5/5   Ext 3/5 cramp hamstring 5/5   Abd +4/5 5/5   Add 5/5 5/5     Special tests:   L R   NICOLLE +    FADIR +    Scour -      Assessment/Plan:    The patient is a 59 year old female with chief complaint of left hip pain.    The patient has the following significant findings with corresponding treatment plan.  Diagnosis 1:  Left hip pain  Pain -  manual therapy, self management, education and home program  Decreased ROM/flexibility - manual therapy, therapeutic exercise and home program  Decreased joint mobility - manual therapy, therapeutic exercise and home program  Decreased strength - therapeutic exercise, therapeutic activities and home program  Impaired balance - neuro re-education, therapeutic activities and home program  Decreased proprioception - neuro re-education and therapeutic activities  Impaired gait - gait training and assistive devices  Impaired muscle performance - neuro re-education and home program  Decreased function - therapeutic activities and home program  Impaired posture - neuro re-education, therapeutic activities and home program  Instability -  Therapeutic Activity, Therapeutic Exercise, Neuromuscular Re-education, Splinting/Taping/Bracing/Orthotic, home program    Diagnosis 2:  Low back pain     Pain -  manual therapy, self management, education and home program  Decreased strength - therapeutic exercise and therapeutic activities    Therapy Evaluation Codes:   1) History comprised of:   Personal factors that impact the plan of care:      Please refer to health history in EMR.    Comorbidity factors that impact the plan of care are:      Please refer to health history in EMR.     Medications impacting care: None.  2) Examination " of Body Systems comprised of:   Body structures and functions that impact the plan of care:      Hip and Lumbar spine.   Activity limitations that impact the plan of care are:      Lifting, Sitting, Squatting/kneeling, Sleeping and Laying down.   Clinical presentation characteristics are:    Stable/Uncomplicated.  3) Presentation comprised of:   Presentation scored as Low complexity with uncomplicated characteristics..  4) Decision-Making    Low complexity using standardized patient assessment instrument and/or measureable assessment of functional outcome.  Cumulative Therapy Evaluation is: Low complexity.    Previous and current functional limitations:  (See Goal Flow Sheet for this information)    Short term and Long term goals: (See Goal Flow Sheet for this information)     Communication ability:  Patient appears to be able to clearly communicate and understand verbal and written communication and follow directions correctly.  Treatment Explanation - The following has been discussed with the patient: RX ordered/plan of care, anticipated outcomes, and possible risks and side effects.  This patient would benefit from PT intervention to resume normal activities.   Rehab potential is excellent    Frequency:  1 X week, once daily  Duration:  for 6 weeks  Discharge Plan: Achieve all LTGs, be independent in home treatment program, and reach maximal therapeutic benefit.    Please refer to the daily flowsheet for treatment today, total treatment time and time spent performing 1:1 timed codes.

## 2020-09-23 ENCOUNTER — THERAPY VISIT (OUTPATIENT)
Dept: PHYSICAL THERAPY | Facility: CLINIC | Age: 60
End: 2020-09-23
Payer: COMMERCIAL

## 2020-09-23 DIAGNOSIS — M25.552 HIP PAIN, LEFT: ICD-10-CM

## 2020-09-23 DIAGNOSIS — M54.50 LOW BACK PAIN: ICD-10-CM

## 2020-09-23 PROCEDURE — 97110 THERAPEUTIC EXERCISES: CPT | Mod: GP | Performed by: PHYSICAL THERAPIST

## 2020-10-07 ENCOUNTER — ANCILLARY PROCEDURE (OUTPATIENT)
Dept: MAMMOGRAPHY | Facility: CLINIC | Age: 60
End: 2020-10-07
Attending: SPECIALIST
Payer: COMMERCIAL

## 2020-10-07 DIAGNOSIS — Z12.31 VISIT FOR SCREENING MAMMOGRAM: ICD-10-CM

## 2020-10-07 PROCEDURE — 77067 SCR MAMMO BI INCL CAD: CPT | Mod: GC | Performed by: RADIOLOGY

## 2020-10-07 PROCEDURE — 77063 BREAST TOMOSYNTHESIS BI: CPT | Mod: GC | Performed by: RADIOLOGY

## 2020-11-10 DIAGNOSIS — K21.9 GASTROESOPHAGEAL REFLUX DISEASE: ICD-10-CM

## 2020-11-14 RX ORDER — OMEPRAZOLE 40 MG/1
40 CAPSULE, DELAYED RELEASE ORAL DAILY
Qty: 90 CAPSULE | Refills: 0 | Status: SHIPPED | OUTPATIENT
Start: 2020-11-14 | End: 2020-12-08

## 2020-11-14 NOTE — TELEPHONE ENCOUNTER
1. Recommend to see Lung Doctor - [facility] [phone]  2. Refills available of all asthma medications  3. Refill of prednisone - due to wheezing, reasonable to start a course - longer course over 12 days.  4. Order for Allegra (allergy medication)  5. Try to remember to take the Flovent twice daily   10/29/2019  [facility]   Benjamin Brooks PA-C  Gastroenterology       RTC 1 year     Scheduling has been notified to contact the pt for appointment.    90  day SENT to [facility]    omeprazole (PRILOSEC) 40 MG DR capsule

## 2020-11-19 ENCOUNTER — TELEPHONE (OUTPATIENT)
Dept: GASTROENTEROLOGY | Facility: CLINIC | Age: 60
End: 2020-11-19

## 2020-12-03 NOTE — TELEPHONE ENCOUNTER
[facility] Call Center    Phone Message    May a detailed message be left on voicemail: yes     Reason for Call: Other: Pt called to schedule a video visit with Vineet Brooks to get her meds refilled however Benjamin Brooks had no available visit until 04/06/2021. Pt booked that visit but is still needing a refill until then. Please advise. Thank you!     Action Taken: Message routed to:  [facility] GI    Travel Screening: Not Applicable

## 2020-12-04 NOTE — TELEPHONE ENCOUNTER
Contacted patient to discuss appointment with Ms. Brooks. Left a message to move her appointment to another provider in GI due to Ms. Brooks out on leave or have her primary care provider fill prescription

## 2020-12-08 ENCOUNTER — VIRTUAL VISIT (OUTPATIENT)
Dept: GASTROENTEROLOGY | Facility: CLINIC | Age: 60
End: 2020-12-08
Payer: COMMERCIAL

## 2020-12-08 VITALS — BODY MASS INDEX: 21.26 KG/M2 | WEIGHT: 120 LBS | HEIGHT: 63 IN

## 2020-12-08 DIAGNOSIS — K50.00 CROHN'S DISEASE OF SMALL INTESTINE WITHOUT COMPLICATION (H): Primary | ICD-10-CM

## 2020-12-08 DIAGNOSIS — K21.9 GASTROESOPHAGEAL REFLUX DISEASE, UNSPECIFIED WHETHER ESOPHAGITIS PRESENT: ICD-10-CM

## 2020-12-08 PROCEDURE — 99213 OFFICE O/P EST LOW 20 MIN: CPT | Mod: 95 | Performed by: PHYSICIAN ASSISTANT

## 2020-12-08 RX ORDER — OMEPRAZOLE 40 MG/1
40 CAPSULE, DELAYED RELEASE ORAL DAILY
Qty: 90 CAPSULE | Refills: 3 | Status: SHIPPED | OUTPATIENT
Start: 2020-12-08 | End: 2021-12-17

## 2020-12-08 ASSESSMENT — MIFFLIN-ST. JEOR: SCORE: 1078.45

## 2020-12-08 NOTE — PROGRESS NOTES
"GI CLINIC VISIT    CC/REFERRING PROVIDER: Bucky Hunter  REASON FOR CONSULTATION: Crohn's Disease, GERD    HPI: 58 year old female w/ h/o ileal Crohn's Disease per 3/2017 ileocolonoscopy biopsies previously on PO 5-ASA, Lichen Planus (oral/perianal involvement), Hidradenitis Suppurativa s/p excision/drainage 1975, prior presumed diagnosis of Collagenous Colitis per colonic biopsies 2006 (though suspecting evolving IBD presentation in retrospect following 3/2017 highly-suggestive biopsies of terminal ileum + bland colonic biopsies), GERD, lumbar spine surgery 2005, periorbital shingles 2015, chronic tobacco abuse (in remission), among other medical issues - presenting for f/u Crohn's Disease. She has most recently been seen by Benjamin Brooks PA-C in 10/2019.  History summarized from her documentation.    8/2018 prompted an increase in 5ASA to 4.8g daily due to intermittent \"flares\" she had been experiencing, despite labs within normal limits.  Since the increased, she has had great symptomatic relieve and reports one incidence of loose stool, otherwise no episodes concerning for flares.  She is currently having 3 stools per day, soft and formed without blood in the stool.  No urgency or nighttime stools.      She had noted a \"tear\" by her anus and had this evaluated by PCP and by her GYN. It was thought to be due to lichen sclerosis (per GYN) and was prescribed a steroid cream.  She notes improvement already even with vaseline as instructed by her PCP. She did not have any pain with a BM associated with this finding.      She had noted a lump on right side of buttocks that has been very bothersome, inhibiting much of her activity.  It is adjacent to previous pilonidal cyst repair. No drainage and no fevers. MRI showed post-surgical cyst.      labs show elevated creatinine and slightly abnormal LFTs. Based on symptoms with concern for active flares (patient reported, despite normal labs) dose was increased Spring " 2019. Normal Creatinine 2/2019 prior to dose increase. Labs with primary care show elevated creatinine (0.6--> 1.29). LFT slightly increased, AST 72 and ALT 92. Normal alk phos. after last visit, plan was to hold Lialda.  Underwent colonoscopy 12/3/2019 with simple endoscopic score for Crohn's disease to be 0.    Interval history December 8, 2020:  Since the patient's last visit, overall she has been doing very well.  Did have one episode of symptoms in early October.  She woke up at 4 AM with abdominal pain, had 1 day of watery stools.  Then pain subsided later that day.  Did have urgency with bowel movements.  Denied melena or hematochezia during this episode.  After this her stools returned back to normal.  Has on average 2 bowel movements a day one before morning and 1 before work.  No abdominal pain at baseline.  Retching greasy foods can trigger symptoms.  Has maybe had 1 nocturnal bowel movement.  Consistency of stool is soft and formed.  Continues to take probiotic which she finds very helpful.    For her GERD, she is taking 40 mg of omeprazole.  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  This improves quickly with drinking water.  She is currently taking a multivitamin.    ROS: 10pt ROS performed and otherwise negative.    PERTINENT PAST MEDICAL/SURGICAL HISTORY:  As noted above.    ENDOSCOPIC EVALUATION:  Impression:          - Simple Endoscopic Score for Crohn's Disease: 0,                        mucosal inflammatory changes secondary to Crohn's                        disease, in remission. Biopsied.                        - Diverticulosis in the sigmoid colon.                        - Non-bleeding internal hemorrhoids.   A. ILEUM, BIOPSY:   - Small intestinal mucosa with no significant histologic abnormality   -  Negative for dysplasia     B. CECUM, BIOPSY:   - Colonic mucosa with no significant histologic abnormality   - Negative for dysplasia     C. COLON, ASCENDING, BIOPSY:   - Colonic mucosa with no significant histologic abnormality   - Negative for dysplasia     D. COLON, TRANSVERSE, BIOPSY:   - Colonic mucosa with no significant histologic abnormality   - Negative for dysplasia     E. COLON, DESCENDING,  BIOPSY:   - Colonic mucosa with no significant histologic abnormality   - Negative for dysplasia     F. COLON, SIGMOID, BIOPSY:   - Colonic mucosa with no significant histologic abnormality   - Negative for dysplasia     G. RECTUM, BIOPSY:   - Colonic mucosa with no significant histologic abnormality   - Negative for dysplasia     PERTINENT MEDICATIONS:  -No current Crohn's medications  Medications reviewed with patient today, see Medication List/Assessment for details.  No other NSAID/anticoagulation reported by patient.  No other OTC/herbal/supplements reported by patient.    SOCIAL HISTORY: Tobacco: none.    PHYSICAL EXAMINATION:  General appearance: Healthy appearing adult, in no acute distress  Eyes: Sclera anicteric  Ears, nose, mouth and throat: No obvious external lesions of ears and nose, Hearing intact  Neck: Symmetric, No obvious external lesions  Respiratory: Normal respiration, no use of accessory muscles   Skin: No rashes or jaundice   Psychiatric: Oriented to person, place and time, Appropriate mood and affect.     PERTINENT STUDIES:  2/2019  LFTs, CBC, Cr, lytes wnl    3/17/2020 AST 24, ALT 39, alk phos 77    7/31/2020: Normal BMP    MRI Pelvis 11/6/2019- normal  IMPRESSION:  Subcutaneous nonenhancing benign likely postsurgical cyst  is present in the right of midline posterior to sacrum without any  connection or fistulization to anus or bowel. No fistula identified.  No perianal fistula identified.    ASSESSMENT/PLAN:    1. Ileal Crohn's Disease not on any medications  Patient is no longer on Lialda  and notes that symptoms have been stable off of medication.  Has had 1 day of abdominal pain, otherwise unremarkable.  Last colonoscopy in 2020 without evidence of active Crohn's disease.  Patient instructed to continue to monitor symptoms and let us know if she has frequent episodes and will consider checking fecal calprotectin/restarting a new medication.  -- Next endoscopic assessment: 2029 unless symptomatic sooner  -- Patient with IBD we recommend supplementation vitamin D 1000 units daily and calcium 500 mg twice daily.  -- Vaccines/immunizations to be updated: Recommend yearly flu shot, pneumonia vaccines (Prevnar 13 then 8 weeks later Pneumovax 23 then 5 years later Pneumovax 23), tetanus every 10 years.  Get the Covid vaccine when you can  -- No NSAIDs (ibuprofen, or anything containing ibuprofen)     2.  GERD  Patient has longstanding GERD symptoms, overall well controlled on omeprazole 40 mg a day.  Discussed risks and benefits of chronic PPI use.  Last checked vitamin B12 and vitamin D, iron studies normal.  Can recheck at follow-up visit in a year.  Given that he has had slight symptoms in the setting of increased stress with Covid, would not recommend decreasing dose at this time but can discuss again in follow-up in a year    2. Cancer Screening  No PSC, no high-risk FH CRC (2nd cousin dx'd in 2017 w/ advanced CRC @57yo). Given colonoscopy in 2019 without adenomatous lesions, recommend repeat colonoscopy for routine screening in 2029 unless symptomatic sooner.    RTC 1 year     Thank you for this consultation. It was a pleasure to participate in the care of this patient; please contact us with any further questions.       Alice Gonzalez PA-C  Division of Gastroenterology, Hepatology & Nutrition  [facility]

## 2020-12-08 NOTE — NURSING NOTE
"Chief Complaint   Patient presents with     Follow Up     1 year follow up       Vitals:    12/08/20 1653   Weight: 54.4 kg (120 lb)   Height: 1.6 m (5' 3\")       Body mass index is 21.26 kg/m .    Ignacia Mayfield CMA    "

## 2020-12-08 NOTE — PROGRESS NOTES
"Aileen Priest is a 60 year old female who is being evaluated via a billable video visit.      The patient has been notified of following:     \"This video visit will be conducted via a call between you and your physician/provider. We have found that certain health care needs can be provided without the need for an in-person physical exam.  This service lets us provide the care you need with a video conversation.  If a prescription is necessary we can send it directly to your pharmacy.  If lab work is needed we can place an order for that and you can then stop by our lab to have the test done at a later time.    Video visits are billed at different rates depending on your insurance coverage.  Please reach out to your insurance provider with any questions.    If during the course of the call the physician/provider feels a video visit is not appropriate, you will not be charged for this service.\"    Patient has given verbal consent for Video visit? Yes  How would you like to obtain your AVS? MyChart  If you are dropped from the video visit, the video invite should be resent to: Text to cell phone: [phone]  Will anyone else be joining your video visit? No      Video-Visit Details    Type of service:  Video Visit    Video Start Time: 5:50 pm  Video End Time: 6:07 PM    Originating Location (pt. Location): Home    Distant Location (provider location):  [facility] [place] (provider's home)    Platform used for Video Visit: David Gonzalez PA-C        "

## 2020-12-08 NOTE — LETTER
"  12/8/2020      RE: Aileen Priest  [address]  [place] [address]      Dear Colleague,    Thank you for referring your patient, Aileen Priest, to the [facility] [place]. Please see a copy of my visit note below.    GI CLINIC VISIT    CC/REFERRING PROVIDER: Bucky Hunter  REASON FOR CONSULTATION: Crohn's Disease, GERD    HPI: 58 year old female w/ h/o ileal Crohn's Disease per 3/2017 ileocolonoscopy biopsies currently on PO 5-ASA, Lichen Planus (oral/perianal involvement), Hidradenitis Suppurativa s/p excision/drainage 1975, prior presumed diagnosis of Collagenous Colitis per colonic biopsies 2006 (though suspecting evolving IBD presentation in retrospect following 3/2017 highly-suggestive biopsies of terminal ileum + bland colonic biopsies), GERD, lumbar spine surgery 2005, periorbital shingles 2015, chronic tobacco abuse (in remission), among other medical issues - presenting for f/u Crohn's Disease. She has most recently been seen by Benjamin Brooks PA-C in 10/2019.  History summarized from her documentation.    8/2018 prompted an increase in 5ASA to 4.8g daily due to intermittent \"flares\" she had been experiencing, despite labs within normal limits.  Since the increased, she has had great symptomatic relieve and reports one incidence of loose stool, otherwise no episodes concerning for flares.  She is currently having 3 stools per day, soft and formed without blood in the stool.  No urgency or nighttime stools.      She had noted a \"tear\" by her anus and had this evaluated by PCP and by her GYN. It was thought to be due to lichen sclerosis (per GYN) and was prescribed a steroid cream.  She notes improvement already even with vaseline as instructed by her PCP. She did not have any pain with a BM associated with this finding.      She had noted a lump on right side of buttocks that has been very bothersome, inhibiting much of her activity.  It is adjacent to " previous pilonidal cyst repair. No drainage and no fevers. MRI showed post-surgical cyst.      labs show elevated creatinine and slightly abnormal LFTs. Based on symptoms with concern for active flares (patient reported, despite normal labs) dose was increased Spring 2019. Normal Creatinine 2/2019 prior to dose increase. Labs with primary care show elevated creatinine (0.6--> 1.29). LFT slightly increased, AST 72 and ALT 92. Normal alk phos. after last visit, plan was to hold Lialda.  Underwent colonoscopy 12/3/2019 with simple endoscopic score for Crohn's disease to be 0.    Interval history December 8, 2020:  Since the patient's last visit, overall she has been doing very well.  Did have one episode of symptoms in early October.  She woke up at 4 AM with abdominal pain, had 1 day of watery stools.  Then pain subsided later that day.  Did have urgency with bowel movements.  Denied melena or hematochezia during this episode.  After this her stools returned back to normal.  Has on average 2 bowel movements a day one before morning and 1 before work.  No abdominal pain at baseline.  Retching greasy foods can trigger symptoms.  Has maybe had 1 nocturnal bowel movement.  Consistency of stool is soft and formed.  Continues to take probiotic which she finds very helpful.    For her GERD, she is taking 40 mg of omeprazole.  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  This improves quickly with drinking water.  She is currently taking a multivitamin.    ROS: 10pt ROS performed and otherwise negative.    PERTINENT PAST MEDICAL/SURGICAL HISTORY:  As noted above.    ENDOSCOPIC EVALUATION:  Impression:          - Simple Endoscopic Score for Crohn's Disease: 0,                        mucosal inflammatory changes secondary to Crohn's                         disease, in remission. Biopsied.                        - Diverticulosis in the sigmoid colon.                        - Non-bleeding internal hemorrhoids.   A. ILEUM, BIOPSY:   - Small intestinal mucosa with no significant histologic abnormality   - Negative for dysplasia     B. CECUM, BIOPSY:   - Colonic mucosa with no significant histologic abnormality   - Negative for dysplasia     C. COLON, ASCENDING, BIOPSY:   - Colonic mucosa with no significant histologic abnormality   - Negative for dysplasia     D. COLON, TRANSVERSE, BIOPSY:   - Colonic mucosa with no significant histologic abnormality   - Negative for dysplasia     E. COLON, DESCENDING,  BIOPSY:   - Colonic mucosa with no significant histologic abnormality   - Negative for dysplasia     F. COLON, SIGMOID, BIOPSY:   - Colonic mucosa with no significant histologic abnormality   - Negative for dysplasia     G. RECTUM, BIOPSY:   - Colonic mucosa with no significant histologic abnormality   - Negative for dysplasia     PERTINENT MEDICATIONS:  -No current Crohn's medications  Medications reviewed with patient today, see Medication List/Assessment for details.  No other NSAID/anticoagulation reported by patient.  No other OTC/herbal/supplements reported by patient.    SOCIAL HISTORY: Tobacco: none.    PHYSICAL EXAMINATION:  General appearance: Healthy appearing adult, in no acute distress  Eyes: Sclera anicteric  Ears, nose, mouth and throat: No obvious external lesions of ears and nose, Hearing intact  Neck: Symmetric, No obvious external lesions  Respiratory: Normal respiration, no use of accessory muscles   Skin: No rashes or jaundice   Psychiatric: Oriented to person, place and time, Appropriate mood and affect.     PERTINENT STUDIES:  2/2019  LFTs, CBC, Cr, lytes wnl    3/17/2020 AST 24, ALT 39, alk phos 77    7/31/2020: Normal BMP    MRI Pelvis 11/6/2019- normal  IMPRESSION:  Subcutaneous nonenhancing benign likely postsurgical cyst  is present  in the right of midline posterior to sacrum without any  connection or fistulization to anus or bowel. No fistula identified.  No perianal fistula identified.    ASSESSMENT/PLAN:    1. Ileal Crohn's Disease not on any medications  Patient is no longer on Lialda and notes that symptoms have been stable off of medication.  Has had 1 day of abdominal pain, otherwise unremarkable.  Last colonoscopy in 2020 without evidence of active Crohn's disease.  Patient instructed to continue to monitor symptoms and let us know if she has frequent episodes and will consider checking fecal calprotectin/restarting a new medication.  -- Next endoscopic assessment: 2029 unless symptomatic sooner  -- Patient with IBD we recommend supplementation vitamin D 1000 units daily and calcium 500 mg twice daily.  -- Vaccines/immunizations to be updated: Recommend yearly flu shot, pneumonia vaccines (Prevnar 13 then 8 weeks later Pneumovax 23 then 5 years later Pneumovax 23), tetanus every 10 years.  Get the Covid vaccine when you can  -- No NSAIDs (ibuprofen, or anything containing ibuprofen)     2.  GERD  Patient has longstanding GERD symptoms, overall well controlled on omeprazole 40 mg a day.  Discussed risks and benefits of chronic PPI use.  Last checked vitamin B12 and vitamin D, iron studies normal.  Can recheck at follow-up visit in a year.  Given that he has had slight symptoms in the setting of increased stress with Covid, would not recommend decreasing dose at this time but can discuss again in follow-up in a year    2. Cancer Screening  No PSC, no high-risk FH CRC (2nd cousin dx'd in 2017 w/ advanced CRC @57yo). Given colonoscopy in 2019 without adenomatous lesions, recommend repeat colonoscopy for routine screening in 2029 unless symptomatic sooner.    RTC 1 year     Thank you for this consultation. It was a pleasure to participate in the care of this patient; please contact us with any further questions.     Alice Gonzalez,  "KAMARI  Division of Gastroenterology, Hepatology & Nutrition  [facility]        Aileen Priest is a 60 year old female who is being evaluated via a billable video visit.      The patient has been notified of following:     \"This video visit will be conducted via a call between you and your physician/provider. We have found that certain health care needs can be provided without the need for an in-person physical exam.  This service lets us provide the care you need with a video conversation.  If a prescription is necessary we can send it directly to your pharmacy.  If lab work is needed we can place an order for that and you can then stop by our lab to have the test done at a later time.    Video visits are billed at different rates depending on your insurance coverage.  Please reach out to your insurance provider with any questions.    If during the course of the call the physician/provider feels a video visit is not appropriate, you will not be charged for this service.\"    Patient has given verbal consent for Video visit? Yes  How would you like to obtain your AVS? MyChart  If you are dropped from the video visit, the video invite should be resent to: Text to cell phone: [phone]  Will anyone else be joining your video visit? No    Video-Visit Details    Type of service:  Video Visit    Video Start Time: 5:50 pm  Video End Time: 6:07 PM    Originating Location (pt. Location): Home    Distant Location (provider location):  [facility] [place] (provider's home)    Platform used for Video Visit: David Gonzalez PA-C          "

## 2020-12-09 NOTE — PATIENT INSTRUCTIONS
It was a pleasure taking care of you today.  I've included a brief summary of our discussion and care plan from today's visit below.  Please review this information with your primary care provider.  ______________________________________________________________________    My recommendations are summarized as follows:    -- Next endoscopic assessment: 2029 unless symptomatic sooner  -- Patient with IBD we recommend supplementation vitamin D 1000 units daily and calcium 500 mg twice daily.  -- Vaccines/immunizations to be updated: Recommend yearly flu shot, pneumonia vaccines (Prevnar 13 then 8 weeks later Pneumovax 23 then 5 years later Pneumovax 23), tetanus every 10 years.  Get the Covid vaccine when you can  -- No NSAIDs (ibuprofen, or anything containing ibuprofen)   -- continue omeprazole 40 mg daily on empty stomach 30-60 minutes before eating or drinking    GERD Lifestyle Modifications  -- Avoid foods high in fat or high fructose corn syrup  -- do not eat within three hours of laying down or going to bed  -- raise the head of your bed 6-8 inches  -- avoid alcohol    Return to GI Clinic in 12 months to review your progress.    ______________________________________________________________________    How do I schedule labs, imaging studies, or procedures that were ordered in clinic today?   Labs: To schedule lab appointment at the [facility], use my chart or call [phone]. If you have a [place] lab closer to home where you are regularly seen you can give them a call.     Procedures: If a colonoscopy, upper endoscopy, breath test, esophageal manometry, or pH impedence was ordered today, our endoscopy team will call you to schedule this. If you have not heard from our endoscopy team within a week, please call [phone] to schedule.     Imaging Studies: If you were scheduled for a CT scan, X-ray, MRI, ultrasound, HIDA scan or other imaging study, please call [phone] to have this  scheduled.     Referral: If a referral to another specialty was ordered, expect a phone call or follow instructions above. If you have not heard from anyone regarding your referral in a week, please call our clinic to check the status.     Who do I call with any questions after my visit?  Please be in touch if there are any further questions that arise following today's visit.  There are multiple ways to contact your gastroenterology care team.        During business hours, you may reach a Gastroenterology nurse at [phone]      To schedule or reschedule an appointment, please call [phone].       You can always send a secure message through Dolphin.  Dolphin messages are answered by your nurse or doctor typically within 24 hours.  Please allow extra time on weekends and holidays.        For urgent/emergent questions after business hours, you may reach the on-call GI Fellow by contacting the [facility]  at [phone].     How will I get the results of any tests ordered?    You will receive all of your results.  If you have signed up for [employer], any tests ordered at your visit will be available to you after your physician reviews them.  Typically this takes 1-2 weeks.  If there are urgent results that require a change in your care plan, your physician or nurse will call you to discuss the next steps.      What is Dolphin?  Dolphin is a secure way for you to access all of your healthcare records from the [facility].  It is a web based computer program, so you can sign on to it from any location.  It also allows you to send secure messages to your care team.  I recommend signing up for Dolphin access if you have not already done so and are comfortable with using a computer.      How to I schedule a follow-up visit?  If you did not schedule a follow-up visit today, please call [phone] to schedule a follow-up office visit.      Sincerely,    Alice Gonzalez PA-C  Division  of Gastroenterology, Hepatology & Nutrition  [facility]

## 2021-01-10 ENCOUNTER — HEALTH MAINTENANCE LETTER (OUTPATIENT)
Age: 61
End: 2021-01-10

## 2021-01-21 PROBLEM — M25.552 HIP PAIN, LEFT: Status: RESOLVED | Noted: 2020-08-19 | Resolved: 2021-01-21

## 2021-01-21 NOTE — PROGRESS NOTES
Discharge Note    Progress reporting period is from initial eval to Sep 23, 2020.     Aileen failed to return for next follow up visit and current status is unknown.  Please see information below for last relevant information on current status.  Patient seen for 2 visits.    SUBJECTIVE  Subjective changes noted by patient:  Pt feels her sxs are pretty much fully resolved. Able to stand up on feet as long as she wants without pain and able to bike without pain. HEP going well, has tried doing some of the squat exercise but hurt knee so she stopped doing it. Wants to try doing HEP on her own for a while and see how she does  .  Current pain level is 0/10.     Previous pain level was   .   Changes in function:  Yes (See Goal flowsheet attached for changes in current functional level)  Adverse reaction to treatment or activity: None    OBJECTIVE  Changes noted in objective findings: Needs cues for proper squat form     ASSESSMENT/PLAN  Diagnosis: left hip pain   DIAGP:  Diagnoses of Hip pain, left and Low back pain were pertinent to this visit.  STG/LTGs have been met or progress has been made towards goals:  Yes, please see goal flowsheet for most current information  Assessment of Progress: current status is unknown.    Last current status: Pt is progressing well   Self Management Plans:  HEP  I have re-evaluated this patient and find that the nature, scope, duration and intensity of the therapy is appropriate for the medical condition of the patient.  Aileen continues to require the following intervention to meet STG and LTG's:  HEP.    Recommendations:  Discharge with current home program.  Patient to follow up with MD as needed.    Please refer to the daily flowsheet for treatment today, total treatment time and time spent performing 1:1 timed codes.

## 2021-02-15 ENCOUNTER — MYC MEDICAL ADVICE (OUTPATIENT)
Dept: GASTROENTEROLOGY | Facility: CLINIC | Age: 61
End: 2021-02-15

## 2021-02-15 DIAGNOSIS — R10.84 ABDOMINAL PAIN, GENERALIZED: ICD-10-CM

## 2021-02-15 DIAGNOSIS — K50.00 CROHN'S DISEASE OF SMALL INTESTINE WITHOUT COMPLICATION (H): Primary | ICD-10-CM

## 2021-02-19 DIAGNOSIS — K50.00 CROHN'S DISEASE OF SMALL INTESTINE WITHOUT COMPLICATION (H): ICD-10-CM

## 2021-02-19 DIAGNOSIS — R10.84 ABDOMINAL PAIN, GENERALIZED: ICD-10-CM

## 2021-02-19 PROCEDURE — 83993 ASSAY FOR CALPROTECTIN FECAL: CPT | Mod: 90 | Performed by: PATHOLOGY

## 2021-02-19 PROCEDURE — 99000 SPECIMEN HANDLING OFFICE-LAB: CPT | Performed by: PATHOLOGY

## 2021-02-22 LAB — CALPROTECTIN STL-MCNT: 95 MG/KG (ref 0–49.9)

## 2021-02-25 ENCOUNTER — HOSPITAL ENCOUNTER (OUTPATIENT)
Dept: MRI IMAGING | Facility: CLINIC | Age: 61
Discharge: HOME | End: 2021-02-25
Attending: PHYSICIAN ASSISTANT | Admitting: PHYSICIAN ASSISTANT
Payer: COMMERCIAL

## 2021-02-25 DIAGNOSIS — K50.00 CROHN'S DISEASE INVOLVING TERMINAL ILEUM (H): ICD-10-CM

## 2021-02-25 PROCEDURE — 72197 MRI PELVIS W/O & W/DYE: CPT | Mod: 26 | Performed by: RADIOLOGY

## 2021-02-25 PROCEDURE — 250N000011 HC RX IP 250 OP 636: Performed by: PHYSICIAN ASSISTANT

## 2021-02-25 PROCEDURE — 74183 MRI ABD W/O CNTR FLWD CNTR: CPT | Mod: 26 | Performed by: RADIOLOGY

## 2021-02-25 PROCEDURE — A9585 GADOBUTROL INJECTION: HCPCS | Performed by: PHYSICIAN ASSISTANT

## 2021-02-25 PROCEDURE — 255N000002 HC RX 255 OP 636: Performed by: PHYSICIAN ASSISTANT

## 2021-02-25 PROCEDURE — 74183 MRI ABD W/O CNTR FLWD CNTR: CPT

## 2021-02-25 RX ORDER — GADOBUTROL 604.72 MG/ML
7.5 INJECTION INTRAVENOUS ONCE
Status: COMPLETED | OUTPATIENT
Start: 2021-02-25 | End: 2021-02-25

## 2021-02-25 RX ADMIN — GADOBUTROL 5.5 ML: 604.72 INJECTION INTRAVENOUS at 14:47

## 2021-02-25 RX ADMIN — GLUCAGON HYDROCHLORIDE 0.5 MG: 1 INJECTION, POWDER, FOR SOLUTION INTRAMUSCULAR; INTRAVENOUS; SUBCUTANEOUS at 15:04

## 2021-03-05 ENCOUNTER — TELEPHONE (OUTPATIENT)
Dept: GASTROENTEROLOGY | Facility: CLINIC | Age: 61
End: 2021-03-05

## 2021-03-05 DIAGNOSIS — K50.018 CROHN'S DISEASE OF SMALL INTESTINE WITH OTHER COMPLICATION (H): Primary | ICD-10-CM

## 2021-03-05 NOTE — TELEPHONE ENCOUNTER
[facility] Call Center    Phone Message    May a detailed message be left on voicemail: yes     Reason for Call: Other: Per pt would like a follow up appointment with dr. Pollock. Writer look and no appointment avaiable within march or early April. Per pt would like to see dr. Pollock before she run out of her medications. 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    Please call back to pt or write to pt through [employer]. Thank you.     Action Taken: Message routed to:  [facility] ([facility] Gastro    Travel Screening: Not Applicable

## 2021-03-08 NOTE — TELEPHONE ENCOUNTER
Called Kelly and set up an appointment for her with Dr Pollock on April 20th at 1pm.  Patient aware of the virtual visit.    patient will complete the oral budesonide in two weeks then she will submit a stool sample one week after the budesonide is completed.  Kit will be mailed

## 2021-03-09 ENCOUNTER — DOCUMENTATION ONLY (OUTPATIENT)
Dept: GASTROENTEROLOGY | Facility: CLINIC | Age: 61
End: 2021-03-09

## 2021-03-11 ENCOUNTER — TRANSFERRED RECORDS (OUTPATIENT)
Dept: HEALTH INFORMATION MANAGEMENT | Facility: CLINIC | Age: 61
End: 2021-03-11

## 2021-03-25 ENCOUNTER — MYC MEDICAL ADVICE (OUTPATIENT)
Dept: FAMILY MEDICINE | Facility: CLINIC | Age: 61
End: 2021-03-25

## 2021-03-25 DIAGNOSIS — I10 HYPERTENSION GOAL BP (BLOOD PRESSURE) < 140/90: Primary | ICD-10-CM

## 2021-03-25 RX ORDER — LISINOPRIL 5 MG/1
5 TABLET ORAL DAILY
Qty: 90 TABLET | Refills: 0 | Status: SHIPPED | OUTPATIENT
Start: 2021-03-25 | End: 2021-08-03

## 2021-03-25 NOTE — TELEPHONE ENCOUNTER
"Requested Prescriptions   Signed Prescriptions Disp Refills    lisinopril (ZESTRIL) 5 MG tablet 90 tablet 0     Sig: Take 1 tablet (5 mg) by mouth daily       ACE Inhibitors (Including Combos) Protocol Passed - 3/25/2021  8:28 AM        Passed - Blood pressure under 140/90 in past 12 months     BP Readings from Last 3 Encounters:   07/31/20 134/80   12/19/19 108/62   12/03/19 102/65                 Passed - Recent (12 mo) or future (30 days) visit within the authorizing provider's specialty     Patient has had an office visit with the authorizing provider or a provider within the authorizing providers department within the previous 12 mos or has a future within next 30 days. See \"Patient Info\" tab in inbasket, or \"Choose Columns\" in Meds & Orders section of the refill encounter.              Passed - Medication is active on med list        Passed - Patient is age 18 or older        Passed - No active pregnancy on record        Passed - Normal serum creatinine on file in past 12 months     Recent Labs   Lab Test 07/31/20  1120   CR 0.59       Ok to refill medication if creatinine is low          Passed - Normal serum potassium on file in past 12 months     Recent Labs   Lab Test 07/31/20  1120   POTASSIUM 4.0             Passed - No positive pregnancy test within past 12 months           La Guidry RN  [facility]    "

## 2021-04-13 DIAGNOSIS — K50.018 CROHN'S DISEASE OF SMALL INTESTINE WITH OTHER COMPLICATION (H): ICD-10-CM

## 2021-04-13 PROCEDURE — 99000 SPECIMEN HANDLING OFFICE-LAB: CPT | Performed by: PATHOLOGY

## 2021-04-13 PROCEDURE — 83993 ASSAY FOR CALPROTECTIN FECAL: CPT | Mod: 90 | Performed by: PATHOLOGY

## 2021-04-14 LAB — CALPROTECTIN STL-MCNT: 226 MG/KG (ref 0–49.9)

## 2021-04-20 ENCOUNTER — VIRTUAL VISIT (OUTPATIENT)
Dept: GASTROENTEROLOGY | Facility: CLINIC | Age: 61
End: 2021-04-20
Payer: COMMERCIAL

## 2021-04-20 VITALS — WEIGHT: 125 LBS | BODY MASS INDEX: 22.15 KG/M2 | HEIGHT: 63 IN

## 2021-04-20 DIAGNOSIS — K50.00 CROHN'S DISEASE OF SMALL INTESTINE WITHOUT COMPLICATION (H): Primary | ICD-10-CM

## 2021-04-20 PROCEDURE — 99215 OFFICE O/P EST HI 40 MIN: CPT | Mod: 95 | Performed by: INTERNAL MEDICINE

## 2021-04-20 ASSESSMENT — PAIN SCALES - GENERAL: PAINLEVEL: NO PAIN (0)

## 2021-04-20 ASSESSMENT — MIFFLIN-ST. JEOR: SCORE: 1101.13

## 2021-04-20 NOTE — NURSING NOTE
"Chief Complaint   Patient presents with     RECHECK     Follow up appointment.       Vitals:    04/20/21 1255   Weight: 56.7 kg (125 lb)   Height: 1.6 m (5' 3\")       Body mass index is 22.14 kg/m .                            JOSE WAGNER, EMT    "

## 2021-04-20 NOTE — PROGRESS NOTES
"GI CLINIC VISIT    CC/REFERRING PROVIDER: Bucky Hunter  REASON FOR CONSULTATION: Crohn's Disease, GERD    HPI: 60 year old female w/ h/o ileal Crohn's Disease per 3/2017 ileocolonoscopy biopsies previously on PO 5-ASA, Lichen Planus (oral/perianal involvement), Hidradenitis Suppurativa s/p excision/drainage 1975, prior presumed diagnosis of Collagenous Colitis per colonic biopsies 2006 (though suspecting evolving IBD presentation in retrospect following 3/2017 highly-suggestive biopsies of terminal ileum + bland colonic biopsies), GERD presenting for f/u Crohn's Disease.  History summarized from her documentation.    8/2018 prompted an increase in 5ASA to 4.8g daily due to intermittent \"flares\" she had been experiencing, despite labs within normal limits.  Since the increased, she has had great symptomatic relieve and reports one incidence of loose stool, otherwise no episodes concerning for flares.  She is currently having 3 stools per day, soft and formed without blood in the stool.  No urgency or nighttime stools.      She had noted a \"tear\" by her anus and had this evaluated by PCP and by her GYN. It was thought to be due to lichen sclerosis (per GYN) and was prescribed a steroid cream.  She notes improvement already even with vaseline as instructed by her PCP. She did not have any pain with a BM associated with this finding.      She had noted a lump on right side of buttocks that has been very bothersome, inhibiting much of her activity.  It is adjacent to previous pilonidal cyst repair. No drainage and no fevers. MRI showed post-surgical cyst.      labs show elevated creatinine and slightly abnormal LFTs. Based on symptoms with concern for active flares (patient reported, despite normal labs) dose was increased Spring 2019. Normal Creatinine 2/2019 prior to dose increase. Labs with primary care show elevated creatinine (0.6--> 1.29). LFT slightly increased, AST 72 and ALT 92. Normal alk phos. after last " visit, plan was to hold Salbador.  Underwent colonoscopy 12/3/2019 with simple endoscopic score for Crohn's disease to be 0.    Interval history December 8, 2020:  Since the patient's last visit, overall she has been doing very well.  Did have one episode of symptoms in early October.  She woke up at 4 AM with abdominal pain, had 1 day of watery stools.  Then pain subsided later that day.  Did have urgency with bowel movements.  Denied melena or hematochezia during this episode.  After this her stools returned back to normal.  Has on average 2 bowel movements a day one before morning and 1 before work.  No abdominal pain at baseline.  Retching greasy foods can trigger symptoms.  Has maybe had 1 nocturnal bowel movement.  Consistency of stool is soft and formed.  Continues to take probiotic which she finds very helpful.    For her GERD, she is taking 40 mg of omeprazole.  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  This improves quickly with drinking water.  She is currently taking a multivitamin.      Interval history 4/20/2021   Kelly was last seen 12/8/2020 with Alice Gonzalez in GI clinic. She was doing well at that time and has been off mesalamine since 2019. Kelly reporting having a Crohn's flare in February of this year. Similar to her previous flares, she had abdominal pain and loose stools for 8 hours and then felt fatigued for 2 more days but had return of normal bowel pattern. No hematochezia, nausea/vomiting, fever, or abdominal pain. She called into clinic and was started on budesonide for 1 month. After 1-2 weeks of budesonide she had fecal calprotectin checked that was 95. Enterography was also done and did not show any evidence of active inflammation. Since this flare she had one day at the end of March where she  "had urgency but otherwise has been doing well. These have been her two \"flares\" of Crohn's this year. She had two similar flares in  and 3 in 2019. Currently having her typical 2-3 soft formed stools per day. She does note some sores on her tongue which are not very painful but occur every few weeks. She is unsure if this is related to CD or Lichen planus which typically affects her oral cavity.    Of note last colonoscopy was in 2019 and showed Simple Endoscopic Score for Crohn's Disease: 0    Luigi Holloway Index:  2021  Well-bein (very well)  Abdomina pain: 0 (none)  # liquid/soft stools: 2   Abdominal mass 0 (none)  Extraluminal comlications    - Aphthous ulcers 1 - although unclear if from LP or CD  Score: 3     ROS: 10pt ROS performed and otherwise negative.    PERTINENT PAST MEDICAL/SURGICAL HISTORY:  As noted above.    ENDOSCOPIC EVALUATION:  2019 Impression:          - Simple Endoscopic Score for Crohn's Disease: 0,                        mucosal inflammatory changes secondary to Crohn's                        disease, in remission. Biopsied.                        - Diverticulosis in the sigmoid colon.                        - Non-bleeding internal hemorrhoids.   A. ILEUM, BIOPSY:   - Small intestinal mucosa with no significant histologic abnormality   - Negative for dysplasia     B. CECUM, BIOPSY:   - Colonic mucosa with no significant histologic abnormality   - Negative for dysplasia     C. COLON, ASCENDING, BIOPSY:   - Colonic mucosa with no significant histologic abnormality   - Negative for dysplasia     D. COLON, TRANSVERSE, BIOPSY:   - Colonic mucosa with no significant histologic abnormality   - Negative for dysplasia     E. COLON, DESCENDING,  BIOPSY:   - Colonic mucosa with no significant histologic abnormality   - Negative for dysplasia     F. COLON, SIGMOID, BIOPSY:   - Colonic mucosa with no significant histologic abnormality   - Negative for dysplasia     G. RECTUM, " BIOPSY:   - Colonic mucosa with no significant histologic abnormality   - Negative for dysplasia     PERTINENT MEDICATIONS:  -No current Crohn's medications  Medications reviewed with patient today, see Medication List/Assessment for details.  No other NSAID/anticoagulation reported by patient.  No other OTC/herbal/supplements reported by patient.    SOCIAL HISTORY: Tobacco: none.    PHYSICAL EXAMINATION: video visit exam   General appearance: Healthy appearing adult, in no acute distress  Eyes: Sclera anicteric  Ears, nose, mouth and throat: No obvious external lesions of ears and nose, Hearing intact  Neck: Symmetric, No obvious external lesions  Respiratory: Normal respiration, no use of accessory muscles   Skin: No rashes or jaundice   Psychiatric: Oriented to person, place and time, Appropriate mood and affect.     PERTINENT STUDIES:  Fecal calprotectin 226 (4/13/21) from 95 (2/19/21)    MR ENTEROGRAPHY: 2/25/2021   No evidence of active inflammation of the small bowel. No evidence of  stricture or fistula.       ASSESSMENT/PLAN:  1. Ileal Crohn's Disease not on any medications  Diagnosed 3/2017. Disease limited to ileum. Initially on mesalamine until 11/2019 when it was stopped for RAMSEY.  Repeat colonoscopy 12/2019 after discontinuing medication was negative for inflammation and patient in endoscopic remission. She has overall done well without medications; typically has 2 flares per year which are isolated to 1 day of loose stools and abdominal pain and then self resolve. Given the short, self limiting nature of symptoms it unclear if this is truly her IBD flaring. However she did have flare in Feb 2021 with elevated fecal calprotectin. MRE was negative for inflammation 2/25/21. Currently she is feeling well with baseline normal bowel habits  but her fecal calprotectin is elevated to 226 despite finishing course of budesonide within the last month. We discussed that there may be ongoing active inflammation and  need for endoscopic evaluation.    - plan for colonoscopy under conscious sedation   - further plan/medication changes pending colonoscopy findings     Routine IBD care:   - recommend supplementation vitamin D 1000 units daily and calcium 500 mg twice daily.Patient confirmed she is taking.   -- Vaccines/immunizations to be updated: Recommend yearly flu shot, pneumonia vaccines (Prevnar 13 then 8 weeks later Pneumovax 23 then 5 years later Pneumovax 23), tetanus every 10 years.   - No NSAIDs (ibuprofen, or anything containing ibuprofen)     2.  GERD  Patient has longstanding GERD symptoms, overall well controlled on omeprazole 40 mg a day.      2. Cancer Screening  No PSC, no high-risk FH CRC (2nd cousin dx'd in 2017 w/ advanced CRC @55yo). Given colonoscopy in 2019 without adenomatous lesions, recommend repeat colonoscopy for routine screening in 2029 unless symptomatic sooner.    RTC in 3 months     Thank you for this consultation. It was a pleasure to participate in the care of this patient; please contact us with any further questions.       Patient seen and plan discussed with attending, Dr. Pollock.     Daniel Oneal MD  [id number]        Kelly is a 60 year old who is being evaluated via a billable video visit.      How would you like to obtain your AVS? MyChart  If the video visit is dropped, the invitation should be resent by: Text to cell phone: [phone]  Will anyone else be joining your video visit? No    Video-Visit Details  Type of service:  Video Visit     Video Start Time: 1:05 PM  Video End Time:  1:45 PM    Originating Location (pt. Location): Home     Distant Location (provider location):  [facility] [place]      Platform used: [employer]

## 2021-04-20 NOTE — LETTER
"    4/20/2021         RE: Aileen Priest  [address]  [place] [address]        Dear Colleague,    Thank you for referring your patient, Aileen Priest, to the [facility]STR[facility] [place]. Please see a copy of my visit note below.    GI CLINIC VISIT    CC/REFERRING PROVIDER: Bucky Hunter  REASON FOR CONSULTATION: Crohn's Disease, GERD    HPI: 60 year old female w/ h/o ileal Crohn's Disease per 3/2017 ileocolonoscopy biopsies previously on PO 5-ASA, Lichen Planus (oral/perianal involvement), Hidradenitis Suppurativa s/p excision/drainage 1975, prior presumed diagnosis of Collagenous Colitis per colonic biopsies 2006 (though suspecting evolving IBD presentation in retrospect following 3/2017 highly-suggestive biopsies of terminal ileum + bland colonic biopsies), GERD presenting for f/u Crohn's Disease.  History summarized from her documentation.    8/2018 prompted an increase in 5ASA to 4.8g daily due to intermittent \"flares\" she had been experiencing, despite labs within normal limits.  Since the increased, she has had great symptomatic relieve and reports one incidence of loose stool, otherwise no episodes concerning for flares.  She is currently having 3 stools per day, soft and formed without blood in the stool.  No urgency or nighttime stools.      She had noted a \"tear\" by her anus and had this evaluated by PCP and by her GYN. It was thought to be due to lichen sclerosis (per GYN) and was prescribed a steroid cream.  She notes improvement already even with vaseline as instructed by her PCP. She did not have any pain with a BM associated with this finding.      She had noted a lump on right side of buttocks that has been very bothersome, inhibiting much of her activity.  It is adjacent to previous pilonidal cyst repair. No drainage and no fevers. MRI showed post-surgical cyst.      labs show elevated creatinine and slightly abnormal LFTs. Based on symptoms with concern " for active flares (patient reported, despite normal labs) dose was increased Spring 2019. Normal Creatinine 2/2019 prior to dose increase. Labs with primary care show elevated creatinine (0.6--> 1.29). LFT slightly increased, AST 72 and ALT 92. Normal alk phos. after last visit, plan was to hold Lialda.  Underwent colonoscopy 12/3/2019 with simple endoscopic score for Crohn's disease to be 0.    Interval history December 8, 2020:  Since the patient's last visit, overall she has been doing very well.  Did have one episode of symptoms in early October.  She woke up at 4 AM with abdominal pain, had 1 day of watery stools.  Then pain subsided later that day.  Did have urgency with bowel movements.  Denied melena or hematochezia during this episode.  After this her stools returned back to normal.  Has on average 2 bowel movements a day one before morning and 1 before work.  No abdominal pain at baseline.  Retching greasy foods can trigger symptoms.  Has maybe had 1 nocturnal bowel movement.  Consistency of stool is soft and formed.  Continues to take probiotic which she finds very helpful.    For her GERD, she is taking 40 mg of omeprazole.  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  This improves quickly with drinking water.  She is currently taking a multivitamin.      Interval history 4/20/2021   Kelly was last seen 12/8/2020 with Alice Gonzalez in GI clinic. She was doing well at that time and has been off mesalamine since 2019. Kelly reporting having a Crohn's flare in February of this year. Similar to her previous flares, she had abdominal pain and loose stools for 8 hours and then felt fatigued for 2 more days but had return of normal bowel pattern. No hematochezia, nausea/vomiting, fever, or abdominal pain. She called into  "clinic and was started on budesonide for 1 month. After 1-2 weeks of budesonide she had fecal calprotectin checked that was 95. Enterography was also done and did not show any evidence of active inflammation. Since this flare she had one day at the end of March where she had urgency but otherwise has been doing well. These have been her two \"flares\" of Crohn's this year. She had two similar flares in  and 3 in . Currently having her typical 2-3 soft formed stools per day. She does note some sores on her tongue which are not very painful but occur every few weeks. She is unsure if this is related to CD or Lichen planus which typically affects her oral cavity.    Of note last colonoscopy was in 2019 and showed Simple Endoscopic Score for Crohn's Disease: 0    Luigi Holloway Index:  2021  Well-bein (very well)  Abdomina pain: 0 (none)  # liquid/soft stools: 2   Abdominal mass 0 (none)  Extraluminal comlications    - Aphthous ulcers 1 - although unclear if from LP or CD  Score: 3     ROS: 10pt ROS performed and otherwise negative.    PERTINENT PAST MEDICAL/SURGICAL HISTORY:  As noted above.    ENDOSCOPIC EVALUATION:  2019 Impression:          - Simple Endoscopic Score for Crohn's Disease: 0,                        mucosal inflammatory changes secondary to Crohn's                        disease, in remission. Biopsied.                        - Diverticulosis in the sigmoid colon.                        - Non-bleeding internal hemorrhoids.   A. ILEUM, BIOPSY:   - Small intestinal mucosa with no significant histologic abnormality   - Negative for dysplasia     B. CECUM, BIOPSY:   - Colonic mucosa with no significant histologic abnormality   - Negative for dysplasia     C. COLON, ASCENDING, BIOPSY:   - Colonic mucosa with no significant histologic abnormality   - Negative for dysplasia     D. COLON, TRANSVERSE, BIOPSY:   - Colonic mucosa with no significant histologic abnormality   - Negative for " dysplasia     E. COLON, DESCENDING,  BIOPSY:   - Colonic mucosa with no significant histologic abnormality   - Negative for dysplasia     F. COLON, SIGMOID, BIOPSY:   - Colonic mucosa with no significant histologic abnormality   - Negative for dysplasia     G. RECTUM, BIOPSY:   - Colonic mucosa with no significant histologic abnormality   - Negative for dysplasia     PERTINENT MEDICATIONS:  -No current Crohn's medications  Medications reviewed with patient today, see Medication List/Assessment for details.  No other NSAID/anticoagulation reported by patient.  No other OTC/herbal/supplements reported by patient.    SOCIAL HISTORY: Tobacco: none.    PHYSICAL EXAMINATION: video visit exam   General appearance: Healthy appearing adult, in no acute distress  Eyes: Sclera anicteric  Ears, nose, mouth and throat: No obvious external lesions of ears and nose, Hearing intact  Neck: Symmetric, No obvious external lesions  Respiratory: Normal respiration, no use of accessory muscles   Skin: No rashes or jaundice   Psychiatric: Oriented to person, place and time, Appropriate mood and affect.     PERTINENT STUDIES:  Fecal calprotectin 226 (4/13/21) from 95 (2/19/21)    MR ENTEROGRAPHY: 2/25/2021   No evidence of active inflammation of the small bowel. No evidence of  stricture or fistula.       ASSESSMENT/PLAN:  1. Ileal Crohn's Disease not on any medications  Diagnosed 3/2017. Disease limited to ileum. Initially on mesalamine until 11/2019 when it was stopped for RAMSEY.  Repeat colonoscopy 12/2019 after discontinuing medication was negative for inflammation and patient in endoscopic remission. She has overall done well without medications; typically has 2 flares per year which are isolated to 1 day of loose stools and abdominal pain and then self resolve. Given the short, self limiting nature of symptoms it unclear if this is truly her IBD flaring. However she did have flare in Feb 2021 with elevated fecal calprotectin. MRE was  negative for inflammation 2/25/21. Currently she is feeling well with baseline normal bowel habits  but her fecal calprotectin is elevated to 226 despite finishing course of budesonide within the last month. We discussed that there may be ongoing active inflammation and need for endoscopic evaluation.    - plan for colonoscopy under conscious sedation   - further plan/medication changes pending colonoscopy findings     Routine IBD care:   - recommend supplementation vitamin D 1000 units daily and calcium 500 mg twice daily.Patient confirmed she is taking.   -- Vaccines/immunizations to be updated: Recommend yearly flu shot, pneumonia vaccines (Prevnar 13 then 8 weeks later Pneumovax 23 then 5 years later Pneumovax 23), tetanus every 10 years.   - No NSAIDs (ibuprofen, or anything containing ibuprofen)     2.  GERD  Patient has longstanding GERD symptoms, overall well controlled on omeprazole 40 mg a day.      2. Cancer Screening  No PSC, no high-risk FH CRC (2nd cousin dx'd in 2017 w/ advanced CRC @57yo). Given colonoscopy in 2019 without adenomatous lesions, recommend repeat colonoscopy for routine screening in 2029 unless symptomatic sooner.    RTC in 3 months     Thank you for this consultation. It was a pleasure to participate in the care of this patient; please contact us with any further questions.       Patient seen and plan discussed with attending, Dr. Pollock.     Daniel Oneal MD  [id number]      Kelly is a 60 year old who is being evaluated via a billable video visit.      How would you like to obtain your AVS? MyChart  If the video visit is dropped, the invitation should be resent by: Text to cell phone: [phone]  Will anyone else be joining your video visit? No    Video-Visit Details  Type of service:  Video Visit     Video Start Time: 1:05 PM  Video End Time:  1:45 PM    Originating Location (pt. Location): Home     Distant Location (provider location):  [facility]OLOG[facility]      Platform used: ACE      Attestation signed by Kassy Pollock MD at 4/20/2021  5:22 PM:  I was present for the key and critical portions of the video visit and discussed the management with Dr. Oneal on 4/20/2021 and the patient. I reviewed the note and there are no changes to the past medical, family or social history.  A complete 10 point review of systems was obtained. Please see the HPI for pertinent positives and negatives. All other systems were reviewed and were found to be negative.     I agree with the documented findings and plan of care as outlined. Plan for colonoscopy for disease activity assessment.    Kassy Pollock MD  GI Attending  Pager: [phone]

## 2021-05-09 DIAGNOSIS — Z11.59 ENCOUNTER FOR SCREENING FOR OTHER VIRAL DISEASES: ICD-10-CM

## 2021-05-13 ENCOUNTER — TELEPHONE (OUTPATIENT)
Dept: GASTROENTEROLOGY | Facility: OUTPATIENT CENTER | Age: 61
End: 2021-05-13

## 2021-05-13 ENCOUNTER — TELEPHONE (OUTPATIENT)
Dept: GASTROENTEROLOGY | Facility: CLINIC | Age: 61
End: 2021-05-13

## 2021-05-13 NOTE — TELEPHONE ENCOUNTER
Patient stated she had read her My chart instructions and had no questions.  policy, conscious sedation plan reviewed. Advised patient to have someone stay with her for 6 hours post exam. Covid test 5-20 at [address] [place] [address].

## 2021-05-20 DIAGNOSIS — Z11.59 ENCOUNTER FOR SCREENING FOR OTHER VIRAL DISEASES: ICD-10-CM

## 2021-05-20 LAB
LABORATORY COMMENT REPORT: NORMAL
SARS-COV-2 RNA RESP QL NAA+PROBE: NEGATIVE
SARS-COV-2 RNA RESP QL NAA+PROBE: NORMAL
SPECIMEN SOURCE: NORMAL
SPECIMEN SOURCE: NORMAL

## 2021-05-20 PROCEDURE — U0005 INFEC AGEN DETEC AMPLI PROBE: HCPCS | Performed by: INTERNAL MEDICINE

## 2021-05-20 PROCEDURE — U0003 INFECTIOUS AGENT DETECTION BY NUCLEIC ACID (DNA OR RNA); SEVERE ACUTE RESPIRATORY SYNDROME CORONAVIRUS 2 (SARS-COV-2) (CORONAVIRUS DISEASE [COVID-19]), AMPLIFIED PROBE TECHNIQUE, MAKING USE OF HIGH THROUGHPUT TECHNOLOGIES AS DESCRIBED BY CMS-2020-01-R: HCPCS | Performed by: INTERNAL MEDICINE

## 2021-05-24 ENCOUNTER — HOSPITAL ENCOUNTER (OUTPATIENT)
Facility: AMBULATORY SURGERY CENTER | Age: 61
Discharge: HOME | End: 2021-05-24
Attending: INTERNAL MEDICINE | Admitting: INTERNAL MEDICINE
Payer: COMMERCIAL

## 2021-05-24 VITALS
SYSTOLIC BLOOD PRESSURE: 115 MMHG | HEART RATE: 70 BPM | BODY MASS INDEX: 22.15 KG/M2 | OXYGEN SATURATION: 99 % | HEIGHT: 63 IN | RESPIRATION RATE: 16 BRPM | WEIGHT: 125 LBS | DIASTOLIC BLOOD PRESSURE: 77 MMHG | TEMPERATURE: 97 F

## 2021-05-24 LAB — COLONOSCOPY: NORMAL

## 2021-05-24 PROCEDURE — 45380 COLONOSCOPY AND BIOPSY: CPT

## 2021-05-24 PROCEDURE — 88305 TISSUE EXAM BY PATHOLOGIST: CPT | Performed by: PATHOLOGY

## 2021-05-24 RX ORDER — NALOXONE HYDROCHLORIDE 0.4 MG/ML
0.2 INJECTION, SOLUTION INTRAMUSCULAR; INTRAVENOUS; SUBCUTANEOUS
Status: CANCELLED | OUTPATIENT
Start: 2021-05-24

## 2021-05-24 RX ORDER — FLUMAZENIL 0.1 MG/ML
0.2 INJECTION, SOLUTION INTRAVENOUS
Status: CANCELLED | OUTPATIENT
Start: 2021-05-24 | End: 2021-05-25

## 2021-05-24 RX ORDER — LIDOCAINE 40 MG/G
CREAM TOPICAL
Status: DISCONTINUED | OUTPATIENT
Start: 2021-05-24 | End: 2021-05-25 | Stop reason: HOSPADM

## 2021-05-24 RX ORDER — NALOXONE HYDROCHLORIDE 0.4 MG/ML
0.4 INJECTION, SOLUTION INTRAMUSCULAR; INTRAVENOUS; SUBCUTANEOUS
Status: CANCELLED | OUTPATIENT
Start: 2021-05-24

## 2021-05-24 RX ORDER — PROCHLORPERAZINE MALEATE 10 MG
10 TABLET ORAL EVERY 6 HOURS PRN
Status: CANCELLED | OUTPATIENT
Start: 2021-05-24

## 2021-05-24 RX ORDER — ONDANSETRON 2 MG/ML
4 INJECTION INTRAMUSCULAR; INTRAVENOUS
Status: DISCONTINUED | OUTPATIENT
Start: 2021-05-24 | End: 2021-05-25 | Stop reason: HOSPADM

## 2021-05-24 RX ORDER — ONDANSETRON 2 MG/ML
4 INJECTION INTRAMUSCULAR; INTRAVENOUS EVERY 6 HOURS PRN
Status: CANCELLED | OUTPATIENT
Start: 2021-05-24

## 2021-05-24 RX ORDER — FENTANYL CITRATE 50 UG/ML
INJECTION, SOLUTION INTRAMUSCULAR; INTRAVENOUS PRN
Status: DISCONTINUED | OUTPATIENT
Start: 2021-05-24 | End: 2021-05-24 | Stop reason: HOSPADM

## 2021-05-24 RX ORDER — ONDANSETRON 4 MG/1
4 TABLET, ORALLY DISINTEGRATING ORAL EVERY 6 HOURS PRN
Status: CANCELLED | OUTPATIENT
Start: 2021-05-24

## 2021-05-24 ASSESSMENT — MIFFLIN-ST. JEOR: SCORE: 1101.13

## 2021-05-26 LAB — COPATH REPORT: NORMAL

## 2021-06-05 ASSESSMENT — ENCOUNTER SYMPTOMS: HEARTBURN: 1

## 2021-06-09 ENCOUNTER — OFFICE VISIT (OUTPATIENT)
Dept: GASTROENTEROLOGY | Facility: CLINIC | Age: 61
End: 2021-06-09
Payer: COMMERCIAL

## 2021-06-09 VITALS
DIASTOLIC BLOOD PRESSURE: 84 MMHG | WEIGHT: 126.1 LBS | OXYGEN SATURATION: 97 % | BODY MASS INDEX: 22.34 KG/M2 | HEIGHT: 63 IN | HEART RATE: 85 BPM | SYSTOLIC BLOOD PRESSURE: 124 MMHG

## 2021-06-09 DIAGNOSIS — K50.00 CROHN'S DISEASE OF ILEUM WITHOUT COMPLICATION (H): Primary | ICD-10-CM

## 2021-06-09 PROCEDURE — 99215 OFFICE O/P EST HI 40 MIN: CPT | Performed by: INTERNAL MEDICINE

## 2021-06-09 ASSESSMENT — MIFFLIN-ST. JEOR: SCORE: 1106.12

## 2021-06-09 NOTE — NURSING NOTE
"Chief Complaint   Patient presents with     Follow Up       Vitals:    06/09/21 0946   BP: 124/84   Pulse: 85   SpO2: 97%   Weight: 57.2 kg (126 lb 1.6 oz)   Height: 1.6 m (5' 3\")       Body mass index is 22.34 kg/m .    Ignacia Mayfield CMA    "

## 2021-06-09 NOTE — LETTER
"    6/9/2021         RE: Aileen Priest  [address]  [place] [address]        Dear Colleague,    Thank you for referring your patient, Aileen Priest, to the [facility]STROENTEROLOG[facility] [place]. Please see a copy of my visit note below.    IBD CLINIC VISIT, follow up    REASON FOR CONSULTATION: Crohn's Disease, GERD    HPI: 60 year old female w/ h/o ileal Crohn's Disease per 3/2017 ileocolonoscopy biopsies previously on PO 5-ASA, Lichen Planus (oral/perianal involvement), Hidradenitis Suppurativa s/p excision/drainage 1975, prior presumed diagnosis of Collagenous Colitis per colonic biopsies 2006 (though suspecting evolving IBD presentation in retrospect following 3/2017 highly-suggestive biopsies of terminal ileum + bland colonic biopsies), GERD presenting for f/u Crohn's Disease.  History summarized from her documentation.    8/2018 prompted an increase in 5ASA to 4.8g daily due to intermittent \"flares\" she had been experiencing, despite labs within normal limits.  Since the increased, she has had great symptomatic relieve and reports one incidence of loose stool, otherwise no episodes concerning for flares.  She is currently having 3 stools per day, soft and formed without blood in the stool.  No urgency or nighttime stools.      She had noted a \"tear\" by her anus and had this evaluated by PCP and by her GYN. It was thought to be due to lichen sclerosis (per GYN) and was prescribed a steroid cream.  She notes improvement already even with vaseline as instructed by her PCP. She did not have any pain with a BM associated with this finding.      She had noted a lump on right side of buttocks that has been very bothersome, inhibiting much of her activity.  It is adjacent to previous pilonidal cyst repair. No drainage and no fevers. MRI showed post-surgical cyst.      labs show elevated creatinine and slightly abnormal LFTs. Based on symptoms with concern for active flares (patient " reported, despite normal labs) dose was increased Spring 2019. Normal Creatinine 2/2019 prior to dose increase. Labs with primary care show elevated creatinine (0.6--> 1.29). LFT slightly increased, AST 72 and ALT 92. Normal alk phos. after last visit, plan was to hold Lialda.  Underwent colonoscopy 12/3/2019 with simple endoscopic score for Crohn's disease to be 0.    Interval history December 8, 2020:  Since the patient's last visit, overall she has been doing very well.  Did have one episode of symptoms in early October.  She woke up at 4 AM with abdominal pain, had 1 day of watery stools.  Then pain subsided later that day.  Did have urgency with bowel movements.  Denied melena or hematochezia during this episode.  After this her stools returned back to normal.  Has on average 2 bowel movements a day one before morning and 1 before work.  No abdominal pain at baseline.  Retching greasy foods can trigger symptoms.  Has maybe had 1 nocturnal bowel movement.  Consistency of stool is soft and formed.  Continues to take probiotic which she finds very helpful.    For her GERD, she is taking 40 mg of omeprazole.  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  This improves quickly with drinking water.  She is currently taking a multivitamin.      Interval history 4/20/2021   Kelly was last seen 12/8/2020 with Alice Gonzalez in GI clinic. She was doing well at that time and has been off mesalamine since 2019. Kelly reporting having a Crohn's flare in February of this year. Similar to her previous flares, she had abdominal pain and loose stools for 8 hours and then felt fatigued for 2 more days but had return of normal bowel pattern. No hematochezia, nausea/vomiting, fever, or abdominal pain. She called into clinic and was started on  "budesonide for 1 month. After 1-2 weeks of budesonide she had fecal calprotectin checked that was 95. Enterography was also done and did not show any evidence of active inflammation. Since this flare she had one day at the end of March where she had urgency but otherwise has been doing well. These have been her two \"flares\" of Crohn's this year. She had two similar flares in  and 3 in . Currently having her typical 2-3 soft formed stools per day. She does note some sores on her tongue which are not very painful but occur every few weeks. She is unsure if this is related to CD or Lichen planus which typically affects her oral cavity.    Of note last colonoscopy was in 2019 and showed Simple Endoscopic Score for Crohn's Disease: 0    Luigi Holloway Index:  2021  Well-bein (very well)  Abdomina pain: 0 (none)  # liquid/soft stools: 2   Abdominal mass 0 (none)  Extraluminal comlications    - Aphthous ulcers 1 - although unclear if from LP or CD  Score: 3       Interval history 2021   Recent cscope findings below.     Luigi Holloway Index:  Well-bein (very well)  Abdominal pain: 1 (mild)  # liquid/soft stools: occasionally, up to 6 per day   Abdominal mass 0 (none)  Extraluminal comlications    - Aphthous ulcers 1 - although unclear if from LP or CD    Dad with SCC. Son has MS. Mom with breast cancer in her 40s.     ROS: 10pt ROS performed and otherwise negative.    PERTINENT PAST MEDICAL/SURGICAL HISTORY:  As noted above.    ENDOSCOPIC EVALUATION:  icscope 522021 showed SES-CD 5 with small ulcers in the TI. Bx normal.    2019 Impression:          - Simple Endoscopic Score for Crohn's Disease: 0,                        mucosal inflammatory changes secondary to Crohn's                        disease, in remission. Biopsied.                        - Diverticulosis in the sigmoid colon.                        - Non-bleeding internal hemorrhoids.   A. ILEUM, BIOPSY:   - Small intestinal " mucosa with no significant histologic abnormality   - Negative for dysplasia     B. CECUM, BIOPSY:   - Colonic mucosa with no significant histologic abnormality   - Negative for dysplasia     C. COLON, ASCENDING, BIOPSY:   - Colonic mucosa with no significant histologic abnormality   - Negative for dysplasia     D. COLON, TRANSVERSE, BIOPSY:   - Colonic mucosa with no significant histologic abnormality   - Negative for dysplasia     E. COLON, DESCENDING,  BIOPSY:   - Colonic mucosa with no significant histologic abnormality   - Negative for dysplasia     F. COLON, SIGMOID, BIOPSY:   - Colonic mucosa with no significant histologic abnormality   - Negative for dysplasia     G. RECTUM, BIOPSY:   - Colonic mucosa with no significant histologic abnormality   - Negative for dysplasia     PERTINENT MEDICATIONS:  -No current Crohn's medications  Medications reviewed with patient today, see Medication List/Assessment for details.  No other NSAID/anticoagulation reported by patient.  No other OTC/herbal/supplements reported by patient.    SOCIAL HISTORY: Tobacco: none.    PHYSICAL EXAMINATION: video visit exam   General appearance: Healthy appearing adult, in no acute distress  Eyes: Sclera anicteric  Ears, nose, mouth and throat: No obvious external lesions of ears and nose, Hearing intact  Abdomen: soft, ND, mild tenderness to deep palpation in the R abd. No RT or IG.   Neck: Symmetric, No obvious external lesions  Respiratory: Normal respiration, no use of accessory muscles   Skin: No rashes or jaundice   Psychiatric: Oriented to person, place and time, Appropriate mood and affect.     PERTINENT STUDIES:  Fecal calprotectin 226 (4/13/21) from 95 (2/19/21)    MR ENTEROGRAPHY: 2/25/2021   No evidence of active inflammation of the small bowel. No evidence of  stricture or fistula.       ASSESSMENT/PLAN:  1. Ileal Crohn's Disease, moderate  Diagnosed 3/2017. Disease limited to ileum. Initially on mesalamine until 11/2019 when  it was stopped for RAMSEY.  Repeat colonoscopy 2019 after discontinuing medication was negative for inflammation and patient in endoscopic remission. She was off medications for a while. More recently, she has deveoped flares requiring budesonide therapy (disease responsive, but does not tolerate this med from a gastritis standpoint). Most recently, she had a flare in 2021 that was more severe than in the past. Fecal calprotectin was elevated. MRE was negative for inflammation 21. In April, FC was elevated and in May, icscope showed small ileal ulcers. We discussed that initiation of biologic therapy at this time is indicated, to help change the course of disease to decrease risk of complications, including fistulas, strictures, hospitalizations. She has had several steroid-requiring flares per year. We discussed the various biologic options, including Remicade, Humira, Entyvio and Stelara. Plan will be to move forward with stelara, given effectiveness and safety profile. Of note, Kelly's father had SCC, her son has MS and her mother  from breast CA in her 40s.     We discussed the risks, benefits and alternatives to Stelara (ustekinumab) for Crohn's disease. Risks discussed include risk of infections and reactivation of latent infections. Serious infections (bacterial, fungal and viral) were observed in clinical trials, but not at increased rates over placebo. There is a theoretical risk for mycobacterial infections and salmonella. Pre-treatment evaluation for TB will be done to minimize risk. Malignancies were reported in some patients in clinical trials, but not at increased frequency compared to placebo. Hypersensitivity reactions, including anaphylaxis, are reported but are rare. One case of reversible posterior leukoencephalopathy syndrome (RPLS) was observed in clinical studies of psoriasis and psoriatic arthritis, however no cases have been observed in Crohn's disease. Patients to be treated  with Stelara should have all age-appropriate immunizations, but should not be given BCG during treatment or one year following discontinuation of Stelara. There is slight increased risk for nasopharyngitis, injection site reactions, and vuvlovaginal candidiasis, UTI, sinusitis, bronchitis. Listeria meningitis and ophthalmic herpes were reported in 1 patient each. Approximately 3% of patients develop antibodies to Stelara. There is limited information to guide use of Stelara during pregnancy. No adverse developmental effects were observed in monkeys exposed to doses 100 time greater than use. The patient was instructed to call immediately for signs or symptoms of infection, such as fever, sweats, chills, muscle aches, cough, shortness of breath, blood in phlegm, weight loss, warm, red or painful skin or sores, burning with urination or frequent urination, severe fatigue. Patient was also advised to call if they know they have TB or have been in close contact with someone with TB. Patient was also instructed to call immediately for headaches, seizures, confusion or vision problems. Dosing is given as intravenous load of 260 mg for patients up to 55 kg, 390 mg for patients >55 kg to 85 kg, and 520 mg for patients greater than 85 kg.     ---PA for Stelara  ---Submit fecal calprotectin     Routine IBD care:   - recommend supplementation vitamin D 1000 units daily and calcium 500 mg twice daily.Patient confirmed she is taking.   -- Vaccines/immunizations to be updated: Recommend yearly flu shot, pneumonia vaccines (Prevnar 13 then 8 weeks later Pneumovax 23 then 5 years later Pneumovax 23), tetanus every 10 years. Kelly is UTD with Shingles vaccine (she did have optic shingles in the past)   - No NSAIDs (ibuprofen, or anything containing ibuprofen)     2.  GERD  Patient has longstanding GERD symptoms, overall well controlled on omeprazole 40 mg a day.      2. Cancer Screening  No PSC, no high-risk FH CRC (2nd cousin dx'd  in 2017 w/ advanced CRC @57yo). Given colonoscopy in 2019 without adenomatous lesions, recommend repeat colonoscopy for routine screening in 2029 unless symptomatic sooner.    RTC in 3 months     40 minutes spent on the date of the encounter doing chart review, history and exam, documentation and further activities as noted above     Answers for HPI/ROS submitted by the patient on 6/5/2021   General Symptoms: No  Skin Symptoms: No  HENT Symptoms: No  EYE SYMPTOMS: No  HEART SYMPTOMS: No  LUNG SYMPTOMS: No  INTESTINAL SYMPTOMS: Yes  URINARY SYMPTOMS: No  GYNECOLOGIC SYMPTOMS: No  BREAST SYMPTOMS: No  SKELETAL SYMPTOMS: No  BLOOD SYMPTOMS: No  NERVOUS SYSTEM SYMPTOMS: No  MENTAL HEALTH SYMPTOMS: No  Heart burn or indigestion: Yes        Again, thank you for allowing me to participate in the care of your patient.        Sincerely,        Kassy Pollock MD

## 2021-06-09 NOTE — PROGRESS NOTES
"IBD CLINIC VISIT, follow up    REASON FOR CONSULTATION: Crohn's Disease, GERD    HPI: 60 year old female w/ h/o ileal Crohn's Disease per 3/2017 ileocolonoscopy biopsies previously on PO 5-ASA, Lichen Planus (oral/perianal involvement), Hidradenitis Suppurativa s/p excision/drainage 1975, prior presumed diagnosis of Collagenous Colitis per colonic biopsies 2006 (though suspecting evolving IBD presentation in retrospect following 3/2017 highly-suggestive biopsies of terminal ileum + bland colonic biopsies), GERD presenting for f/u Crohn's Disease.  History summarized from her documentation.    8/2018 prompted an increase in 5ASA to 4.8g daily due to intermittent \"flares\" she had been experiencing, despite labs within normal limits.  Since the increased, she has had great symptomatic relieve and reports one incidence of loose stool, otherwise no episodes concerning for flares.  She is currently having 3 stools per day, soft and formed without blood in the stool.  No urgency or nighttime stools.      She had noted a \"tear\" by her anus and had this evaluated by PCP and by her GYN. It was thought to be due to lichen sclerosis (per GYN) and was prescribed a steroid cream.  She notes improvement already even with vaseline as instructed by her PCP. She did not have any pain with a BM associated with this finding.      She had noted a lump on right side of buttocks that has been very bothersome, inhibiting much of her activity.  It is adjacent to previous pilonidal cyst repair. No drainage and no fevers. MRI showed post-surgical cyst.      labs show elevated creatinine and slightly abnormal LFTs. Based on symptoms with concern for active flares (patient reported, despite normal labs) dose was increased Spring 2019. Normal Creatinine 2/2019 prior to dose increase. Labs with primary care show elevated creatinine (0.6--> 1.29). LFT slightly increased, AST 72 and ALT 92. Normal alk phos. after last visit, plan was to hold " Salbador.  Underwent colonoscopy 12/3/2019 with simple endoscopic score for Crohn's disease to be 0.    Interval history December 8, 2020:  Since the patient's last visit, overall she has been doing very well.  Did have one episode of symptoms in early October.  She woke up at 4 AM with abdominal pain, had 1 day of watery stools.  Then pain subsided later that day.  Did have urgency with bowel movements.  Denied melena or hematochezia during this episode.  After this her stools returned back to normal.  Has on average 2 bowel movements a day one before morning and 1 before work.  No abdominal pain at baseline.  Retching greasy foods can trigger symptoms.  Has maybe had 1 nocturnal bowel movement.  Consistency of stool is soft and formed.  Continues to take probiotic which she finds very helpful.    For her GERD, she is taking 40 mg of omeprazole.  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  This improves quickly with drinking water.  She is currently taking a multivitamin.      Interval history 4/20/2021   Kelly was last seen 12/8/2020 with Alice Gonzalez in GI clinic. She was doing well at that time and has been off mesalamine since 2019. Kelly reporting having a Crohn's flare in February of this year. Similar to her previous flares, she had abdominal pain and loose stools for 8 hours and then felt fatigued for 2 more days but had return of normal bowel pattern. No hematochezia, nausea/vomiting, fever, or abdominal pain. She called into clinic and was started on budesonide for 1 month. After 1-2 weeks of budesonide she had fecal calprotectin checked that was 95. Enterography was also done and did not show any evidence of active inflammation. Since this flare she had one day at the end of March where she had urgency but  "otherwise has been doing well. These have been her two \"flares\" of Crohn's this year. She had two similar flares in  and 3 in . Currently having her typical 2-3 soft formed stools per day. She does note some sores on her tongue which are not very painful but occur every few weeks. She is unsure if this is related to CD or Lichen planus which typically affects her oral cavity.    Of note last colonoscopy was in 2019 and showed Simple Endoscopic Score for Crohn's Disease: 0    Luigi Holloway Index:  2021  Well-bein (very well)  Abdomina pain: 0 (none)  # liquid/soft stools: 2   Abdominal mass 0 (none)  Extraluminal comlications    - Aphthous ulcers 1 - although unclear if from LP or CD  Score: 3       Interval history 2021   Recent cscope findings below.     Luigi Holloway Index:  Well-bein (very well)  Abdominal pain: 1 (mild)  # liquid/soft stools: occasionally, up to 6 per day   Abdominal mass 0 (none)  Extraluminal comlications    - Aphthous ulcers 1 - although unclear if from LP or CD    Dad with SCC. Son has MS. Mom with breast cancer in her 40s.     ROS: 10pt ROS performed and otherwise negative.    PERTINENT PAST MEDICAL/SURGICAL HISTORY:  As noted above.    ENDOSCOPIC EVALUATION:  icscope 522021 showed SES-CD 5 with small ulcers in the TI. Bx normal.    2019 Impression:          - Simple Endoscopic Score for Crohn's Disease: 0,                        mucosal inflammatory changes secondary to Crohn's                        disease, in remission. Biopsied.                        - Diverticulosis in the sigmoid colon.                        - Non-bleeding internal hemorrhoids.   A. ILEUM, BIOPSY:   - Small intestinal mucosa with no significant histologic abnormality   - Negative for dysplasia     B. CECUM, BIOPSY:   - Colonic mucosa with no significant histologic abnormality   - Negative for dysplasia     C. COLON, ASCENDING, BIOPSY:   - Colonic mucosa with no significant " histologic abnormality   - Negative for dysplasia     D. COLON, TRANSVERSE, BIOPSY:   - Colonic mucosa with no significant histologic abnormality   - Negative for dysplasia     E. COLON, DESCENDING,  BIOPSY:   - Colonic mucosa with no significant histologic abnormality   - Negative for dysplasia     F. COLON, SIGMOID, BIOPSY:   - Colonic mucosa with no significant histologic abnormality   - Negative for dysplasia     G. RECTUM, BIOPSY:   - Colonic mucosa with no significant histologic abnormality   - Negative for dysplasia     PERTINENT MEDICATIONS:  -No current Crohn's medications  Medications reviewed with patient today, see Medication List/Assessment for details.  No other NSAID/anticoagulation reported by patient.  No other OTC/herbal/supplements reported by patient.    SOCIAL HISTORY: Tobacco: none.    PHYSICAL EXAMINATION: video visit exam   General appearance: Healthy appearing adult, in no acute distress  Eyes: Sclera anicteric  Ears, nose, mouth and throat: No obvious external lesions of ears and nose, Hearing intact  Abdomen: soft, ND, mild tenderness to deep palpation in the R abd. No RT or IG.   Neck: Symmetric, No obvious external lesions  Respiratory: Normal respiration, no use of accessory muscles   Skin: No rashes or jaundice   Psychiatric: Oriented to person, place and time, Appropriate mood and affect.     PERTINENT STUDIES:  Fecal calprotectin 226 (4/13/21) from 95 (2/19/21)    MR ENTEROGRAPHY: 2/25/2021   No evidence of active inflammation of the small bowel. No evidence of  stricture or fistula.       ASSESSMENT/PLAN:  1. Ileal Crohn's Disease, moderate  Diagnosed 3/2017. Disease limited to ileum. Initially on mesalamine until 11/2019 when it was stopped for RAMSEY.  Repeat colonoscopy 12/2019 after discontinuing medication was negative for inflammation and patient in endoscopic remission. She was off medications for a while. More recently, she has deveoped flares requiring budesonide therapy  (disease responsive, but does not tolerate this med from a gastritis standpoint). Most recently, she had a flare in 2021 that was more severe than in the past. Fecal calprotectin was elevated. MRE was negative for inflammation 21. In April, FC was elevated and in May, icscope showed small ileal ulcers. We discussed that initiation of biologic therapy at this time is indicated, to help change the course of disease to decrease risk of complications, including fistulas, strictures, hospitalizations. She has had several steroid-requiring flares per year. We discussed the various biologic options, including Remicade, Humira, Entyvio and Stelara. Plan will be to move forward with stelara, given effectiveness and safety profile. Of note, Kelly's father had SCC, her son has MS and her mother  from breast CA in her 40s.     We discussed the risks, benefits and alternatives to Stelara (ustekinumab) for Crohn's disease. Risks discussed include risk of infections and reactivation of latent infections. Serious infections (bacterial, fungal and viral) were observed in clinical trials, but not at increased rates over placebo. There is a theoretical risk for mycobacterial infections and salmonella. Pre-treatment evaluation for TB will be done to minimize risk. Malignancies were reported in some patients in clinical trials, but not at increased frequency compared to placebo. Hypersensitivity reactions, including anaphylaxis, are reported but are rare. One case of reversible posterior leukoencephalopathy syndrome (RPLS) was observed in clinical studies of psoriasis and psoriatic arthritis, however no cases have been observed in Crohn's disease. Patients to be treated with Stelara should have all age-appropriate immunizations, but should not be given BCG during treatment or one year following discontinuation of Stelara. There is slight increased risk for nasopharyngitis, injection site reactions, and vuvlovaginal  candidiasis, UTI, sinusitis, bronchitis. Listeria meningitis and ophthalmic herpes were reported in 1 patient each. Approximately 3% of patients develop antibodies to Stelara. There is limited information to guide use of Stelara during pregnancy. No adverse developmental effects were observed in monkeys exposed to doses 100 time greater than use. The patient was instructed to call immediately for signs or symptoms of infection, such as fever, sweats, chills, muscle aches, cough, shortness of breath, blood in phlegm, weight loss, warm, red or painful skin or sores, burning with urination or frequent urination, severe fatigue. Patient was also advised to call if they know they have TB or have been in close contact with someone with TB. Patient was also instructed to call immediately for headaches, seizures, confusion or vision problems. Dosing is given as intravenous load of 260 mg for patients up to 55 kg, 390 mg for patients >55 kg to 85 kg, and 520 mg for patients greater than 85 kg.     ---PA for Stelara  ---Submit fecal calprotectin     Routine IBD care:   - recommend supplementation vitamin D 1000 units daily and calcium 500 mg twice daily.Patient confirmed she is taking.   -- Vaccines/immunizations to be updated: Recommend yearly flu shot, pneumonia vaccines (Prevnar 13 then 8 weeks later Pneumovax 23 then 5 years later Pneumovax 23), tetanus every 10 years. Kelly is UTD with Shingles vaccine (she did have optic shingles in the past)   - No NSAIDs (ibuprofen, or anything containing ibuprofen)     2.  GERD  Patient has longstanding GERD symptoms, overall well controlled on omeprazole 40 mg a day.      2. Cancer Screening  No PSC, no high-risk FH CRC (2nd cousin dx'd in 2017 w/ advanced CRC @57yo). Given colonoscopy in 2019 without adenomatous lesions, recommend repeat colonoscopy for routine screening in 2029 unless symptomatic sooner.    RTC in 3 months     40 minutes spent on the date of the encounter doing  chart review, history and exam, documentation and further activities as noted above     Answers for HPI/ROS submitted by the patient on 6/5/2021   General Symptoms: No  Skin Symptoms: No  HENT Symptoms: No  EYE SYMPTOMS: No  HEART SYMPTOMS: No  LUNG SYMPTOMS: No  INTESTINAL SYMPTOMS: Yes  URINARY SYMPTOMS: No  GYNECOLOGIC SYMPTOMS: No  BREAST SYMPTOMS: No  SKELETAL SYMPTOMS: No  BLOOD SYMPTOMS: No  NERVOUS SYSTEM SYMPTOMS: No  MENTAL HEALTH SYMPTOMS: No  Heart burn or indigestion: Yes

## 2021-07-28 ENCOUNTER — TRANSFERRED RECORDS (OUTPATIENT)
Dept: HEALTH INFORMATION MANAGEMENT | Facility: CLINIC | Age: 61
End: 2021-07-28

## 2021-08-01 DIAGNOSIS — I10 HYPERTENSION GOAL BP (BLOOD PRESSURE) < 140/90: ICD-10-CM

## 2021-08-03 RX ORDER — LISINOPRIL 5 MG/1
TABLET ORAL
Qty: 90 TABLET | Refills: 0 | Status: SHIPPED | OUTPATIENT
Start: 2021-08-03 | End: 2021-11-03

## 2021-08-10 ENCOUNTER — LAB (OUTPATIENT)
Dept: LAB | Facility: CLINIC | Age: 61
End: 2021-08-10
Attending: OBSTETRICS & GYNECOLOGY
Payer: COMMERCIAL

## 2021-08-10 DIAGNOSIS — Z11.59 ENCOUNTER FOR SCREENING FOR OTHER VIRAL DISEASES: Primary | ICD-10-CM

## 2021-08-10 DIAGNOSIS — K50.00 CROHN'S DISEASE OF ILEUM WITHOUT COMPLICATION (H): ICD-10-CM

## 2021-09-16 PROCEDURE — 83993 ASSAY FOR CALPROTECTIN FECAL: CPT

## 2021-09-17 ENCOUNTER — PATIENT OUTREACH (OUTPATIENT)
Dept: GASTROENTEROLOGY | Facility: CLINIC | Age: 61
End: 2021-09-17

## 2021-09-17 LAB — CALPROTECTIN STL-MCNT: 105 MG/KG (ref 0–49.9)

## 2021-09-23 ENCOUNTER — PATIENT OUTREACH (OUTPATIENT)
Dept: GASTROENTEROLOGY | Facility: CLINIC | Age: 61
End: 2021-09-23

## 2021-09-23 ENCOUNTER — VIRTUAL VISIT (OUTPATIENT)
Dept: GASTROENTEROLOGY | Facility: CLINIC | Age: 61
End: 2021-09-23
Payer: COMMERCIAL

## 2021-09-23 VITALS — WEIGHT: 126 LBS | BODY MASS INDEX: 22.32 KG/M2

## 2021-09-23 DIAGNOSIS — K50.00 CROHN'S DISEASE OF ILEUM WITHOUT COMPLICATION (H): Primary | ICD-10-CM

## 2021-09-23 PROCEDURE — 99215 OFFICE O/P EST HI 40 MIN: CPT | Mod: 95 | Performed by: INTERNAL MEDICINE

## 2021-09-23 RX ORDER — DIPHENHYDRAMINE HYDROCHLORIDE 50 MG/ML
50 INJECTION INTRAMUSCULAR; INTRAVENOUS
Status: CANCELLED
Start: 2021-09-23

## 2021-09-23 RX ORDER — ALBUTEROL SULFATE 0.83 MG/ML
2.5 SOLUTION RESPIRATORY (INHALATION)
Status: CANCELLED | OUTPATIENT
Start: 2021-09-23

## 2021-09-23 RX ORDER — NALOXONE HYDROCHLORIDE 0.4 MG/ML
0.2 INJECTION, SOLUTION INTRAMUSCULAR; INTRAVENOUS; SUBCUTANEOUS
Status: CANCELLED | OUTPATIENT
Start: 2021-09-23

## 2021-09-23 RX ORDER — EPINEPHRINE 1 MG/ML
0.3 INJECTION, SOLUTION, CONCENTRATE INTRAVENOUS EVERY 5 MIN PRN
Status: CANCELLED | OUTPATIENT
Start: 2021-09-23

## 2021-09-23 RX ORDER — MEPERIDINE HYDROCHLORIDE 25 MG/ML
25 INJECTION INTRAMUSCULAR; INTRAVENOUS; SUBCUTANEOUS EVERY 30 MIN PRN
Status: CANCELLED | OUTPATIENT
Start: 2021-09-23

## 2021-09-23 RX ORDER — ALBUTEROL SULFATE 90 UG/1
1-2 AEROSOL, METERED RESPIRATORY (INHALATION)
Status: CANCELLED
Start: 2021-09-23

## 2021-09-23 RX ORDER — METHYLPREDNISOLONE SODIUM SUCCINATE 125 MG/2ML
125 INJECTION, POWDER, LYOPHILIZED, FOR SOLUTION INTRAMUSCULAR; INTRAVENOUS
Status: CANCELLED
Start: 2021-09-23

## 2021-09-23 NOTE — PATIENT INSTRUCTIONS
PLAN  --Repeat colonoscopy under CS in Nov 2021 for disease activity assessment   --In the meantime, plan for budesonide treatment with flare in the future  --Blood work   --Referral to Dr. Gilmore for mental health

## 2021-09-23 NOTE — NURSING NOTE
Chief Complaint   Patient presents with     Follow Up       Vitals:    09/23/21 0835   Weight: 57.2 kg (126 lb)       Body mass index is 22.32 kg/m .    Ignacia Mayfield CMA

## 2021-09-23 NOTE — PROGRESS NOTES
Stelara infusion plan entered    Plan to start prior authorization and is if approved.     Patient is going to decide if she wants to start stelara    Patient needs to have labs

## 2021-09-23 NOTE — PROGRESS NOTES
"IBD CLINIC VISIT, follow up    REASON FOR CONSULTATION: Crohn's Disease, GERD    HPI: 60 year old female w/ h/o ileal Crohn's Disease per 3/2017 ileocolonoscopy biopsies previously on PO 5-ASA, Lichen Planus (oral/perianal involvement), Hidradenitis Suppurativa s/p excision/drainage 1975, prior presumed diagnosis of Collagenous Colitis per colonic biopsies 2006 (though suspecting evolving IBD presentation in retrospect following 3/2017 highly-suggestive biopsies of terminal ileum + bland colonic biopsies), GERD presenting for f/u Crohn's Disease.  History summarized from her documentation.    8/2018 prompted an increase in 5ASA to 4.8g daily due to intermittent \"flares\" she had been experiencing, despite labs within normal limits.  Since the increased, she has had great symptomatic relieve and reports one incidence of loose stool, otherwise no episodes concerning for flares.  She is currently having 3 stools per day, soft and formed without blood in the stool.  No urgency or nighttime stools.      She had noted a \"tear\" by her anus and had this evaluated by PCP and by her GYN. It was thought to be due to lichen sclerosis (per GYN) and was prescribed a steroid cream.  She notes improvement already even with vaseline as instructed by her PCP. She did not have any pain with a BM associated with this finding.      She had noted a lump on right side of buttocks that has been very bothersome, inhibiting much of her activity.  It is adjacent to previous pilonidal cyst repair. No drainage and no fevers. MRI showed post-surgical cyst.      labs show elevated creatinine and slightly abnormal LFTs. Based on symptoms with concern for active flares (patient reported, despite normal labs) dose was increased Spring 2019. Normal Creatinine 2/2019 prior to dose increase. Labs with primary care show elevated creatinine (0.6--> 1.29). LFT slightly increased, AST 72 and ALT 92. Normal alk phos. after last visit, plan was to hold " Salbador.  Underwent colonoscopy 12/3/2019 with simple endoscopic score for Crohn's disease to be 0.    Interval history December 8, 2020:  Since the patient's last visit, overall she has been doing very well.  Did have one episode of symptoms in early October.  She woke up at 4 AM with abdominal pain, had 1 day of watery stools.  Then pain subsided later that day.  Did have urgency with bowel movements.  Denied melena or hematochezia during this episode.  After this her stools returned back to normal.  Has on average 2 bowel movements a day one before morning and 1 before work.  No abdominal pain at baseline.  Retching greasy foods can trigger symptoms.  Has maybe had 1 nocturnal bowel movement.  Consistency of stool is soft and formed.  Continues to take probiotic which she finds very helpful.    For her GERD, she is taking 40 mg of omeprazole.  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  This improves quickly with drinking water.  She is currently taking a multivitamin.      Interval history 4/20/2021   Kelly was last seen 12/8/2020 with Alice Gonzalez in GI clinic. She was doing well at that time and has been off mesalamine since 2019. Kelly reporting having a Crohn's flare in February of this year. Similar to her previous flares, she had abdominal pain and loose stools for 8 hours and then felt fatigued for 2 more days but had return of normal bowel pattern. No hematochezia, nausea/vomiting, fever, or abdominal pain. She called into clinic and was started on budesonide for 1 month. After 1-2 weeks of budesonide she had fecal calprotectin checked that was 95. Enterography was also done and did not show any evidence of active inflammation. Since this flare she had one day at the end of March where she had urgency but  "otherwise has been doing well. These have been her two \"flares\" of Crohn's this year. She had two similar flares in  and 3 in 2019. Currently having her typical 2-3 soft formed stools per day. She does note some sores on her tongue which are not very painful but occur every few weeks. She is unsure if this is related to CD or Lichen planus which typically affects her oral cavity.    Of note last colonoscopy was in 2019 and showed Simple Endoscopic Score for Crohn's Disease: 0    Luigi Holloway Index:  2021  Well-bein (very well)  Abdomina pain: 0 (none)  # liquid/soft stools: 2   Abdominal mass 0 (none)  Extraluminal comlications    - Aphthous ulcers 1 - although unclear if from LP or CD  Score: 3        Interval history 2021   Recent cscope findings below.     Luigi Holloway Index:  Well-bein (very well)  Abdominal pain: 1 (mild)  # liquid/soft stools: occasionally, up to 6 per day   Abdominal mass 0 (none)  Extraluminal comlications    - Aphthous ulcers 1 - although unclear if from LP or CD    Dad with SCC. Son has MS. Mom with breast cancer in her 40s.     ROS: 10pt ROS performed and otherwise negative.    Interval history 2021 (video visit)  Overall feeling well, but with a few, self-limited episodes of loose stools and abd pain that lasts a few hours.  No weight loss.     High fiber foods might cause abd pain.      Luigi Holloway Index:  Well-bein (very well)  Abdominal pain: 1 (mild)  # liquid/soft stools: 0;  2-3 per day (formed)   Abdominal mass 0 (none)  Extraluminal comlications    - Aphthous ulcers 1 - although unclear if from LP or CD. Has been present over the past few months.      PERTINENT PAST MEDICAL/SURGICAL HISTORY:  As noted above.    ENDOSCOPIC EVALUATION:  icscope  showed SES-CD 5 with small ulcers in the TI. Bx normal.  Bx:     SPECIMEN(S):   A: Ileum biopsy   B: Cecal biopsy     FINAL DIAGNOSIS:   A. Ileum, Biopsy:   Ileal mucosa with no granulomas, " cryptitis or other histologic evidence of    active Crohn; negative for dysplasia     B. Cecum, Biopsy:   Colonic mucosa with no granulomas, cryptitis or other histologic evidence   of active Crohn; negative for dysplasia       12/2019 Impression:          - Simple Endoscopic Score for Crohn's Disease: 0,                        mucosal inflammatory changes secondary to Crohn's                        disease, in remission. Biopsied.                        - Diverticulosis in the sigmoid colon.                        - Non-bleeding internal hemorrhoids.   A. ILEUM, BIOPSY:   - Small intestinal mucosa with no significant histologic abnormality   - Negative for dysplasia     B. CECUM, BIOPSY:   - Colonic mucosa with no significant histologic abnormality   - Negative for dysplasia     C. COLON, ASCENDING, BIOPSY:   - Colonic mucosa with no significant histologic abnormality   - Negative for dysplasia     D. COLON, TRANSVERSE, BIOPSY:   - Colonic mucosa with no significant histologic abnormality   - Negative for dysplasia     E. COLON, DESCENDING,  BIOPSY:   - Colonic mucosa with no significant histologic abnormality   - Negative for dysplasia     F. COLON, SIGMOID, BIOPSY:   - Colonic mucosa with no significant histologic abnormality   - Negative for dysplasia     G. RECTUM, BIOPSY:   - Colonic mucosa with no significant histologic abnormality   - Negative for dysplasia     PERTINENT MEDICATIONS:  -No current Crohn's medications  Medications reviewed with patient today, see Medication List/Assessment for details.  No other NSAID/anticoagulation reported by patient.  No other OTC/herbal/supplements reported by patient.    SOCIAL HISTORY: Tobacco: none.    PHYSICAL EXAMINATION: video visit exam     General appearance  Healthy appearing adult, in no acute distress     Eyes  Sclera anicteric  Pupils round and reactive to light     Ears, nose, mouth and throat  No obvious external lesions of ears and nose  Hearing intact      Neck  Symmetric  No obvious external lesions     Respiratory  Normal respiration, no use of accessory muscles      MSK  Gait normal     Skin  No rashes or jaundice      Psychiatric  Oriented to person, place and time  Appropriate mood and affect.       PERTINENT STUDIES:  Fecal calprotectin 226 (21) from 95 (21)   2021    MR ENTEROGRAPHY: 2021   No evidence of active inflammation of the small bowel. No evidence of  stricture or fistula.       ASSESSMENT/PLAN:  1. Ileal Crohn's Disease, moderate  Diagnosed 3/2017. Disease limited to ileum. Initially on mesalamine until 2019 when it was stopped for RAMSEY.  Repeat colonoscopy 2019 after discontinuing medication was negative for inflammation and patient in endoscopic remission. She was off medications for a while. More recently, she has deveoped flares requiring budesonide therapy (disease responsive, but does not tolerate this med from a gastritis standpoint). Most recently, she had a flare in 2021 that was more severe than in the past. Fecal calprotectin was elevated. MRE was negative for inflammation 21. In April, FC was elevated and in May, icscope showed small ileal ulcers. We discussed that initiation of biologic therapy at this time is indicated, to help change the course of disease to decrease risk of complications, including fistulas, strictures, hospitalizations. She has had several steroid-requiring flares per year. We discussed the various biologic options, including Remicade, Humira, Entyvio and Stelara. Plan will be to move forward with stelara, given effectiveness and safety profile. Of note, Kelly's father had SCC, her son has MS and her mother  from breast CA in her 40s.     Kelly has not yet started Stelara. Her FC was borderline elevated recently and she does report occasional food intolerances to high fiber and rare, self-limited episodes of loose stools and abd pain. She is traveling to [place] for a  wedding in Oct and does wonder about the risks of immunosuppression. We will plan for the following:    PLAN  --Repeat colonoscopy under CS in Nov 2021 for disease activity assessment   -------------If there is moderate disease, will start Stelara. PA will be obtained now.   --Blood work now  --In the meantime, plan for budesonide treatment if flare occurs  --Referral to Dr. Gilmore for mental health    Routine IBD care:   - recommend supplementation vitamin D 1000 units daily and calcium 500 mg twice daily.Patient confirmed she is taking.   -- Vaccines/immunizations to be updated: Recommend yearly flu shot, pneumonia vaccines (Prevnar 13 then 8 weeks later Pneumovax 23 then 5 years later Pneumovax 23), tetanus every 10 years. Kelly is UTD with Shingles vaccine (she did have optic shingles in the past)   - No NSAIDs (ibuprofen, or anything containing ibuprofen)     2.  GERD  Patient has longstanding GERD symptoms, overall well controlled on omeprazole 40 mg a day.      2. Cancer Screening  No PSC, no high-risk FH CRC (2nd cousin dx'd in 2017 w/ advanced CRC @55yo). Given colonoscopy in 2019 without adenomatous lesions, recommend repeat colonoscopy for routine screening in 2029 unless symptomatic sooner.    RTC in 3 months     40 minutes spent on the date of the encounter doing chart review, history and exam, documentation and further activities as noted above

## 2021-09-23 NOTE — LETTER
"    9/23/2021         RE: Aileen Priest  [address]  [place] [address]        Dear Colleague,    Thank you for referring your patient, Aileen Priest, to the [facility] [place]. Please see a copy of my visit note below.    IBD CLINIC VISIT, follow up    REASON FOR CONSULTATION: Crohn's Disease, GERD    HPI: 60 year old female w/ h/o ileal Crohn's Disease per 3/2017 ileocolonoscopy biopsies previously on PO 5-ASA, Lichen Planus (oral/perianal involvement), Hidradenitis Suppurativa s/p excision/drainage 1975, prior presumed diagnosis of Collagenous Colitis per colonic biopsies 2006 (though suspecting evolving IBD presentation in retrospect following 3/2017 highly-suggestive biopsies of terminal ileum + bland colonic biopsies), GERD presenting for f/u Crohn's Disease.  History summarized from her documentation.    8/2018 prompted an increase in 5ASA to 4.8g daily due to intermittent \"flares\" she had been experiencing, despite labs within normal limits.  Since the increased, she has had great symptomatic relieve and reports one incidence of loose stool, otherwise no episodes concerning for flares.  She is currently having 3 stools per day, soft and formed without blood in the stool.  No urgency or nighttime stools.      She had noted a \"tear\" by her anus and had this evaluated by PCP and by her GYN. It was thought to be due to lichen sclerosis (per GYN) and was prescribed a steroid cream.  She notes improvement already even with vaseline as instructed by her PCP. She did not have any pain with a BM associated with this finding.      She had noted a lump on right side of buttocks that has been very bothersome, inhibiting much of her activity.  It is adjacent to previous pilonidal cyst repair. No drainage and no fevers. MRI showed post-surgical cyst.      labs show elevated creatinine and slightly abnormal LFTs. Based on symptoms with concern for active flares (patient " reported, despite normal labs) dose was increased Spring 2019. Normal Creatinine 2/2019 prior to dose increase. Labs with primary care show elevated creatinine (0.6--> 1.29). LFT slightly increased, AST 72 and ALT 92. Normal alk phos. after last visit, plan was to hold Lialda.  Underwent colonoscopy 12/3/2019 with simple endoscopic score for Crohn's disease to be 0.    Interval history December 8, 2020:  Since the patient's last visit, overall she has been doing very well.  Did have one episode of symptoms in early October.  She woke up at 4 AM with abdominal pain, had 1 day of watery stools.  Then pain subsided later that day.  Did have urgency with bowel movements.  Denied melena or hematochezia during this episode.  After this her stools returned back to normal.  Has on average 2 bowel movements a day one before morning and 1 before work.  No abdominal pain at baseline.  Retching greasy foods can trigger symptoms.  Has maybe had 1 nocturnal bowel movement.  Consistency of stool is soft and formed.  Continues to take probiotic which she finds very helpful.    For her GERD, she is taking 40 mg of omeprazole.  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  This improves quickly with drinking water.  She is currently taking a multivitamin.      Interval history 4/20/2021   Kelly was last seen 12/8/2020 with Alice Gonzalez in GI clinic. She was doing well at that time and has been off mesalamine since 2019. Kelly reporting having a Crohn's flare in February of this year. Similar to her previous flares, she had abdominal pain and loose stools for 8 hours and then felt fatigued for 2 more days but had return of normal bowel pattern. No hematochezia, nausea/vomiting, fever, or abdominal pain. She called into clinic and was started on  "budesonide for 1 month. After 1-2 weeks of budesonide she had fecal calprotectin checked that was 95. Enterography was also done and did not show any evidence of active inflammation. Since this flare she had one day at the end of March where she had urgency but otherwise has been doing well. These have been her two \"flares\" of Crohn's this year. She had two similar flares in  and 3 in . Currently having her typical 2-3 soft formed stools per day. She does note some sores on her tongue which are not very painful but occur every few weeks. She is unsure if this is related to CD or Lichen planus which typically affects her oral cavity.    Of note last colonoscopy was in 2019 and showed Simple Endoscopic Score for Crohn's Disease: 0    Luigi Holloway Index:  2021  Well-bein (very well)  Abdomina pain: 0 (none)  # liquid/soft stools: 2   Abdominal mass 0 (none)  Extraluminal comlications    - Aphthous ulcers 1 - although unclear if from LP or CD  Score: 3        Interval history 2021   Recent cscope findings below.     Luigi Holloway Index:  Well-bein (very well)  Abdominal pain: 1 (mild)  # liquid/soft stools: occasionally, up to 6 per day   Abdominal mass 0 (none)  Extraluminal comlications    - Aphthous ulcers 1 - although unclear if from LP or CD    Dad with SCC. Son has MS. Mom with breast cancer in her 40s.     ROS: 10pt ROS performed and otherwise negative.    Interval history 2021 (video visit)  Overall feeling well, but with a few, self-limited episodes of loose stools and abd pain that lasts a few hours.  No weight loss.     High fiber foods might cause abd pain.      Luigi Holloway Index:  Well-bein (very well)  Abdominal pain: 1 (mild)  # liquid/soft stools: 0;  2-3 per day (formed)   Abdominal mass 0 (none)  Extraluminal comlications    - Aphthous ulcers 1 - although unclear if from LP or CD. Has been present over the past few months.      PERTINENT PAST MEDICAL/SURGICAL " HISTORY:  As noted above.    ENDOSCOPIC EVALUATION:  icscope 524/2021 showed SES-CD 5 with small ulcers in the TI. Bx normal.  Bx:     SPECIMEN(S):   A: Ileum biopsy   B: Cecal biopsy     FINAL DIAGNOSIS:   A. Ileum, Biopsy:   Ileal mucosa with no granulomas, cryptitis or other histologic evidence of    active Crohn; negative for dysplasia     B. Cecum, Biopsy:   Colonic mucosa with no granulomas, cryptitis or other histologic evidence   of active Crohn; negative for dysplasia       12/2019 Impression:          - Simple Endoscopic Score for Crohn's Disease: 0,                        mucosal inflammatory changes secondary to Crohn's                        disease, in remission. Biopsied.                        - Diverticulosis in the sigmoid colon.                        - Non-bleeding internal hemorrhoids.   A. ILEUM, BIOPSY:   - Small intestinal mucosa with no significant histologic abnormality   - Negative for dysplasia     B. CECUM, BIOPSY:   - Colonic mucosa with no significant histologic abnormality   - Negative for dysplasia     C. COLON, ASCENDING, BIOPSY:   - Colonic mucosa with no significant histologic abnormality   - Negative for dysplasia     D. COLON, TRANSVERSE, BIOPSY:   - Colonic mucosa with no significant histologic abnormality   - Negative for dysplasia     E. COLON, DESCENDING,  BIOPSY:   - Colonic mucosa with no significant histologic abnormality   - Negative for dysplasia     F. COLON, SIGMOID, BIOPSY:   - Colonic mucosa with no significant histologic abnormality   - Negative for dysplasia     G. RECTUM, BIOPSY:   - Colonic mucosa with no significant histologic abnormality   - Negative for dysplasia     PERTINENT MEDICATIONS:  -No current Crohn's medications  Medications reviewed with patient today, see Medication List/Assessment for details.  No other NSAID/anticoagulation reported by patient.  No other OTC/herbal/supplements reported by patient.    SOCIAL HISTORY: Tobacco: none.    PHYSICAL  EXAMINATION: video visit exam     General appearance  Healthy appearing adult, in no acute distress     Eyes  Sclera anicteric  Pupils round and reactive to light     Ears, nose, mouth and throat  No obvious external lesions of ears and nose  Hearing intact     Neck  Symmetric  No obvious external lesions     Respiratory  Normal respiration, no use of accessory muscles      MSK  Gait normal     Skin  No rashes or jaundice      Psychiatric  Oriented to person, place and time  Appropriate mood and affect.       PERTINENT STUDIES:  Fecal calprotectin 226 (21) from 95 (21)   2021    MR ENTEROGRAPHY: 2021   No evidence of active inflammation of the small bowel. No evidence of  stricture or fistula.       ASSESSMENT/PLAN:  1. Ileal Crohn's Disease, moderate  Diagnosed 3/2017. Disease limited to ileum. Initially on mesalamine until 2019 when it was stopped for RAMSEY.  Repeat colonoscopy 2019 after discontinuing medication was negative for inflammation and patient in endoscopic remission. She was off medications for a while. More recently, she has deveoped flares requiring budesonide therapy (disease responsive, but does not tolerate this med from a gastritis standpoint). Most recently, she had a flare in 2021 that was more severe than in the past. Fecal calprotectin was elevated. MRE was negative for inflammation 21. In April, FC was elevated and in May, icscope showed small ileal ulcers. We discussed that initiation of biologic therapy at this time is indicated, to help change the course of disease to decrease risk of complications, including fistulas, strictures, hospitalizations. She has had several steroid-requiring flares per year. We discussed the various biologic options, including Remicade, Humira, Entyvio and Stelara. Plan will be to move forward with stelara, given effectiveness and safety profile. Of note, Kelly's father had SCC, her son has MS and her mother  from breast  CA in her 40s.     Kelly has not yet started Stelara. Her FC was borderline elevated recently and she does report occasional food intolerances to high fiber and rare, self-limited episodes of loose stools and abd pain. She is traveling to [place] for a wedding in Oct and does wonder about the risks of immunosuppression. We will plan for the following:    PLAN  --Repeat colonoscopy under CS in Nov 2021 for disease activity assessment   -------------If there is moderate disease, will start Stelara. PA will be obtained now.   --Blood work now  --In the meantime, plan for budesonide treatment if flare occurs  --Referral to Dr. Gilmore for mental health    Routine IBD care:   - recommend supplementation vitamin D 1000 units daily and calcium 500 mg twice daily.Patient confirmed she is taking.   -- Vaccines/immunizations to be updated: Recommend yearly flu shot, pneumonia vaccines (Prevnar 13 then 8 weeks later Pneumovax 23 then 5 years later Pneumovax 23), tetanus every 10 years. Kelly is UTD with Shingles vaccine (she did have optic shingles in the past)   - No NSAIDs (ibuprofen, or anything containing ibuprofen)     2.  GERD  Patient has longstanding GERD symptoms, overall well controlled on omeprazole 40 mg a day.      2. Cancer Screening  No PSC, no high-risk FH CRC (2nd cousin dx'd in 2017 w/ advanced CRC @57yo). Given colonoscopy in 2019 without adenomatous lesions, recommend repeat colonoscopy for routine screening in 2029 unless symptomatic sooner.    RTC in 3 months     40 minutes spent on the date of the encounter doing chart review, history and exam, documentation and further activities as noted above         Again, thank you for allowing me to participate in the care of your patient.      Sincerely,    Kassy Pollock MD

## 2021-10-15 ENCOUNTER — TELEPHONE (OUTPATIENT)
Dept: GASTROENTEROLOGY | Facility: CLINIC | Age: 61
End: 2021-10-15

## 2021-10-15 NOTE — TELEPHONE ENCOUNTER
Screening Questions  1. Are you active on mychart?    2. What insurance is in the chart? PREFERREDONE ON CHART     2.  Ordering/Referring Provider: DR. ROSS    3. BMI 22.1    4. Do you have any Lung issues?  NO If yes continue:   Do you use daily home oxygen? NO   Do you have Pulmonary Hypertension? NO   Do you have SEVERE asthma? NO    5. Have you had a heart, lung, or liver transplant? NO    6. Are you currently on dialysis or have chronic kidney disease? NO    7. Have you had a stroke or Transient ischemic attack (TIA) within 6 months? NO    8. In the past 6 months, have you had any heart related issues including cardiomyopathy or heart attack? NO      If yes, did it require cardiac stenting or other implantable device?NO      9. Do you have any implantable devices in your body (pacemaker, defib, LVAD)? NO    10. Do you take nitroglycerin? If yes, how often? NO    11. Are you currently taking any blood thinners?NO    12. Are you a diabetic? NO    13. (Females) Are you currently pregnant? NO  If yes, how many weeks?      15. Are you taking any prescription pain medications on a routine schedule? NO If yes, MAC sedation.    16. Do you have any chemical dependencies such as alcohol, street drugs, or methadone? NOIf yes, MAC sedation.    17. Do you have any history of post-traumatic stress syndrome, severe anxiety or history of psychosis? NO    18. Do you transfer independently? YES    19.  Do you have any issues with constipation? NO    20. Preferred Pharmacy for Pre Prescription [facility] ON CHART     Scheduling Details    Which Colonoscopy Prep was Sent?: MIRALAX   Procedure Scheduled: COLONOSCOPY   Provider/Surgeon: DR. ROSS  Date of Procedure: 11/16/2021  [place] [facility] [place] (Please ask for phone number if not scheduled by patient): BRIEN       Sedation Type: CS  Conscious Sedation- Needs  for 6 hours after the procedure  MAC/General-Needs  for 24 hours after procedure    Pre-op  Required at [facility], [facility] and OR for MAC sedation:   (if yes advise patient they will need a pre-op prior to procedure)      Is patient on blood thinners? -NO (If yes- inform patient to follow up with PCP or provider for follow up instructions)     Informed patient they will need an adult  YES  Cannot take any type of public or medical transportation alone    Pre-Procedure Covid test to be completed at [facility] or Externally: EXTERNALLY     Confirmed Nurse will call to complete assessment YES    Additional comments: NO

## 2021-10-16 DIAGNOSIS — Z11.59 ENCOUNTER FOR SCREENING FOR OTHER VIRAL DISEASES: ICD-10-CM

## 2021-10-23 ENCOUNTER — HEALTH MAINTENANCE LETTER (OUTPATIENT)
Age: 61
End: 2021-10-23

## 2021-10-26 ENCOUNTER — ANCILLARY PROCEDURE (OUTPATIENT)
Dept: MAMMOGRAPHY | Facility: CLINIC | Age: 61
End: 2021-10-26
Payer: COMMERCIAL

## 2021-10-26 DIAGNOSIS — Z12.31 VISIT FOR SCREENING MAMMOGRAM: ICD-10-CM

## 2021-10-26 PROCEDURE — 77063 BREAST TOMOSYNTHESIS BI: CPT | Performed by: RADIOLOGY

## 2021-10-26 PROCEDURE — 77067 SCR MAMMO BI INCL CAD: CPT | Performed by: RADIOLOGY

## 2021-11-02 DIAGNOSIS — I10 HYPERTENSION GOAL BP (BLOOD PRESSURE) < 140/90: ICD-10-CM

## 2021-11-03 RX ORDER — LISINOPRIL 5 MG/1
TABLET ORAL
Qty: 90 TABLET | Refills: 0 | Status: SHIPPED | OUTPATIENT
Start: 2021-11-03 | End: 2022-01-13

## 2021-11-03 NOTE — TELEPHONE ENCOUNTER
Attempted to call, left VM stating med approved but has to schedule physical in 1-2 months for further refills.     Thanks,  TYSON Oconnor  [facility]

## 2021-11-03 NOTE — TELEPHONE ENCOUNTER
"Requested Prescriptions   Pending Prescriptions Disp Refills     lisinopril (ZESTRIL) 5 MG tablet [Pharmacy Med Name: LISINOPRIL 5MG TABS] 90 tablet 0     Sig: TAKE ONE TABLET BY MOUTH ONCE DAILY. NEED TO SCHEDULE APPOINTMENT FOR FURTHER FILLS       ACE Inhibitors (Including Combos) Protocol Failed - 11/2/2021  6:32 PM        Failed - Recent (12 mo) or future (30 days) visit within the authorizing provider's specialty     Patient has had an office visit with the authorizing provider or a provider within the authorizing providers department within the previous 12 mos or has a future within next 30 days. See \"Patient Info\" tab in inbasket, or \"Choose Columns\" in Meds & Orders section of the refill encounter.              Failed - Normal serum creatinine on file in past 12 months     Recent Labs   Lab Test 07/31/20  1120   CR 0.59       Ok to refill medication if creatinine is low          Failed - Normal serum potassium on file in past 12 months     Recent Labs   Lab Test 07/31/20  1120   POTASSIUM 4.0             Passed - Blood pressure under 140/90 in past 12 months     BP Readings from Last 3 Encounters:   06/09/21 124/84   05/24/21 115/77   07/31/20 134/80                 Passed - Medication is active on med list        Passed - Patient is age 18 or older        Passed - No active pregnancy on record        Passed - No positive pregnancy test within past 12 months           Routing refill request to provider for review/approval because:  Labs not current:  Cr, K  Patient needs to be seen because it has been more than 1 year since last office visit.      Thanks,  TYSON Oconnor  [facility]         "

## 2021-11-06 NOTE — PROGRESS NOTES
"GI CLINIC VISIT - NEW PATIENT    CC/REFERRING PROVIDER: Bucky Hunter  REASON FOR CONSULTATION: ileitis NOS on colonoscopy 3/2017 - suspicious for ileal Crohn's disease    HPI: 56 year old female w/ h/o suspected ileal Crohn's disease per 3/2017 ileocolonoscopy biopsies, Lichen Planus (oral/perianal involvement, in remission), Hidradenitis Suppurativa s/p excision/drainage 1975, presumed Collagenous Colitis per colonic biopsies 2006 (though suspecting evolving IBD presentation in retrospect following 3/2017 highly-suggestive biopsies of terminal ileum + bland colonic biopsies), presumed GERD (off PPI), lumbar spine surgery 2005, periorbital shingles 2015, chronic tobacco abuse (in remission), among other medical issues - presenting for evaluation of incidentally-found active ileitis on colonoscopy 3/28/17, concern for early/evolving ileal Crohn's disease. Pt is known to me from 3/28 colonoscopy, seen in f/u - interval confirmation of acute-on-chronic inflammation with pyloric metaplasia on ileal biopsies, very suggestive for ileal Crohn's disease (colon o/w endoscopically and histologically spared on segmental colonic biopsies). Reports having 3-4 variable consistency stools/day w/o blood (baseline), +marked urgency w/ insecurity at least 1-2x/wk. +multiple recurrent \"episodes\" of nocturnal abd pain and diarrhea (occurs at least 2-3x/yr). Denies any fecal incontinence or new perianal sxs. Denies any N/V/F/C/HA/NS or other const/syst/cardiopulmonary sxs, no BRBPR/melena/urinary changes, no unintentional wt loss or appetite/satiety changes. No other bowel/bladder habit changes, no dysphagia/odynophagia. No jaundice/icterus/pruritus, no acholic stools/steatorrhea, no new lumps/bumps, no jt pain/oral ulcer/rash/eye sxs noted. Denies any recent travel/abx/other sick contact exposures, no recent NSAID use.    ROS: 10pt ROS performed and otherwise negative.    PERTINENT PAST MEDICAL HISTORY:  As noted " Assisted MD with insertion of 16fr NG. Patient tolerated it well. Attached to low intermittent suction after placement checked via auscultation.   above.    PREVIOUS ABDOMINAL/GYNECOLOGIC SURGERIES:  As noted above.    PREVIOUS ENDOSCOPY:  - Colonoscopy 3/28/17 ([facility], [place]): +active ileal inflammation w/ ulceration, +mild diffuse colonic mucosal congestion but colon was o/w endoscopically-normal. +sigmoid diverticulosis, +IH.    PERTINENT MEDICATIONS:  Medications reviewed with patient today, see Medication List/Assessment for details.  No other NSAID/anticoagulation reported by patient.  No other OTC/herbal/supplements reported by patient.    SOCIAL HISTORY:  Former smoker (prior 1ppd [id number]), quit 1987. Reports having 3-6 drinks/wk, o/w negative. Works as L&D RN.    FAMILY HISTORY:  Father w/ Idiopathic Angioedema, mother w/ diverticulitis and breast CA. MGM w/ ovarian CA, brother w/ psoriasis, sister w/ hypothyroidism. No colon/panc/esophageal/other GI CA, no other HNPCC-related Marlon. No IBD/celiac, no other AI/liver disease.    PHYSICAL EXAMINATION (w/ RN Plumhoff present):  Vitals reviewed, AFVSS  Wt 127# today (stable)  Gen: aaox3, cooperative, pleasant, not diaphoretic, nad  HEENT: ncat, neck supple, no clad/sclad, normal op w/o ulcer/exudate, anicteric, mmm, no active oral lesions noted on this exam  Mallampati Score 1  Resp/CV without acute findings, not dyspneic/tachycardic  Abd: +nabs, soft, nt, nd, no peritoneal s/s noted. No ecchymoses, +lumbar spine surgical scar, +coccygeal/upper gluteal cleft scar from prior HS excision, no CVA/spinal tenderness noted.  Ext: no c/c/e  Skin: warm, perfused, no jaundice  Neuro: grossly intact, no asterixis noted  Perianal: No evidence of recurrent HS at scar, no active perianal LP or Crohn's-like tags    PERTINENT STUDIES:  Lytes/LFT normal, cr 0.66, alb 3.8  wbc 7, hgb 13.2, plt 332, mcv 91  ESR/CRP negative  ferritin 64, iron profile normal  B12 588, folate 18.5  VitD 24 (normal)  TSH normal  TTG negative    Q-Gold TB negative 4/10/17  HAV Ab negative  HBsAb positive (c/w prior vax), sAg/cAb negative  HCV  Ab negative  TPMT 24.5 (normal range)    ASSESSMENT/PLAN:    1. Likely early/evolving ileal Crohn's disease with clinical (but not nutritional/biochemical) impact at this time - warrants moving forward with consideration for Crohn's-directed therapy in the near-future  1. I'm glad that we had the opportunity to meet again today to discuss your recent colonoscopy results, particularly in light of the chronic active ileal inflammation noted on that exam - discussed the likelihood that this may represent early/evolving ileal Crohn's Disease, as well as the next steps to complete the diagnostic evaluation (particularly as this will have bearing on options for treatment).  - No evidence of histologic colonic involvement on 3/2017 biopsies, could represent evolving IBD presentation. Will continue to monitor recurrent colonic activity in ongoing care.  - Recommend moving forward with an MRI Enterography in the next 2-3 weeks to evaluate for more proximal Crohn's small bowel involvement as we discussed today. Will provide a single dose of Ativan to help with any associated claustrophobia.    2. Recommend moving forward with an Upper GI Endoscopy at [facility] under MAC sedation (scheduled with me) in the next 2-3 weeks, specifically to assess your upper GI concerns (chronic GERD) and to rule out Lichen Planus or Crohn's Disease upper GI involvement.    3. Recommend routine studies including nutritional and inflammatory markers as we discussed today.   - Will also obtain your immunosuppression pre-screening studies (Hepatitis serologies with exception of Hepatitis C, TB testing, Chest X-ray, TPMT level) as well. This will help our next discussions about treatment options.    4. Recommend checking out the Crohn's & Colitis Foundation of [place] website (www.ccfa.org), particularly as this is an excellent resource for patients.  Please feel free to bring any questions you have to clinic for us to discuss together, or  you can call/email us as well.    5. Continue to monitor for the danger signs/symptoms we reviewed together today:  worsening abdominal pain, worsening diarrhea, obstructive symptoms (nausea/vomiting, abdominal distention, inability to pass stool/flatus), blood mixed into stools, persistent fevers/chills, progressive anemia (particulary with iron deficiency), unexpected weight loss, etc.  - Contact us via MyChart should these symptoms occur, particularly as we may advise further evaluation accordingly.    2. Colorectal Cancer Screening  No PSC or known FH CRC, will readdress once current diagnostic evaluation is completed.    RTC 4-6 weeks, sooner if symptomatic.      Thank you for this consultation. It was a pleasure to participate in the care of this patient; please contact us with any further questions.    Rudy Morfin MD   of Medicine  [facility] - Department of Medicine  Division of Gastroenterology

## 2021-11-09 ENCOUNTER — TELEPHONE (OUTPATIENT)
Dept: GASTROENTEROLOGY | Facility: CLINIC | Age: 61
End: 2021-11-09
Payer: COMMERCIAL

## 2021-11-09 NOTE — TELEPHONE ENCOUNTER
Attempted to contact patient regarding upcoming colonoscopy procedure on 11/16/21 for pre assessment questions. No answer.     Left message to return call to [phone] #2    Covid test scheduled: 11/12/21    Arrival time: 0715    Facility location: [facility]    Sedation type: CS    Indication for procedure: crohns    Anticoagulants: no.     Bowel prep recommendation: Miralax/Magnesium citrate/Dulcolax    Sabina Valentin RN

## 2021-11-10 ENCOUNTER — PATIENT OUTREACH (OUTPATIENT)
Dept: GASTROENTEROLOGY | Facility: CLINIC | Age: 61
End: 2021-11-10
Payer: COMMERCIAL

## 2021-11-10 NOTE — TELEPHONE ENCOUNTER
Patient returned call.     Pre assessment questions completed for upcoming colonoscopy procedure scheduled on 11/16/21    COVID test scheduled 11/12/21    Reviewed procedural arrival time 0715 and facility location [facility].    Designated  policy reviewed. Instructed to have someone stay 6 hours post procedure.     Bowel prep recommendation: Miralax/Magnesium citrate/Dulcolax     Reviewed Miralax prep instructions with patient. No fiber/iron supplements or foods that contain nuts/seeds 7 days prior to procedure.     Anticoagulation/blood thinners? no    Electronic implanted devices? no    Patient verbalized understanding and had no questions or concerns at this time.    Sabina Valentin RN

## 2021-11-12 ENCOUNTER — LAB (OUTPATIENT)
Dept: LAB | Facility: CLINIC | Age: 61
End: 2021-11-12
Payer: COMMERCIAL

## 2021-11-12 DIAGNOSIS — K50.00 CROHN'S DISEASE OF ILEUM WITHOUT COMPLICATION (H): ICD-10-CM

## 2021-11-12 DIAGNOSIS — Z11.59 ENCOUNTER FOR SCREENING FOR OTHER VIRAL DISEASES: ICD-10-CM

## 2021-11-12 LAB
ALBUMIN SERPL-MCNC: 4.3 G/DL (ref 3.4–5)
ALP SERPL-CCNC: 111 U/L (ref 40–150)
ALT SERPL W P-5'-P-CCNC: 28 U/L (ref 0–50)
ANION GAP SERPL CALCULATED.3IONS-SCNC: 6 MMOL/L (ref 3–14)
AST SERPL W P-5'-P-CCNC: 20 U/L (ref 0–45)
BASOPHILS # BLD AUTO: 0 10E3/UL (ref 0–0.2)
BASOPHILS NFR BLD AUTO: 1 %
BILIRUB SERPL-MCNC: 0.6 MG/DL (ref 0.2–1.3)
BUN SERPL-MCNC: 10 MG/DL (ref 7–30)
CALCIUM SERPL-MCNC: 9.7 MG/DL (ref 8.5–10.1)
CHLORIDE BLD-SCNC: 101 MMOL/L (ref 94–109)
CO2 SERPL-SCNC: 25 MMOL/L (ref 20–32)
CREAT SERPL-MCNC: 0.64 MG/DL (ref 0.52–1.04)
CRP SERPL-MCNC: <2.9 MG/L (ref 0–8)
EOSINOPHIL # BLD AUTO: 0.3 10E3/UL (ref 0–0.7)
EOSINOPHIL NFR BLD AUTO: 6 %
ERYTHROCYTE [DISTWIDTH] IN BLOOD BY AUTOMATED COUNT: 13.1 % (ref 10–15)
GFR SERPL CREATININE-BSD FRML MDRD: >90 ML/MIN/1.73M2
GLUCOSE BLD-MCNC: 89 MG/DL (ref 70–99)
HBV CORE AB SERPL QL IA: NONREACTIVE
HBV SURFACE AB SERPL IA-ACNC: 23.03 M[IU]/ML
HBV SURFACE AG SERPL QL IA: NONREACTIVE
HCT VFR BLD AUTO: 45.1 % (ref 35–47)
HGB BLD-MCNC: 15.2 G/DL (ref 11.7–15.7)
LYMPHOCYTES # BLD AUTO: 1.7 10E3/UL (ref 0.8–5.3)
LYMPHOCYTES NFR BLD AUTO: 32 %
MCH RBC QN AUTO: 30.7 PG (ref 26.5–33)
MCHC RBC AUTO-ENTMCNC: 33.7 G/DL (ref 31.5–36.5)
MCV RBC AUTO: 91 FL (ref 78–100)
MONOCYTES # BLD AUTO: 0.5 10E3/UL (ref 0–1.3)
MONOCYTES NFR BLD AUTO: 9 %
NEUTROPHILS # BLD AUTO: 2.8 10E3/UL (ref 1.6–8.3)
NEUTROPHILS NFR BLD AUTO: 52 %
PLATELET # BLD AUTO: 311 10E3/UL (ref 150–450)
POTASSIUM BLD-SCNC: 4 MMOL/L (ref 3.4–5.3)
PROT SERPL-MCNC: 8.7 G/DL (ref 6.8–8.8)
RBC # BLD AUTO: 4.95 10E6/UL (ref 3.8–5.2)
SODIUM SERPL-SCNC: 132 MMOL/L (ref 133–144)
WBC # BLD AUTO: 5.3 10E3/UL (ref 4–11)

## 2021-11-12 PROCEDURE — 87340 HEPATITIS B SURFACE AG IA: CPT

## 2021-11-12 PROCEDURE — U0005 INFEC AGEN DETEC AMPLI PROBE: HCPCS

## 2021-11-12 PROCEDURE — 80053 COMPREHEN METABOLIC PANEL: CPT

## 2021-11-12 PROCEDURE — 86481 TB AG RESPONSE T-CELL SUSP: CPT

## 2021-11-12 PROCEDURE — 86140 C-REACTIVE PROTEIN: CPT

## 2021-11-12 PROCEDURE — 86704 HEP B CORE ANTIBODY TOTAL: CPT

## 2021-11-12 PROCEDURE — 36415 COLL VENOUS BLD VENIPUNCTURE: CPT

## 2021-11-12 PROCEDURE — 86706 HEP B SURFACE ANTIBODY: CPT

## 2021-11-12 PROCEDURE — 85025 COMPLETE CBC W/AUTO DIFF WBC: CPT

## 2021-11-12 PROCEDURE — U0003 INFECTIOUS AGENT DETECTION BY NUCLEIC ACID (DNA OR RNA); SEVERE ACUTE RESPIRATORY SYNDROME CORONAVIRUS 2 (SARS-COV-2) (CORONAVIRUS DISEASE [COVID-19]), AMPLIFIED PROBE TECHNIQUE, MAKING USE OF HIGH THROUGHPUT TECHNOLOGIES AS DESCRIBED BY CMS-2020-01-R: HCPCS

## 2021-11-13 LAB — SARS-COV-2 RNA RESP QL NAA+PROBE: NEGATIVE

## 2021-11-14 LAB
GAMMA INTERFERON BACKGROUND BLD IA-ACNC: 0.03 IU/ML
M TB IFN-G BLD-IMP: NEGATIVE
M TB IFN-G CD4+ BCKGRND COR BLD-ACNC: 7.47 IU/ML
MITOGEN IGNF BCKGRD COR BLD-ACNC: 0.06 IU/ML
MITOGEN IGNF BCKGRD COR BLD-ACNC: 0.14 IU/ML
QUANTIFERON MITOGEN: 7.5 IU/ML
QUANTIFERON NIL TUBE: 0.03 IU/ML
QUANTIFERON TB1 TUBE: 0.09 IU/ML
QUANTIFERON TB2 TUBE: 0.17

## 2021-11-16 ENCOUNTER — ANESTHESIA EVENT (OUTPATIENT)
Dept: SURGERY | Facility: AMBULATORY SURGERY CENTER | Age: 61
End: 2021-11-16
Payer: COMMERCIAL

## 2021-11-16 ENCOUNTER — ANESTHESIA (OUTPATIENT)
Dept: SURGERY | Facility: AMBULATORY SURGERY CENTER | Age: 61
End: 2021-11-16
Payer: COMMERCIAL

## 2021-11-16 ENCOUNTER — HOSPITAL ENCOUNTER (OUTPATIENT)
Facility: AMBULATORY SURGERY CENTER | Age: 61
End: 2021-11-16
Attending: INTERNAL MEDICINE
Payer: COMMERCIAL

## 2021-11-16 VITALS
OXYGEN SATURATION: 100 % | SYSTOLIC BLOOD PRESSURE: 111 MMHG | TEMPERATURE: 97.1 F | HEART RATE: 62 BPM | DIASTOLIC BLOOD PRESSURE: 75 MMHG | RESPIRATION RATE: 16 BRPM | BODY MASS INDEX: 21.62 KG/M2 | WEIGHT: 122 LBS | HEIGHT: 63 IN

## 2021-11-16 VITALS — HEART RATE: 68 BPM

## 2021-11-16 LAB — COLONOSCOPY: NORMAL

## 2021-11-16 PROCEDURE — 88305 TISSUE EXAM BY PATHOLOGIST: CPT | Mod: 26 | Performed by: PATHOLOGY

## 2021-11-16 PROCEDURE — 45380 COLONOSCOPY AND BIOPSY: CPT | Mod: 33

## 2021-11-16 PROCEDURE — 88305 TISSUE EXAM BY PATHOLOGIST: CPT | Mod: TC | Performed by: INTERNAL MEDICINE

## 2021-11-16 RX ORDER — PROCHLORPERAZINE MALEATE 10 MG
10 TABLET ORAL EVERY 6 HOURS PRN
Status: DISCONTINUED | OUTPATIENT
Start: 2021-11-16 | End: 2021-11-17 | Stop reason: HOSPADM

## 2021-11-16 RX ORDER — SODIUM CHLORIDE, SODIUM LACTATE, POTASSIUM CHLORIDE, CALCIUM CHLORIDE 600; 310; 30; 20 MG/100ML; MG/100ML; MG/100ML; MG/100ML
INJECTION, SOLUTION INTRAVENOUS CONTINUOUS PRN
Status: DISCONTINUED | OUTPATIENT
Start: 2021-11-16 | End: 2021-11-16

## 2021-11-16 RX ORDER — NALOXONE HYDROCHLORIDE 0.4 MG/ML
0.4 INJECTION, SOLUTION INTRAMUSCULAR; INTRAVENOUS; SUBCUTANEOUS
Status: DISCONTINUED | OUTPATIENT
Start: 2021-11-16 | End: 2021-11-17 | Stop reason: HOSPADM

## 2021-11-16 RX ORDER — LIDOCAINE HYDROCHLORIDE 20 MG/ML
INJECTION, SOLUTION INFILTRATION; PERINEURAL PRN
Status: DISCONTINUED | OUTPATIENT
Start: 2021-11-16 | End: 2021-11-16

## 2021-11-16 RX ORDER — FLUMAZENIL 0.1 MG/ML
0.2 INJECTION, SOLUTION INTRAVENOUS
Status: ACTIVE | OUTPATIENT
Start: 2021-11-16 | End: 2021-11-16

## 2021-11-16 RX ORDER — PROPOFOL 10 MG/ML
INJECTION, EMULSION INTRAVENOUS PRN
Status: DISCONTINUED | OUTPATIENT
Start: 2021-11-16 | End: 2021-11-16

## 2021-11-16 RX ORDER — ONDANSETRON 2 MG/ML
4 INJECTION INTRAMUSCULAR; INTRAVENOUS EVERY 6 HOURS PRN
Status: DISCONTINUED | OUTPATIENT
Start: 2021-11-16 | End: 2021-11-17 | Stop reason: HOSPADM

## 2021-11-16 RX ORDER — ONDANSETRON 4 MG/1
4 TABLET, ORALLY DISINTEGRATING ORAL EVERY 6 HOURS PRN
Status: CANCELLED | OUTPATIENT
Start: 2021-11-16

## 2021-11-16 RX ORDER — FLUMAZENIL 0.1 MG/ML
0.2 INJECTION, SOLUTION INTRAVENOUS
Status: CANCELLED | OUTPATIENT
Start: 2021-11-16 | End: 2021-11-16

## 2021-11-16 RX ORDER — ONDANSETRON 4 MG/1
4 TABLET, ORALLY DISINTEGRATING ORAL EVERY 6 HOURS PRN
Status: DISCONTINUED | OUTPATIENT
Start: 2021-11-16 | End: 2021-11-17 | Stop reason: HOSPADM

## 2021-11-16 RX ORDER — ONDANSETRON 2 MG/ML
4 INJECTION INTRAMUSCULAR; INTRAVENOUS
Status: DISCONTINUED | OUTPATIENT
Start: 2021-11-16 | End: 2021-11-16 | Stop reason: HOSPADM

## 2021-11-16 RX ORDER — SIMETHICONE
LIQUID (ML) MISCELLANEOUS PRN
Status: DISCONTINUED | OUTPATIENT
Start: 2021-11-16 | End: 2021-11-16 | Stop reason: HOSPADM

## 2021-11-16 RX ORDER — PROCHLORPERAZINE MALEATE 10 MG
10 TABLET ORAL EVERY 6 HOURS PRN
Status: CANCELLED | OUTPATIENT
Start: 2021-11-16

## 2021-11-16 RX ORDER — NALOXONE HYDROCHLORIDE 0.4 MG/ML
0.2 INJECTION, SOLUTION INTRAMUSCULAR; INTRAVENOUS; SUBCUTANEOUS
Status: DISCONTINUED | OUTPATIENT
Start: 2021-11-16 | End: 2021-11-17 | Stop reason: HOSPADM

## 2021-11-16 RX ORDER — ONDANSETRON 2 MG/ML
4 INJECTION INTRAMUSCULAR; INTRAVENOUS EVERY 6 HOURS PRN
Status: CANCELLED | OUTPATIENT
Start: 2021-11-16

## 2021-11-16 RX ORDER — LIDOCAINE 40 MG/G
CREAM TOPICAL
Status: DISCONTINUED | OUTPATIENT
Start: 2021-11-16 | End: 2021-11-16 | Stop reason: HOSPADM

## 2021-11-16 RX ORDER — PROPOFOL 10 MG/ML
INJECTION, EMULSION INTRAVENOUS CONTINUOUS PRN
Status: DISCONTINUED | OUTPATIENT
Start: 2021-11-16 | End: 2021-11-16

## 2021-11-16 RX ADMIN — PROPOFOL 50 MG: 10 INJECTION, EMULSION INTRAVENOUS at 08:18

## 2021-11-16 RX ADMIN — SODIUM CHLORIDE, SODIUM LACTATE, POTASSIUM CHLORIDE, CALCIUM CHLORIDE: 600; 310; 30; 20 INJECTION, SOLUTION INTRAVENOUS at 07:00

## 2021-11-16 RX ADMIN — LIDOCAINE HYDROCHLORIDE 50 MG: 20 INJECTION, SOLUTION INFILTRATION; PERINEURAL at 08:18

## 2021-11-16 RX ADMIN — PROPOFOL 200 MCG/KG/MIN: 10 INJECTION, EMULSION INTRAVENOUS at 08:18

## 2021-11-16 ASSESSMENT — MIFFLIN-ST. JEOR: SCORE: 1082.52

## 2021-11-16 ASSESSMENT — COPD QUESTIONNAIRES: COPD: 0

## 2021-11-16 ASSESSMENT — ENCOUNTER SYMPTOMS: SEIZURES: 0

## 2021-11-16 ASSESSMENT — LIFESTYLE VARIABLES: TOBACCO_USE: 0

## 2021-11-16 NOTE — ANESTHESIA CARE TRANSFER NOTE
Patient: Aileen Priest    Procedure: Procedure(s):  COLONOSCOPY, WITH  BIOPSY       Diagnosis: Crohn's disease of small intestine with other complication (H) [K50.018]  Diagnosis Additional Information: No value filed.    Anesthesia Type:   No value filed.     Note:    Oropharynx: spontaneously breathing and oropharynx clear of all foreign objects  Level of Consciousness: awake  Oxygen Supplementation: room air    Independent Airway: airway patency satisfactory and stable  Dentition: dentition unchanged  Vital Signs Stable: post-procedure vital signs reviewed and stable  Report to RN Given: handoff report given  Patient transferred to: Phase II    Handoff Report: Identifed the Patient, Identified the Reponsible Provider, Reviewed the pertinent medical history, Discussed the surgical course, Reviewed Intra-OP anesthesia mangement and issues during anesthesia, Set expectations for post-procedure period and Allowed opportunity for questions and acknowledgement of understanding      Vitals:  Vitals Value Taken Time   BP     Temp     Pulse     Resp     SpO2         Electronically Signed By: HERIBERTO Paredes CRNA  November 16, 2021  8:54 AM

## 2021-11-16 NOTE — ANESTHESIA POSTPROCEDURE EVALUATION
Patient: Aileen Priest    Procedure: Procedure(s):  COLONOSCOPY, WITH  BIOPSY       Diagnosis:Crohn's disease of small intestine with other complication (H) [K50.018]  Diagnosis Additional Information: No value filed.    Anesthesia Type:  MAC    Note:  Disposition: Outpatient   Postop Pain Control: Uneventful            Sign Out: Well controlled pain   PONV: No   Neuro/Psych: Uneventful            Sign Out: Acceptable/Baseline neuro status   Airway/Respiratory: Uneventful            Sign Out: Acceptable/Baseline resp. status   CV/Hemodynamics: Uneventful            Sign Out: Acceptable CV status; No obvious hypovolemia; No obvious fluid overload   Other NRE: NONE   DID A NON-ROUTINE EVENT OCCUR? No           Last vitals:  Vitals Value Taken Time   /75 11/16/21 0926   Temp 36.2  C (97.1  F) 11/16/21 0910   Pulse 62 11/16/21 0926   Resp 16 11/16/21 0926   SpO2 100 % 11/16/21 0910       Electronically Signed By: Ibrahima Daniel MD  November 16, 2021  10:44 AM

## 2021-11-16 NOTE — ANESTHESIA PREPROCEDURE EVALUATION
Anesthesia Pre-Procedure Evaluation    Patient: Aileen Priest   MRN: [id number] : 1960        Preoperative Diagnosis: Crohn's disease of small intestine with other complication (H) [K50.018]    Procedure : Procedure(s):  COLONOSCOPY, WITH  BIOPSY          Past Medical History:   Diagnosis Date     Collagenous colitis      Lichen planus      Lichen planus       Past Surgical History:   Procedure Laterality Date     BACK SURGERY      Lumbar discectomy L5 S1     COLONOSCOPY N/A 12/3/2019    Procedure: COLONOSCOPY, WITH POLYPECTOMY AND BIOPSY;  Surgeon: Kassy Pollock MD;  [facility]     COLONOSCOPY N/A 2021    Procedure: COLONOSCOPY, WITH BIOPSY;  Surgeon: Kassy Pollock MD;  Location: [facility] OR     HC DRAIN PILONIDAL CYST SIMPLE       HC REMOVAL OF TONSILS,12+ Y/O       ZZC NONSPECIFIC PROCEDURE       X 2     ZZC NONSPECIFIC PROCEDURE      removal of growth from toe      Allergies   Allergen Reactions     Sulfa Drugs Rash      Social History     Tobacco Use     Smoking status: Former Smoker     Packs/day: 1.00     Years: 10.00     Pack years: 10.00     Types: Cigarettes     Start date: 1976     Quit date: 1987     Years since quittin.8     Smokeless tobacco: Never Used   Substance Use Topics     Alcohol use: Yes     Alcohol/week: 1.0 - 3.0 standard drink     Types: 1 - 3 Cans of beer per week     Comment: socially      Wt Readings from Last 1 Encounters:   21 55.3 kg (122 lb)        Anesthesia Evaluation   Pt has had prior anesthetic.     No history of anesthetic complications       ROS/MED HX  ENT/Pulmonary:    (-) tobacco use, asthma and COPD   Neurologic:    (-) no seizures, no CVA and no TIA   Cardiovascular:     (+) hypertension----- (-) angina, MILLER and angina   METS/Exercise Tolerance: >4 METS    Hematologic:       Musculoskeletal:       GI/Hepatic:    (-) GERD   Renal/Genitourinary:       Endo:       Psychiatric/Substance Use:       Infectious Disease:        Malignancy:       Other:            Physical Exam    Airway  airway exam normal           Respiratory Devices and Support         Dental  no notable dental history         Cardiovascular   cardiovascular exam normal          Pulmonary   pulmonary exam normal                OUTSIDE LABS:  CBC:   Lab Results   Component Value Date    WBC 5.3 11/12/2021    WBC 6.0 02/04/2019    HGB 15.2 11/12/2021    HGB 12.8 02/04/2019    HCT 45.1 11/12/2021    HCT 39.5 02/04/2019     11/12/2021     02/04/2019     BMP:   Lab Results   Component Value Date     (L) 11/12/2021     (L) 07/31/2020    POTASSIUM 4.0 11/12/2021    POTASSIUM 4.0 07/31/2020    CHLORIDE 101 11/12/2021    CHLORIDE 99 07/31/2020    CO2 25 11/12/2021    CO2 24 07/31/2020    BUN 10 11/12/2021    BUN 12 07/31/2020    CR 0.64 11/12/2021    CR 0.59 07/31/2020    GLC 89 11/12/2021    GLC 89 07/31/2020     COAGS: No results found for: PTT, INR, FIBR  POC: No results found for: BGM, HCG, HCGS  HEPATIC:   Lab Results   Component Value Date    ALBUMIN 4.3 11/12/2021    PROTTOTAL 8.7 11/12/2021    ALT 28 11/12/2021    AST 20 11/12/2021    ALKPHOS 111 11/12/2021    BILITOTAL 0.6 11/12/2021     OTHER:   Lab Results   Component Value Date    AYANA 9.7 11/12/2021    MAG 1.8 09/18/2018    LIPASE 90 03/05/2014    TSH 2.26 01/27/2017    T4 6.4 04/02/2008    CRP <2.9 11/12/2021    SED 9 02/04/2019       Anesthesia Plan    ASA Status:  2   NPO Status:  NPO Appropriate    Anesthesia Type: MAC.     - Reason for MAC: straight local not clinically adequate   Induction: N/a.   Maintenance: N/A.        Consents    Anesthesia Plan(s) and associated risks, benefits, and realistic alternatives discussed. Questions answered and patient/representative(s) expressed understanding.     - Discussed with:  Patient         Postoperative Care       PONV prophylaxis: Ondansetron (or other 5HT-3)     Comments:                Ibrahima Daniel MD

## 2021-11-16 NOTE — H&P
Aileenosvaldo Priest  [id number]  female  61 year old      Reason for procedure/surgery: Crohn's disease    Patient Active Problem List   Diagnosis     Leg weakness     CARDIOVASCULAR SCREENING; LDL GOAL LESS THAN 160     Nasal folliculitis     Hypertension, goal below 140/90     Crohn's disease of small intestine with other complication (H)     History of hidradenitis suppurativa     Lichen planus     Crohn's disease of small intestine without complication (H)       Past Surgical History:    Past Surgical History:   Procedure Laterality Date     BACK SURGERY      Lumbar discectomy L5 S1     COLONOSCOPY N/A 12/3/2019    Procedure: COLONOSCOPY, WITH POLYPECTOMY AND BIOPSY;  Surgeon: Kassy Pollock MD;  [facility]     COLONOSCOPY N/A 2021    Procedure: COLONOSCOPY, WITH BIOPSY;  Surgeon: Kassy Pollock MD;  Location: [facility] OR     HC DRAIN PILONIDAL CYST SIMPLE       HC REMOVAL OF TONSILS,12+ Y/O       ZZ NONSPECIFIC PROCEDURE       X 2     ZZC NONSPECIFIC PROCEDURE      removal of growth from toe       Past Medical History:   Past Medical History:   Diagnosis Date     Collagenous colitis      Lichen planus      Lichen planus        Social History:   Social History     Tobacco Use     Smoking status: Former Smoker     Packs/day: 1.00     Years: 10.00     Pack years: 10.00     Types: Cigarettes     Start date: 1976     Quit date: 1987     Years since quittin.8     Smokeless tobacco: Never Used   Substance Use Topics     Alcohol use: Yes     Alcohol/week: 1.0 - 3.0 standard drink     Types: 1 - 3 Cans of beer per week     Comment: socially       Family History:   Family History   Problem Relation Age of Onset     Breast Cancer Mother      Hypertension Father      Cancer Maternal Grandmother      Heart Disease Maternal Grandfather      Cancer Paternal Grandmother      Respiratory Paternal Grandfather      Asthma Brother      Depression Sister      Psychotic Disorder Sister      Asthma Son       "Depression Son      Thyroid Disease Sister        Allergies:   Allergies   Allergen Reactions     Sulfa Drugs Rash       Active Medications:   Current Outpatient Medications   Medication Sig Dispense Refill     Calcium Carb-Cholecalciferol (CALCIUM + D3 PO)        dexamethasone 0.5 MG/5ML solution Take  by mouth daily.       Lactobacillus-Inulin (CULTURELLE DIGESTIVE HEALTH PO)        lisinopril (ZESTRIL) 5 MG tablet TAKE ONE TABLET BY MOUTH ONCE DAILY. NEED TO SCHEDULE APPOINTMENT FOR FURTHER FILLS 90 tablet 0     Multiple Vitamins-Minerals (MULTIVITAMIN ADULT PO) Take 1 capsule by mouth daily       omeprazole (PRILOSEC) 40 MG DR capsule Take 1 capsule (40 mg) by mouth daily Call clinic to schedule follow up appointment. 90 capsule 3       Systemic Review:   CONSTITUTIONAL: NEGATIVE for fever, chills, change in weight  ENT/MOUTH: NEGATIVE for ear, mouth and throat problems  RESP: NEGATIVE for significant cough or SOB  CV: NEGATIVE for chest pain, palpitations or peripheral edema    Physical Examination:   Vital Signs: /75   Pulse 62   Temp 97.1  F (36.2  C) (Temporal)   Resp 16   Ht 1.6 m (5' 3\")   Wt 55.3 kg (122 lb)   LMP  (LMP Unknown)   SpO2 100%   BMI 21.61 kg/m    GENERAL: healthy, alert and no distress  NECK: no adenopathy, no asymmetry, masses, or scars  RESP: lungs clear to auscultation - no rales, rhonchi or wheezes  CV: regular rate and rhythm, normal S1 S2, no S3 or S4, no murmur, click or rub, no peripheral edema and peripheral pulses strong  ABDOMEN: soft, nontender, no hepatosplenomegaly, no masses and bowel sounds normal  MS: no gross musculoskeletal defects noted, no edema    Plan: Appropriate to proceed as scheduled.      Kassy Pollock MD  11/16/2021    PCP:  Bucky Hunter    "

## 2021-11-17 LAB
PATH REPORT.COMMENTS IMP SPEC: NORMAL
PATH REPORT.COMMENTS IMP SPEC: NORMAL
PATH REPORT.FINAL DX SPEC: NORMAL
PATH REPORT.GROSS SPEC: NORMAL
PATH REPORT.MICROSCOPIC SPEC OTHER STN: NORMAL
PATH REPORT.RELEVANT HX SPEC: NORMAL
PHOTO IMAGE: NORMAL

## 2021-11-22 ASSESSMENT — ENCOUNTER SYMPTOMS
NAIL CHANGES: 0
SKIN CHANGES: 0
POOR WOUND HEALING: 0

## 2021-11-23 ENCOUNTER — VIRTUAL VISIT (OUTPATIENT)
Dept: GASTROENTEROLOGY | Facility: CLINIC | Age: 61
End: 2021-11-23
Payer: COMMERCIAL

## 2021-11-23 DIAGNOSIS — K50.00 CROHN'S DISEASE OF ILEUM WITHOUT COMPLICATION (H): Primary | ICD-10-CM

## 2021-11-23 PROCEDURE — 99214 OFFICE O/P EST MOD 30 MIN: CPT | Mod: 95 | Performed by: INTERNAL MEDICINE

## 2021-11-23 NOTE — LETTER
"    11/23/2021         RE: Aileen Priest  [address]  [place] [address]        Dear Colleague,    Thank you for referring your patient, Aileen Priest, to the [facility] [place]. Please see a copy of my visit note below.    IBD CLINIC VISIT, follow up    REASON FOR CONSULTATION: Crohn's Disease, GERD    HPI: 60 year old female w/ h/o ileal Crohn's Disease per 3/2017 ileocolonoscopy biopsies previously on PO 5-ASA, Lichen Planus (oral/perianal involvement), Hidradenitis Suppurativa s/p excision/drainage 1975, prior presumed diagnosis of Collagenous Colitis per colonic biopsies 2006 (though suspecting evolving IBD presentation in retrospect following 3/2017 highly-suggestive biopsies of terminal ileum + bland colonic biopsies), GERD presenting for f/u Crohn's Disease.  History summarized from her documentation.    8/2018 prompted an increase in 5ASA to 4.8g daily due to intermittent \"flares\" she had been experiencing, despite labs within normal limits.  Since the increased, she has had great symptomatic relieve and reports one incidence of loose stool, otherwise no episodes concerning for flares.  She is currently having 3 stools per day, soft and formed without blood in the stool.  No urgency or nighttime stools.      She had noted a \"tear\" by her anus and had this evaluated by PCP and by her GYN. It was thought to be due to lichen sclerosis (per GYN) and was prescribed a steroid cream.  She notes improvement already even with vaseline as instructed by her PCP. She did not have any pain with a BM associated with this finding.      She had noted a lump on right side of buttocks that has been very bothersome, inhibiting much of her activity.  It is adjacent to previous pilonidal cyst repair. No drainage and no fevers. MRI showed post-surgical cyst.      labs show elevated creatinine and slightly abnormal LFTs. Based on symptoms with concern for active flares (patient " reported, despite normal labs) dose was increased Spring 2019. Normal Creatinine 2/2019 prior to dose increase. Labs with primary care show elevated creatinine (0.6--> 1.29). LFT slightly increased, AST 72 and ALT 92. Normal alk phos. after last visit, plan was to hold Lialda.  Underwent colonoscopy 12/3/2019 with simple endoscopic score for Crohn's disease to be 0.    Interval history December 8, 2020:  Since the patient's last visit, overall she has been doing very well.  Did have one episode of symptoms in early October.  She woke up at 4 AM with abdominal pain, had 1 day of watery stools.  Then pain subsided later that day.  Did have urgency with bowel movements.  Denied melena or hematochezia during this episode.  After this her stools returned back to normal.  Has on average 2 bowel movements a day one before morning and 1 before work.  No abdominal pain at baseline.  Retching greasy foods can trigger symptoms.  Has maybe had 1 nocturnal bowel movement.  Consistency of stool is soft and formed.  Continues to take probiotic which she finds very helpful.    For her GERD, she is taking 40 mg of omeprazole.  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  This improves quickly with drinking water.  She is currently taking a multivitamin.      Interval history 4/20/2021   Kelly was last seen 12/8/2020 with Alice Gonzalez in GI clinic. She was doing well at that time and has been off mesalamine since 2019. Kelly reporting having a Crohn's flare in February of this year. Similar to her previous flares, she had abdominal pain and loose stools for 8 hours and then felt fatigued for 2 more days but had return of normal bowel pattern. No hematochezia, nausea/vomiting, fever, or abdominal pain. She called into clinic and was started on  "budesonide for 1 month. After 1-2 weeks of budesonide she had fecal calprotectin checked that was 95. Enterography was also done and did not show any evidence of active inflammation. Since this flare she had one day at the end of March where she had urgency but otherwise has been doing well. These have been her two \"flares\" of Crohn's this year. She had two similar flares in  and 3 in . Currently having her typical 2-3 soft formed stools per day. She does note some sores on her tongue which are not very painful but occur every few weeks. She is unsure if this is related to CD or Lichen planus which typically affects her oral cavity.    Of note last colonoscopy was in 2019 and showed Simple Endoscopic Score for Crohn's Disease: 0    Luigi Holloway Index:  2021  Well-bein (very well)  Abdomina pain: 0 (none)  # liquid/soft stools: 2   Abdominal mass 0 (none)  Extraluminal comlications    - Aphthous ulcers 1 - although unclear if from LP or CD  Score: 3      Interval history 2021   Recent cscope findings below.     Luigi Holloway Index:  Well-bein (very well)  Abdominal pain: 1 (mild)  # liquid/soft stools: occasionally, up to 6 per day   Abdominal mass 0 (none)  Extraluminal comlications    - Aphthous ulcers 1 - although unclear if from LP or CD    Dad with SCC. Son has MS. Mom with breast cancer in her 40s.     ROS: 10pt ROS performed and otherwise negative.    Interval history 2021 (video visit)  Overall feeling well, but with a few, self-limited episodes of loose stools and abd pain that lasts a few hours.  No weight loss.     High fiber foods might cause abd pain.      Luigi Holloway Index:  Well-bein (very well)  Abdominal pain: 1 (mild)  # liquid/soft stools: 0;  2-3 per day (formed)   Abdominal mass 0 (none)  Extraluminal complications    - Aphthous ulcers 1 - although unclear if from LP or CD. Has been present over the past few months.      Interval history 2021 (video " visit)  Continues to feel well overall. Does have occasional urgency. Has 2-3 BMs per day, soft, well-formed. No blood. No weight loss. No abdominal pain.       PERTINENT PAST MEDICAL/SURGICAL HISTORY:  As noted above.    ENDOSCOPIC EVALUATION:  icscope 11/2021 showed SES-CD 4    The Simple Endoscopic Score for Crohn's Disease was determined based on        the endoscopic appearance of the mucosa in the following segments:        - Ileum: Findings include aphthous ulcers less than 0.5 cm in size, less        than 10% ulcerated surfaces, less than 50% of surfaces affected and no        narrowings. Segment score: 3.        - Right Colon: Findings include no ulcers present, no ulcerated        surfaces, less than 50% of surfaces affected, no narrowings and no        ulcers present, no ulcerated surfaces, no affected surfaces and no        narrowings. Segment score: 1.        - Transverse Colon: Findings include no ulcers present, no ulcerated        surfaces, no affected surfaces and no narrowings. Segment score: 0.        - Left Colon: Findings include no ulcers present, no ulcerated surfaces,        no affected surfaces and no narrowings. Segment score: 0.        - Rectum: Findings include no ulcers present, no ulcerated surfaces, no        affected surfaces and no narrowings. Segment score: 0.        - Total SES-CD aggregate score: 4. Biopsies were taken with a cold        forceps for histology.     PATH:     A.  TERMINAL ILEUM, BIOPSY:  Ileal mucosa with no granulomas, cryptitis or other histologic evidence of active Crohn; negative for dysplasia     B. CECUM, BIOPSY:  Colonic mucosa with no granulomas, cryptitis or other histologic evidence of active Crohn; negative for dysplasia        icscope 5/24/2021 showed SES-CD 5 with small ulcers in the TI. Bx normal.  Bx:     SPECIMEN(S):   A: Ileum biopsy   B: Cecal biopsy     FINAL DIAGNOSIS:   A. Ileum, Biopsy:   Ileal mucosa with no granulomas, cryptitis or other  histologic evidence of    active Crohn; negative for dysplasia     B. Cecum, Biopsy:   Colonic mucosa with no granulomas, cryptitis or other histologic evidence   of active Crohn; negative for dysplasia       12/2019 Impression:          - Simple Endoscopic Score for Crohn's Disease: 0,                        mucosal inflammatory changes secondary to Crohn's                        disease, in remission. Biopsied.                        - Diverticulosis in the sigmoid colon.                        - Non-bleeding internal hemorrhoids.   A. ILEUM, BIOPSY:   - Small intestinal mucosa with no significant histologic abnormality   - Negative for dysplasia     B. CECUM, BIOPSY:   - Colonic mucosa with no significant histologic abnormality   - Negative for dysplasia     C. COLON, ASCENDING, BIOPSY:   - Colonic mucosa with no significant histologic abnormality   - Negative for dysplasia     D. COLON, TRANSVERSE, BIOPSY:   - Colonic mucosa with no significant histologic abnormality   - Negative for dysplasia     E. COLON, DESCENDING,  BIOPSY:   - Colonic mucosa with no significant histologic abnormality   - Negative for dysplasia     F. COLON, SIGMOID, BIOPSY:   - Colonic mucosa with no significant histologic abnormality   - Negative for dysplasia     G. RECTUM, BIOPSY:   - Colonic mucosa with no significant histologic abnormality   - Negative for dysplasia     PERTINENT MEDICATIONS:  -No current Crohn's medications  Medications reviewed with patient today, see Medication List/Assessment for details.  No other NSAID/anticoagulation reported by patient.  No other OTC/herbal/supplements reported by patient.    SOCIAL HISTORY: Tobacco: none.    PHYSICAL EXAMINATION: video visit exam     General appearance  Healthy appearing adult, in no acute distress     Eyes  Sclera anicteric  Pupils round and reactive to light     Ears, nose, mouth and throat  No obvious external lesions of ears and nose  Hearing intact     Neck  Symmetric  No  obvious external lesions     Respiratory  Normal respiration, no use of accessory muscles      MSK  Gait normal     Skin  No rashes or jaundice      Psychiatric  Oriented to person, place and time  Appropriate mood and affect.       PERTINENT STUDIES:  Fecal calprotectin 226 (21) from 95 (21)   2021    MR ENTEROGRAPHY: 2021   No evidence of active inflammation of the small bowel. No evidence of  stricture or fistula.     ASSESSMENT/PLAN:  1. Ileal Crohn's Disease, moderate  Diagnosed 3/2017. Disease limited to ileum. Initially on mesalamine until 2019 when it was stopped for RAMSEY.  Repeat colonoscopy 2019 after discontinuing medication was negative for inflammation and patient in endoscopic remission. She was off medications for a while. More recently, she has deveoped flares requiring budesonide therapy (disease responsive, but does not tolerate this med from a gastritis standpoint). Most recently, she had a flare in 2021 that was more severe than in the past. Fecal calprotectin was elevated. MRE was negative for inflammation 21. In April, FC was elevated and in May, icscope showed small ileal ulcers. Her repeat icscope in 2021 showed persistent ileal ulcers.  We discussed that initiation of biologic therapy at this time is indicated, to help change the course of disease to decrease risk of complications, including fistulas, strictures, hospitalizations. She has had several steroid-requiring flares per year. We discussed the various biologic options, including Remicade, Humira, Entyvio and Stelara. Plan will be to move forward with stelara, given effectiveness and safety profile. Of note, Kelly's father had SCC, her son has MS and her mother  from breast CA in her 40s.     We will plan for the following:    PLAN  ---Schedule Stelara infusion  ---Referral to [facility] pharmacy for HCM     Routine IBD care:   - recommend supplementation vitamin D 1000 units daily and calcium  500 mg twice daily.Patient confirmed she is taking.   -- Vaccines/immunizations to be updated: Recommend yearly flu shot, pneumonia vaccines (Prevnar 13 then 8 weeks later Pneumovax 23 then 5 years later Pneumovax 23), tetanus every 10 years. Kelly is UTD with Shingles vaccine (she did have optic shingles in the past)   - No NSAIDs (ibuprofen, or anything containing ibuprofen)     2.  GERD  Patient has longstanding GERD symptoms, overall well controlled on omeprazole 40 mg a day.      2. Cancer Screening  No PSC, no high-risk FH CRC (2nd cousin dx'd in 2017 w/ advanced CRC @55yo). Given colonoscopy in 2019 without adenomatous lesions, recommend repeat colonoscopy for routine screening in 2029 unless symptomatic sooner.    RTC in 3 months     31 minutes spent on the date of the encounter doing chart review, history and exam, documentation and further activities as noted above         Again, thank you for allowing me to participate in the care of your patient.      Sincerely,    Kassy Pollock MD

## 2021-11-23 NOTE — NURSING NOTE
Patient verified meds and allergies are correct via patients echeck-in.    Tonia Luciano, Virtual Facilitator

## 2021-11-23 NOTE — PATIENT INSTRUCTIONS
PLAN  ---Schedule Stelara infusion  ---Referral to [facility] pharmacy for HCM   ---COVID swab

## 2021-11-23 NOTE — PROGRESS NOTES
"Kelly is a 61 year old who is being evaluated via a billable video visit.      How would you like to obtain your AVS? MyChart  If the video visit is dropped, the invitation should be resent by: Text to cell phone: [phone]  Will anyone else be joining your video visit? No      Video Start Time: 2:16 PM  Video-Visit Details    Type of service:  Video Visit    Video End Time:2:35 PM    Originating Location (pt. Location): Home    Distant Location (provider location):  [facility] [place]     Platform used for Video Visit: [facility]     IBD CLINIC VISIT, follow up    REASON FOR CONSULTATION: Crohn's Disease, GERD    HPI: 60 year old female w/ h/o ileal Crohn's Disease per 3/2017 ileocolonoscopy biopsies previously on PO 5-ASA, Lichen Planus (oral/perianal involvement), Hidradenitis Suppurativa s/p excision/drainage 1975, prior presumed diagnosis of Collagenous Colitis per colonic biopsies 2006 (though suspecting evolving IBD presentation in retrospect following 3/2017 highly-suggestive biopsies of terminal ileum + bland colonic biopsies), GERD presenting for f/u Crohn's Disease.  History summarized from her documentation.    8/2018 prompted an increase in 5ASA to 4.8g daily due to intermittent \"flares\" she had been experiencing, despite labs within normal limits.  Since the increased, she has had great symptomatic relieve and reports one incidence of loose stool, otherwise no episodes concerning for flares.  She is currently having 3 stools per day, soft and formed without blood in the stool.  No urgency or nighttime stools.      She had noted a \"tear\" by her anus and had this evaluated by PCP and by her GYN. It was thought to be due to lichen sclerosis (per GYN) and was prescribed a steroid cream.  She notes improvement already even with vaseline as instructed by her PCP. She did not have any pain with a BM associated with this finding.      She had noted a lump on right side of " buttocks that has been very bothersome, inhibiting much of her activity.  It is adjacent to previous pilonidal cyst repair. No drainage and no fevers. MRI showed post-surgical cyst.      labs show elevated creatinine and slightly abnormal LFTs. Based on symptoms with concern for active flares (patient reported, despite normal labs) dose was increased Spring 2019. Normal Creatinine 2/2019 prior to dose increase. Labs with primary care show elevated creatinine (0.6--> 1.29). LFT slightly increased, AST 72 and ALT 92. Normal alk phos. after last visit, plan was to hold Lialda.  Underwent colonoscopy 12/3/2019 with simple endoscopic score for Crohn's disease to be 0.    Interval history December 8, 2020:  Since the patient's last visit, overall she has been doing very well.  Did have one episode of symptoms in early October.  She woke up at 4 AM with abdominal pain, had 1 day of watery stools.  Then pain subsided later that day.  Did have urgency with bowel movements.  Denied melena or hematochezia during this episode.  After this her stools returned back to normal.  Has on average 2 bowel movements a day one before morning and 1 before work.  No abdominal pain at baseline.  Retching greasy foods can trigger symptoms.  Has maybe had 1 nocturnal bowel movement.  Consistency of stool is soft and formed.  Continues to take probiotic which she finds very helpful.    For her GERD, she is taking 40 mg of omeprazole.  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  This improves quickly with drinking water.  She is currently taking a multivitamin.      Interval history 4/20/2021   Kelly was last seen 12/8/2020 with Alice Gonzalez in GI clinic. She was doing well at that time and has been off mesalamine since 2019. Kelly reporting having a  "Crohn's flare in February of this year. Similar to her previous flares, she had abdominal pain and loose stools for 8 hours and then felt fatigued for 2 more days but had return of normal bowel pattern. No hematochezia, nausea/vomiting, fever, or abdominal pain. She called into clinic and was started on budesonide for 1 month. After 1-2 weeks of budesonide she had fecal calprotectin checked that was 95. Enterography was also done and did not show any evidence of active inflammation. Since this flare she had one day at the end of March where she had urgency but otherwise has been doing well. These have been her two \"flares\" of Crohn's this year. She had two similar flares in  and 3 in . Currently having her typical 2-3 soft formed stools per day. She does note some sores on her tongue which are not very painful but occur every few weeks. She is unsure if this is related to CD or Lichen planus which typically affects her oral cavity.    Of note last colonoscopy was in 2019 and showed Simple Endoscopic Score for Crohn's Disease: 0    Luigi Holloway Index:  2021  Well-bein (very well)  Abdomina pain: 0 (none)  # liquid/soft stools: 2   Abdominal mass 0 (none)  Extraluminal comlications    - Aphthous ulcers 1 - although unclear if from LP or CD  Score: 3      Interval history 2021   Recent cscope findings below.     Luigi Holloway Index:  Well-bein (very well)  Abdominal pain: 1 (mild)  # liquid/soft stools: occasionally, up to 6 per day   Abdominal mass 0 (none)  Extraluminal comlications    - Aphthous ulcers 1 - although unclear if from LP or CD    Dad with SCC. Son has MS. Mom with breast cancer in her 40s.     ROS: 10pt ROS performed and otherwise negative.    Interval history 2021 (video visit)  Overall feeling well, but with a few, self-limited episodes of loose stools and abd pain that lasts a few hours.  No weight loss.     High fiber foods might cause abd pain.      Luigi Holloway " Index:  Well-bein (very well)  Abdominal pain: 1 (mild)  # liquid/soft stools: 0;  2-3 per day (formed)   Abdominal mass 0 (none)  Extraluminal complications    - Aphthous ulcers 1 - although unclear if from LP or CD. Has been present over the past few months.      Interval history 2021 (video visit)  Continues to feel well overall. Does have occasional urgency. Has 2-3 BMs per day, soft, well-formed. No blood. No weight loss. No abdominal pain.       PERTINENT PAST MEDICAL/SURGICAL HISTORY:  As noted above.    ENDOSCOPIC EVALUATION:  icscope 2021 showed SES-CD 4    The Simple Endoscopic Score for Crohn's Disease was determined based on        the endoscopic appearance of the mucosa in the following segments:        - Ileum: Findings include aphthous ulcers less than 0.5 cm in size, less        than 10% ulcerated surfaces, less than 50% of surfaces affected and no        narrowings. Segment score: 3.        - Right Colon: Findings include no ulcers present, no ulcerated        surfaces, less than 50% of surfaces affected, no narrowings and no        ulcers present, no ulcerated surfaces, no affected surfaces and no        narrowings. Segment score: 1.        - Transverse Colon: Findings include no ulcers present, no ulcerated        surfaces, no affected surfaces and no narrowings. Segment score: 0.        - Left Colon: Findings include no ulcers present, no ulcerated surfaces,        no affected surfaces and no narrowings. Segment score: 0.        - Rectum: Findings include no ulcers present, no ulcerated surfaces, no        affected surfaces and no narrowings. Segment score: 0.        - Total SES-CD aggregate score: 4. Biopsies were taken with a cold        forceps for histology.     PATH:     A.  TERMINAL ILEUM, BIOPSY:  Ileal mucosa with no granulomas, cryptitis or other histologic evidence of active Crohn; negative for dysplasia     B. CECUM, BIOPSY:  Colonic mucosa with no granulomas, cryptitis or  other histologic evidence of active Crohn; negative for dysplasia        icscope 5/24/2021 showed SES-CD 5 with small ulcers in the TI. Bx normal.  Bx:     SPECIMEN(S):   A: Ileum biopsy   B: Cecal biopsy     FINAL DIAGNOSIS:   A. Ileum, Biopsy:   Ileal mucosa with no granulomas, cryptitis or other histologic evidence of    active Crohn; negative for dysplasia     B. Cecum, Biopsy:   Colonic mucosa with no granulomas, cryptitis or other histologic evidence   of active Crohn; negative for dysplasia       12/2019 Impression:          - Simple Endoscopic Score for Crohn's Disease: 0,                        mucosal inflammatory changes secondary to Crohn's                        disease, in remission. Biopsied.                        - Diverticulosis in the sigmoid colon.                        - Non-bleeding internal hemorrhoids.   A. ILEUM, BIOPSY:   - Small intestinal mucosa with no significant histologic abnormality   - Negative for dysplasia     B. CECUM, BIOPSY:   - Colonic mucosa with no significant histologic abnormality   - Negative for dysplasia     C. COLON, ASCENDING, BIOPSY:   - Colonic mucosa with no significant histologic abnormality   - Negative for dysplasia     D. COLON, TRANSVERSE, BIOPSY:   - Colonic mucosa with no significant histologic abnormality   - Negative for dysplasia     E. COLON, DESCENDING,  BIOPSY:   - Colonic mucosa with no significant histologic abnormality   - Negative for dysplasia     F. COLON, SIGMOID, BIOPSY:   - Colonic mucosa with no significant histologic abnormality   - Negative for dysplasia     G. RECTUM, BIOPSY:   - Colonic mucosa with no significant histologic abnormality   - Negative for dysplasia     PERTINENT MEDICATIONS:  -No current Crohn's medications  Medications reviewed with patient today, see Medication List/Assessment for details.  No other NSAID/anticoagulation reported by patient.  No other OTC/herbal/supplements reported by patient.    SOCIAL HISTORY: Tobacco:  none.    PHYSICAL EXAMINATION: video visit exam     General appearance  Healthy appearing adult, in no acute distress     Eyes  Sclera anicteric  Pupils round and reactive to light     Ears, nose, mouth and throat  No obvious external lesions of ears and nose  Hearing intact     Neck  Symmetric  No obvious external lesions     Respiratory  Normal respiration, no use of accessory muscles      MSK  Gait normal     Skin  No rashes or jaundice      Psychiatric  Oriented to person, place and time  Appropriate mood and affect.       PERTINENT STUDIES:  Fecal calprotectin 226 (4/13/21) from 95 (2/19/21)   9/2021    MR ENTEROGRAPHY: 2/25/2021   No evidence of active inflammation of the small bowel. No evidence of  stricture or fistula.     ASSESSMENT/PLAN:  1. Ileal Crohn's Disease, moderate  Diagnosed 3/2017. Disease limited to ileum. Initially on mesalamine until 11/2019 when it was stopped for RAMSEY.  Repeat colonoscopy 12/2019 after discontinuing medication was negative for inflammation and patient in endoscopic remission. She was off medications for a while. More recently, she has deveoped flares requiring budesonide therapy (disease responsive, but does not tolerate this med from a gastritis standpoint). Most recently, she had a flare in Feb 2021 that was more severe than in the past. Fecal calprotectin was elevated. MRE was negative for inflammation 2/25/21. In April, FC was elevated and in May, icscope showed small ileal ulcers. Her repeat icscope in Nov 2021 showed persistent ileal ulcers.  We discussed that initiation of biologic therapy at this time is indicated, to help change the course of disease to decrease risk of complications, including fistulas, strictures, hospitalizations. She has had several steroid-requiring flares per year. We discussed the various biologic options, including Remicade, Humira, Entyvio and Stelara. Plan will be to move forward with stelara, given effectiveness and safety profile.  Of note, Kelly's father had SCC, her son has MS and her mother  from breast CA in her 40s.     We will plan for the following:    PLAN  ---Schedule Stelara infusion  ---Referral to [facility] pharmacy for HCM     Routine IBD care:   - recommend supplementation vitamin D 1000 units daily and calcium 500 mg twice daily.Patient confirmed she is taking.   -- Vaccines/immunizations to be updated: Recommend yearly flu shot, pneumonia vaccines (Prevnar 13 then 8 weeks later Pneumovax 23 then 5 years later Pneumovax 23), tetanus every 10 years. Kelly is UTD with Shingles vaccine (she did have optic shingles in the past)   - No NSAIDs (ibuprofen, or anything containing ibuprofen)     2.  GERD  Patient has longstanding GERD symptoms, overall well controlled on omeprazole 40 mg a day.      2. Cancer Screening  No PSC, no high-risk FH CRC (2nd cousin dx'd in 2017 w/ advanced CRC @57yo). Given colonoscopy in 2019 without adenomatous lesions, recommend repeat colonoscopy for routine screening in  unless symptomatic sooner.    RTC in 3 months     31 minutes spent on the date of the encounter doing chart review, history and exam, documentation and further activities as noted above

## 2021-11-27 ENCOUNTER — E-VISIT (OUTPATIENT)
Dept: URGENT CARE | Facility: URGENT CARE | Age: 61
End: 2021-11-27
Payer: COMMERCIAL

## 2021-11-27 DIAGNOSIS — Z20.822 ENCOUNTER FOR LABORATORY TESTING FOR COVID-19 VIRUS: Primary | ICD-10-CM

## 2021-11-27 PROCEDURE — 99421 OL DIG E/M SVC 5-10 MIN: CPT | Performed by: PHYSICIAN ASSISTANT

## 2021-11-27 NOTE — PATIENT INSTRUCTIONS
Dear Aileen Priest,    Based on your responses, we have ordered COVID-19 (coronavirus) testing for you. Testing is recommended for people without symptoms in a number of different situations. These reasons include testing prior to air travel, testing after international travel, periodic testing for people who are attending in-person school, and testing related to participation in activities such as sports.     How to schedule:  Go to your [employer] home page and scroll down to the section that says  You have an appointment that needs to be scheduled  and click the large green button that says  Schedule Now  and follow the steps to find the next available opening.     If you are unable to complete these [employer] scheduling steps, please call [phone] to schedule your testing.      Know the test result takes usually 1-2 days to return but occasionally takes longer (over 95% are done within 72 hours).The results are available on [employer] right when the lab is completed. We will also call all people who have positive results.    What are the symptoms of COVID-19?  The most common symptoms are cough, fever and trouble breathing. Less common symptoms include headache, body aches, fatigue (feeling very tired), chills, sore throat, stuffy or runny nose, diarrhea (loose poop), loss of taste or smell, belly pain, and nausea or vomiting (feeling sick to your stomach or throwing up).    If you develop symptoms of COVID-19, you should be re-evaluated to see if retesting would be recommended.     Where can I get more information?  [facility] - About COVID-19: [email or web address]  CDC - What to Do If You're Sick: www.cdc.gov/coronavirus/2019-ncov/about/steps-when-sick.html  CDC - Ending Home Isolation: www.cdc.gov/coronavirus/2019-ncov/hcp/disposition-in-home-patients.html  CDC - Caring for Someone: www.cdc.gov/coronavirus/2019-ncov/if-you-are-sick/care-for-someone.html  [facility]  trials (COVID-19 research studies): [email or web address]  Below are the COVID-19 hotlines at the [place] Department of [facility] ([facility]). Interpreters are available.  For health questions: Call 537-046-3370 or 1-752.829.2084 (7 a.m. to 7 p.m.)  For questions about schools and childcare: Call 178-425-2632 or 1-429.108.6573 (7 a.m. to 7 p.m.)

## 2021-11-29 ENCOUNTER — LAB (OUTPATIENT)
Dept: LAB | Facility: CLINIC | Age: 61
End: 2021-11-29
Attending: PHYSICIAN ASSISTANT
Payer: COMMERCIAL

## 2021-11-29 DIAGNOSIS — Z20.822 ENCOUNTER FOR LABORATORY TESTING FOR COVID-19 VIRUS: ICD-10-CM

## 2021-11-29 LAB — SARS-COV-2 RNA RESP QL NAA+PROBE: NEGATIVE

## 2021-11-29 PROCEDURE — U0005 INFEC AGEN DETEC AMPLI PROBE: HCPCS

## 2021-11-29 PROCEDURE — U0003 INFECTIOUS AGENT DETECTION BY NUCLEIC ACID (DNA OR RNA); SEVERE ACUTE RESPIRATORY SYNDROME CORONAVIRUS 2 (SARS-COV-2) (CORONAVIRUS DISEASE [COVID-19]), AMPLIFIED PROBE TECHNIQUE, MAKING USE OF HIGH THROUGHPUT TECHNOLOGIES AS DESCRIBED BY CMS-2020-01-R: HCPCS

## 2021-12-01 ENCOUNTER — TELEPHONE (OUTPATIENT)
Dept: GASTROENTEROLOGY | Facility: CLINIC | Age: 61
End: 2021-12-01
Payer: COMMERCIAL

## 2021-12-01 NOTE — TELEPHONE ENCOUNTER
PA Initiation    Medication: STELARA  Insurance Company: [employer] - Phone [phone] Fax [phone]  Pharmacy Filling the Rx: [facility] - [place], [place] - [facility] [address]  Filling Pharmacy Phone:    Filling Pharmacy Fax:    Start Date: 12/1/2021    KARY FOSTER (Pereyra: [id number])

## 2021-12-03 NOTE — TELEPHONE ENCOUNTER
PRIOR AUTHORIZATION DENIED    Medication: STELARA - DENIED     Denial Date: 12/3/2021    Denial Rational:  Patient has not tried and failed 6-MP, Imuran, methotrexate     Appeal Information:  Can appeal

## 2021-12-08 ENCOUNTER — VIRTUAL VISIT (OUTPATIENT)
Dept: PHARMACY | Facility: CLINIC | Age: 61
End: 2021-12-08
Payer: COMMERCIAL

## 2021-12-08 DIAGNOSIS — I10 HYPERTENSION, GOAL BELOW 140/90: ICD-10-CM

## 2021-12-08 DIAGNOSIS — L43.9 LICHEN PLANUS: ICD-10-CM

## 2021-12-08 DIAGNOSIS — K21.9 GASTROESOPHAGEAL REFLUX DISEASE, UNSPECIFIED WHETHER ESOPHAGITIS PRESENT: ICD-10-CM

## 2021-12-08 DIAGNOSIS — K50.018 CROHN'S DISEASE OF SMALL INTESTINE WITH OTHER COMPLICATION (H): Primary | ICD-10-CM

## 2021-12-08 PROCEDURE — 99605 MTMS BY PHARM NP 15 MIN: CPT | Performed by: PHARMACIST

## 2021-12-08 PROCEDURE — 99607 MTMS BY PHARM ADDL 15 MIN: CPT | Performed by: PHARMACIST

## 2021-12-08 NOTE — Clinical Note
Hi E,     Would you be agreeable to Kelly tapering her omeprazole? She is hoping to get off of it, but given time on therapy we both agree an extended taper may be more successful. Thank you, Valerie

## 2021-12-08 NOTE — PROGRESS NOTES
Medication Therapy Management (MTM) Encounter    ASSESSMENT:                            Medication Adherence/Access: See below for considerations    Crohn's: Kelly would benefit from starting ustekinumab as indicated by Dr. Pollock. We will move forward with appealing her insurance for maintenance dosing as discussed, including information given today. Given chronic PPI, we discussed consideration for switching to calcium citrate to improve absorption. We reviewed health maintenance today, and given planned immunosuppression Kelly should consider pneumococcal vaccination with both Prevnar-13 and Pneumovax-23.     Oral Lichen Planus: Stable based on report.    GERD: Given stability of symptoms and potential long-term side effects associated with chronic PPI therapy, agree with trying to taper or minimize this. Will discuss with Dr. Pollock.     Hypertension: Hypertension at goal of < 140/90 mmHg.    PLAN:                            1. Valerie to follow-up on Stelara appeal   -- Connected with our pharmacy liaison/RNCC, plan in place to appeal    2. Valerie to discuss omeprazole tapering with Dr. Pollock      3. While on PPI therapy, Kelly to consider Citracal (calcium citrate)     4. Kelly to consider the following vaccines:   -- Prevnar-13 and Pneumovax-23     Follow-up: 1 year maintenance review   -consider DEXA    SUBJECTIVE/OBJECTIVE:                          Aileen Priest is a 61 year old female called for an initial visit. She was referred to me from Dr. Pollock.      Reason for visit: IBD Health Maintenance review (starting Stelara)    Allergies/ADRs: Reviewed in chart  Tobacco: She reports that she quit smoking about 34 years ago. Her smoking use included cigarettes. She started smoking about 45 years ago. She has a 10.00 pack-year smoking history. She has never used smokeless tobacco.  Alcohol: socially    Medication Adherence/Access: no issues reported    Crohn's:   Stelara (pending start)  Physician's  choice probiotics  Centrum silver daily  Calcium daily     Kelly was recently seen by Dr. Pollock with plan to start Stelara. We discussed today that her infusion has been authorized and is currently scheduled, however, her subcutaneous injections (maintenance dosing) were denied by her insurance for failure to try immunomodulators and methotrexate. Dad has melanoma (leg) and oral cancer - squamous cell carcinoma of the mouth. Son in MS. Maternal grandmother uterine cancer. Lots of cancers in her family - farmers. 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 Had Shingles of the eye previously and took her a long time to recover.     Kelly did review ustekinumab with Dr. Pollock and her preference was to do question/answer as opposed to reviewing the medication again. We discussed coverage of infusions versus injectables. She remembers that the infusion is only authorized until 12/29/21, therefore, if the subcutaneous injections are not approved by then, she may have be delay the infusion and get a new authorization.     IBD Health Care Maintenance:    Vaccinations:  All patients on biologics should avoid live vaccines.    -- Influenza (every year) 10/13/21  -- TdaP (every 10 years) 2/23/2015, due 2025  -- Pneumococcal Pneumonia    Prevnar-13: not on file   Pneumovax-23: not on file   -- COVID-19 12/22/20, 1/11/21, and 9/13/21  -- Yearly assessment for latent Tb (verbal screening and exam, PPD or QuantiFERON-Tb testing)       -- negative 11/12/21    One time confirmation of immunity or serologies:  -- Hepatitis A (serologies or immunizations) not on file, can consider depending on risk factors   -- Hepatitis B (serologies or immunizations) serologies 2021 indicate immunity  -- Varicella/Zoster Shingrix 12/18/18 and 11/19/19    Due to the immunosuppression in this patient, I would not advise administration of live vaccines such as varicella/VZV, intranasal influenza, MMR, or yellow fever  vaccine (if traveling).      Pre-Biologic Screening:  Completed 11/12/21  -- Hep B Surface Antibody serologies indicate immunity  -- Hep B Surface Antigen non-reactive  -- Hep B Core Antibody non-reactive    Bone mineral density screening   -- Recommend all patients supplement with calcium and vitamin D    Cancer Screening:    Cervical cancer screening: Annual due to immunosuppression (pending)   -- gets yearly     Skin cancer screening: Annual visual exam of skin by dermatologist since patient is planning immunosuppression (discussed data specific to Stelara)   -- gets yearly    Depression Screening:  -- Over the last month, have you felt down, depressed, or hopeless? No  -- Over the last month, have you felt little interest or pleasure doing things? No    Misc:  -- Avoid tobacco use  -- Avoid NSAIDs as there is potentially a 25% chance of causing an IBD flare    Oral Lichen Planus:   Dexamethasone swish and spit up to three times daily    She notes this is another reason she may not want to take methotrexate. No acute concerns.    GERD:   Omeprazole 40 mg daily    Has been on this forever. She would be interested in tapering but understands rebound is a potential.     Hypertension:   Lisinopril 5 mg daily    No reported side effects or concerns.    BP Readings from Last 3 Encounters:   11/16/21 111/75   06/09/21 124/84   05/24/21 115/77     ----------------    I spent 45 minutes with this patient today. I offer these suggestions for consideration by her care team. A copy of the visit note was provided to the patient's referring provider.    The patient was sent via [employer] a summary of these recommendations.     Valerie ArteagaD, BCACP  MTM Pharmacist   [facility]  Phone: [phone]    Telemedicine Visit Details  Type of service:  Telephone visit  Start Time: 11:00 AM  End Time: 11:45 AM  Originating Location (patient location): Home  Distant Location (provider  location):  [facility]     Medication Therapy Recommendations  Crohn's disease of small intestine with other complication (H)    Current Medication: Calcium Carb-Cholecalciferol (CALCIUM + D3 PO)   Rationale: More effective medication available - Ineffective medication - Effectiveness   Recommendation: Change Medication - CALCIUM CITRATE   Status: Accepted - no CPA Needed   Note: Consider change to calcium citrate to improve absorption in setting of omeprazole.          Rationale: Preventive therapy - Needs additional medication therapy - Indication   Recommendation: Start Medication - pneumococcal Susp injection   Status: Patient Agreed - Adherence/Education   Note: Kelly to consider Prevnar-13 followed by Pneumovax-23 at least 8 weeks later given planned immunosuppression.         Gastroesophageal reflux disease, unspecified whether esophagitis present    Current Medication: omeprazole (PRILOSEC) 40 MG DR capsule   Rationale: No medical indication at this time - Unnecessary medication therapy - Indication   Recommendation: Discontinue Medication   Status: Contact Provider - Awaiting Response   Note: Valerie to discuss omeprazole tapering with Dr. Pollock.

## 2021-12-13 NOTE — PATIENT INSTRUCTIONS
Recommendations from today's MTM visit:                                                    MTM (medication therapy management) is a service provided by a clinical pharmacist designed to help you get the most of out of your medicines.   Today we reviewed what your medicines are for, how to know if they are working, that your medicines are safe and how to make your medicine regimen as easy as possible.      1. Valerie to follow-up on Fideltra appeal   -- Connected with our pharmacy liaison/RNCC, plan in place to appeal.    2. Valerie to discuss omeprazole tapering with Dr. Pollock.     3. While on PPI therapy, Kelly to consider Citracal (calcium citrate) to improve absorption of calcium.    4. Kelly to consider the following vaccines:   -- Pneumonia vaccines: We would recommend getting Prevnar-13 first, followed by Pneumovax-23 at least eight weeks later. Pneumovax-23 then gets a booster dose after five years if remaining on immunosuppression.     Follow-up: 1 year maintenance review   -consider DEXA    It was great to speak with you today.  I value your experience and would be very thankful for your time with providing feedback on our clinic survey. You may receive a survey via email or text message in the next few days.     To schedule another MTM appointment, please call the clinic directly or you may call the MTM scheduling line at [phone] or toll-free at [phone].     My Clinical Pharmacist's contact information:                                                      Please feel free to contact me with any questions or concerns you have.      Valerie Nino PharmD, BCACP  MTM Pharmacist   [facility]  Phone: [phone]

## 2021-12-14 NOTE — TELEPHONE ENCOUNTER
Medication Appeal Initiation    We have initiated an appeal for the requested medication:  Medication: STELARA - APPEAL INITIATED   Appeal Start Date:  12/14/2021  Insurance Company: [employer] - Phone [phone] Fax [phone]  Comments:  Letter in letters tab

## 2021-12-15 ENCOUNTER — PATIENT OUTREACH (OUTPATIENT)
Dept: GASTROENTEROLOGY | Facility: CLINIC | Age: 61
End: 2021-12-15
Payer: COMMERCIAL

## 2021-12-15 DIAGNOSIS — K50.00 CROHN'S DISEASE OF ILEUM WITHOUT COMPLICATION (H): Primary | ICD-10-CM

## 2021-12-15 DIAGNOSIS — K21.9 GASTROESOPHAGEAL REFLUX DISEASE, UNSPECIFIED WHETHER ESOPHAGITIS PRESENT: ICD-10-CM

## 2021-12-15 NOTE — TELEPHONE ENCOUNTER
MEDICATION APPEAL APPROVED    Medication: STELARA - APPEAL APPROVED  Authorization Effective Date:  12/14/21  Authorization Expiration Date:  12/14/22  Approved Dose/Quantity:   Reference #:     Insurance Company: [id number] - Phone [phone] Fax [phone]  Expected CoPay:     $22  CoPay Card Available:      Foundation Assistance Needed:    Which Pharmacy is filling the prescription (Not needed for infusion/clinic administered): [facility] - [place], [place] - [facility] [address]    * PATIENT NOTIFIED, RX STILL NEEDS TO BE SENT TO [facility]

## 2021-12-17 RX ORDER — OMEPRAZOLE 40 MG/1
40 CAPSULE, DELAYED RELEASE ORAL DAILY
Qty: 90 CAPSULE | Refills: 3 | Status: SHIPPED | OUTPATIENT
Start: 2021-12-17 | End: 2022-04-14

## 2021-12-23 ENCOUNTER — LAB (OUTPATIENT)
Dept: LAB | Facility: CLINIC | Age: 61
End: 2021-12-23
Payer: COMMERCIAL

## 2021-12-23 DIAGNOSIS — Z20.822 ENCOUNTER FOR LABORATORY TESTING FOR COVID-19 VIRUS: Primary | ICD-10-CM

## 2021-12-23 DIAGNOSIS — Z11.59 ENCOUNTER FOR SCREENING FOR OTHER VIRAL DISEASES: ICD-10-CM

## 2021-12-23 PROCEDURE — 99000 SPECIMEN HANDLING OFFICE-LAB: CPT | Performed by: PATHOLOGY

## 2021-12-23 PROCEDURE — U0005 INFEC AGEN DETEC AMPLI PROBE: HCPCS | Mod: 90 | Performed by: PATHOLOGY

## 2021-12-23 PROCEDURE — U0003 INFECTIOUS AGENT DETECTION BY NUCLEIC ACID (DNA OR RNA); SEVERE ACUTE RESPIRATORY SYNDROME CORONAVIRUS 2 (SARS-COV-2) (CORONAVIRUS DISEASE [COVID-19]), AMPLIFIED PROBE TECHNIQUE, MAKING USE OF HIGH THROUGHPUT TECHNOLOGIES AS DESCRIBED BY CMS-2020-01-R: HCPCS | Mod: 90 | Performed by: PATHOLOGY

## 2021-12-24 LAB — SARS-COV-2 RNA RESP QL NAA+PROBE: NEGATIVE

## 2021-12-27 ENCOUNTER — INFUSION THERAPY VISIT (OUTPATIENT)
Dept: INFUSION THERAPY | Facility: CLINIC | Age: 61
End: 2021-12-27
Attending: INTERNAL MEDICINE
Payer: COMMERCIAL

## 2021-12-27 VITALS
RESPIRATION RATE: 16 BRPM | DIASTOLIC BLOOD PRESSURE: 90 MMHG | BODY MASS INDEX: 22.82 KG/M2 | TEMPERATURE: 97.7 F | HEART RATE: 85 BPM | WEIGHT: 128.8 LBS | OXYGEN SATURATION: 97 % | SYSTOLIC BLOOD PRESSURE: 132 MMHG

## 2021-12-27 DIAGNOSIS — K50.00 CROHN'S DISEASE OF SMALL INTESTINE WITHOUT COMPLICATION (H): Primary | ICD-10-CM

## 2021-12-27 PROCEDURE — 96365 THER/PROPH/DIAG IV INF INIT: CPT

## 2021-12-27 PROCEDURE — 250N000011 HC RX IP 250 OP 636: Performed by: INTERNAL MEDICINE

## 2021-12-27 PROCEDURE — 258N000003 HC RX IP 258 OP 636: Performed by: INTERNAL MEDICINE

## 2021-12-27 RX ORDER — DIPHENHYDRAMINE HYDROCHLORIDE 50 MG/ML
50 INJECTION INTRAMUSCULAR; INTRAVENOUS
Status: CANCELLED
Start: 2021-12-27

## 2021-12-27 RX ORDER — EPINEPHRINE 1 MG/ML
0.3 INJECTION, SOLUTION INTRAMUSCULAR; SUBCUTANEOUS EVERY 5 MIN PRN
Status: CANCELLED | OUTPATIENT
Start: 2021-12-27

## 2021-12-27 RX ORDER — MEPERIDINE HYDROCHLORIDE 25 MG/ML
25 INJECTION INTRAMUSCULAR; INTRAVENOUS; SUBCUTANEOUS EVERY 30 MIN PRN
Status: CANCELLED | OUTPATIENT
Start: 2021-12-27

## 2021-12-27 RX ORDER — METHYLPREDNISOLONE SODIUM SUCCINATE 125 MG/2ML
125 INJECTION, POWDER, LYOPHILIZED, FOR SOLUTION INTRAMUSCULAR; INTRAVENOUS
Status: CANCELLED
Start: 2021-12-27

## 2021-12-27 RX ORDER — NALOXONE HYDROCHLORIDE 0.4 MG/ML
0.2 INJECTION, SOLUTION INTRAMUSCULAR; INTRAVENOUS; SUBCUTANEOUS
Status: CANCELLED | OUTPATIENT
Start: 2021-12-27

## 2021-12-27 RX ORDER — ALBUTEROL SULFATE 0.83 MG/ML
2.5 SOLUTION RESPIRATORY (INHALATION)
Status: CANCELLED | OUTPATIENT
Start: 2021-12-27

## 2021-12-27 RX ORDER — ALBUTEROL SULFATE 90 UG/1
1-2 AEROSOL, METERED RESPIRATORY (INHALATION)
Status: CANCELLED
Start: 2021-12-27

## 2021-12-27 RX ADMIN — USTEKINUMAB 390 MG: 130 SOLUTION INTRAVENOUS at 09:50

## 2021-12-27 NOTE — LETTER
12/27/2021         RE: Aileen Priest  [address]  [place] [address]        Dear Colleague,    Thank you for referring your patient, Aileen Priest, to the [facility]. Please see a copy of my visit note below.    Nursing Note  Aileen Priest presents today [facility] for:   Chief Complaint   Patient presents with     Infusion     Stelara     During today's [facility] appointment, orders from Dr. Pollock were completed.  Frequency: first dose    Progress note:  Patient identification verified by name and date of birth.  Assessment completed.  Vitals recorded in Doc Flowsheets.  Patient was provided with education regarding medication/procedure and possible side effects.  Patient verbalized understanding.     present during visit today: Not Applicable.    Treatment Conditions: ~~~ NOTE: If the patient answers yes to any of the questions below, hold the infusion and contact ordering provider or on-call provider.    1. Have you recently had an elevated temperature, fever, chills, productive cough, coughing for 3 weeks or longer or hemoptysis, abnormal vital signs, night sweats,  chest pain or have you noticed a decrease in your appetite, unexplained weight loss or fatigue? No  2. Do you have any open wounds or new incisions? No  3. Do you have any recent or upcoming hospitalizations, surgeries or dental procedures? No  4. Do you currently have or recently have had any signs of illness or infection or are you on any antibiotics? No  5. Have you had any new, sudden or worsening abdominal pain? No  6. Have you or anyone in your household received a live vaccination in the past 4 weeks? Please note:  No live vaccines while on biologic/chemotherapy until 6 months after the last treatment.  Patient can receive the flu vaccine (shot only) and the pneumovax.  It is optimal for the patient to get  these vaccines mid cycle, but they can be given at any time as long as it is not on the day of the infusion. No  7. Have you recently been diagnosed with any new nervous system diseases (ie. Multiple sclerosis, Guillain [place], seizures, neurological changes) or cancer diagnosis? No  8. Are you on any form of radiation or chemotherapy? No  9. Are you pregnant or breast feeding or do you have plans of pregnancy in the future? No  10. Have you been having any signs of worsening depression or suicidal ideations?  (benlysta only) No  11. Have there been any other new onset medical symptoms? No  Premedications: were not ordered.    Drug Waste Record: No    Infusion length and rate:  infusion given over approximately 60 minutes    Labs: were not ordered for this appointment.    Vascular access: peripheral IV placed today.    Is the next appt scheduled? No, 1st and only dose. Future doses will be subcutaneous at home.  Asymptomatic COVID test completed? no    Post Infusion Assessment:  Patient tolerated infusion without incident.     Discharge Plan:   Follow up plan of care with: ongoing infusions at [facility]. and ordering provider as scheduled.  Discharge instructions were reviewed with patient.  Patient/representative verbalized understanding of discharge instructions and all questions answered.  Patient discharged from [facility] in stable condition.    Gracie Das, TYSON       Administrations This Visit     ustekinumab (STELARA) 390 mg in sodium chloride 0.9 % 250 mL infusion     Admin Date  12/27/2021 Action  New Bag Dose  390 mg Rate  250 mL/hr Route  Intravenous Administered By  Gracie Das, RN              BP (!) 132/90 (BP Location: Left arm, Patient Position: Sitting)   Pulse 85   Temp 97.7  F (36.5  C) (Oral)   Resp 16   LMP  (LMP Unknown)   SpO2 97%           Again, thank you for allowing me to participate in the care of your patient.         Sincerely,        [facility]     seen the pt drink the popsicle   passing urine in ER Will [email or web address]

## 2021-12-27 NOTE — PROGRESS NOTES
Nursing Note  Aileen M Alanevelia presents today to [facility] for:   Chief Complaint   Patient presents with     Infusion     Stelara     During [facility]'s [facility] appointment, orders from Dr. Pollock were completed.  Frequency: first dose    Progress note:  Patient identification verified by name and date of birth.  Assessment completed.  Vitals recorded in Doc Flowsheets.  Patient was provided with education regarding medication/procedure and possible side effects.  Patient verbalized understanding.     present during visit today: Not Applicable.    Treatment Conditions: ~~~ NOTE: If the patient answers yes to any of the questions below, hold the infusion and contact ordering provider or on-call provider.    1. Have you recently had an elevated temperature, fever, chills, productive cough, coughing for 3 weeks or longer or hemoptysis, abnormal vital signs, night sweats,  chest pain or have you noticed a decrease in your appetite, unexplained weight loss or fatigue? No  2. Do you have any open wounds or new incisions? No  3. Do you have any recent or upcoming hospitalizations, surgeries or dental procedures? No  4. Do you currently have or recently have had any signs of illness or infection or are you on any antibiotics? No  5. Have you had any new, sudden or worsening abdominal pain? No  6. Have you or anyone in your household received a live vaccination in the past 4 weeks? Please note:  No live vaccines while on biologic/chemotherapy until 6 months after the last treatment.  Patient can receive the flu vaccine (shot only) and the pneumovax.  It is optimal for the patient to get these vaccines mid cycle, but they can be given at any time as long as it is not on the day of the infusion. No  7. Have you recently been diagnosed with any new nervous system diseases (ie. Multiple sclerosis, Guillain [place], seizures, neurological changes) or cancer  diagnosis? No  8. Are you on any form of radiation or chemotherapy? No  9. Are you pregnant or breast feeding or do you have plans of pregnancy in the future? No  10. Have you been having any signs of worsening depression or suicidal ideations?  (benlysta only) No  11. Have there been any other new onset medical symptoms? No  Premedications: were not ordered.    Drug Waste Record: No    Infusion length and rate:  infusion given over approximately 60 minutes    Labs: were not ordered for this appointment.    Vascular access: peripheral IV placed today.    Is the next appt scheduled? No, 1st and only dose. Future doses will be subcutaneous at home.  Asymptomatic COVID test completed? no    Post Infusion Assessment:  Patient tolerated infusion without incident.     Discharge Plan:   Follow up plan of care with: ongoing infusions at [facility]. and ordering provider as scheduled.  Discharge instructions were reviewed with patient.  Patient/representative verbalized understanding of discharge instructions and all questions answered.  Patient discharged from [facility] in stable condition.    Gracie Das RN       Administrations This Visit     ustekinumab (STELARA) 390 mg in sodium chloride 0.9 % 250 mL infusion     Admin Date  12/27/2021 Action  New Bag Dose  390 mg Rate  250 mL/hr Route  Intravenous Administered By  Gracie Das, RN              BP (!) 132/90 (BP Location: Left arm, Patient Position: Sitting)   Pulse 85   Temp 97.7  F (36.5  C) (Oral)   Resp 16   LMP  (LMP Unknown)   SpO2 97%

## 2021-12-27 NOTE — PATIENT INSTRUCTIONS
Patient Education     Stelara Injection 45 mg/0.5 mL  Uses  This medicine is used for the following purposes:    arthritis    inflammatory bowel disease    psoriasis  Instructions  This medicine is used by injecting it into the skin. Please ask your doctor, nurse or pharmacist for the correct places on your body where this medicine can be injected.  Carefully follow the instructions for preparing this medicine before injection.  Read and make sure you understand the instructions for measuring your dose and using the syringe before using this medicine.  Do not dilute the medicine.  Do not mix this medicine with other solutions.  Check the medicine before each use. If the liquid medicine has any particles in it, appears discolored, or if the vial appears damaged, do not use it.  Do not shake the medicine before using.  Store new medicine in the refrigerator until you are ready to use it. Do not allow them to freeze.  Protect medicine from light.  Take the medicine out of the refrigerator about 30 minutes before use to warm to room temperature.  Injecting cold drug may be uncomfortable.  Never use any medicine that has .  Discard any remaining medicine after your dose is given.  Please ask your doctor, nurse, or pharmacist how to discard unused medicines safely.  Ask your doctor, nurse or pharmacist to show you how to use this medicine correctly.  You or a family member can be trained to give this medicine at home.  Change the location of the injection each time. Choose a location at least 1 inch from the last injection.  Tell your doctor if you have ever had an allergic reaction to latex.  It may take several weeks for this medicine to fully work.  It is important that you keep taking each dose of this medicine on time even if you are feeling well.  If you miss a dose, contact your doctor for instructions.  Please tell your doctor and pharmacist about all the medicines you take. Include both prescription and  over-the-counter medicines. Also tell them about any vitamins, herbal medicines, or anything else you take for your health.  If your symptoms do not improve or they worsen while on this medicine, contact your doctor.  It is very important that you follow your doctor's instructions for all blood tests.  It is very important that you keep all appointments for medical exams and tests while on this medicine.  Do not take the medicine more than once during 24 hours.  Cautions  Tell your doctor and pharmacist if you ever had an allergic reaction to a medicine. Symptoms of an allergic reaction can include trouble breathing, skin rash, itching, swelling, or severe dizziness.  Some patients on this medicine have developed severe, life-threatening infections. Please speak with your doctor about the risks and benefits of using this medicine.  Some patients taking this medicine have experienced serious side effects. Please speak with your doctor to understand the risks and benefits associated with this medicine.  Do not use the medication any more than instructed.  Call the doctor if there are any signs of confusion or unusual changes in behavior.  This medicine may reduce your body's ability to fight infections. Try to avoid contact with people with colds, flu or other infections.  Contact your doctor if you develop any signs of a new infection such as fever, cough, sore throat, or chills.  Wash your hands often and avoid close contact with people with infections such as colds and flu.  Speak with your health care provider before receiving any vaccinations.  Tell the doctor or pharmacist if you are pregnant, planning to be pregnant, or breastfeeding.  Ask your pharmacist if this medicine can interact with any of your other medicines. Be sure to tell them about all the medicines you take.  Please tell all your doctors and dentists that you are on this medicine before they provide care.  Do not start or stop any other medicines  without first speaking to your doctor or pharmacist.  Call your doctor right away if you notice any unusual bleeding or bruising.  Used needles and syringes should be thrown away properly in a medical waste container. Ask your doctor or pharmacist if you need help.  Do not share this medicine with anyone who has not been prescribed this medicine.  This medicine can cause serious side effects in some patients. Important information from the U[place]. Food and Drug Administration (FDA) is available from your pharmacist. Please review it carefully with your pharmacist to understand the risks associated with this medicine.  Side Effects  The following is a list of some common side effects from this medicine. Please speak with your doctor about what you should do if you experience these or other side effects.    back pain    unusual bruising or discoloration on skin    increased risk of cancer    lack of energy and tiredness    headaches    reaction at the area of the injection (pain, redness, swelling)    itching    pain near injection site    sore throat    upper respiratory infection  Call your doctor or get medical help right away if you notice any of these more serious side effects:    confusion    cough that does not go away    fever or chills    flu-like symptoms    severe or persistent headache    muscle pain    seizures    shortness of breath    skin changes - brown or black area with uneven edges or coloring    change in the size or color of a skin mole    stomach pain    unusual or unexplained tiredness or weakness    unsteadiness while walking    increased urinary frequency    difficulty or discomfort urinating    blurring or changes of vision    weight loss  A few people may have an allergic reactions to this medicine. Symptoms can include difficulty breathing, skin rash, itching, swelling, or severe dizziness. If you notice any of these symptoms, seek medical help quickly.  Extra  Please speak with your doctor,  nurse, or pharmacist if you have any questions about this medicine.  [email or web address]  IMPORTANT NOTE: This document tells you briefly how to take your medicine, but it does not tell you all there is to know about it.Your doctor or pharmacist may give you other documents about your medicine. Please talk to them if you have any questions.Always follow their advice. There is a more complete description of this medicine available in English.Scan this code on your smartphone or tablet or use the web address below. You can also ask your pharmacist for a printout. If you have any questions, please ask your pharmacist.     [email or web address] [email or web address].

## 2022-01-13 ENCOUNTER — OFFICE VISIT (OUTPATIENT)
Dept: FAMILY MEDICINE | Facility: CLINIC | Age: 62
End: 2022-01-13
Payer: COMMERCIAL

## 2022-01-13 VITALS
DIASTOLIC BLOOD PRESSURE: 81 MMHG | OXYGEN SATURATION: 97 % | HEART RATE: 74 BPM | BODY MASS INDEX: 22.85 KG/M2 | TEMPERATURE: 96.8 F | WEIGHT: 129 LBS | SYSTOLIC BLOOD PRESSURE: 126 MMHG

## 2022-01-13 DIAGNOSIS — Z00.00 ROUTINE HISTORY AND PHYSICAL EXAMINATION OF ADULT: Primary | ICD-10-CM

## 2022-01-13 DIAGNOSIS — K21.00 GASTROESOPHAGEAL REFLUX DISEASE WITH ESOPHAGITIS WITHOUT HEMORRHAGE: ICD-10-CM

## 2022-01-13 DIAGNOSIS — I10 HYPERTENSION GOAL BP (BLOOD PRESSURE) < 140/90: ICD-10-CM

## 2022-01-13 DIAGNOSIS — K50.018 CROHN'S DISEASE OF SMALL INTESTINE WITH OTHER COMPLICATION (H): ICD-10-CM

## 2022-01-13 DIAGNOSIS — I10 HYPERTENSION, GOAL BELOW 140/90: ICD-10-CM

## 2022-01-13 PROCEDURE — 99396 PREV VISIT EST AGE 40-64: CPT | Mod: 25 | Performed by: FAMILY MEDICINE

## 2022-01-13 PROCEDURE — 90471 IMMUNIZATION ADMIN: CPT | Performed by: FAMILY MEDICINE

## 2022-01-13 PROCEDURE — 90715 TDAP VACCINE 7 YRS/> IM: CPT | Performed by: FAMILY MEDICINE

## 2022-01-13 RX ORDER — LISINOPRIL 5 MG/1
TABLET ORAL
Qty: 90 TABLET | Refills: 3 | Status: SHIPPED | OUTPATIENT
Start: 2022-01-13 | End: 2023-01-05

## 2022-01-13 NOTE — LETTER
[facility]  [address]  [address]  [facility] [address]  [phone]          January 13, 2022    RE:  Aileen Priest                                                                                                                                                       [address] [place] [address]            To whom it may concern:    Aileen Priest is under my professional care for her health care     She is ok for microblading tomorrow.    Sincerely,        Bucky Hunter MD

## 2022-01-13 NOTE — PROGRESS NOTES
Answers for HPI/ROS submitted by the patient on 1/10/2022  Frequency of exercise:: 2-3 days/week  Getting at least 3 servings of Calcium per day:: Yes  Diet:: Regular (no restrictions)  Taking medications regularly:: Yes  Medication side effects:: None  Bi-annual eye exam:: Yes  Dental care twice a year:: Yes  Sleep apnea or symptoms of sleep apnea:: None  Additional concerns today:: No       SUBJECTIVE:   CC: Aileen Priest is an 61 year old woman who presents for preventive health visit.       Patient has been advised of split billing requirements and indicates understanding: No  Healthy Habits:     Getting at least 3 servings of Calcium per day:  Yes    Bi-annual eye exam:  Yes    Dental care twice a year:  Yes    Sleep apnea or symptoms of sleep apnea:  None    Diet:  Regular (no restrictions)    Frequency of exercise:  2-3 days/week    Taking medications regularly:  Yes    Medication side effects:  None    PHQ-2 Total Score: 0    Additional concerns today:  No          Hypertension Follow-up      Do you check your blood pressure regularly outside of the clinic? Yes     Are you following a low salt diet? Yes    Are your blood pressures ever more than 140 on the top number (systolic) OR more   than 90 on the bottom number (diastolic), for example 140/90? No      Today's PHQ-2 Score:   PHQ-2 ( 1999 [employer]) 1/10/2022   Q1: Little interest or pleasure in doing things 0   Q2: Feeling down, depressed or hopeless 0   PHQ-2 Score 0   PHQ-2 Total Score (12-17 Years)- Positive if 3 or more points; Administer PHQ-A if positive -   Q1: Little interest or pleasure in doing things Not at all   Q2: Feeling down, depressed or hopeless Not at all   PHQ-2 Score 0       Abuse: Current or Past (Physical, Sexual or Emotional) - No  Do you feel safe in your environment? Yes    Have you ever done Advance Care Planning? (For example, a Health Directive, POLST, or a discussion with a medical provider or your loved ones about your  wishes):     Social History     Tobacco Use     Smoking status: Former Smoker     Packs/day: 1.00     Years: 10.00     Pack years: 10.00     Types: Cigarettes     Start date: 1976     Quit date: 1987     Years since quittin.0     Smokeless tobacco: Never Used   Substance Use Topics     Alcohol use: Yes     Alcohol/week: 1.0 - 3.0 standard drink     Types: 1 - 3 Cans of beer per week     Comment: socially         Alcohol Use 1/10/2022   Prescreen: >3 drinks/day or >7 drinks/week? No   No flowsheet data found.    Reviewed orders with patient.  Reviewed health maintenance and updated orders accordingly - Yes  Lab work is in process    Breast Cancer Screening:  Any new diagnosis of family breast, ovarian, or bowel cancer? No    FHS-7:   Breast CA Risk Assessment (FHS-7) 10/26/2021   Did any of your first-degree relatives have breast or ovarian cancer? Yes   Did any of your relatives have bilateral breast cancer? No   Did any man in your family have breast cancer? No   Did any woman in your family have breast cancer before age 50 y? Yes   Do you have 2 or more relatives with breast and/or ovarian cancer? Yes   Do you have 2 or more relatives with breast and/or bowel cancer? Yes       Mammogram Screening: Recommended mammography every 1-2 years with patient discussion and risk factor consideration  Pertinent mammograms are reviewed under the imaging tab.    History of abnormal Pap smear: NO - age 30-65 PAP every 5 years with negative HPV co-testing recommended     Reviewed and updated as needed this visit by clinical staff  Tobacco               Reviewed and updated as needed this visit by Provider               Past Medical History:   Diagnosis Date     Collagenous colitis      Lichen planus      Lichen planus         Review of Systems  CONSTITUTIONAL: NEGATIVE for fever, chills, change in weight  INTEGUMENTARY/SKIN: NEGATIVE for worrisome rashes, moles or lesions  EYES: NEGATIVE for vision changes or  irritation  ENT: NEGATIVE for ear, mouth and throat problems  RESP: NEGATIVE for significant cough or SOB  BREAST: NEGATIVE for masses, tenderness or discharge  CV: NEGATIVE for chest pain, palpitations or peripheral edema  GI: POSITIVE for Hx IBD  : NEGATIVE for unusual urinary or vaginal symptoms. No vaginal bleeding.  MUSCULOSKELETAL: NEGATIVE for significant arthralgias or myalgia  NEURO: NEGATIVE for weakness, dizziness or paresthesias  PSYCHIATRIC: NEGATIVE for changes in mood or affect      OBJECTIVE:   /81   Pulse 74   Temp 96.8  F (36  C) (Temporal)   Wt 58.5 kg (129 lb)   LMP  (LMP Unknown)   SpO2 97%   BMI 22.85 kg/m    Physical Exam  GENERAL: healthy, alert and no distress  EYES: Eyes grossly normal to inspection, PERRL and conjunctivae and sclerae normal  HENT: ear canals and TM's normal, nose and mouth without ulcers or lesions  NECK: no adenopathy, no asymmetry, masses, or scars and thyroid normal to palpation  RESP: lungs clear to auscultation - no rales, rhonchi or wheezes  CV: regular rate and rhythm, normal S1 S2, no S3 or S4, no murmur, click or rub, no peripheral edema and peripheral pulses strong  ABDOMEN: soft, nontender, no hepatosplenomegaly, no masses and bowel sounds normal  MS: no gross musculoskeletal defects noted, no edema  SKIN: no suspicious lesions or rashes  NEURO: Normal strength and tone, mentation intact and speech normal  PSYCH: mentation appears normal, affect normal/bright  LYMPH: no cervical, supraclavicular, axillary, or inguinal adenopathy        ASSESSMENT/PLAN:   (Z00.00) Routine history and physical examination of adult  (primary encounter diagnosis)  Comment: overall well  Plan: TSH with free T4 reflex, Lipid panel reflex to         direct LDL Fasting, Vitamin D Deficiency            (K21.00) Gastroesophageal reflux disease with esophagitis without hemorrhage  Comment: try weanin gof medication   Plan: omeprazole (PRILOSEC) 20 MG DR capsule        Times 1  "month then every other day then try to stop    (I10) Hypertension, goal below 140/90  Comment: Well controlled   Plan: lisinoipril    (K50.018) Crohn's disease of small intestine with other complication (H)  Comment: Well controlled   Plan: Follow up with consultant as planned.     (I10) Hypertension goal BP (blood pressure) < 140/90  Comment:   Plan: lisinopril (ZESTRIL) 5 MG tablet              Patient has been advised of split billing requirements and indicates understanding: No  COUNSELING:  Reviewed preventive health counseling, as reflected in patient instructions       Regular exercise       Healthy diet/nutrition       Vision screening       Hearing screening       Immunizations    Vaccinated for: TDAP             Folic Acid       Osteoporosis prevention/bone health       Safe sex practices/STD prevention       Colon cancer screening    Estimated body mass index is 22.85 kg/m  as calculated from the following:    Height as of 11/16/21: 1.6 m (5' 3\").    Weight as of this encounter: 58.5 kg (129 lb).        She reports that she quit smoking about 35 years ago. Her smoking use included cigarettes. She started smoking about 46 years ago. She has a 10.00 pack-year smoking history. She has never used smokeless tobacco.      Counseling Resources:  ATP IV Guidelines  Pooled Cohorts Equation Calculator  Breast Cancer Risk Calculator  BRCA-Related Cancer Risk Assessment: FHS-7 Tool  FRAX Risk Assessment  ICSI Preventive Guidelines  Dietary Guidelines for Americans, 2010  USDA's MyPlate  ASA Prophylaxis  Lung CA Screening    Bucky Hunter MD  [facility]  "

## 2022-01-25 ENCOUNTER — TELEPHONE (OUTPATIENT)
Dept: GASTROENTEROLOGY | Facility: CLINIC | Age: 62
End: 2022-01-25
Payer: COMMERCIAL

## 2022-01-25 NOTE — TELEPHONE ENCOUNTER
lvm to RESCHEDULE 3/2 appt with ANDRES CASTRO for next avail with TORRI SHEN, left call center phone number and sent mychart.

## 2022-01-28 ENCOUNTER — PATIENT OUTREACH (OUTPATIENT)
Dept: GASTROENTEROLOGY | Facility: CLINIC | Age: 62
End: 2022-01-28
Payer: COMMERCIAL

## 2022-01-28 NOTE — PROGRESS NOTES
Returned patient voicemail in regards to scheduling follow up and questions regarding stelara.  Left my direct number for questions

## 2022-02-02 ENCOUNTER — PATIENT OUTREACH (OUTPATIENT)
Dept: GASTROENTEROLOGY | Facility: CLINIC | Age: 62
End: 2022-02-02
Payer: COMMERCIAL

## 2022-02-02 NOTE — PROGRESS NOTES
Spoke to the patient today in regards to the stelara injection informed her she would be due around the 21st of February.  She feels comfortable giving the injection to herself.  Scheduled her for a return appt with Benjamin Brooks on 2/10.

## 2022-03-07 ASSESSMENT — ENCOUNTER SYMPTOMS
SORE THROAT: 0
WEIGHT LOSS: 0
TASTE DISTURBANCE: 0
FATIGUE: 1
NIGHT SWEATS: 1
STIFFNESS: 0
NECK PAIN: 0
HOARSE VOICE: 1
MUSCLE WEAKNESS: 0
ALTERED TEMPERATURE REGULATION: 0
FEVER: 0
SINUS PAIN: 0
JOINT SWELLING: 0
SKIN CHANGES: 0
POOR WOUND HEALING: 0
DECREASED APPETITE: 0
INCREASED ENERGY: 0
MUSCLE CRAMPS: 0
SINUS CONGESTION: 0
HALLUCINATIONS: 0
CHILLS: 0
ARTHRALGIAS: 0
POLYPHAGIA: 0
NAIL CHANGES: 0
TROUBLE SWALLOWING: 0
MYALGIAS: 0
BACK PAIN: 0
NECK MASS: 0
WEIGHT GAIN: 0
POLYDIPSIA: 0
SMELL DISTURBANCE: 0

## 2022-03-08 ENCOUNTER — MYC MEDICAL ADVICE (OUTPATIENT)
Dept: FAMILY MEDICINE | Facility: CLINIC | Age: 62
End: 2022-03-08
Payer: COMMERCIAL

## 2022-03-08 DIAGNOSIS — G57.60 MORTON'S NEUROMA, UNSPECIFIED LATERALITY: Primary | ICD-10-CM

## 2022-03-09 NOTE — TELEPHONE ENCOUNTER
Dr Hunter    See pt Mychart message    Referral cued    Edelmira Van, RN   [facility]

## 2022-03-10 ENCOUNTER — VIRTUAL VISIT (OUTPATIENT)
Dept: GASTROENTEROLOGY | Facility: CLINIC | Age: 62
End: 2022-03-10
Payer: COMMERCIAL

## 2022-03-10 DIAGNOSIS — K50.00 CROHN'S DISEASE OF ILEUM WITHOUT COMPLICATION (H): Primary | ICD-10-CM

## 2022-03-10 PROCEDURE — 99214 OFFICE O/P EST MOD 30 MIN: CPT | Mod: 95 | Performed by: PHYSICIAN ASSISTANT

## 2022-03-10 NOTE — PROGRESS NOTES
"Kelly is a 61 year old who is being evaluated via a billable video visit.      How would you like to obtain your AVS? MyChart  If the video visit is dropped, the invitation should be resent by: Text to cell phone: [phone]  Will anyone else be joining your video visit? No      Video Start Time: 0832 AM  Video-Visit Details    Type of service:  Video Visit    Video End Time: 0858 AM    Originating Location (pt. Location): Home    Distant Location (provider location):  [facility] [place]     Platform used for Video Visit: dVisit     IBD CLINIC VISIT, follow up    REASON FOR CONSULTATION: Crohn's Disease, GERD    HPI: 60 year old female w/ h/o ileal Crohn's Disease per 3/2017 ileocolonoscopy biopsies previously on PO 5-ASA, Lichen Planus (oral/perianal involvement), Hidradenitis Suppurativa s/p excision/drainage 1975, prior presumed diagnosis of Collagenous Colitis per colonic biopsies 2006 (though suspecting evolving IBD presentation in retrospect following 3/2017 highly-suggestive biopsies of terminal ileum + bland colonic biopsies), GERD presenting for f/u Crohn's Disease.  History summarized from her documentation.    8/2018 prompted an increase in 5ASA to 4.8g daily due to intermittent \"flares\" she had been experiencing, despite labs within normal limits.  Since the increased, she has had great symptomatic relieve and reports one incidence of loose stool, otherwise no episodes concerning for flares.  She is currently having 3 stools per day, soft and formed without blood in the stool.  No urgency or nighttime stools.      She had noted a \"tear\" by her anus and had this evaluated by PCP and by her GYN. It was thought to be due to lichen sclerosis (per GYN) and was prescribed a steroid cream.  She notes improvement already even with vaseline as instructed by her PCP. She did not have any pain with a BM associated with this finding.      She had noted a lump on right side of " buttocks that has been very bothersome, inhibiting much of her activity.  It is adjacent to previous pilonidal cyst repair. No drainage and no fevers. MRI showed post-surgical cyst.      labs show elevated creatinine and slightly abnormal LFTs. Based on symptoms with concern for active flares (patient reported, despite normal labs) dose was increased Spring 2019. Normal Creatinine 2/2019 prior to dose increase. Labs with primary care show elevated creatinine (0.6--> 1.29). LFT slightly increased, AST 72 and ALT 92. Normal alk phos. after last visit, plan was to hold Lialda.  Underwent colonoscopy 12/3/2019 with simple endoscopic score for Crohn's disease to be 0.    Interval history December 8, 2020:  Since the patient's last visit, overall she has been doing very well.  Did have one episode of symptoms in early October.  She woke up at 4 AM with abdominal pain, had 1 day of watery stools.  Then pain subsided later that day.  Did have urgency with bowel movements.  Denied melena or hematochezia during this episode.  After this her stools returned back to normal.  Has on average 2 bowel movements a day one before morning and 1 before work.  No abdominal pain at baseline.  Retching greasy foods can trigger symptoms.  Has maybe had 1 nocturnal bowel movement.  Consistency of stool is soft and formed.  Continues to take probiotic which she finds very helpful.    For her GERD, she is taking 40 mg of omeprazole.  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  This improves quickly with drinking water.  She is currently taking a multivitamin.    Interval history 4/20/2021   Kelly was last seen 12/8/2020 with Alice Gonzalez in GI clinic. She was doing well at that time and has been off mesalamine since 2019. Kelly reporting having a  "Crohn's flare in February of this year. Similar to her previous flares, she had abdominal pain and loose stools for 8 hours and then felt fatigued for 2 more days but had return of normal bowel pattern. No hematochezia, nausea/vomiting, fever, or abdominal pain. She called into clinic and was started on budesonide for 1 month. After 1-2 weeks of budesonide she had fecal calprotectin checked that was 95. Enterography was also done and did not show any evidence of active inflammation. Since this flare she had one day at the end of March where she had urgency but otherwise has been doing well. These have been her two \"flares\" of Crohn's this year. She had two similar flares in  and 3 in . Currently having her typical 2-3 soft formed stools per day. She does note some sores on her tongue which are not very painful but occur every few weeks. She is unsure if this is related to CD or Lichen planus which typically affects her oral cavity.    Of note last colonoscopy was in 2019 and showed Simple Endoscopic Score for Crohn's Disease: 0    Luigi Holloway Index:  2021  Well-bein (very well)  Abdomina pain: 0 (none)  # liquid/soft stools: 2   Abdominal mass 0 (none)  Extraluminal comlications    - Aphthous ulcers 1 - although unclear if from LP or CD  Score: 3      Interval history 2021   Recent cscope findings below.     Luigi Holloway Index:  Well-bein (very well)  Abdominal pain: 1 (mild)  # liquid/soft stools: occasionally, up to 6 per day   Abdominal mass 0 (none)  Extraluminal comlications    - Aphthous ulcers 1 - although unclear if from LP or CD    Dad with SCC. Son has MS. Mom with breast cancer in her 40s.     ROS: 10pt ROS performed and otherwise negative.    Interval history 2021 (video visit)  Overall feeling well, but with a few, self-limited episodes of loose stools and abd pain that lasts a few hours.  No weight loss.     High fiber foods might cause abd pain.      Luigi Holloway " Index:  Well-bein (very well)  Abdominal pain: 1 (mild)  # liquid/soft stools: 0;  2-3 per day (formed)   Abdominal mass 0 (none)  Extraluminal complications    - Aphthous ulcers 1 - although unclear if from LP or CD. Has been present over the past few months.      Interval history 2021 (video visit)  Continues to feel well overall. Does have occasional urgency. Has 2-3 BMs per day, soft, well-formed. No blood. No weight loss. No abdominal pain.    Interval history, 3/10/22  First injection was 22. Has been stressed as daughter has been going through some pregnancy losses. A month ago had a couple episodes abdominal cramping associated with BMs, now is back to baseline. Currently having 2 stools per day that are formed without blood. Some urgency. No nighttime stools or fecal incontinence. Would be duefor next injection .    PERTINENT PAST MEDICAL/SURGICAL HISTORY:  As noted above.    ENDOSCOPIC EVALUATION:  icscope 2021 showed SES-CD 4    The Simple Endoscopic Score for Crohn's Disease was determined based on        the endoscopic appearance of the mucosa in the following segments:        - Ileum: Findings include aphthous ulcers less than 0.5 cm in size, less        than 10% ulcerated surfaces, less than 50% of surfaces affected and no        narrowings. Segment score: 3.        - Right Colon: Findings include no ulcers present, no ulcerated        surfaces, less than 50% of surfaces affected, no narrowings and no        ulcers present, no ulcerated surfaces, no affected surfaces and no        narrowings. Segment score: 1.        - Transverse Colon: Findings include no ulcers present, no ulcerated        surfaces, no affected surfaces and no narrowings. Segment score: 0.        - Left Colon: Findings include no ulcers present, no ulcerated surfaces,        no affected surfaces and no narrowings. Segment score: 0.        - Rectum: Findings include no ulcers present, no ulcerated surfaces, no         affected surfaces and no narrowings. Segment score: 0.        - Total SES-CD aggregate score: 4. Biopsies were taken with a cold        forceps for histology.     PATH:     A.  TERMINAL ILEUM, BIOPSY:  Ileal mucosa with no granulomas, cryptitis or other histologic evidence of active Crohn; negative for dysplasia     B. CECUM, BIOPSY:  Colonic mucosa with no granulomas, cryptitis or other histologic evidence of active Crohn; negative for dysplasia        icscope 5/24/2021 showed SES-CD 5 with small ulcers in the TI. Bx normal.  Bx:     SPECIMEN(S):   A: Ileum biopsy   B: Cecal biopsy     FINAL DIAGNOSIS:   A. Ileum, Biopsy:   Ileal mucosa with no granulomas, cryptitis or other histologic evidence of    active Crohn; negative for dysplasia     B. Cecum, Biopsy:   Colonic mucosa with no granulomas, cryptitis or other histologic evidence   of active Crohn; negative for dysplasia       12/2019 Impression:          - Simple Endoscopic Score for Crohn's Disease: 0,                        mucosal inflammatory changes secondary to Crohn's                        disease, in remission. Biopsied.                        - Diverticulosis in the sigmoid colon.                        - Non-bleeding internal hemorrhoids.   A. ILEUM, BIOPSY:   - Small intestinal mucosa with no significant histologic abnormality   - Negative for dysplasia     B. CECUM, BIOPSY:   - Colonic mucosa with no significant histologic abnormality   - Negative for dysplasia     C. COLON, ASCENDING, BIOPSY:   - Colonic mucosa with no significant histologic abnormality   - Negative for dysplasia     D. COLON, TRANSVERSE, BIOPSY:   - Colonic mucosa with no significant histologic abnormality   - Negative for dysplasia     E. COLON, DESCENDING,  BIOPSY:   - Colonic mucosa with no significant histologic abnormality   - Negative for dysplasia     F. COLON, SIGMOID, BIOPSY:   - Colonic mucosa with no significant histologic abnormality   - Negative for dysplasia      G. RECTUM, BIOPSY:   - Colonic mucosa with no significant histologic abnormality   - Negative for dysplasia     PERTINENT MEDICATIONS:  -No current Crohn's medications  Medications reviewed with patient today, see Medication List/Assessment for details.  No other NSAID/anticoagulation reported by patient.  No other OTC/herbal/supplements reported by patient.    SOCIAL HISTORY: Tobacco: none.    PHYSICAL EXAMINATION: video visit exam     General appearance  Healthy appearing adult, in no acute distress     Eyes  Sclera anicteric  Pupils round and reactive to light     Ears, nose, mouth and throat  No obvious external lesions of ears and nose  Hearing intact     Neck  Symmetric  No obvious external lesions     Respiratory  Normal respiration, no use of accessory muscles      MSK  Gait normal     Skin  No rashes or jaundice      Psychiatric  Oriented to person, place and time  Appropriate mood and affect.       PERTINENT STUDIES:  Fecal calprotectin 226 (4/13/21) from 95 (2/19/21)   9/2021    MR ENTEROGRAPHY: 2/25/2021   No evidence of active inflammation of the small bowel. No evidence of  stricture or fistula.     ASSESSMENT/PLAN:  1. Ileal Crohn's Disease, moderate  Current medication: Stelara (started 12/27/21)  Current clinical disease activity: remission   Current endoscopic disease activity: icscope 11/2021 showed SES-CD 4    Diagnosed 3/2017. Disease limited to ileum. Initially on mesalamine until 11/2019 when it was stopped for RAMSEY.  Due to active disease on cscope 11/2021, recently started on stelara late last year and has generally done well. She had a few episodes of abdominal cramping and loose stools, now back to baseline. We will obtain a trough level before next injection, in conjunction with her other labs. If level is 0, would be inclined to move to decreased interval. We also discussed Entocort if needed, however decided to hold off at this time.    We will plan for the  following:    PLAN  ---Continue with stelara every 8 weeks  -- Stelara trough (due April24)  -- Labs with trough to include CBC, CRP, ESR, LFTs     Routine IBD care:   - recommend supplementation vitamin D 1000 units daily and calcium 500 mg twice daily.Patient confirmed she is taking.   -- Vaccines/immunizations to be updated: Recommend yearly flu shot, pneumonia vaccines (Prevnar 13 then 8 weeks later Pneumovax 23 then 5 years later Pneumovax 23), tetanus every 10 years. Kelly is UTD with Shingles vaccine (she did have optic shingles in the past)   - No NSAIDs (ibuprofen, or anything containing ibuprofen)     2.  GERD  Patient has longstanding GERD symptoms, overall well controlled on omeprazole 40 mg a day.      2. Cancer Screening  No PSC, no high-risk FH CRC (2nd cousin dx'd in 2017 w/ advanced CRC @57yo). Given colonoscopy in 2019 without adenomatous lesions, recommend repeat colonoscopy for routine screening in 2029 unless symptomatic sooner.    RTC in 3 months       Benjamin Brooks PA-C  Division of Gastroenterology, Hepatology and Nutrition  [facility]

## 2022-03-10 NOTE — LETTER
"    3/10/2022         RE: Aileen Priest  [address]  [place] [address]        Dear Colleague,    Thank you for referring your patient, Aileen Priest, to the [facility] [place]. Please see a copy of my visit note below.      IBD CLINIC VISIT, follow up    REASON FOR CONSULTATION: Crohn's Disease, GERD    HPI: 60 year old female w/ h/o ileal Crohn's Disease per 3/2017 ileocolonoscopy biopsies previously on PO 5-ASA, Lichen Planus (oral/perianal involvement), Hidradenitis Suppurativa s/p excision/drainage 1975, prior presumed diagnosis of Collagenous Colitis per colonic biopsies 2006 (though suspecting evolving IBD presentation in retrospect following 3/2017 highly-suggestive biopsies of terminal ileum + bland colonic biopsies), GERD presenting for f/u Crohn's Disease.  History summarized from her documentation.    8/2018 prompted an increase in 5ASA to 4.8g daily due to intermittent \"flares\" she had been experiencing, despite labs within normal limits.  Since the increased, she has had great symptomatic relieve and reports one incidence of loose stool, otherwise no episodes concerning for flares.  She is currently having 3 stools per day, soft and formed without blood in the stool.  No urgency or nighttime stools.      She had noted a \"tear\" by her anus and had this evaluated by PCP and by her GYN. It was thought to be due to lichen sclerosis (per GYN) and was prescribed a steroid cream.  She notes improvement already even with vaseline as instructed by her PCP. She did not have any pain with a BM associated with this finding.      She had noted a lump on right side of buttocks that has been very bothersome, inhibiting much of her activity.  It is adjacent to previous pilonidal cyst repair. No drainage and no fevers. MRI showed post-surgical cyst.      labs show elevated creatinine and slightly abnormal LFTs. Based on symptoms with concern for active flares (patient " reported, despite normal labs) dose was increased Spring 2019. Normal Creatinine 2/2019 prior to dose increase. Labs with primary care show elevated creatinine (0.6--> 1.29). LFT slightly increased, AST 72 and ALT 92. Normal alk phos. after last visit, plan was to hold Lialda.  Underwent colonoscopy 12/3/2019 with simple endoscopic score for Crohn's disease to be 0.    Interval history December 8, 2020:  Since the patient's last visit, overall she has been doing very well.  Did have one episode of symptoms in early October.  She woke up at 4 AM with abdominal pain, had 1 day of watery stools.  Then pain subsided later that day.  Did have urgency with bowel movements.  Denied melena or hematochezia during this episode.  After this her stools returned back to normal.  Has on average 2 bowel movements a day one before morning and 1 before work.  No abdominal pain at baseline.  Retching greasy foods can trigger symptoms.  Has maybe had 1 nocturnal bowel movement.  Consistency of stool is soft and formed.  Continues to take probiotic which she finds very helpful.    For her GERD, she is taking 40 mg of omeprazole.  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  This improves quickly with drinking water.  She is currently taking a multivitamin.    Interval history 4/20/2021   Kelly was last seen 12/8/2020 with Alice Gonzalez in GI clinic. She was doing well at that time and has been off mesalamine since 2019. Kelly reporting having a Crohn's flare in February of this year. Similar to her previous flares, she had abdominal pain and loose stools for 8 hours and then felt fatigued for 2 more days but had return of normal bowel pattern. No hematochezia, nausea/vomiting, fever, or abdominal pain. She called into clinic and was started on  "budesonide for 1 month. After 1-2 weeks of budesonide she had fecal calprotectin checked that was 95. Enterography was also done and did not show any evidence of active inflammation. Since this flare she had one day at the end of March where she had urgency but otherwise has been doing well. These have been her two \"flares\" of Crohn's this year. She had two similar flares in  and 3 in . Currently having her typical 2-3 soft formed stools per day. She does note some sores on her tongue which are not very painful but occur every few weeks. She is unsure if this is related to CD or Lichen planus which typically affects her oral cavity.    Of note last colonoscopy was in 2019 and showed Simple Endoscopic Score for Crohn's Disease: 0    Luigi Holloway Index:  2021  Well-bein (very well)  Abdomina pain: 0 (none)  # liquid/soft stools: 2   Abdominal mass 0 (none)  Extraluminal comlications    - Aphthous ulcers 1 - although unclear if from LP or CD  Score: 3      Interval history 2021   Recent cscope findings below.     Luigi Holloway Index:  Well-bein (very well)  Abdominal pain: 1 (mild)  # liquid/soft stools: occasionally, up to 6 per day   Abdominal mass 0 (none)  Extraluminal comlications    - Aphthous ulcers 1 - although unclear if from LP or CD    Dad with SCC. Son has MS. Mom with breast cancer in her 40s.     ROS: 10pt ROS performed and otherwise negative.    Interval history 2021 (video visit)  Overall feeling well, but with a few, self-limited episodes of loose stools and abd pain that lasts a few hours.  No weight loss.     High fiber foods might cause abd pain.      Luigi Holloway Index:  Well-bein (very well)  Abdominal pain: 1 (mild)  # liquid/soft stools: 0;  2-3 per day (formed)   Abdominal mass 0 (none)  Extraluminal complications    - Aphthous ulcers 1 - although unclear if from LP or CD. Has been present over the past few months.      Interval history 2021 (video " visit)  Continues to feel well overall. Does have occasional urgency. Has 2-3 BMs per day, soft, well-formed. No blood. No weight loss. No abdominal pain.    Interval history, 3/10/22  First injection was 2/27/22. Has been stressed as daughter has been going through some pregnancy losses. A month ago had a couple episodes abdominal cramping associated with BMs, now is back to baseline. Currently having 2 stools per day that are formed without blood. Some urgency. No nighttime stools or fecal incontinence. Would be duefor next injection April 24.    PERTINENT PAST MEDICAL/SURGICAL HISTORY:  As noted above.    ENDOSCOPIC EVALUATION:  icscope 11/2021 showed SES-CD 4    The Simple Endoscopic Score for Crohn's Disease was determined based on        the endoscopic appearance of the mucosa in the following segments:        - Ileum: Findings include aphthous ulcers less than 0.5 cm in size, less        than 10% ulcerated surfaces, less than 50% of surfaces affected and no        narrowings. Segment score: 3.        - Right Colon: Findings include no ulcers present, no ulcerated        surfaces, less than 50% of surfaces affected, no narrowings and no        ulcers present, no ulcerated surfaces, no affected surfaces and no        narrowings. Segment score: 1.        - Transverse Colon: Findings include no ulcers present, no ulcerated        surfaces, no affected surfaces and no narrowings. Segment score: 0.        - Left Colon: Findings include no ulcers present, no ulcerated surfaces,        no affected surfaces and no narrowings. Segment score: 0.        - Rectum: Findings include no ulcers present, no ulcerated surfaces, no        affected surfaces and no narrowings. Segment score: 0.        - Total SES-CD aggregate score: 4. Biopsies were taken with a cold        forceps for histology.     PATH:     A.  TERMINAL ILEUM, BIOPSY:  Ileal mucosa with no granulomas, cryptitis or other histologic evidence of active Crohn;  negative for dysplasia     B. CECUM, BIOPSY:  Colonic mucosa with no granulomas, cryptitis or other histologic evidence of active Crohn; negative for dysplasia        icscope 5/24/2021 showed SES-CD 5 with small ulcers in the TI. Bx normal.  Bx:     SPECIMEN(S):   A: Ileum biopsy   B: Cecal biopsy     FINAL DIAGNOSIS:   A. Ileum, Biopsy:   Ileal mucosa with no granulomas, cryptitis or other histologic evidence of    active Crohn; negative for dysplasia     B. Cecum, Biopsy:   Colonic mucosa with no granulomas, cryptitis or other histologic evidence   of active Crohn; negative for dysplasia       12/2019 Impression:          - Simple Endoscopic Score for Crohn's Disease: 0,                        mucosal inflammatory changes secondary to Crohn's                        disease, in remission. Biopsied.                        - Diverticulosis in the sigmoid colon.                        - Non-bleeding internal hemorrhoids.   A. ILEUM, BIOPSY:   - Small intestinal mucosa with no significant histologic abnormality   - Negative for dysplasia     B. CECUM, BIOPSY:   - Colonic mucosa with no significant histologic abnormality   - Negative for dysplasia     C. COLON, ASCENDING, BIOPSY:   - Colonic mucosa with no significant histologic abnormality   - Negative for dysplasia     D. COLON, TRANSVERSE, BIOPSY:   - Colonic mucosa with no significant histologic abnormality   - Negative for dysplasia     E. COLON, DESCENDING,  BIOPSY:   - Colonic mucosa with no significant histologic abnormality   - Negative for dysplasia     F. COLON, SIGMOID, BIOPSY:   - Colonic mucosa with no significant histologic abnormality   - Negative for dysplasia     G. RECTUM, BIOPSY:   - Colonic mucosa with no significant histologic abnormality   - Negative for dysplasia     PERTINENT MEDICATIONS:  -No current Crohn's medications  Medications reviewed with patient today, see Medication List/Assessment for details.  No other NSAID/anticoagulation reported  by patient.  No other OTC/herbal/supplements reported by patient.    SOCIAL HISTORY: Tobacco: none.    PHYSICAL EXAMINATION: video visit exam     General appearance  Healthy appearing adult, in no acute distress     Eyes  Sclera anicteric  Pupils round and reactive to light     Ears, nose, mouth and throat  No obvious external lesions of ears and nose  Hearing intact     Neck  Symmetric  No obvious external lesions     Respiratory  Normal respiration, no use of accessory muscles      MSK  Gait normal     Skin  No rashes or jaundice      Psychiatric  Oriented to person, place and time  Appropriate mood and affect.       PERTINENT STUDIES:  Fecal calprotectin 226 (4/13/21) from 95 (2/19/21)   9/2021    MR ENTEROGRAPHY: 2/25/2021   No evidence of active inflammation of the small bowel. No evidence of  stricture or fistula.     ASSESSMENT/PLAN:  1. Ileal Crohn's Disease, moderate  Current medication: Stelara (started 12/27/21)  Current clinical disease activity: remission   Current endoscopic disease activity: icscope 11/2021 showed SES-CD 4    Diagnosed 3/2017. Disease limited to ileum. Initially on mesalamine until 11/2019 when it was stopped for RAMSEY.  Due to active disease on cscope 11/2021, recently started on stelara late last year and has generally done well. She had a few episodes of abdominal cramping and loose stools, now back to baseline. We will obtain a trough level before next injection, in conjunction with her other labs. If level is 0, would be inclined to move to decreased interval. We also discussed Entocort if needed, however decided to hold off at this time.    We will plan for the following:    PLAN  ---Continue with stelara every 8 weeks  -- Stelara trough (due April24)  -- Labs with trough to include CBC, CRP, ESR, LFTs     Routine IBD care:   - recommend supplementation vitamin D 1000 units daily and calcium 500 mg twice daily.Patient confirmed she is taking.   -- Vaccines/immunizations to  be updated: Recommend yearly flu shot, pneumonia vaccines (Prevnar 13 then 8 weeks later Pneumovax 23 then 5 years later Pneumovax 23), tetanus every 10 years. Kelly is UTD with Shingles vaccine (she did have optic shingles in the past)   - No NSAIDs (ibuprofen, or anything containing ibuprofen)     2.  GERD  Patient has longstanding GERD symptoms, overall well controlled on omeprazole 40 mg a day.      2. Cancer Screening  No PSC, no high-risk FH CRC (2nd cousin dx'd in 2017 w/ advanced CRC @55yo). Given colonoscopy in 2019 without adenomatous lesions, recommend repeat colonoscopy for routine screening in 2029 unless symptomatic sooner.    RTC in 3 months       Benjamin Brooks PA-C  Division of Gastroenterology, Hepatology and Nutrition  [facility]

## 2022-03-10 NOTE — PATIENT INSTRUCTIONS
It was a pleasure taking care of you today.  I've included a brief summary of our discussion and care plan from today's visit below.  Please review this information with your primary care provider.  ______________________________________________________________________    My recommendations are summarized as follows:    -- Continue stelara every 8 weeks  -- Labs just before your next infusion  -- Next endoscopic assessment: 6 months   -- Patient with IBD we recommend supplementation vitamin D 1000 units daily and calcium 500 mg twice daily.  -- Vaccines/immunizations to be updated: Recommend yearly flu shot, pneumonia vaccines (Prevnar 13 then 8 weeks later Pneumovax 23 then 5 years later Pneumovax 23), tetanus every 10 years.  -- No NSAIDs (ibuprofen, or anything containing ibuprofen)     For additional resources about inflammatory bowel disease visit [email or web address]      Return to GI Clinic in 3 months to review your progress.    ______________________________________________________________________    How do I schedule labs, imaging studies, or procedures that were ordered in clinic today?     Labs: To schedule lab appointment at the [facility], use my chart or call [phone]. If you have a [place] lab closer to home where you are regularly seen you can give them a call.     Procedures: If a colonoscopy, upper endoscopy, breath test, esophageal manometry, or pH impedence was ordered today, our endoscopy team will call you to schedule this. If you have not heard from our endoscopy team within a week, please call [phone] to schedule.     Imaging Studies: If you were scheduled for a CT scan, X-ray, MRI, ultrasound, HIDA scan or other imaging study, please call [phone] to have this scheduled.     Referral: If a referral to another specialty was ordered, expect a phone call or follow instructions above. If you have not heard from anyone regarding your referral in  a week, please call our clinic to check the status.     Who do I call with any questions after my visit?  Please be in touch if there are any further questions that arise following today's visit.  There are multiple ways to contact your gastroenterology care team.        During business hours, you may reach a Gastroenterology nurse at [phone]      To schedule or reschedule an appointment, please call [phone].       You can always send a secure message through [employer].  [employer] messages are answered by your nurse or doctor typically within 24 hours.  Please allow extra time on weekends and holidays.        For urgent/emergent questions after business hours, you may reach the on-call GI Fellow by contacting the [facility]  at [phone].     How will I get the results of any tests ordered?    You will receive all of your results.  If you have signed up for [employer], any tests ordered at your visit will be available to you after your physician reviews them.  Typically this takes 1-2 weeks.  If there are urgent results that require a change in your care plan, your physician or nurse will call you to discuss the next steps.      What is [employer]?  [employer] is a secure way for you to access all of your healthcare records from the [facility].  It is a web based computer program, so you can sign on to it from any location.  It also allows you to send secure messages to your care team.  I recommend signing up for [employer] access if you have not already done so and are comfortable with using a computer.         Sincerely,    Benjamin Brooks PA-C  [facility]  Division of Gastroenterology

## 2022-03-16 ENCOUNTER — TELEPHONE (OUTPATIENT)
Dept: GASTROENTEROLOGY | Facility: CLINIC | Age: 62
End: 2022-03-16
Payer: COMMERCIAL

## 2022-03-16 DIAGNOSIS — K50.018 CROHN'S DISEASE OF SMALL INTESTINE WITH OTHER COMPLICATION (H): Primary | ICD-10-CM

## 2022-03-16 NOTE — TELEPHONE ENCOUNTER
----- Message from Benjamin Brooks PA-C sent at 3/10/2022  9:09 AM CST -----  Good morning:)    Can we get a stelara trough on her? Next injection due 4/24    Thank you!!  Benjamin

## 2022-03-17 ENCOUNTER — DOCUMENTATION ONLY (OUTPATIENT)
Dept: GASTROENTEROLOGY | Facility: CLINIC | Age: 62
End: 2022-03-17
Payer: COMMERCIAL

## 2022-04-10 ASSESSMENT — ENCOUNTER SYMPTOMS
BACK PAIN: 1
STIFFNESS: 0
JAUNDICE: 0
VOMITING: 0
BLOOD IN STOOL: 0
MUSCLE WEAKNESS: 0
MUSCLE CRAMPS: 0
BLOATING: 0
CONSTIPATION: 0
DIARRHEA: 0
MYALGIAS: 0
NAUSEA: 0
ABDOMINAL PAIN: 0
BOWEL INCONTINENCE: 0
JOINT SWELLING: 0
NECK PAIN: 1
RECTAL PAIN: 0
ARTHRALGIAS: 1
HEARTBURN: 1

## 2022-04-13 ENCOUNTER — MYC MEDICAL ADVICE (OUTPATIENT)
Dept: FAMILY MEDICINE | Facility: CLINIC | Age: 62
End: 2022-04-13

## 2022-04-13 ENCOUNTER — OFFICE VISIT (OUTPATIENT)
Dept: ORTHOPEDICS | Facility: CLINIC | Age: 62
End: 2022-04-13
Payer: COMMERCIAL

## 2022-04-13 DIAGNOSIS — G57.62 MORTON'S NEUROMA OF LEFT FOOT: ICD-10-CM

## 2022-04-13 DIAGNOSIS — K21.9 GASTROESOPHAGEAL REFLUX DISEASE, UNSPECIFIED WHETHER ESOPHAGITIS PRESENT: ICD-10-CM

## 2022-04-13 PROCEDURE — 99203 OFFICE O/P NEW LOW 30 MIN: CPT | Performed by: PODIATRIST

## 2022-04-13 NOTE — PROGRESS NOTES
Date of Service: 4/13/2022    Chief Complaint   Patient presents with     Consult     Left foot neuroma          HPI: Aileen is a 61 year old female who presents today for further evaluation of left foot neuroma.  She was diagnosed with this neuroma in the 2000's.  She did have injections, and this did make it better.  Over the last couple of months, she has noted the pain started to increase.  She does describe this with nerve pain.  It happens more when she is on her foot.  She has not had orthotics.    Review of Systems: Answers for HPI/ROS submitted by the patient on 4/10/2022  General Symptoms: No  Skin Symptoms: No  HENT Symptoms: No  EYE SYMPTOMS: No  HEART SYMPTOMS: No  LUNG SYMPTOMS: No  INTESTINAL SYMPTOMS: Yes  URINARY SYMPTOMS: No  GYNECOLOGIC SYMPTOMS: No  BREAST SYMPTOMS: No  SKELETAL SYMPTOMS: Yes  BLOOD SYMPTOMS: No  NERVOUS SYSTEM SYMPTOMS: No  MENTAL HEALTH SYMPTOMS: No  Heart burn or indigestion: Yes  Nausea: No  Vomiting: No  Abdominal pain: No  Bloating: No  Constipation: No  Diarrhea: No  Blood in stool: No  Black stools: No  Rectal or Anal pain: No  Fecal incontinence: No  Yellowing of skin or eyes: No  Vomit with blood: No  Change in stools: No  Back pain: Yes  Muscle aches: No  Neck pain: Yes  Swollen joints: No  Joint pain: Yes  Bone pain: Yes  Muscle cramps: No  Muscle weakness: No  Joint stiffness: No  Bone fracture: No        PMH:   Past Medical History:   Diagnosis Date     Collagenous colitis      Lichen planus      Lichen planus        PSxH:   Past Surgical History:   Procedure Laterality Date     BACK SURGERY      Lumbar discectomy L5 S1     COLONOSCOPY N/A 12/3/2019    Procedure: COLONOSCOPY, WITH POLYPECTOMY AND BIOPSY;  Surgeon: Kassy Pollock MD;  Location: [facility] OR     COLONOSCOPY N/A 5/24/2021    Procedure: COLONOSCOPY, WITH BIOPSY;  Surgeon: Kassy Pollock MD;  Location: [facility] OR     COLONOSCOPY N/A 11/16/2021    Procedure: COLONOSCOPY, WITH  BIOPSY;  Surgeon: Kassy Pollock  MD;  Location: [facility] OR      REMOVAL OF TONSILS,12+ Y/O       ZZC NONSPECIFIC PROCEDURE       X 2     ZZC NONSPECIFIC PROCEDURE      removal of growth from toe     ZZHC DRAIN PILONIDAL CYST SIMPLE         Allergies: Sulfa drugs    SH:   Social History     Socioeconomic History     Marital status:      Spouse name: Not on file     Number of children: Not on file     Years of education: Not on file     Highest education level: Not on file   Occupational History     Not on file   Tobacco Use     Smoking status: Former Smoker     Packs/day: 1.00     Years: 10.00     Pack years: 10.00     Types: Cigarettes     Start date: 1976     Quit date: 1987     Years since quittin.3     Smokeless tobacco: Never Used   Substance and Sexual Activity     Alcohol use: Yes     Alcohol/week: 1.0 - 3.0 standard drink     Types: 1 - 3 Cans of beer per week     Comment: socially     Drug use: No     Sexual activity: Yes     Partners: Male   Other Topics Concern     Parent/sibling w/ CABG, MI or angioplasty before 65F 55M? No   Social History Narrative     Not on file     Social Determinants of Health     Financial Resource Strain: Not on file   Food Insecurity: Not on file   Transportation Needs: Not on file   Physical Activity: Not on file   Stress: Not on file   Social Connections: Not on file   Intimate Partner Violence: Not on file   Housing Stability: Not on file       FH:   Family History   Problem Relation Age of Onset     Breast Cancer Mother      Hypertension Father      Cancer Maternal Grandmother      Heart Disease Maternal Grandfather      Cancer Paternal Grandmother      Respiratory Paternal Grandfather      Asthma Brother      Depression Sister      Psychotic Disorder Sister      Asthma Son      Depression Son      Thyroid Disease Sister        Objective:  Data Unavailable Data Unavailable Data Unavailable Data Unavailable Data Unavailable 0 lbs 0 oz    PT and DP pulses are 2/4 bilaterally. CRT is  instant.  Positive pedal hair.   Gross sensation is intact bilaterally.  No pain noted with palpation of the first and second interspaces on the right side.  No pain noted with palpation of the corresponding metatarsals.  Negative Kim's click.  Equinus is noted bilaterally. No pain with active or passive ROM of the ankle, MTJ, 1st ray, or halluces bilaterally.  No pain with palpation of any plantar metatarsal head.  No pain noted along the shafts of the metatarsal.  No pain with MPJ range of motion.  Nails normal bilaterally. No open lesions are noted.     Assessment: Kelly is a 61-year-old with previously diagnosed neuroma in the left foot.  She is starting to have this pain again.  Should not try orthotics.  I recommended that we try to get her a pair of inserts with a metatarsal bar on the left side.  She does agree to this.  If this does not help, I recommend we get an ultrasound to the area, and discussed doing steroid injections again.  She does agree to this.    Plan:  - Pt seen and evaluated.  -Orthotics were molded and sent to the lab.  -We will see her again when I come back to my leave, in July.

## 2022-04-13 NOTE — LETTER
4/13/2022         RE: Aileen Priest  [address]  [place] [address]        Dear Colleague,    Thank you for referring your patient, Aileen Priest, to the [facility] [place]. Please see a copy of my visit note below.    Date of Service: 4/13/2022    Chief Complaint   Patient presents with     Consult     Left foot neuroma          HPI: Aileen is a 61 year old female who presents today for further evaluation of left foot neuroma.  She was diagnosed with this neuroma in the 2000's.  She did have injections, and this did make it better.  Over the last couple of months, she has noted the pain started to increase.  She does describe this with nerve pain.  It happens more when she is on her foot.  She has not had orthotics.    Review of Systems: Answers for HPI/ROS submitted by the patient on 4/10/2022  General Symptoms: No  Skin Symptoms: No  HENT Symptoms: No  EYE SYMPTOMS: No  HEART SYMPTOMS: No  LUNG SYMPTOMS: No  INTESTINAL SYMPTOMS: Yes  URINARY SYMPTOMS: No  GYNECOLOGIC SYMPTOMS: No  BREAST SYMPTOMS: No  SKELETAL SYMPTOMS: Yes  BLOOD SYMPTOMS: No  NERVOUS SYSTEM SYMPTOMS: No  MENTAL HEALTH SYMPTOMS: No  Heart burn or indigestion: Yes  Nausea: No  Vomiting: No  Abdominal pain: No  Bloating: No  Constipation: No  Diarrhea: No  Blood in stool: No  Black stools: No  Rectal or Anal pain: No  Fecal incontinence: No  Yellowing of skin or eyes: No  Vomit with blood: No  Change in stools: No  Back pain: Yes  Muscle aches: No  Neck pain: Yes  Swollen joints: No  Joint pain: Yes  Bone pain: Yes  Muscle cramps: No  Muscle weakness: No  Joint stiffness: No  Bone fracture: No        PMH:   Past Medical History:   Diagnosis Date     Collagenous colitis      Lichen planus      Lichen planus        PSxH:   Past Surgical History:   Procedure Laterality Date     BACK SURGERY      Lumbar discectomy L5 S1     COLONOSCOPY N/A 12/3/2019    Procedure: COLONOSCOPY, WITH POLYPECTOMY AND BIOPSY;   Surgeon: Kassy Pollock MD;  [facility]     COLONOSCOPY N/A 2021    Procedure: COLONOSCOPY, WITH BIOPSY;  Surgeon: Kassy Pollock MD;  [facility]     COLONOSCOPY N/A 2021    Procedure: COLONOSCOPY, WITH  BIOPSY;  Surgeon: Kassy Pollock MD;  [facility]     HC REMOVAL OF TONSILS,12+ Y/O       ZZC NONSPECIFIC PROCEDURE       X 2     ZZC NONSPECIFIC PROCEDURE      removal of growth from toe     ZZHC DRAIN PILONIDAL CYST SIMPLE         Allergies: Sulfa drugs    SH:   Social History     Socioeconomic History     Marital status:      Spouse name: Not on file     Number of children: Not on file     Years of education: Not on file     Highest education level: Not on file   Occupational History     Not on file   Tobacco Use     Smoking status: Former Smoker     Packs/day: 1.00     Years: 10.00     Pack years: 10.00     Types: Cigarettes     Start date: 1976     Quit date: 1987     Years since quittin.3     Smokeless tobacco: Never Used   Substance and Sexual Activity     Alcohol use: Yes     Alcohol/week: 1.0 - 3.0 standard drink     Types: 1 - 3 Cans of beer per week     Comment: socially     Drug use: No     Sexual activity: Yes     Partners: Male   Other Topics Concern     Parent/sibling w/ CABG, MI or angioplasty before 65F 55M? No   Social History Narrative     Not on file     Social Determinants of Health     Financial Resource Strain: Not on file   Food Insecurity: Not on file   Transportation Needs: Not on file   Physical Activity: Not on file   Stress: Not on file   Social Connections: Not on file   Intimate Partner Violence: Not on file   Housing Stability: Not on file       FH:   Family History   Problem Relation Age of Onset     Breast Cancer Mother      Hypertension Father      Cancer Maternal Grandmother      Heart Disease Maternal Grandfather      Cancer Paternal Grandmother      Respiratory Paternal Grandfather      Asthma Brother      Depression  Sister      Psychotic Disorder Sister      Asthma Son      Depression Son      Thyroid Disease Sister        Objective:  Data Unavailable Data Unavailable Data Unavailable Data Unavailable Data Unavailable 0 lbs 0 oz    PT and DP pulses are 2/4 bilaterally. CRT is instant.  Positive pedal hair.   Gross sensation is intact bilaterally.  No pain noted with palpation of the first and second interspaces on the right side.  No pain noted with palpation of the corresponding metatarsals.  Negative Kim's click.  Equinus is noted bilaterally. No pain with active or passive ROM of the ankle, MTJ, 1st ray, or halluces bilaterally.  No pain with palpation of any plantar metatarsal head.  No pain noted along the shafts of the metatarsal.  No pain with MPJ range of motion.  Nails normal bilaterally. No open lesions are noted.     Assessment: Kelly is a 61-year-old with previously diagnosed neuroma in the left foot.  She is starting to have this pain again.  Should not try orthotics.  I recommended that we try to get her a pair of inserts with a metatarsal bar on the left side.  She does agree to this.  If this does not help, I recommend we get an ultrasound to the area, and discussed doing steroid injections again.  She does agree to this.    Plan:  - Pt seen and evaluated.  -Orthotics were molded and sent to the lab.  -We will see her again when I come back to my leave, in July.     Tera Miramontes DPM

## 2022-04-13 NOTE — TELEPHONE ENCOUNTER
Dr Hunter    See pt [employer] [facility]  Med cued  Note from 1/13/22 visit  K21.00) Gastroesophageal reflux disease with esophagitis without hemorrhage  Comment: try weanin gof medication   Plan: omeprazole (PRILOSEC) 20 MG DR capsule        Times 1 month then every other day then try to stop    Edelmira Van RN   [facility]

## 2022-04-14 RX ORDER — OMEPRAZOLE 40 MG/1
40 CAPSULE, DELAYED RELEASE ORAL DAILY
Qty: 90 CAPSULE | Refills: 3 | Status: SHIPPED | OUTPATIENT
Start: 2022-04-14 | End: 2022-11-09

## 2022-04-16 ENCOUNTER — DOCUMENTATION ONLY (OUTPATIENT)
Dept: MULTI SPECIALTY CLINIC | Facility: CLINIC | Age: 62
End: 2022-04-16
Payer: COMMERCIAL

## 2022-04-16 ENCOUNTER — TELEPHONE (OUTPATIENT)
Dept: NURSING | Facility: CLINIC | Age: 62
End: 2022-04-16
Payer: COMMERCIAL

## 2022-04-16 DIAGNOSIS — K50.018 CROHN'S DISEASE OF SMALL INTESTINE WITH OTHER COMPLICATION (H): Primary | ICD-10-CM

## 2022-04-16 RX ORDER — BUDESONIDE 3 MG/1
9 CAPSULE, COATED PELLETS ORAL EVERY MORNING
Qty: 90 CAPSULE | Refills: 0 | Status: SHIPPED | OUTPATIENT
Start: 2022-04-16 | End: 2022-10-17

## 2022-04-16 NOTE — PROGRESS NOTES
Gastroenterology Brief Note:    I am the on call fellow and received a call regarding this patient.    In brief, Ms. Priest calls in with concerns regarding her established ileal Crohn's disease (follows with Dr. Pollock).    She reports several days of cramping, loose stools (but not liquid or diarrhea) without blood.  No fevers or chills.    She wonders about a short course of Budesonide and reports this had been offered to her previously by Dr. Pollock PRN, but the patient declined at the time.    I discussed the case with Dr. Garber.  We suspect (as does the patient) that this may be due to her IBD.  A brief course of budesonide is reasonable.  We do want her to get baseline labs (CRP, ESR, calprotectin stool, Enteric pathogen panel, C. Difficile, CBC, BMP etc) but the majority of these are already ordered for Monday.    She plans to come in on Monday and get these done.  I talked about the possibility of going to the ED and certainly if things get worse she should do that for CT abdomen, but she does not wish to go to the ED at this time and will monitor her symptoms.    Will send RX to [facility] and discuss with nursing team to ensure the outpatient follow-up occurs on Monday.    Case staffed with attending, Dr. Garber who agrees with this assessment and plan.     Barry Saeed MD  Gastroenterology Fellow, PGY5

## 2022-04-16 NOTE — TELEPHONE ENCOUNTER
Patient calls requesting to speak with on call GI provider. She reports that she has Crohn's, is having abdominal pain. Patient does not want to give further information to triage nurse, would prefer to speak with provider.    Dr. Barry Saeed paged at 11:14 AM.    Dr. Saeed returned page, advised that patient refused to be triaged and was requesting to speak with on call  Provider. Dr. Saeed will call patient directly to speak with her.     Pooja Nichols RN  [facility]

## 2022-04-18 ENCOUNTER — PATIENT OUTREACH (OUTPATIENT)
Dept: GASTROENTEROLOGY | Facility: CLINIC | Age: 62
End: 2022-04-18

## 2022-04-18 ENCOUNTER — TELEPHONE (OUTPATIENT)
Dept: GASTROENTEROLOGY | Facility: CLINIC | Age: 62
End: 2022-04-18
Payer: COMMERCIAL

## 2022-04-18 ENCOUNTER — LAB (OUTPATIENT)
Dept: LAB | Facility: CLINIC | Age: 62
End: 2022-04-18
Payer: COMMERCIAL

## 2022-04-18 DIAGNOSIS — K50.00 CROHN'S DISEASE OF ILEUM WITHOUT COMPLICATION (H): ICD-10-CM

## 2022-04-18 DIAGNOSIS — K50.018 CROHN'S DISEASE OF SMALL INTESTINE WITH OTHER COMPLICATION (H): ICD-10-CM

## 2022-04-18 DIAGNOSIS — K50.018 CROHN'S DISEASE OF SMALL INTESTINE WITH OTHER COMPLICATION (H): Primary | ICD-10-CM

## 2022-04-18 LAB
BASOPHILS # BLD AUTO: 0 10E3/UL (ref 0–0.2)
BASOPHILS NFR BLD AUTO: 1 %
EOSINOPHIL # BLD AUTO: 0.1 10E3/UL (ref 0–0.7)
EOSINOPHIL NFR BLD AUTO: 1 %
ERYTHROCYTE [DISTWIDTH] IN BLOOD BY AUTOMATED COUNT: 12.8 % (ref 10–15)
ERYTHROCYTE [SEDIMENTATION RATE] IN BLOOD BY WESTERGREN METHOD: 17 MM/HR (ref 0–30)
HCT VFR BLD AUTO: 37.3 % (ref 35–47)
HGB BLD-MCNC: 12.6 G/DL (ref 11.7–15.7)
LYMPHOCYTES # BLD AUTO: 1.8 10E3/UL (ref 0.8–5.3)
LYMPHOCYTES NFR BLD AUTO: 21 %
MCH RBC QN AUTO: 31 PG (ref 26.5–33)
MCHC RBC AUTO-ENTMCNC: 33.8 G/DL (ref 31.5–36.5)
MCV RBC AUTO: 92 FL (ref 78–100)
MONOCYTES # BLD AUTO: 0.7 10E3/UL (ref 0–1.3)
MONOCYTES NFR BLD AUTO: 9 %
NEUTROPHILS # BLD AUTO: 5.8 10E3/UL (ref 1.6–8.3)
NEUTROPHILS NFR BLD AUTO: 69 %
PLATELET # BLD AUTO: 329 10E3/UL (ref 150–450)
RBC # BLD AUTO: 4.07 10E6/UL (ref 3.8–5.2)
WBC # BLD AUTO: 8.4 10E3/UL (ref 4–11)

## 2022-04-18 PROCEDURE — 85025 COMPLETE CBC W/AUTO DIFF WBC: CPT

## 2022-04-18 PROCEDURE — 86140 C-REACTIVE PROTEIN: CPT

## 2022-04-18 PROCEDURE — 36415 COLL VENOUS BLD VENIPUNCTURE: CPT

## 2022-04-18 PROCEDURE — 85652 RBC SED RATE AUTOMATED: CPT

## 2022-04-18 NOTE — TELEPHONE ENCOUNTER
Kelly started to have looser stools starting las Wednesday/thrusday.  Friday evening she started abdominal pain and loose stools,and the abdominal pain made it uncomfortable  To sleep.   Called into the on call on Friday night and started Budesonide on Saturday.      Today she is having normal stools and no abdominal pain. Feeling much better today.    Kelly was exposed to COVID, tested negative today and yesterday.      Kelly is due for her stelara on Sunday      Plan:  Patient lou have labs completed today, including stelara level and stool samples for FCP. Orders also placed for a c,diff and enteric panel, if the patient develops loose stools again today she will submit these samples also.

## 2022-04-18 NOTE — TELEPHONE ENCOUNTER
[facility] Call Center    Phone Message    May a detailed message be left on voicemail: yes     Reason for Call: Other: Patient had a flare up over the weekend and was put on budesonide. Patient was exposed to Covid but, she is negative.  Her injection is next weekend.  Patient is wondering if she should go ahead with her lab appt today, 4/18/22, due to the budesonide or not.  Please follow up with patient.  Thank you.     Action Taken: Message routed to:  [facility]    Travel Screening: Not Applicable

## 2022-04-18 NOTE — TELEPHONE ENCOUNTER
----- Message from Barry Saeed MD sent at 4/16/2022  2:26 PM CDT -----  Guilherme Gil -    My name is Natan Saeed, I am one of the GI fellows.  I was on call this weekend with Dr. Garber and received a call about Ms. Priest, who is a woman with crohns disease (follows with Dr. Pollock) and calls in with a suspected flare.    We have sent her a Rx for budesonide.    It looks like she has standing orders for labs and a plan to come in and get them drawn on Monday.  We also want to make sure she gets a C. Difficile and Enteric pathogen panel stool tests sent in addition to the tests that Dr. Pollock already had ordered (inflammatory markers and basic labs).    Can you please follow-up with her to help get these done?  Dr. Garber asked that I CC him on this too.    Best regards,  Natan Saeed

## 2022-04-19 LAB
C COLI+JEJUNI+LARI FUSA STL QL NAA+PROBE: NOT DETECTED
C DIFF TOX B STL QL: NEGATIVE
CRP SERPL-MCNC: 16 MG/L (ref 0–8)
EC STX1 GENE STL QL NAA+PROBE: NOT DETECTED
EC STX2 GENE STL QL NAA+PROBE: NOT DETECTED
NOROV GI+II ORF1-ORF2 JNC STL QL NAA+PR: NOT DETECTED
RVA NSP5 STL QL NAA+PROBE: NOT DETECTED
SALMONELLA SP RPOD STL QL NAA+PROBE: NOT DETECTED
SHIGELLA SP+EIEC IPAH STL QL NAA+PROBE: NOT DETECTED
V CHOL+PARA RFBL+TRKH+TNAA STL QL NAA+PR: NOT DETECTED
Y ENTERO RECN STL QL NAA+PROBE: NOT DETECTED

## 2022-04-19 PROCEDURE — 87506 IADNA-DNA/RNA PROBE TQ 6-11: CPT

## 2022-04-19 PROCEDURE — 87493 C DIFF AMPLIFIED PROBE: CPT | Mod: 59

## 2022-04-19 PROCEDURE — 83993 ASSAY FOR CALPROTECTIN FECAL: CPT

## 2022-04-20 ENCOUNTER — LAB (OUTPATIENT)
Dept: LAB | Facility: CLINIC | Age: 62
End: 2022-04-20
Payer: COMMERCIAL

## 2022-04-20 DIAGNOSIS — K50.00 CROHN'S DISEASE OF ILEUM WITHOUT COMPLICATION (H): ICD-10-CM

## 2022-04-20 DIAGNOSIS — K50.018 CROHN'S DISEASE OF SMALL INTESTINE WITH OTHER COMPLICATION (H): ICD-10-CM

## 2022-04-20 LAB
BASOPHILS # BLD AUTO: 0 10E3/UL (ref 0–0.2)
BASOPHILS NFR BLD AUTO: 1 %
CALPROTECTIN STL-MCNT: 334 MG/KG (ref 0–49.9)
EOSINOPHIL # BLD AUTO: 0.2 10E3/UL (ref 0–0.7)
EOSINOPHIL NFR BLD AUTO: 2 %
ERYTHROCYTE [DISTWIDTH] IN BLOOD BY AUTOMATED COUNT: 12.8 % (ref 10–15)
ERYTHROCYTE [SEDIMENTATION RATE] IN BLOOD BY WESTERGREN METHOD: 15 MM/HR (ref 0–30)
HCT VFR BLD AUTO: 38.1 % (ref 35–47)
HGB BLD-MCNC: 12.7 G/DL (ref 11.7–15.7)
LYMPHOCYTES # BLD AUTO: 2.1 10E3/UL (ref 0.8–5.3)
LYMPHOCYTES NFR BLD AUTO: 27 %
MCH RBC QN AUTO: 30.7 PG (ref 26.5–33)
MCHC RBC AUTO-ENTMCNC: 33.3 G/DL (ref 31.5–36.5)
MCV RBC AUTO: 92 FL (ref 78–100)
MONOCYTES # BLD AUTO: 0.7 10E3/UL (ref 0–1.3)
MONOCYTES NFR BLD AUTO: 9 %
NEUTROPHILS # BLD AUTO: 4.7 10E3/UL (ref 1.6–8.3)
NEUTROPHILS NFR BLD AUTO: 61 %
PLATELET # BLD AUTO: 335 10E3/UL (ref 150–450)
RBC # BLD AUTO: 4.14 10E6/UL (ref 3.8–5.2)
WBC # BLD AUTO: 7.7 10E3/UL (ref 4–11)

## 2022-04-20 PROCEDURE — 86140 C-REACTIVE PROTEIN: CPT

## 2022-04-20 PROCEDURE — 85025 COMPLETE CBC W/AUTO DIFF WBC: CPT

## 2022-04-20 PROCEDURE — 36415 COLL VENOUS BLD VENIPUNCTURE: CPT

## 2022-04-20 PROCEDURE — 99000 SPECIMEN HANDLING OFFICE-LAB: CPT

## 2022-04-20 PROCEDURE — 80299 QUANTITATIVE ASSAY DRUG: CPT | Mod: 90

## 2022-04-20 PROCEDURE — 82542 COL CHROMOTOGRAPHY QUAL/QUAN: CPT | Mod: 90

## 2022-04-20 PROCEDURE — 85652 RBC SED RATE AUTOMATED: CPT

## 2022-04-21 LAB — CRP SERPL-MCNC: 4.6 MG/L (ref 0–8)

## 2022-04-28 LAB
USTEKINUMAB ANTIBODIES: <1.6 U/ML
USTEKINUMAB CONCENTRATION: 12.5 UG/ML

## 2022-07-12 ENCOUNTER — OFFICE VISIT (OUTPATIENT)
Dept: ORTHOPEDICS | Facility: CLINIC | Age: 62
End: 2022-07-12
Payer: COMMERCIAL

## 2022-07-12 DIAGNOSIS — G57.62 MORTON'S NEUROMA OF LEFT FOOT: Primary | ICD-10-CM

## 2022-07-12 PROCEDURE — 99215 OFFICE O/P EST HI 40 MIN: CPT | Performed by: PODIATRIST

## 2022-07-12 NOTE — PROGRESS NOTES
Chief Complaint   Patient presents with     Follow Up     3 month follow up.           HPI: Aileen is a 61 year old female who presents today for further evaluation of left foot neuroma.  She has had the orthotics made.  She relates that these do help to decrease her pain.  She does get some pain in the bottom of the left foot at the area of the neuroma.    Review of Systems: No nausea, vomiting, diarrhea, fever, chills, masses, shortness of breath, chest pain.        PMH:   Past Medical History:   Diagnosis Date     Collagenous colitis      Lichen planus      Lichen planus        PSxH:   Past Surgical History:   Procedure Laterality Date     BACK SURGERY      Lumbar discectomy L5 S1     COLONOSCOPY N/A 12/3/2019    Procedure: COLONOSCOPY, WITH POLYPECTOMY AND BIOPSY;  Surgeon: Kassy Pollock MD;  [facility]     COLONOSCOPY N/A 2021    Procedure: COLONOSCOPY, WITH BIOPSY;  Surgeon: Kassy Pollock MD;  [facility]     COLONOSCOPY N/A 2021    Procedure: COLONOSCOPY, WITH  BIOPSY;  Surgeon: Kassy Pollock MD;  [facility]     HC REMOVAL OF TONSILS,12+ Y/O       ZZC NONSPECIFIC PROCEDURE       X 2     ZZC NONSPECIFIC PROCEDURE      removal of growth from toe     ZZHC DRAIN PILONIDAL CYST SIMPLE         Allergies: Sulfa drugs    SH:   Social History     Socioeconomic History     Marital status:      Spouse name: Not on file     Number of children: Not on file     Years of education: Not on file     Highest education level: Not on file   Occupational History     Not on file   Tobacco Use     Smoking status: Former Smoker     Packs/day: 1.00     Years: 10.00     Pack years: 10.00     Types: Cigarettes     Start date: 1976     Quit date: 1987     Years since quittin.5     Smokeless tobacco: Never Used   Substance and Sexual Activity     Alcohol use: Yes     Alcohol/week: 1.0 - 3.0 standard drink     Types: 1 - 3 Cans of beer per week     Comment: socially      Drug use: No     Sexual activity: Yes     Partners: Male   Other Topics Concern     Parent/sibling w/ CABG, MI or angioplasty before 65F 55M? No   Social History Narrative     Not on file     Social Determinants of Health     Financial Resource Strain: Not on file   Food Insecurity: Not on file   Transportation Needs: Not on file   Physical Activity: Not on file   Stress: Not on file   Social Connections: Not on file   Intimate Partner Violence: Not on file   Housing Stability: Not on file       FH:   Family History   Problem Relation Age of Onset     Breast Cancer Mother      Hypertension Father      Cancer Maternal Grandmother      Heart Disease Maternal Grandfather      Cancer Paternal Grandmother      Respiratory Paternal Grandfather      Asthma Brother      Depression Sister      Psychotic Disorder Sister      Asthma Son      Depression Son      Thyroid Disease Sister        Objective:  Data Unavailable Data Unavailable Data Unavailable Data Unavailable Data Unavailable 0 lbs 0 oz    PT and DP pulses are 2/4 bilaterally. CRT is instant.  Positive pedal hair.   Gross sensation is intact bilaterally.  Pain noted with palpation of the first and second interspaces on the right side.  No pain noted with palpation of the corresponding metatarsals.  Negative Kim's click.  Equinus is noted bilaterally. No pain with active or passive ROM of the ankle, MTJ, 1st ray, or halluces bilaterally.  No pain with palpation of any plantar metatarsal head.  No pain noted along the shafts of the metatarsal.  No pain with MPJ range of motion.  Nails normal bilaterally. No open lesions are noted.     Assessment: Kelly is a 61-year-old with previously diagnosed neuroma in the left foot.  She is starting to have this pain again.  Although orthotics do help, I recommended that we get an ultrasound of the area to see if we see the neuroma again.  If so, we can talk injection therapy.  She does agree to this plan.    Plan:  - Pt seen  and evaluated.  -Continue orthotics.  -Ultrasound was ordered.  -See again status post ultrasound.  On the date of service, spent 40 minutes with patient care, documentation, chart review, care coordination.

## 2022-07-12 NOTE — LETTER
2022         RE: Aileen Priest   [address]  [place] [address]        Dear Colleague,    Thank you for referring your patient, Aileen Priest, to the [facility] [place]. Please see a copy of my visit note below.        Chief Complaint   Patient presents with     Follow Up     3 month follow up.           HPI: Aileen is a 61 year old female who presents today for further evaluation of left foot neuroma.  She has had the orthotics made.  She relates that these do help to decrease her pain.  She does get some pain in the bottom of the left foot at the area of the neuroma.    Review of Systems: No nausea, vomiting, diarrhea, fever, chills, masses, shortness of breath, chest pain.        PMH:   Past Medical History:   Diagnosis Date     Collagenous colitis      Lichen planus      Lichen planus        PSxH:   Past Surgical History:   Procedure Laterality Date     BACK SURGERY      Lumbar discectomy L5 S1     COLONOSCOPY N/A 12/3/2019    Procedure: COLONOSCOPY, WITH POLYPECTOMY AND BIOPSY;  Surgeon: Kassy Pollock MD;  [facility]     COLONOSCOPY N/A 2021    Procedure: COLONOSCOPY, WITH BIOPSY;  Surgeon: Kassy Pollock MD;  [facility]     COLONOSCOPY N/A 2021    Procedure: COLONOSCOPY, WITH  BIOPSY;  Surgeon: Ksasy Pollock MD;  [facility]     HC REMOVAL OF TONSILS,12+ Y/O       ZZC NONSPECIFIC PROCEDURE       X 2     ZZC NONSPECIFIC PROCEDURE      removal of growth from toe     ZZHC DRAIN PILONIDAL CYST SIMPLE         Allergies: Sulfa drugs    SH:   Social History     Socioeconomic History     Marital status:      Spouse name: Not on file     Number of children: Not on file     Years of education: Not on file     Highest education level: Not on file   Occupational History     Not on file   Tobacco Use     Smoking status: Former Smoker     Packs/day: 1.00     Years: 10.00     Pack years: 10.00     Types: Cigarettes     Start  date: 1976     Quit date: 1987     Years since quittin.5     Smokeless tobacco: Never Used   Substance and Sexual Activity     Alcohol use: Yes     Alcohol/week: 1.0 - 3.0 standard drink     Types: 1 - 3 Cans of beer per week     Comment: socially     Drug use: No     Sexual activity: Yes     Partners: Male   Other Topics Concern     Parent/sibling w/ CABG, MI or angioplasty before 65F 55M? No   Social History Narrative     Not on file     Social Determinants of Health     Financial Resource Strain: Not on file   Food Insecurity: Not on file   Transportation Needs: Not on file   Physical Activity: Not on file   Stress: Not on file   Social Connections: Not on file   Intimate Partner Violence: Not on file   Housing Stability: Not on file       FH:   Family History   Problem Relation Age of Onset     Breast Cancer Mother      Hypertension Father      Cancer Maternal Grandmother      Heart Disease Maternal Grandfather      Cancer Paternal Grandmother      Respiratory Paternal Grandfather      Asthma Brother      Depression Sister      Psychotic Disorder Sister      Asthma Son      Depression Son      Thyroid Disease Sister        Objective:  Data Unavailable Data Unavailable Data Unavailable Data Unavailable Data Unavailable 0 lbs 0 oz    PT and DP pulses are 2/4 bilaterally. CRT is instant.  Positive pedal hair.   Gross sensation is intact bilaterally.  Pain noted with palpation of the first and second interspaces on the right side.  No pain noted with palpation of the corresponding metatarsals.  Negative Kim's click.  Equinus is noted bilaterally. No pain with active or passive ROM of the ankle, MTJ, 1st ray, or halluces bilaterally.  No pain with palpation of any plantar metatarsal head.  No pain noted along the shafts of the metatarsal.  No pain with MPJ range of motion.  Nails normal bilaterally. No open lesions are noted.     Assessment: Kelly is a 61-year-old with previously diagnosed neuroma in  the left foot.  She is starting to have this pain again.  Although orthotics do help, I recommended that we get an ultrasound of the area to see if we see the neuroma again.  If so, we can talk injection therapy.  She does agree to this plan.    Plan:  - Pt seen and evaluated.  -Continue orthotics.  -Ultrasound was ordered.  -See again status post ultrasound.  On the date of service, spent 40 minutes with patient care, documentation, chart review, care coordination.        Tera Miramontes DPM

## 2022-07-21 ENCOUNTER — TELEPHONE (OUTPATIENT)
Dept: GASTROENTEROLOGY | Facility: CLINIC | Age: 62
End: 2022-07-21

## 2022-07-21 DIAGNOSIS — K50.018 CROHN'S DISEASE OF SMALL INTESTINE WITH OTHER COMPLICATION (H): ICD-10-CM

## 2022-07-21 NOTE — TELEPHONE ENCOUNTER
[facility] Call Center    Phone Message    May a detailed message be left on voicemail: yes     Reason for Call: Symptoms or Concerns     If patient has red-flag symptoms, warm transfer to triage line    Current symptom or concern: abdominal pain    Symptoms have been present for:  1 week(s)    Has patient previously been seen for this? Yes    By: Kassy Pollock MD    Date: 03/22/2022    Are there any new or worsening symptoms? Yes  Pt is requesting budesonide (ENTOCORT EC) 3 MG     Action Taken: Message routed to:  [facility]    Travel Screening: Not Applicable

## 2022-07-21 NOTE — TELEPHONE ENCOUNTER
Patient called pharmacy looking for refill of Budesonide.    Thanks!    Nimesh Warner  Retail Pharm Tech II   [facility]   [address] [place] [address]   [email or web address]  [email or web address]   Office: [phone]  Fax [phone]

## 2022-07-26 ENCOUNTER — ANCILLARY PROCEDURE (OUTPATIENT)
Dept: ULTRASOUND IMAGING | Facility: CLINIC | Age: 62
End: 2022-07-26
Attending: PODIATRIST
Payer: COMMERCIAL

## 2022-07-26 DIAGNOSIS — G57.62 MORTON'S NEUROMA OF LEFT FOOT: ICD-10-CM

## 2022-07-26 PROCEDURE — 76882 US LMTD JT/FCL EVL NVASC XTR: CPT | Performed by: RADIOLOGY

## 2022-08-09 RX ORDER — BUDESONIDE 3 MG/1
9 CAPSULE, COATED PELLETS ORAL EVERY MORNING
Qty: 90 CAPSULE | Refills: 0 | OUTPATIENT
Start: 2022-08-09

## 2022-08-11 ENCOUNTER — PATIENT OUTREACH (OUTPATIENT)
Dept: GASTROENTEROLOGY | Facility: CLINIC | Age: 62
End: 2022-08-11

## 2022-08-11 DIAGNOSIS — K50.018 CROHN'S DISEASE OF SMALL INTESTINE WITH OTHER COMPLICATION (H): Primary | ICD-10-CM

## 2022-08-11 NOTE — PROGRESS NOTES
Placed an order for a colonoscopy to assess how Stelara therapy is working. Plan for colonoscopy to be completed, then will schedule a follow-up visit with Dr. Pollock afterwards.    Patient was notified of plan via [employer].

## 2022-08-12 ENCOUNTER — TELEPHONE (OUTPATIENT)
Dept: GASTROENTEROLOGY | Facility: CLINIC | Age: 62
End: 2022-08-12

## 2022-08-12 NOTE — TELEPHONE ENCOUNTER
Screening Questions    BlueKIND OF PREP RedLOCATION [review exclusion criteria] GreenSEDATION TYPE      1. Are you active on mychart? Y    2. What insurance is in the chart? [employer]     3.   Ordering/Referring Provider: Kassy Pollock MD     4. BMI   (If greater than 40 review exclusion criteria [PAC APPT IF [MAC] @ [facility])  22.8  [If yes, BMI OVER 40-EXTENDED PREP]      **(Sedation review/consideration needed)**  Do you have a legal guardian or Medical Power of    and/or are you able to give consent for your medical care?     Y    5. Have you had a positive covid test in the last 90 days?   N -     6.  Are you currently on dialysis?   N [ If yes, G-PREP & HOSPITAL setting ONLY]     7.  Do you have chronic kidney disease?  N [ If yes, G-PREP ]    8.   Do you have a diagnosis of diabetes?   N   [ If yes, G-PREP ]    9.  On a regular basis do you go 3-5 days between bowel movements?   N   [ If yes, EXTENDED PREP]    10.  Are you taking any prescription pain medications on a routine schedule?    N -  [ If yes, EXTENDED PREP] [If yes, MAC]      11.   Do you have any chemical dependencies such as alcohol, street drugs, or methadone?    N [If yes, MAC]    12.   Do you have any history of post-traumatic stress syndrome, severe anxiety or history of psychosis?    N  [If yes, MAC]    13.  [FEMALES] Are you currently pregnant? N    If yes, how many weeks?       Respiratory/Heart Screening:  [If yes to any of the following HOSPITAL setting only]     14. Do you have Pulmonary Hypertension [Lungs]?   N       15. Do you have UNCONTROLLED asthma?   N     16.  Do you use daily home oxygen?  N      17. Do you have mod to severe Obstructive Sleep Apnea?         (OKAY @ [facility]  [facility]  SH  PH  RI  MG - if pt is not on OXYGEN)  N      18.   Have you had a heart or lung transplant?   N      19.   Have you had a stroke or Transient ischemic attack (TIA - aka  mini stroke ) within 6 months?  (If yes, please review exclusion  criteria)  N     20.   In the past 6 months, have you had any heart related issues including cardiomyopathy or heart attack?   N           If yes, did it require cardiac stenting or other implantable device?   NA      21.   Do you have any implantable devices in your body (pacemaker, defib, LVAD)? (If yes, please review exclusion criteria)  N     22.  Do you take the medication Phentermine?  NO        23. Do you take nitroglycerin?   N           If yes, how often? NA  (if yes, HOSPITAL setting ONLY)    24.  Are you currently taking any blood thinners?    [If yes, INFORM patient to follw up w/ ORDERING PROVIDER FOR BRIDGING INSTRUCTIONS]     N    25.   Do you transfer independently?                (If NO, please HOSPITAL setting ONLY)  Y    26.   Preferred LOCAL Pharmacy for Pre Prescription:      [place], [place] - [address]      Scheduling Details  (Please ask for phone number if not scheduled by patient)      Caller : Aileen Priest    Date of Procedure: 10/17  Surgeon: Phill  Location: [facility] [place] [place]        Sedation Type: MAC l Per Order  Conscious Sedation- Needs  for 6 hours after the procedure  MAC/General-Needs  for 24 hours after procedure    N :[Pre-op Required] at [facility]  SH  MG and OR for MAC sedation   (advise patient they will need a pre-op WITH IN 30 DAYS of procedure date)     Type of Procedure Scheduled:   Lower Endoscopy [Colonoscopy]    Which Colonoscopy Prep was Sent?:   Golytely - Magnesium Citrate Recall    KHORUTS CF PATIENTS & GROEN'S PATIENTS NEEDS EXTENDED PREP       Informed patient they will need an adult  Y  Cannot take any type of public or medical transportation alone    [facility] or Externally: Y  **INFORMED OF HOME TESTING & LAB OPTION**        Confirmed Nurse will call to complete assessment Y    Additional comments:

## 2022-09-17 ENCOUNTER — MYC MEDICAL ADVICE (OUTPATIENT)
Dept: GASTROENTEROLOGY | Facility: CLINIC | Age: 62
End: 2022-09-17

## 2022-10-09 ENCOUNTER — HEALTH MAINTENANCE LETTER (OUTPATIENT)
Age: 62
End: 2022-10-09

## 2022-10-10 ENCOUNTER — TELEPHONE (OUTPATIENT)
Dept: GASTROENTEROLOGY | Facility: CLINIC | Age: 62
End: 2022-10-10

## 2022-10-10 DIAGNOSIS — K50.018 CROHN'S DISEASE OF SMALL INTESTINE WITH OTHER COMPLICATION (H): Primary | ICD-10-CM

## 2022-10-10 RX ORDER — BISACODYL 5 MG/1
TABLET, DELAYED RELEASE ORAL
Qty: 4 TABLET | Refills: 0 | Status: SHIPPED | OUTPATIENT
Start: 2022-10-10 | End: 2023-10-06

## 2022-10-10 NOTE — TELEPHONE ENCOUNTER
Attempted to contact patient regarding upcoming colonoscopy  procedure on 10/17/22 for pre assessment questions. No answer.     Left message to return call to [phone] #[phone]    Covid test scheduled? No  Discuss at home rapid antigen COVID test 1-2 days prior to procedure.    Arrival time: 1200    Facility location: [facility]     Sedation type: MAC     Indication for procedure: crohns     Anticoagulants: no.     Bowel prep recommendation: Golytely d/t mg citrate recall    Golytely  script sent to [place], [place] - [address]. Prep instructions sent via [employer]    Sabina Valentin RN

## 2022-10-10 NOTE — TELEPHONE ENCOUNTER
Pre assessment questions completed for upcoming colonoscopy  procedure scheduled on 10/17/22    COVID policy reviewed. Patient to complete rapid antigen test one to two days before their scheduled procedure. Patient to bring photo of the results when they come in for their procedure.    Reviewed procedural arrival time 1200 and facility location [facility].    Designated  policy reviewed. Instructed to have someone stay 24 hours post procedure.     Reviewed Golytely prep instructions. No fiber/iron supplements or foods that contain nuts/seeds 7 days prior to procedure.     Patient verbalized understanding and had no questions or concerns at this time.    Sabina Valentin RN

## 2022-10-17 ENCOUNTER — HOSPITAL ENCOUNTER (OUTPATIENT)
Facility: AMBULATORY SURGERY CENTER | Age: 62
Discharge: HOME | End: 2022-10-17
Attending: INTERNAL MEDICINE
Payer: COMMERCIAL

## 2022-10-17 ENCOUNTER — ANESTHESIA (OUTPATIENT)
Dept: SURGERY | Facility: AMBULATORY SURGERY CENTER | Age: 62
End: 2022-10-17
Payer: COMMERCIAL

## 2022-10-17 ENCOUNTER — ANESTHESIA EVENT (OUTPATIENT)
Dept: SURGERY | Facility: AMBULATORY SURGERY CENTER | Age: 62
End: 2022-10-17
Payer: COMMERCIAL

## 2022-10-17 VITALS
HEART RATE: 57 BPM | HEIGHT: 63 IN | OXYGEN SATURATION: 100 % | SYSTOLIC BLOOD PRESSURE: 119 MMHG | TEMPERATURE: 97.4 F | DIASTOLIC BLOOD PRESSURE: 89 MMHG | WEIGHT: 125 LBS | BODY MASS INDEX: 22.15 KG/M2 | RESPIRATION RATE: 16 BRPM

## 2022-10-17 VITALS — HEART RATE: 64 BPM

## 2022-10-17 LAB — COLONOSCOPY: NORMAL

## 2022-10-17 PROCEDURE — 45378 DIAGNOSTIC COLONOSCOPY: CPT

## 2022-10-17 RX ORDER — PROPOFOL 10 MG/ML
INJECTION, EMULSION INTRAVENOUS PRN
Status: DISCONTINUED | OUTPATIENT
Start: 2022-10-17 | End: 2022-10-17

## 2022-10-17 RX ORDER — PROCHLORPERAZINE MALEATE 10 MG
10 TABLET ORAL EVERY 6 HOURS PRN
Status: DISCONTINUED | OUTPATIENT
Start: 2022-10-17 | End: 2022-10-18 | Stop reason: HOSPADM

## 2022-10-17 RX ORDER — NALOXONE HYDROCHLORIDE 0.4 MG/ML
0.2 INJECTION, SOLUTION INTRAMUSCULAR; INTRAVENOUS; SUBCUTANEOUS
Status: DISCONTINUED | OUTPATIENT
Start: 2022-10-17 | End: 2022-10-18 | Stop reason: HOSPADM

## 2022-10-17 RX ORDER — ONDANSETRON 2 MG/ML
4 INJECTION INTRAMUSCULAR; INTRAVENOUS EVERY 6 HOURS PRN
Status: DISCONTINUED | OUTPATIENT
Start: 2022-10-17 | End: 2022-10-18 | Stop reason: HOSPADM

## 2022-10-17 RX ORDER — ONDANSETRON 4 MG/1
4 TABLET, ORALLY DISINTEGRATING ORAL EVERY 6 HOURS PRN
Status: DISCONTINUED | OUTPATIENT
Start: 2022-10-17 | End: 2022-10-18 | Stop reason: HOSPADM

## 2022-10-17 RX ORDER — FLUMAZENIL 0.1 MG/ML
0.2 INJECTION, SOLUTION INTRAVENOUS
Status: DISCONTINUED | OUTPATIENT
Start: 2022-10-17 | End: 2022-10-18 | Stop reason: HOSPADM

## 2022-10-17 RX ORDER — ONDANSETRON 2 MG/ML
4 INJECTION INTRAMUSCULAR; INTRAVENOUS
Status: DISCONTINUED | OUTPATIENT
Start: 2022-10-17 | End: 2022-10-17 | Stop reason: HOSPADM

## 2022-10-17 RX ORDER — SODIUM CHLORIDE, SODIUM LACTATE, POTASSIUM CHLORIDE, CALCIUM CHLORIDE 600; 310; 30; 20 MG/100ML; MG/100ML; MG/100ML; MG/100ML
INJECTION, SOLUTION INTRAVENOUS CONTINUOUS PRN
Status: DISCONTINUED | OUTPATIENT
Start: 2022-10-17 | End: 2022-10-17

## 2022-10-17 RX ORDER — NALOXONE HYDROCHLORIDE 0.4 MG/ML
0.4 INJECTION, SOLUTION INTRAMUSCULAR; INTRAVENOUS; SUBCUTANEOUS
Status: DISCONTINUED | OUTPATIENT
Start: 2022-10-17 | End: 2022-10-18 | Stop reason: HOSPADM

## 2022-10-17 RX ORDER — PROPOFOL 10 MG/ML
INJECTION, EMULSION INTRAVENOUS CONTINUOUS PRN
Status: DISCONTINUED | OUTPATIENT
Start: 2022-10-17 | End: 2022-10-17

## 2022-10-17 RX ORDER — LIDOCAINE 40 MG/G
CREAM TOPICAL
Status: DISCONTINUED | OUTPATIENT
Start: 2022-10-17 | End: 2022-10-17 | Stop reason: HOSPADM

## 2022-10-17 RX ADMIN — PROPOFOL 50 MG: 10 INJECTION, EMULSION INTRAVENOUS at 13:24

## 2022-10-17 RX ADMIN — PROPOFOL 200 MCG/KG/MIN: 10 INJECTION, EMULSION INTRAVENOUS at 13:24

## 2022-10-17 RX ADMIN — SODIUM CHLORIDE, SODIUM LACTATE, POTASSIUM CHLORIDE, CALCIUM CHLORIDE: 600; 310; 30; 20 INJECTION, SOLUTION INTRAVENOUS at 13:06

## 2022-10-17 NOTE — H&P
Aileen M Hamzahpramod  [id number]  female  61 year old      Reason for procedure/surgery: Crohn's ileitis    Patient Active Problem List   Diagnosis     Leg weakness     CARDIOVASCULAR SCREENING; LDL GOAL LESS THAN 160     Nasal folliculitis     Hypertension, goal below 140/90     Crohn's disease of small intestine with other complication (H)     History of hidradenitis suppurativa     Lichen planus     Crohn's disease of small intestine without complication (H)       Past Surgical History:    Past Surgical History:   Procedure Laterality Date     BACK SURGERY      Lumbar discectomy L5 S1     COLONOSCOPY N/A 12/3/2019    Procedure: COLONOSCOPY, WITH POLYPECTOMY AND BIOPSY;  Surgeon: Kassy Pollock MD;  [facility]     COLONOSCOPY N/A 2021    Procedure: COLONOSCOPY, WITH BIOPSY;  Surgeon: Kassy Pollock MD;  Location: [facility]     COLONOSCOPY N/A 2021    Procedure: COLONOSCOPY, WITH  BIOPSY;  Surgeon: Kassy Pollock MD;  Location: [facility] OR     HC REMOVAL OF TONSILS,12+ Y/O       ZZC NONSPECIFIC PROCEDURE       X 2     ZZC NONSPECIFIC PROCEDURE      removal of growth from toe     ZZ DRAIN PILONIDAL CYST SIMPLE         Past Medical History:   Past Medical History:   Diagnosis Date     Collagenous colitis      Lichen planus      Lichen planus        Social History:   Social History     Tobacco Use     Smoking status: Former     Packs/day: 1.00     Years: 10.00     Pack years: 10.00     Types: Cigarettes     Start date: 1976     Quit date: 1987     Years since quittin.8     Smokeless tobacco: Never   Substance Use Topics     Alcohol use: Yes     Alcohol/week: 1.0 - 3.0 standard drink     Types: 1 - 3 Cans of beer per week     Comment: socially       Family History:   Family History   Problem Relation Age of Onset     Breast Cancer Mother      Hypertension Father      Cancer Maternal Grandmother      Heart Disease Maternal Grandfather      Cancer Paternal Grandmother      Respiratory  Paternal Grandfather      Asthma Brother      Depression Sister      Psychotic Disorder Sister      Asthma Son      Depression Son      Thyroid Disease Sister        Allergies:   Allergies   Allergen Reactions     Sulfa Drugs Rash       Active Medications:   Current Outpatient Medications   Medication Sig Dispense Refill     bisacodyl (DULCOLAX) 5 MG EC tablet Take as directed. One day before exam take 2 tablets at 3 PM. Day of exam take 2 tablets at 6 AM. 4 tablet 0     budesonide (ENTOCORT EC) 3 MG EC capsule Take 3 capsules (9 mg) by mouth every morning 90 capsule 0     Calcium Carb-Cholecalciferol (CALCIUM + D3 PO) Take 1 tablet by mouth daily        dexamethasone 0.5 MG/5ML solution Swish and spit in mouth 3 times daily as needed        lisinopril (ZESTRIL) 5 MG tablet TAKE ONE TABLET BY MOUTH ONCE DAILY. NEED TO SCHEDULE APPOINTMENT FOR FURTHER FILLS 90 tablet 3     omeprazole (PRILOSEC) 40 MG DR capsule Take 1 capsule (40 mg) by mouth daily Call clinic to schedule follow up appointment. 90 capsule 3     polyethylene glycol (GOLYTELY) 236 g suspension Take as directed. One day before exam fill the jug with water. Cover and shake until well mixed. At 6 PM start drinking an 8oz glass of mixture every 15 minutes until jug is 1/2 empty. Store remainder in the refrigerator. Day of exam at 6 AM Drink the other half of the Golytely jug. Drink one 8-ounce glass every 15 minutes until the jug is empty. You should finish the prep 4 hours before the exam. 4000 mL 0     ustekinumab (STELARA) 90 MG/ML Inject 1 syringe 90 mg  subcutaneus every 8 weeks 1 mL 4       Systemic Review:   CONSTITUTIONAL: NEGATIVE for fever, chills, change in weight  ENT/MOUTH: NEGATIVE for ear, mouth and throat problems  RESP: NEGATIVE for significant cough or SOB  CV: NEGATIVE for chest pain, palpitations or peripheral edema    Physical Examination:   Vital Signs: /86 (BP Location: Right arm)   Pulse 72   Temp 97  F (36.1  C) (Temporal)   " Resp 16   Ht 1.6 m (5' 3\")   Wt 56.7 kg (125 lb)   LMP  (LMP Unknown)   SpO2 100%   BMI 22.14 kg/m    GENERAL: healthy, alert and no distress  NECK: no adenopathy, no asymmetry, masses, or scars  RESP: lungs clear to auscultation - no rales, rhonchi or wheezes  CV: regular rate and rhythm, normal S1 S2, no S3 or S4, no murmur, click or rub, no peripheral edema and peripheral pulses strong  ABDOMEN: soft, nontender, no hepatosplenomegaly, no masses and bowel sounds normal  MS: no gross musculoskeletal defects noted, no edema    Plan: Appropriate to proceed as scheduled.      Kassy Pollock MD  10/17/2022    PCP:  Bucky Hunter    "

## 2022-10-17 NOTE — ANESTHESIA POSTPROCEDURE EVALUATION
Patient: Aileen Priest    Procedure: Procedure(s):  COLONOSCOPY       Anesthesia Type:  MAC    Note:  Disposition: Outpatient   Postop Pain Control: Uneventful            Sign Out: Well controlled pain   PONV: No   Neuro/Psych: Uneventful            Sign Out: Acceptable/Baseline neuro status   Airway/Respiratory: Uneventful            Sign Out: Acceptable/Baseline resp. status   CV/Hemodynamics: Uneventful            Sign Out: Acceptable CV status; No obvious hypovolemia; No obvious fluid overload   Other NRE: NONE   DID A NON-ROUTINE EVENT OCCUR? No           Last vitals:  Vitals Value Taken Time   /89 10/17/22 1407   Temp 36.3  C (97.4  F) 10/17/22 1407   Pulse 57 10/17/22 1407   Resp 16 10/17/22 1407   SpO2 100 % 10/17/22 1407       Electronically Signed By: Timothy John MD, MD  October 17, 2022  3:20 PM

## 2022-10-17 NOTE — ANESTHESIA CARE TRANSFER NOTE
Patient: Aileen Priest    Procedure: Procedure(s):  COLONOSCOPY       Diagnosis: Crohn's disease of small intestine with other complication (H) [K50.018]  Diagnosis Additional Information: No value filed.    Anesthesia Type:   MAC     Note:    Oropharynx: spontaneously breathing  Level of Consciousness: awake  Oxygen Supplementation: room air    Independent Airway: airway patency satisfactory and stable  Dentition: dentition unchanged  Vital Signs Stable: post-procedure vital signs reviewed and stable  Report to RN Given: handoff report given  Patient transferred to: Phase II    Handoff Report: Identifed the Patient, Identified the Reponsible Provider, Reviewed the pertinent medical history, Discussed the surgical course, Reviewed Intra-OP anesthesia mangement and issues during anesthesia, Set expectations for post-procedure period and Allowed opportunity for questions and acknowledgement of understanding      Vitals:  Vitals Value Taken Time   /76 10/17/22 1346   Temp 36.2  C (97.2  F) 10/17/22 1346   Pulse 66 10/17/22 1346   Resp 16 10/17/22 1346   SpO2 98 % 10/17/22 1346       Electronically Signed By: HERIBERTO Song CRNA  October 17, 2022  1:48 PM

## 2022-10-17 NOTE — ANESTHESIA PREPROCEDURE EVALUATION
Anesthesia Pre-Procedure Evaluation    Patient: Aileen Priest   MRN: [id number] : 1960        Procedure : Procedure(s):  COLONOSCOPY          Past Medical History:   Diagnosis Date     Collagenous colitis      Lichen planus      Lichen planus       Past Surgical History:   Procedure Laterality Date     BACK SURGERY      Lumbar discectomy L5 S1     COLONOSCOPY N/A 12/3/2019    Procedure: COLONOSCOPY, WITH POLYPECTOMY AND BIOPSY;  Surgeon: Kassy Pollock MD;  [facility]     COLONOSCOPY N/A 2021    Procedure: COLONOSCOPY, WITH BIOPSY;  Surgeon: Kassy Pollock MD;  Location: [facility]     COLONOSCOPY N/A 2021    Procedure: COLONOSCOPY, WITH  BIOPSY;  Surgeon: Kassy Pollock MD;  Location: [facility] OR     HC REMOVAL OF TONSILS,12+ Y/O       ZZC NONSPECIFIC PROCEDURE       X 2     ZZC NONSPECIFIC PROCEDURE      removal of growth from toe     ZZHC DRAIN PILONIDAL CYST SIMPLE        Allergies   Allergen Reactions     Sulfa Drugs Rash      Social History     Tobacco Use     Smoking status: Former     Packs/day: 1.00     Years: 10.00     Pack years: 10.00     Types: Cigarettes     Start date: 1976     Quit date: 1987     Years since quittin.8     Smokeless tobacco: Never   Substance Use Topics     Alcohol use: Yes     Alcohol/week: 1.0 - 3.0 standard drink     Types: 1 - 3 Cans of beer per week     Comment: socially      Wt Readings from Last 1 Encounters:   10/17/22 56.7 kg (125 lb)        Anesthesia Evaluation            ROS/MED HX  ENT/Pulmonary:       Neurologic:       Cardiovascular:     (+) hypertension-----    METS/Exercise Tolerance:     Hematologic:       Musculoskeletal:       GI/Hepatic:     (+) bowel prep,     Renal/Genitourinary:       Endo:       Psychiatric/Substance Use:       Infectious Disease:       Malignancy:       Other:               OUTSIDE LABS:  CBC:   Lab Results   Component Value Date    WBC 7.7 2022    WBC 8.4 2022    HGB 12.7 2022     HGB 12.6 04/18/2022    HCT 38.1 04/20/2022    HCT 37.3 04/18/2022     04/20/2022     04/18/2022     BMP:   Lab Results   Component Value Date     (L) 11/12/2021     (L) 07/31/2020    POTASSIUM 4.0 11/12/2021    POTASSIUM 4.0 07/31/2020    CHLORIDE 101 11/12/2021    CHLORIDE 99 07/31/2020    CO2 25 11/12/2021    CO2 24 07/31/2020    BUN 10 11/12/2021    BUN 12 07/31/2020    CR 0.64 11/12/2021    CR 0.59 07/31/2020    GLC 89 11/12/2021    GLC 89 07/31/2020     COAGS: No results found for: PTT, INR, FIBR  POC: No results found for: BGM, HCG, HCGS  HEPATIC:   Lab Results   Component Value Date    ALBUMIN 4.3 11/12/2021    PROTTOTAL 8.7 11/12/2021    ALT 28 11/12/2021    AST 20 11/12/2021    ALKPHOS 111 11/12/2021    BILITOTAL 0.6 11/12/2021     OTHER:   Lab Results   Component Value Date    AYANA 9.7 11/12/2021    MAG 1.8 09/18/2018    LIPASE 90 03/05/2014    TSH 2.26 01/27/2017    T4 6.4 04/02/2008    CRP 4.6 04/20/2022    SED 15 04/20/2022       Anesthesia Plan    ASA Status:  2      Anesthesia Type: MAC.     - Reason for MAC: immobility needed              Consents    Anesthesia Plan(s) and associated risks, benefits, and realistic alternatives discussed. Questions answered and patient/representative(s) expressed understanding.     - Discussed: Risks, Benefits and Alternatives for BOTH SEDATION and the PROCEDURE were discussed     - Discussed with:  Patient      - Extended Intubation/Ventilatory Support Discussed: No.      - Patient is DNR/DNI Status: No    Use of blood products discussed: No .     Postoperative Care    Pain management: Oral pain medications.        Comments:           H&P reviewed: Unable to attach H&P to encounter due to EHR limitations. H&P Update: appropriate H&P reviewed, patient examined. No interval changes since H&P (within 30 days).         Timothy John MD, MD

## 2022-10-19 ENCOUNTER — PHARMACY VISIT (OUTPATIENT)
Dept: ADMINISTRATIVE | Facility: CLINIC | Age: 62
End: 2022-10-19

## 2022-10-19 NOTE — PROGRESS NOTES
Crohn's/Ulcerative Colitis Clinical Follow Up Assessment   Assessment Link -Crohn's/Ulcerative Colitis Clinical Follow Up Assessment     2022/10/19 18:14:33 [facility], by Lyric Reyes        Patient  Patient Name: KARY FOSTER  :   EHR ID: [id number]       Activity Date      Care Details     What is the patient's diagnosis?   ? Crohn's Disease      Select prescribed medication:   ? STELARA (ustekinumab)     The [facility] IBD Modified assessment? Crohn's Disease     Number of liquid or soft stools for past 7 days   ? 2    Abdominal pain sum of 7 daily ratings:   ? Mild (1)     General wellbeing sum of 7 daily ratings   ? Well (0)     What symptoms is your IBD causing you? (1 pt given for each excluding lab values, diagnostic procedures, and other)   ? Abdominal pain/cramping   How many symptoms from question above (excluding lab values, diagnostic procedures, and other) is patient experiencing?   ? 1    How severe would you rate your symptoms affecting your overall life?   ? Not at all (0)     How often has your IBD interfered with activities (work, school, grocery shopping, sports, etc)?   ? Sometimes (1)    How often has your IBD affected your sleep at night during the last 2 weeks?   ? Not at all (0)     How often do you feel anxious or depressed because of your IBD?   ? Sometimes (1)     How often do you feel fatigue or have a lack of energy?   ? Not at all (0)     Please enter the Crohns Disease Scoring   ? 12       What are the patients' goals for this therapy?   ? Prevent flares      Is the patient meeting treatment goals?   ? Not appropriate to assess at this time - unable to reach patient       Is it appropriate to collect a PDC at this time? (PDC would not be appropriate for cycled medications or if the patient is on therapy)   ? Yes      Please enter patient's PDC, PDC = adherence metric. Goal > 0.8 (80% adherence)    ? 0.97      Does the patient appear to be taking less than 80% of  their medication?   ? No        Review of side effects PREVIOUSLY counseled on:   ? None       Summary Notes   Unable to reach x3. Chart review - Colonoscopy 10/17 showed Crohn's in clinical remission - 0 mucosal inflammatory changes. She does have diverticulitis. PDC = 0.97. Pending TM outreach one quarter. Crohn's QOL 9/29/22 = 12. Some abdominal pain/cramping. 2 loose or liquid stools.    TM  pharmacists will continue quarterly check-ins.     Lyric Reyes, PharmD  Therapy Management Pharmacist  [facility]  [email or web address]  Specialty: [phone]

## 2022-11-02 DIAGNOSIS — K50.00 CROHN'S DISEASE OF ILEUM WITHOUT COMPLICATION (H): ICD-10-CM

## 2022-11-07 ENCOUNTER — ANCILLARY PROCEDURE (OUTPATIENT)
Dept: MAMMOGRAPHY | Facility: CLINIC | Age: 62
End: 2022-11-07
Attending: FAMILY MEDICINE
Payer: COMMERCIAL

## 2022-11-07 DIAGNOSIS — Z12.31 VISIT FOR SCREENING MAMMOGRAM: ICD-10-CM

## 2022-11-07 PROCEDURE — 77063 BREAST TOMOSYNTHESIS BI: CPT | Mod: 26 | Performed by: RADIOLOGY

## 2022-11-07 PROCEDURE — 77067 SCR MAMMO BI INCL CAD: CPT

## 2022-11-07 PROCEDURE — 77067 SCR MAMMO BI INCL CAD: CPT | Mod: 26 | Performed by: RADIOLOGY

## 2022-11-08 ENCOUNTER — TELEPHONE (OUTPATIENT)
Dept: FAMILY MEDICINE | Facility: CLINIC | Age: 62
End: 2022-11-08

## 2022-11-08 DIAGNOSIS — R92.8 ABNORMAL MAMMOGRAM: Primary | ICD-10-CM

## 2022-11-08 ASSESSMENT — ENCOUNTER SYMPTOMS
JAUNDICE: 0
NECK MASS: 0
SMELL DISTURBANCE: 0
BOWEL INCONTINENCE: 0
NAUSEA: 0
BLOOD IN STOOL: 0
SORE THROAT: 0
DIARRHEA: 0
BLOATING: 0
VOMITING: 0
RECTAL PAIN: 0
HEARTBURN: 0
HOARSE VOICE: 0
TASTE DISTURBANCE: 0
ABDOMINAL PAIN: 1
SINUS CONGESTION: 0
SINUS PAIN: 0
TROUBLE SWALLOWING: 0
CONSTIPATION: 0

## 2022-11-08 NOTE — TELEPHONE ENCOUNTER
See mammography notes  Encounter Diagnosis   Name Primary?     Abnormal mammogram Yes    ,  Orders Placed This Encounter     MA Diagnostic Digital Bilateral     Standing Status:   Future     Standing Expiration Date:   11/8/2023     Order Specific Question:   Clinical Info for the Interpreting Provider     Answer:   see 11/22 mammo report     Order Specific Question:   Does patient have lump,drainage,discharge,focal pain,other symptoms?     Answer:   NO - order as requested     Order Specific Question:   Has the patient had a bilateral mastectomy?     Answer:   NO - order as requested     Order Specific Question:   Was the patient treated for breast cancer within the last two years?     Answer:   NO - order as requested     Order Specific Question:   Do you want to order follow up Imaging as recommended by Radiologist?     Answer:   Yes:Diagnostic Breast Imaging Assessment, as deemed appropriate, by the supervising radiologist, may include one or more: Diagnostic Mammogram, Ultrasound, Cyst Aspiration, FNA, Biopsy, Abscess Drainage, Ductogram, or Contrast Enhanced Mammography     US Breast Right Complete 4 Quadrants     Standing Status:   Future     Standing Expiration Date:   11/8/2023     Order Specific Question:   Priority     Answer:   Routine     Order Specific Question:   Clinical Info for the Interpreting Provider     Answer:   see 11/22/ mammogram     Order Specific Question:   Does patient have lump,drainage,discharge,focal pain,other symptoms?     Answer:   NO - order as requested     Order Specific Question:   Has the patient had a bilateral mastectomy?     Answer:   NO - order as requested     Order Specific Question:   Was the patient treated for breast cancer within the last two years?     Answer:   NO - order as requested     Order Specific Question:   Do you want to order follow up Imaging as recommended by Radiologist?     Answer:   Yes:Diagnostic Breast Imaging Assessment, as deemed appropriate, by  the supervising radiologist, may include one or more: Diagnostic Mammogram, Cyst Aspiration, FNA, Biopsy, Abscess Drainage, Ductogram, or Contrast Enhanced Mammography

## 2022-11-09 ENCOUNTER — VIRTUAL VISIT (OUTPATIENT)
Dept: GASTROENTEROLOGY | Facility: CLINIC | Age: 62
End: 2022-11-09
Payer: COMMERCIAL

## 2022-11-09 DIAGNOSIS — K50.00 CROHN'S DISEASE OF ILEUM WITHOUT COMPLICATION (H): Primary | ICD-10-CM

## 2022-11-09 DIAGNOSIS — K21.9 GASTROESOPHAGEAL REFLUX DISEASE, UNSPECIFIED WHETHER ESOPHAGITIS PRESENT: ICD-10-CM

## 2022-11-09 PROCEDURE — 99214 OFFICE O/P EST MOD 30 MIN: CPT | Mod: 95 | Performed by: PHYSICIAN ASSISTANT

## 2022-11-09 RX ORDER — OMEPRAZOLE 40 MG/1
40 CAPSULE, DELAYED RELEASE ORAL DAILY
Qty: 90 CAPSULE | Refills: 3 | Status: SHIPPED | OUTPATIENT
Start: 2022-11-09 | End: 2024-01-11

## 2022-11-09 NOTE — PATIENT INSTRUCTIONS
It was a pleasure taking care of you today.  I've included a brief summary of our discussion and care plan from today's visit below.  Please review this information with your primary care provider.  ______________________________________________________________________    My recommendations are summarized as follows:    -- Continue stelara every 8 weeks  -- Labs every 3 months   -- Next endoscopic assessment: TBD  -- Patient with IBD we recommend supplementation vitamin D 1000 units daily and calcium 500 mg twice daily.  -- Vaccines/immunizations to be updated: Recommend yearly flu shot, pneumonia vaccines (Prevnar 13 then 8 weeks later Pneumovax 23 then 5 years later Pneumovax 23), tetanus every 10 years.  -- No NSAIDs (ibuprofen, or anything containing ibuprofen)     For additional resources about inflammatory bowel disease visit [email or web address]      Return to GI Clinic in 6 months to review your progress.    ______________________________________________________________________    How do I schedule labs, imaging studies, or procedures that were ordered in clinic today?     Labs: To schedule lab appointment at the [facility], use my chart or call [phone]. If you have a [place] lab closer to home where you are regularly seen you can give them a call.     Procedures: If a colonoscopy, upper endoscopy, breath test, esophageal manometry, or pH impedence was ordered today, our endoscopy team will call you to schedule this. If you have not heard from our endoscopy team within a week, please call [phone] to schedule.     Imaging Studies: If you were scheduled for a CT scan, X-ray, MRI, ultrasound, HIDA scan or other imaging study, please call [phone] to have this scheduled.     Referral: If a referral to another specialty was ordered, expect a phone call or follow instructions above. If you have not heard from anyone regarding your referral in a week, please call  our clinic to check the status.     Who do I call with any questions after my visit?  Please be in touch if there are any further questions that arise following today's visit.  There are multiple ways to contact your gastroenterology care team.      During business hours, you may reach a Gastroenterology nurse at [phone]    To schedule or reschedule an appointment, please call [phone].     You can always send a secure message through [email or web address].  [email or web address] messages are answered by your nurse or doctor typically within 24 hours.  Please allow extra time on weekends and holidays.      For urgent/emergent questions after business hours, you may reach the on-call GI Fellow by contacting the [facility]  at [phone].     How will I get the results of any tests ordered?    You will receive all of your results.  If you have signed up for [employer], any tests ordered at your visit will be available to you after your physician reviews them.  Typically this takes 1-2 weeks.  If there are urgent results that require a change in your care plan, your physician or nurse will call you to discuss the next steps.      What is [email or web address]?  [email or web address] is a secure way for you to access all of your healthcare records from the [facility].  It is a web based computer program, so you can sign on to it from any location.  It also allows you to send secure messages to your care team.  I recommend signing up for [email or web address] access if you have not already done so and are comfortable with using a computer.         Sincerely,    Benjamin Brooks PA-C  [facility]  Division of Gastroenterology

## 2022-11-09 NOTE — PROGRESS NOTES
"Kelly is a 62 year old who is being evaluated via a billable video visit.      How would you like to obtain your AVS? MyChart  If the video visit is dropped, the invitation should be resent by: Text to cell phone: [phone]  Will anyone else be joining your video visit? No      Video-Visit Details    Video Start Time: 1147    Type of service:  Video Visit    Video End Time:12:08 PM    Originating Location (pt. Location): Home        Distant Location (provider location):  Off-site    Platform used for Video Visit: [facility]     IBD CLINIC VISIT, follow up    REASON FOR CONSULTATION: Crohn's Disease, GERD    HPI: 62 year old female w/ h/o ileal Crohn's Disease per 3/2017 ileocolonoscopy biopsies previously on PO 5-ASA, Lichen Planus (oral/perianal involvement), Hidradenitis Suppurativa s/p excision/drainage 1975, prior presumed diagnosis of Collagenous Colitis per colonic biopsies 2006 (though suspecting evolving IBD presentation in retrospect following 3/2017 highly-suggestive biopsies of terminal ileum + bland colonic biopsies), GERD presenting for f/u Crohn's Disease.  History summarized from her documentation.    8/2018 prompted an increase in 5ASA to 4.8g daily due to intermittent \"flares\" she had been experiencing, despite labs within normal limits.  Since the increased, she has had great symptomatic relieve and reports one incidence of loose stool, otherwise no episodes concerning for flares.  She is currently having 3 stools per day, soft and formed without blood in the stool.  No urgency or nighttime stools.      She had noted a \"tear\" by her anus and had this evaluated by PCP and by her GYN. It was thought to be due to lichen sclerosis (per GYN) and was prescribed a steroid cream.  She notes improvement already even with vaseline as instructed by her PCP. She did not have any pain with a BM associated with this finding.      She had noted a lump on right side of buttocks that has been very bothersome, " inhibiting much of her activity.  It is adjacent to previous pilonidal cyst repair. No drainage and no fevers. MRI showed post-surgical cyst.      labs show elevated creatinine and slightly abnormal LFTs. Based on symptoms with concern for active flares (patient reported, despite normal labs) dose was increased Spring 2019. Normal Creatinine 2/2019 prior to dose increase. Labs with primary care show elevated creatinine (0.6--> 1.29). LFT slightly increased, AST 72 and ALT 92. Normal alk phos. after last visit, plan was to hold Lialda.  Underwent colonoscopy 12/3/2019 with simple endoscopic score for Crohn's disease to be 0.    Interval history December 8, 2020:  Since the patient's last visit, overall she has been doing very well.  Did have one episode of symptoms in early October.  She woke up at 4 AM with abdominal pain, had 1 day of watery stools.  Then pain subsided later that day.  Did have urgency with bowel movements.  Denied melena or hematochezia during this episode.  After this her stools returned back to normal.  Has on average 2 bowel movements a day one before morning and 1 before work.  No abdominal pain at baseline.  Retching greasy foods can trigger symptoms.  Has maybe had 1 nocturnal bowel movement.  Consistency of stool is soft and formed.  Continues to take probiotic which she finds very helpful.    For her GERD, she is taking 40 mg of omeprazole.  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  This improves quickly with drinking water.  She is currently taking a multivitamin.    Interval history 4/20/2021   Kelly was last seen 12/8/2020 with Alice Gonzalez in GI clinic. She was doing well at that time and has been off mesalamine since 2019. Kelly reporting having a Crohn's flare in February of this year.  "Similar to her previous flares, she had abdominal pain and loose stools for 8 hours and then felt fatigued for 2 more days but had return of normal bowel pattern. No hematochezia, nausea/vomiting, fever, or abdominal pain. She called into clinic and was started on budesonide for 1 month. After 1-2 weeks of budesonide she had fecal calprotectin checked that was 95. Enterography was also done and did not show any evidence of active inflammation. Since this flare she had one day at the end of March where she had urgency but otherwise has been doing well. These have been her two \"flares\" of Crohn's this year. She had two similar flares in  and 3 in . Currently having her typical 2-3 soft formed stools per day. She does note some sores on her tongue which are not very painful but occur every few weeks. She is unsure if this is related to CD or Lichen planus which typically affects her oral cavity.    Of note last colonoscopy was in 2019 and showed Simple Endoscopic Score for Crohn's Disease: 0    Luigi Holloway Index:  2021  Well-bein (very well)  Abdomina pain: 0 (none)  # liquid/soft stools: 2   Abdominal mass 0 (none)  Extraluminal comlications    - Aphthous ulcers 1 - although unclear if from LP or CD  Score: 3      Interval history 2021   Recent cscope findings below.     Luigi Holloway Index:  Well-bein (very well)  Abdominal pain: 1 (mild)  # liquid/soft stools: occasionally, up to 6 per day   Abdominal mass 0 (none)  Extraluminal comlications    - Aphthous ulcers 1 - although unclear if from LP or CD    Dad with SCC. Son has MS. Mom with breast cancer in her 40s.     ROS: 10pt ROS performed and otherwise negative.    Interval history 2021 (video visit)  Overall feeling well, but with a few, self-limited episodes of loose stools and abd pain that lasts a few hours.  No weight loss.     High fiber foods might cause abd pain.      Luigi Holloway Index:  Well-bein (very " well)  Abdominal pain: 1 (mild)  # liquid/soft stools: 0;  2-3 per day (formed)   Abdominal mass 0 (none)  Extraluminal complications    - Aphthous ulcers 1 - although unclear if from LP or CD. Has been present over the past few months.    Interval history 11/2021 (video visit)  Continues to feel well overall. Does have occasional urgency. Has 2-3 BMs per day, soft, well-formed. No blood. No weight loss. No abdominal pain.    Interval history, 3/10/22  First injection was 2/27/22. Has been stressed as daughter has been going through some pregnancy losses. A month ago had a couple episodes abdominal cramping associated with BMs, now is back to baseline. Currently having 2 stools per day that are formed without blood. Some urgency. No nighttime stools or fecal incontinence. Would be due  for next injection April 24.    Interval hx 11/9/22  Feeling well, some stressors include recent mammogram requiring additional imaging, step mother broke hip and daughter going through infertility w/ multiple losses. Bowel pattern has been stable, no blood or urgency. Compliant with injections.    PERTINENT PAST MEDICAL/SURGICAL HISTORY:  As noted above.    ENDOSCOPIC EVALUATION:  icscope 11/2021 showed SES-CD 4    The Simple Endoscopic Score for Crohn's Disease was determined based on        the endoscopic appearance of the mucosa in the following segments:        - Ileum: Findings include aphthous ulcers less than 0.5 cm in size, less        than 10% ulcerated surfaces, less than 50% of surfaces affected and no        narrowings. Segment score: 3.        - Right Colon: Findings include no ulcers present, no ulcerated        surfaces, less than 50% of surfaces affected, no narrowings and no        ulcers present, no ulcerated surfaces, no affected surfaces and no        narrowings. Segment score: 1.        - Transverse Colon: Findings include no ulcers present, no ulcerated        surfaces, no affected surfaces and no narrowings.  Segment score: 0.        - Left Colon: Findings include no ulcers present, no ulcerated surfaces,        no affected surfaces and no narrowings. Segment score: 0.        - Rectum: Findings include no ulcers present, no ulcerated surfaces, no        affected surfaces and no narrowings. Segment score: 0.        - Total SES-CD aggregate score: 4. Biopsies were taken with a cold        forceps for histology.     PATH:     A.  TERMINAL ILEUM, BIOPSY:  Ileal mucosa with no granulomas, cryptitis or other histologic evidence of active Crohn; negative for dysplasia     B. CECUM, BIOPSY:  Colonic mucosa with no granulomas, cryptitis or other histologic evidence of active Crohn; negative for dysplasia        icscope 5/24/2021 showed SES-CD 5 with small ulcers in the TI. Bx normal.  Bx:     SPECIMEN(S):   A: Ileum biopsy   B: Cecal biopsy     FINAL DIAGNOSIS:   A. Ileum, Biopsy:   Ileal mucosa with no granulomas, cryptitis or other histologic evidence of    active Crohn; negative for dysplasia     B. Cecum, Biopsy:   Colonic mucosa with no granulomas, cryptitis or other histologic evidence   of active Crohn; negative for dysplasia       12/2019 Impression:          - Simple Endoscopic Score for Crohn's Disease: 0,                        mucosal inflammatory changes secondary to Crohn's                        disease, in remission. Biopsied.                        - Diverticulosis in the sigmoid colon.                        - Non-bleeding internal hemorrhoids.   A. ILEUM, BIOPSY:   - Small intestinal mucosa with no significant histologic abnormality   - Negative for dysplasia     B. CECUM, BIOPSY:   - Colonic mucosa with no significant histologic abnormality   - Negative for dysplasia     C. COLON, ASCENDING, BIOPSY:   - Colonic mucosa with no significant histologic abnormality   - Negative for dysplasia     D. COLON, TRANSVERSE, BIOPSY:   - Colonic mucosa with no significant histologic abnormality   - Negative for dysplasia      E. COLON, DESCENDING,  BIOPSY:   - Colonic mucosa with no significant histologic abnormality   - Negative for dysplasia     F. COLON, SIGMOID, BIOPSY:   - Colonic mucosa with no significant histologic abnormality   - Negative for dysplasia     G. RECTUM, BIOPSY:   - Colonic mucosa with no significant histologic abnormality   - Negative for dysplasia     PERTINENT MEDICATIONS:  -No current Crohn's medications  Medications reviewed with patient today, see Medication List/Assessment for details.  No other NSAID/anticoagulation reported by patient.  No other OTC/herbal/supplements reported by patient.    SOCIAL HISTORY: Tobacco: none.    PHYSICAL EXAMINATION: video visit exam     General appearance  Healthy appearing adult, in no acute distress     Eyes  Sclera anicteric  Pupils round and reactive to light     Ears, nose, mouth and throat  No obvious external lesions of ears and nose  Hearing intact     Neck  Symmetric  No obvious external lesions     Respiratory  Normal respiration, no use of accessory muscles      MSK  Gait normal     Skin  No rashes or jaundice      Psychiatric  Oriented to person, place and time  Appropriate mood and affect.       PERTINENT STUDIES:  Fecal calprotectin 226 (4/13/21) from 95 (2/19/21)   9/2021    MR ENTEROGRAPHY: 2/25/2021   No evidence of active inflammation of the small bowel. No evidence of  stricture or fistula.     ASSESSMENT/PLAN:  1. Ileal Crohn's Disease, moderate  Current medication: Stelara (started 12/27/21)  Current clinical disease activity: remission   Current endoscopic disease activity: icscope 10/2022 showed SES-CD 0    Diagnosed 3/2017. Disease limited to ileum. Initially on mesalamine until 11/2019 when it was stopped for RAMSEY.  Due to active disease on cscope 11/2021, recently started on stelara late last year and has generally done well, achieving endoscopic and histologic remission colonoscopy 10/2022.   We will plan for the  following:    PLAN  ---Continue with stelara every 8 weeks  -- Labs to include CBC, CRP, ESR, LFTs every 3 months     Routine IBD care:   - recommend supplementation vitamin D 1000 units daily and calcium 500 mg twice daily.Patient confirmed she is taking.   -- Vaccines/immunizations to be updated: Recommend yearly flu shot, pneumonia vaccines (Prevnar 13 then 8 weeks later Pneumovax 23 then 5 years later Pneumovax 23), tetanus every 10 years. Kelly is UTD with Shingles vaccine (she did have optic shingles in the past)   - No NSAIDs (ibuprofen, or anything containing ibuprofen)     2.  GERD  Patient has longstanding GERD symptoms, overall well controlled on omeprazole 40 mg a day.      2. Cancer Screening  No PSC, no high-risk FH CRC (2nd cousin dx'd in 2017 w/ advanced CRC @ 55yo). Given colonoscopy in 2019 without adenomatous lesions, recommend repeat colonoscopy for routine screening in 2029 unless symptomatic sooner.    RTC in 6 months       Benjamin Brooks PA-C  Division of Gastroenterology, Hepatology and Nutrition  [facility]

## 2022-11-09 NOTE — TELEPHONE ENCOUNTER
ustekinumab (STELARA) 90 MG/ML  Last Written Prescription Date:   12/15/2021  Last Fill Quantity: 1,   # refills: 4  Last Office Visit :  3/10/2022  Future Office visit:   11/9/2022  1 mL, 4 Refills sent to pharm 11/9/2022      Hanny Ann RN  Central Triage Red Flags/Med Refills

## 2022-11-09 NOTE — LETTER
"    11/9/2022         RE: Aileen Priest  [address]  [place] [address]        Dear Colleague,    Thank you for referring your patient, Aileen Priest, to the [facility] [place]. Please see a copy of my visit note below.    IBD CLINIC VISIT, follow up    REASON FOR CONSULTATION: Crohn's Disease, GERD    HPI: 62 year old female w/ h/o ileal Crohn's Disease per 3/2017 ileocolonoscopy biopsies previously on PO 5-ASA, Lichen Planus (oral/perianal involvement), Hidradenitis Suppurativa s/p excision/drainage 1975, prior presumed diagnosis of Collagenous Colitis per colonic biopsies 2006 (though suspecting evolving IBD presentation in retrospect following 3/2017 highly-suggestive biopsies of terminal ileum + bland colonic biopsies), GERD presenting for f/u Crohn's Disease.  History summarized from her documentation.    8/2018 prompted an increase in 5ASA to 4.8g daily due to intermittent \"flares\" she had been experiencing, despite labs within normal limits.  Since the increased, she has had great symptomatic relieve and reports one incidence of loose stool, otherwise no episodes concerning for flares.  She is currently having 3 stools per day, soft and formed without blood in the stool.  No urgency or nighttime stools.      She had noted a \"tear\" by her anus and had this evaluated by PCP and by her GYN. It was thought to be due to lichen sclerosis (per GYN) and was prescribed a steroid cream.  She notes improvement already even with vaseline as instructed by her PCP. She did not have any pain with a BM associated with this finding.      She had noted a lump on right side of buttocks that has been very bothersome, inhibiting much of her activity.  It is adjacent to previous pilonidal cyst repair. No drainage and no fevers. MRI showed post-surgical cyst.      labs show elevated creatinine and slightly abnormal LFTs. Based on symptoms with concern for active flares (patient " reported, despite normal labs) dose was increased Spring 2019. Normal Creatinine 2/2019 prior to dose increase. Labs with primary care show elevated creatinine (0.6--> 1.29). LFT slightly increased, AST 72 and ALT 92. Normal alk phos. after last visit, plan was to hold Lialda.  Underwent colonoscopy 12/3/2019 with simple endoscopic score for Crohn's disease to be 0.    Interval history December 8, 2020:  Since the patient's last visit, overall she has been doing very well.  Did have one episode of symptoms in early October.  She woke up at 4 AM with abdominal pain, had 1 day of watery stools.  Then pain subsided later that day.  Did have urgency with bowel movements.  Denied melena or hematochezia during this episode.  After this her stools returned back to normal.  Has on average 2 bowel movements a day one before morning and 1 before work.  No abdominal pain at baseline.  Retching greasy foods can trigger symptoms.  Has maybe had 1 nocturnal bowel movement.  Consistency of stool is soft and formed.  Continues to take probiotic which she finds very helpful.    For her GERD, she is taking 40 mg of omeprazole.  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  This improves quickly with drinking water.  She is currently taking a multivitamin.    Interval history 4/20/2021   Kelly was last seen 12/8/2020 with Alice Gonzalez in GI clinic. She was doing well at that time and has been off mesalamine since 2019. Kelly reporting having a Crohn's flare in February of this year. Similar to her previous flares, she had abdominal pain and loose stools for 8 hours and then felt fatigued for 2 more days but had return of normal bowel pattern. No hematochezia, nausea/vomiting, fever, or abdominal pain. She called into clinic and was started on  "budesonide for 1 month. After 1-2 weeks of budesonide she had fecal calprotectin checked that was 95. Enterography was also done and did not show any evidence of active inflammation. Since this flare she had one day at the end of March where she had urgency but otherwise has been doing well. These have been her two \"flares\" of Crohn's this year. She had two similar flares in  and 3 in . Currently having her typical 2-3 soft formed stools per day. She does note some sores on her tongue which are not very painful but occur every few weeks. She is unsure if this is related to CD or Lichen planus which typically affects her oral cavity.    Of note last colonoscopy was in 2019 and showed Simple Endoscopic Score for Crohn's Disease: 0    Luigi Holloway Index:  2021  Well-bein (very well)  Abdomina pain: 0 (none)  # liquid/soft stools: 2   Abdominal mass 0 (none)  Extraluminal comlications    - Aphthous ulcers 1 - although unclear if from LP or CD  Score: 3      Interval history 2021   Recent cscope findings below.     Luigi Holloway Index:  Well-bein (very well)  Abdominal pain: 1 (mild)  # liquid/soft stools: occasionally, up to 6 per day   Abdominal mass 0 (none)  Extraluminal comlications    - Aphthous ulcers 1 - although unclear if from LP or CD    Dad with SCC. Son has MS. Mom with breast cancer in her 40s.     ROS: 10pt ROS performed and otherwise negative.    Interval history 2021 (video visit)  Overall feeling well, but with a few, self-limited episodes of loose stools and abd pain that lasts a few hours.  No weight loss.     High fiber foods might cause abd pain.      Luigi Holloway Index:  Well-bein (very well)  Abdominal pain: 1 (mild)  # liquid/soft stools: 0;  2-3 per day (formed)   Abdominal mass 0 (none)  Extraluminal complications    - Aphthous ulcers 1 - although unclear if from LP or CD. Has been present over the past few months.    Interval history 2021 (video " visit)  Continues to feel well overall. Does have occasional urgency. Has 2-3 BMs per day, soft, well-formed. No blood. No weight loss. No abdominal pain.    Interval history, 3/10/22  First injection was 2/27/22. Has been stressed as daughter has been going through some pregnancy losses. A month ago had a couple episodes abdominal cramping associated with BMs, now is back to baseline. Currently having 2 stools per day that are formed without blood. Some urgency. No nighttime stools or fecal incontinence. Would be due  for next injection April 24.    Interval hx 11/9/22  Feeling well, some stressors include recent mammogram requiring additional imaging, step mother broke hip and daughter going through infertility w/ multiple losses. Bowel pattern has been stable, no blood or urgency. Compliant with injections.    PERTINENT PAST MEDICAL/SURGICAL HISTORY:  As noted above.    ENDOSCOPIC EVALUATION:  icscope 11/2021 showed SES-CD 4    The Simple Endoscopic Score for Crohn's Disease was determined based on        the endoscopic appearance of the mucosa in the following segments:        - Ileum: Findings include aphthous ulcers less than 0.5 cm in size, less        than 10% ulcerated surfaces, less than 50% of surfaces affected and no        narrowings. Segment score: 3.        - Right Colon: Findings include no ulcers present, no ulcerated        surfaces, less than 50% of surfaces affected, no narrowings and no        ulcers present, no ulcerated surfaces, no affected surfaces and no        narrowings. Segment score: 1.        - Transverse Colon: Findings include no ulcers present, no ulcerated        surfaces, no affected surfaces and no narrowings. Segment score: 0.        - Left Colon: Findings include no ulcers present, no ulcerated surfaces,        no affected surfaces and no narrowings. Segment score: 0.        - Rectum: Findings include no ulcers present, no ulcerated surfaces, no        affected surfaces and no  narrowings. Segment score: 0.        - Total SES-CD aggregate score: 4. Biopsies were taken with a cold        forceps for histology.     PATH:     A.  TERMINAL ILEUM, BIOPSY:  Ileal mucosa with no granulomas, cryptitis or other histologic evidence of active Crohn; negative for dysplasia     B. CECUM, BIOPSY:  Colonic mucosa with no granulomas, cryptitis or other histologic evidence of active Crohn; negative for dysplasia        icscope 5/24/2021 showed SES-CD 5 with small ulcers in the TI. Bx normal.  Bx:     SPECIMEN(S):   A: Ileum biopsy   B: Cecal biopsy     FINAL DIAGNOSIS:   A. Ileum, Biopsy:   Ileal mucosa with no granulomas, cryptitis or other histologic evidence of    active Crohn; negative for dysplasia     B. Cecum, Biopsy:   Colonic mucosa with no granulomas, cryptitis or other histologic evidence   of active Crohn; negative for dysplasia       12/2019 Impression:          - Simple Endoscopic Score for Crohn's Disease: 0,                        mucosal inflammatory changes secondary to Crohn's                        disease, in remission. Biopsied.                        - Diverticulosis in the sigmoid colon.                        - Non-bleeding internal hemorrhoids.   A. ILEUM, BIOPSY:   - Small intestinal mucosa with no significant histologic abnormality   - Negative for dysplasia     B. CECUM, BIOPSY:   - Colonic mucosa with no significant histologic abnormality   - Negative for dysplasia     C. COLON, ASCENDING, BIOPSY:   - Colonic mucosa with no significant histologic abnormality   - Negative for dysplasia     D. COLON, TRANSVERSE, BIOPSY:   - Colonic mucosa with no significant histologic abnormality   - Negative for dysplasia     E. COLON, DESCENDING,  BIOPSY:   - Colonic mucosa with no significant histologic abnormality   - Negative for dysplasia     F. COLON, SIGMOID, BIOPSY:   - Colonic mucosa with no significant histologic abnormality   - Negative for dysplasia     G. RECTUM, BIOPSY:   -  Colonic mucosa with no significant histologic abnormality   - Negative for dysplasia     PERTINENT MEDICATIONS:  -No current Crohn's medications  Medications reviewed with patient today, see Medication List/Assessment for details.  No other NSAID/anticoagulation reported by patient.  No other OTC/herbal/supplements reported by patient.    SOCIAL HISTORY: Tobacco: none.    PHYSICAL EXAMINATION: video visit exam     General appearance  Healthy appearing adult, in no acute distress     Eyes  Sclera anicteric  Pupils round and reactive to light     Ears, nose, mouth and throat  No obvious external lesions of ears and nose  Hearing intact     Neck  Symmetric  No obvious external lesions     Respiratory  Normal respiration, no use of accessory muscles      MSK  Gait normal     Skin  No rashes or jaundice      Psychiatric  Oriented to person, place and time  Appropriate mood and affect.       PERTINENT STUDIES:  Fecal calprotectin 226 (4/13/21) from 95 (2/19/21)   9/2021    MR ENTEROGRAPHY: 2/25/2021   No evidence of active inflammation of the small bowel. No evidence of  stricture or fistula.     ASSESSMENT/PLAN:  1. Ileal Crohn's Disease, moderate  Current medication: Stelara (started 12/27/21)  Current clinical disease activity: remission   Current endoscopic disease activity: icscope 10/2022 showed SES-CD 0    Diagnosed 3/2017. Disease limited to ileum. Initially on mesalamine until 11/2019 when it was stopped for RAMSEY.  Due to active disease on cscope 11/2021, recently started on stelara late last year and has generally done well, achieving endoscopic and histologic remission colonoscopy 10/2022.   We will plan for the following:    PLAN  ---Continue with stelara every 8 weeks  -- Labs to include CBC, CRP, ESR, LFTs every 3 months     Routine IBD care:   - recommend supplementation vitamin D 1000 units daily and calcium 500 mg twice daily.Patient confirmed she is taking.   -- Vaccines/immunizations to be updated:  Recommend yearly flu shot, pneumonia vaccines (Prevnar 13 then 8 weeks later Pneumovax 23 then 5 years later Pneumovax 23), tetanus every 10 years. Kelly is UTD with Shingles vaccine (she did have optic shingles in the past)   - No NSAIDs (ibuprofen, or anything containing ibuprofen)     2.  GERD  Patient has longstanding GERD symptoms, overall well controlled on omeprazole 40 mg a day.      2. Cancer Screening  No PSC, no high-risk FH CRC (2nd cousin dx'd in 2017 w/ advanced CRC @ 57yo). Given colonoscopy in 2019 without adenomatous lesions, recommend repeat colonoscopy for routine screening in 2029 unless symptomatic sooner.    RTC in 6 months       Benjamin Brooks PA-C  Division of Gastroenterology, Hepatology and Nutrition  [facility]

## 2022-11-10 ENCOUNTER — TELEPHONE (OUTPATIENT)
Dept: GASTROENTEROLOGY | Facility: CLINIC | Age: 62
End: 2022-11-10

## 2022-11-10 NOTE — TELEPHONE ENCOUNTER
Called and spoke with patient about scheduling a follow up appt with Benjamin Brooks PA-C in about 6 months (around 5/9/23). Patient wanted to call back to schedule when she has her calendar nearby. Gave her the call center number to call back.

## 2022-11-17 ENCOUNTER — ANCILLARY PROCEDURE (OUTPATIENT)
Dept: MAMMOGRAPHY | Facility: CLINIC | Age: 62
End: 2022-11-17
Attending: FAMILY MEDICINE
Payer: COMMERCIAL

## 2022-11-17 DIAGNOSIS — R92.8 ABNORMAL MAMMOGRAM OF RIGHT BREAST: ICD-10-CM

## 2022-11-17 PROCEDURE — 77065 DX MAMMO INCL CAD UNI: CPT | Mod: RT | Performed by: RADIOLOGY

## 2022-11-17 PROCEDURE — 76642 ULTRASOUND BREAST LIMITED: CPT | Mod: RT | Performed by: RADIOLOGY

## 2022-11-17 PROCEDURE — G0279 TOMOSYNTHESIS, MAMMO: HCPCS | Mod: RT | Performed by: RADIOLOGY

## 2022-11-29 ENCOUNTER — MYC MEDICAL ADVICE (OUTPATIENT)
Dept: FAMILY MEDICINE | Facility: CLINIC | Age: 62
End: 2022-11-29

## 2022-11-29 DIAGNOSIS — J10.1 INFLUENZA A: Primary | ICD-10-CM

## 2022-11-30 RX ORDER — OSELTAMIVIR PHOSPHATE 75 MG/1
75 CAPSULE ORAL DAILY
Qty: 7 CAPSULE | Refills: 0 | Status: SHIPPED | OUTPATIENT
Start: 2022-11-30 | End: 2022-12-07

## 2022-11-30 NOTE — TELEPHONE ENCOUNTER
Pt sent My chart message. See below:    Guilherme Manrique, my  just tested positive for Influenza A today. The PA at Urgent care said that I should let you know that since I m immune compromised and have been exposed, I could benefit from tamiflu. Could you send a prescription to the [facility] pharmacy if you feel that s appropriate?    The order has been Td up if you would like to sign. Thanks!!    Jennifer Grijalva RN  [facility]

## 2022-12-12 ENCOUNTER — TELEPHONE (OUTPATIENT)
Dept: GASTROENTEROLOGY | Facility: CLINIC | Age: 62
End: 2022-12-12

## 2022-12-12 NOTE — TELEPHONE ENCOUNTER
PA Initiation    Medication: Stelara PA Renewal  Insurance Company: [employer] - Phone [phone] Fax [phone]  Pharmacy Filling the Rx:    Filling Pharmacy Phone:    Filling Pharmacy Fax:    Start Date: 12/12/2022    [id number]

## 2022-12-16 NOTE — TELEPHONE ENCOUNTER
Prior Authorization Approval    Authorization Effective Date: 12/15/2022  Authorization Expiration Date: 12/15/2023  Medication: Stelara PA Renewal Approved  Approved Dose/Quantity: ud  Reference #: Key: [id number]   Insurance Company: [employer] - Phone [phone] Fax [phone]  Expected CoPay:       CoPay Card Available:      Foundation Assistance Needed:    Which Pharmacy is filling the prescription (Not needed for infusion/clinic administered):    Pharmacy Notified: Yes  Patient Notified: Yes

## 2023-01-05 DIAGNOSIS — I10 HYPERTENSION GOAL BP (BLOOD PRESSURE) < 140/90: ICD-10-CM

## 2023-01-05 RX ORDER — LISINOPRIL 5 MG/1
TABLET ORAL
Qty: 90 TABLET | Refills: 3 | Status: SHIPPED | OUTPATIENT
Start: 2023-01-05 | End: 2024-01-30

## 2023-01-05 NOTE — TELEPHONE ENCOUNTER
"Requested Prescriptions   Pending Prescriptions Disp Refills     lisinopril (ZESTRIL) 5 MG tablet [Pharmacy Med Name: LISINOPRIL 5MG TABS] 90 tablet 3     Sig: TAKE ONE TABLET BY MOUTH ONCE DAILY.       ACE Inhibitors (Including Combos) Protocol Failed - 1/5/2023  5:27 AM        Failed - Normal serum creatinine on file in past 12 months     Recent Labs   Lab Test 11/12/21  0907   CR 0.64       Ok to refill medication if creatinine is low          Failed - Normal serum potassium on file in past 12 months     Recent Labs   Lab Test 11/12/21  0907   POTASSIUM 4.0             Passed - Blood pressure under 140/90 in past 12 months     BP Readings from Last 3 Encounters:   10/17/22 119/89   01/13/22 126/81   12/27/21 (!) 132/90                 Passed - Recent (12 mo) or future (30 days) visit within the authorizing provider's specialty     Patient has had an office visit with the authorizing provider or a provider within the authorizing providers department within the previous 12 mos or has a future within next 30 days. See \"Patient Info\" tab in inbasket, or \"Choose Columns\" in Meds & Orders section of the refill encounter.              Passed - Medication is active on med list        Passed - Patient is age 18 or older        Passed - No active pregnancy on record        Passed - No positive pregnancy test within past 12 months         Routing refill request to provider for review/approval because pt did not pass protocol.    Pt was seen 01/13/22    Jennifer Grijalva RN  [facility]       "

## 2023-01-16 ENCOUNTER — LAB (OUTPATIENT)
Dept: LAB | Facility: CLINIC | Age: 63
End: 2023-01-16
Payer: COMMERCIAL

## 2023-01-16 ENCOUNTER — TELEPHONE (OUTPATIENT)
Dept: FAMILY MEDICINE | Facility: CLINIC | Age: 63
End: 2023-01-16

## 2023-01-16 DIAGNOSIS — Z13.6 CARDIOVASCULAR SCREENING; LDL GOAL LESS THAN 130: ICD-10-CM

## 2023-01-16 DIAGNOSIS — E55.9 VITAMIN D DEFICIENCY: ICD-10-CM

## 2023-01-16 DIAGNOSIS — K50.00 CROHN'S DISEASE OF ILEUM WITHOUT COMPLICATION (H): ICD-10-CM

## 2023-01-16 DIAGNOSIS — E55.9 VITAMIN D DEFICIENCY: Primary | ICD-10-CM

## 2023-01-16 LAB
ALBUMIN SERPL BCG-MCNC: 4.6 G/DL (ref 3.5–5.2)
ALP SERPL-CCNC: 87 U/L (ref 35–104)
ALT SERPL W P-5'-P-CCNC: 15 U/L (ref 10–35)
AST SERPL W P-5'-P-CCNC: 25 U/L (ref 10–35)
BASOPHILS # BLD AUTO: 0.1 10E3/UL (ref 0–0.2)
BASOPHILS NFR BLD AUTO: 1 %
BILIRUB DIRECT SERPL-MCNC: <0.2 MG/DL (ref 0–0.3)
BILIRUB SERPL-MCNC: 0.5 MG/DL
CHOLEST SERPL-MCNC: 253 MG/DL
CRP SERPL-MCNC: <3 MG/L
EOSINOPHIL # BLD AUTO: 0.4 10E3/UL (ref 0–0.7)
EOSINOPHIL NFR BLD AUTO: 6 %
ERYTHROCYTE [DISTWIDTH] IN BLOOD BY AUTOMATED COUNT: 12.8 % (ref 10–15)
ERYTHROCYTE [SEDIMENTATION RATE] IN BLOOD BY WESTERGREN METHOD: 8 MM/HR (ref 0–30)
HCT VFR BLD AUTO: 40.3 % (ref 35–47)
HDLC SERPL-MCNC: 74 MG/DL
HGB BLD-MCNC: 13.7 G/DL (ref 11.7–15.7)
HOLD SPECIMEN: NORMAL
LDLC SERPL CALC-MCNC: 163 MG/DL
LYMPHOCYTES # BLD AUTO: 1.8 10E3/UL (ref 0.8–5.3)
LYMPHOCYTES NFR BLD AUTO: 26 %
MCH RBC QN AUTO: 30.9 PG (ref 26.5–33)
MCHC RBC AUTO-ENTMCNC: 34 G/DL (ref 31.5–36.5)
MCV RBC AUTO: 91 FL (ref 78–100)
MONOCYTES # BLD AUTO: 0.6 10E3/UL (ref 0–1.3)
MONOCYTES NFR BLD AUTO: 9 %
NEUTROPHILS # BLD AUTO: 4 10E3/UL (ref 1.6–8.3)
NEUTROPHILS NFR BLD AUTO: 59 %
NONHDLC SERPL-MCNC: 179 MG/DL
PLATELET # BLD AUTO: 298 10E3/UL (ref 150–450)
PROT SERPL-MCNC: 7.5 G/DL (ref 6.4–8.3)
RBC # BLD AUTO: 4.44 10E6/UL (ref 3.8–5.2)
TRIGL SERPL-MCNC: 82 MG/DL
WBC # BLD AUTO: 6.8 10E3/UL (ref 4–11)

## 2023-01-16 PROCEDURE — 80061 LIPID PANEL: CPT

## 2023-01-16 PROCEDURE — 82306 VITAMIN D 25 HYDROXY: CPT

## 2023-01-16 PROCEDURE — 85652 RBC SED RATE AUTOMATED: CPT

## 2023-01-16 PROCEDURE — 36415 COLL VENOUS BLD VENIPUNCTURE: CPT

## 2023-01-16 PROCEDURE — 85025 COMPLETE CBC W/AUTO DIFF WBC: CPT

## 2023-01-16 PROCEDURE — 86140 C-REACTIVE PROTEIN: CPT

## 2023-01-16 PROCEDURE — 80076 HEPATIC FUNCTION PANEL: CPT

## 2023-01-16 NOTE — TELEPHONE ENCOUNTER
PT called back, relayed message from Dr Hunter requesting appointment for check up, she will call back or scheduled via [employer]. Please call Kelly back with any further questions

## 2023-01-16 NOTE — TELEPHONE ENCOUNTER
Patient is at lab and requesting vitamin D and lipids - she is fasting.     T'd up if appropriate.     Tisha Joyner RN  [facility]

## 2023-01-17 LAB — DEPRECATED CALCIDIOL+CALCIFEROL SERPL-MC: 39 UG/L (ref 20–75)

## 2023-01-25 ENCOUNTER — PHARMACY VISIT (OUTPATIENT)
Dept: PHARMACY | Facility: CLINIC | Age: 63
End: 2023-01-25
Payer: COMMERCIAL

## 2023-01-25 NOTE — PROGRESS NOTES
Crohn's/Ulcerative Colitis Clinical Follow Up Assessment     Care Details    Would you like to perform the [place] IBD Modified assessment?   ? Yes    Does the patient have Crohn's Disease or Ulcerative Colitis? If the patient has both, please assess whichever is the most severe for the patient.   ? Crohn's Disease     Number of liquid or soft stools for past 7 days   ? 0    Abdominal pain sum of 7 daily ratings:   ? None (0)     General wellbeing sum of 7 daily ratings   ? Well (0)     What symptoms is your IBD causing you? (1 pt given for each excluding lab values, diagnostic procedures, and other)   ? None    How many symptoms from question above (excluding lab values, diagnostic procedures, and other) is patient experiencing?   ? 0    How severe would you rate your symptoms affecting your overall life?   ? Not at all (0)     How often has your IBD interfered with activities (work, school, grocery shopping, sports, etc)?   ? Not at all (0)     How often has your IBD affected your sleep at night during the last 2 weeks?   ? Not at all (0)     How often do you feel anxious or depressed because of your IBD?   ? Not at all (0)     How often do you feel fatigue or have a lack of energy?   ? Not at all (0)     Please enter the Crohns Disease Scoring   ? 0         What are the patients' goals for this therapy?   ? Prevent flares      Is the patient meeting treatment goals?   ? Yes      What is the patient's diagnosis?   ? Crohn's Disease      Select prescribed medication:   ? STELARA (ustekinumab)      Is it appropriate to collect a PDC at this time? ( PDC would not be appropriate for cycled medications or if the patient is on therapy   ? Yes      Please enter patient's PDC, PDC = adherence metric. Goal > 0.8 (80% adherence) [QA Metric]   ? 0.98        Summary Notes   Unable to reach pt again this quarter for pharmacist assessment, but she does respond to QoL text and looks like things are going great. Not having  any symptoms at this time. PDC 0.98. Moving to annual outreach attempts.    Chandler BearD  Therapy Management Pharmacist  [facility]

## 2023-02-18 ENCOUNTER — HEALTH MAINTENANCE LETTER (OUTPATIENT)
Age: 63
End: 2023-02-18

## 2023-04-25 ENCOUNTER — TELEPHONE (OUTPATIENT)
Dept: GASTROENTEROLOGY | Facility: CLINIC | Age: 63
End: 2023-04-25
Payer: COMMERCIAL

## 2023-04-25 DIAGNOSIS — K50.018 CROHN'S DISEASE OF SMALL INTESTINE WITH OTHER COMPLICATION (H): Primary | ICD-10-CM

## 2023-04-25 NOTE — TELEPHONE ENCOUNTER
Patient returned call. She has been experiencing GI symptoms for about 3 weeks. Increased frequency, urgency, and diarrhea. No blood in the stools. Denies pain. Incontinent episode x1 today, which is the first occurrence she has ever had.    Of note, she has several high-stress situations occurring in her life right now. Her Dad is being moved into a nursing home and her daughter is starting IVF after multiple pregnancies lost.    Plan for standing blood work + FCP and infectious stool studies. Patient will go in for labs tomorrow.

## 2023-04-25 NOTE — TELEPHONE ENCOUNTER
"Patient left voicemail stating she was having \"problems with Crohn's\". Called back; left voicemail with request for call to be returned. Will follow-up via mentiont as well.  "

## 2023-04-26 ENCOUNTER — LAB (OUTPATIENT)
Dept: LAB | Facility: CLINIC | Age: 63
End: 2023-04-26
Payer: COMMERCIAL

## 2023-04-26 DIAGNOSIS — K50.018 CROHN'S DISEASE OF SMALL INTESTINE WITH OTHER COMPLICATION (H): ICD-10-CM

## 2023-04-26 LAB
ALBUMIN SERPL BCG-MCNC: 4.4 G/DL (ref 3.5–5.2)
ALP SERPL-CCNC: 85 U/L (ref 35–104)
ALT SERPL W P-5'-P-CCNC: 15 U/L (ref 10–35)
ANION GAP SERPL CALCULATED.3IONS-SCNC: 12 MMOL/L (ref 7–15)
AST SERPL W P-5'-P-CCNC: 24 U/L (ref 10–35)
BASOPHILS # BLD AUTO: 0 10E3/UL (ref 0–0.2)
BASOPHILS NFR BLD AUTO: 1 %
BILIRUB SERPL-MCNC: 0.4 MG/DL
BUN SERPL-MCNC: 11.6 MG/DL (ref 8–23)
CALCIUM SERPL-MCNC: 9.8 MG/DL (ref 8.8–10.2)
CHLORIDE SERPL-SCNC: 102 MMOL/L (ref 98–107)
CREAT SERPL-MCNC: 0.75 MG/DL (ref 0.51–0.95)
CRP SERPL-MCNC: <3 MG/L
DEPRECATED HCO3 PLAS-SCNC: 23 MMOL/L (ref 22–29)
EOSINOPHIL # BLD AUTO: 0.2 10E3/UL (ref 0–0.7)
EOSINOPHIL NFR BLD AUTO: 2 %
ERYTHROCYTE [DISTWIDTH] IN BLOOD BY AUTOMATED COUNT: 13.1 % (ref 10–15)
ERYTHROCYTE [SEDIMENTATION RATE] IN BLOOD BY WESTERGREN METHOD: 9 MM/HR (ref 0–30)
GFR SERPL CREATININE-BSD FRML MDRD: 89 ML/MIN/1.73M2
GLUCOSE SERPL-MCNC: 97 MG/DL (ref 70–99)
HCT VFR BLD AUTO: 40.6 % (ref 35–47)
HGB BLD-MCNC: 13.8 G/DL (ref 11.7–15.7)
LYMPHOCYTES # BLD AUTO: 1.8 10E3/UL (ref 0.8–5.3)
LYMPHOCYTES NFR BLD AUTO: 24 %
MCH RBC QN AUTO: 30.9 PG (ref 26.5–33)
MCHC RBC AUTO-ENTMCNC: 34 G/DL (ref 31.5–36.5)
MCV RBC AUTO: 91 FL (ref 78–100)
MONOCYTES # BLD AUTO: 0.6 10E3/UL (ref 0–1.3)
MONOCYTES NFR BLD AUTO: 8 %
NEUTROPHILS # BLD AUTO: 4.8 10E3/UL (ref 1.6–8.3)
NEUTROPHILS NFR BLD AUTO: 65 %
PLATELET # BLD AUTO: 325 10E3/UL (ref 150–450)
POTASSIUM SERPL-SCNC: 4.2 MMOL/L (ref 3.4–5.3)
PROT SERPL-MCNC: 7.2 G/DL (ref 6.4–8.3)
RBC # BLD AUTO: 4.46 10E6/UL (ref 3.8–5.2)
SODIUM SERPL-SCNC: 137 MMOL/L (ref 136–145)
WBC # BLD AUTO: 7.4 10E3/UL (ref 4–11)

## 2023-04-26 PROCEDURE — 36415 COLL VENOUS BLD VENIPUNCTURE: CPT

## 2023-04-26 PROCEDURE — 87493 C DIFF AMPLIFIED PROBE: CPT | Mod: 59

## 2023-04-26 PROCEDURE — 85652 RBC SED RATE AUTOMATED: CPT

## 2023-04-26 PROCEDURE — 83993 ASSAY FOR CALPROTECTIN FECAL: CPT

## 2023-04-26 PROCEDURE — 86140 C-REACTIVE PROTEIN: CPT

## 2023-04-26 PROCEDURE — 80053 COMPREHEN METABOLIC PANEL: CPT

## 2023-04-26 PROCEDURE — 85025 COMPLETE CBC W/AUTO DIFF WBC: CPT

## 2023-04-26 PROCEDURE — 87506 IADNA-DNA/RNA PROBE TQ 6-11: CPT

## 2023-04-27 ENCOUNTER — MYC MEDICAL ADVICE (OUTPATIENT)
Dept: GASTROENTEROLOGY | Facility: CLINIC | Age: 63
End: 2023-04-27
Payer: COMMERCIAL

## 2023-04-27 DIAGNOSIS — K50.018 CROHN'S DISEASE OF SMALL INTESTINE WITH OTHER COMPLICATION (H): Primary | ICD-10-CM

## 2023-04-27 LAB
C COLI+JEJUNI+LARI FUSA STL QL NAA+PROBE: NOT DETECTED
C DIFF TOX B STL QL: NEGATIVE
EC STX1 GENE STL QL NAA+PROBE: NOT DETECTED
EC STX2 GENE STL QL NAA+PROBE: NOT DETECTED
NOROV GI+II ORF1-ORF2 JNC STL QL NAA+PR: NOT DETECTED
RVA NSP5 STL QL NAA+PROBE: NOT DETECTED
SALMONELLA SP RPOD STL QL NAA+PROBE: NOT DETECTED
SHIGELLA SP+EIEC IPAH STL QL NAA+PROBE: NOT DETECTED
V CHOL+PARA RFBL+TRKH+TNAA STL QL NAA+PR: NOT DETECTED
Y ENTERO RECN STL QL NAA+PROBE: NOT DETECTED

## 2023-04-28 LAB — CALPROTECTIN STL-MCNT: 197 MG/KG (ref 0–49.9)

## 2023-04-28 RX ORDER — BUDESONIDE 3 MG/1
9 CAPSULE, COATED PELLETS ORAL EVERY MORNING
Qty: 90 CAPSULE | Refills: 0 | Status: SHIPPED | OUTPATIENT
Start: 2023-04-28 | End: 2023-10-09

## 2023-04-28 NOTE — TELEPHONE ENCOUNTER
Reviewed lab results with Dr. Pollock. All infectious stool studies negative. Blood work normal. . Plan for 30 day course of PO budesonide. Order placed.

## 2023-05-03 ENCOUNTER — PATIENT OUTREACH (OUTPATIENT)
Dept: GASTROENTEROLOGY | Facility: CLINIC | Age: 63
End: 2023-05-03
Payer: COMMERCIAL

## 2023-05-03 ENCOUNTER — TELEPHONE (OUTPATIENT)
Dept: GASTROENTEROLOGY | Facility: CLINIC | Age: 63
End: 2023-05-03
Payer: COMMERCIAL

## 2023-05-03 DIAGNOSIS — K50.018 CROHN'S DISEASE OF SMALL INTESTINE WITH OTHER COMPLICATION (H): Primary | ICD-10-CM

## 2023-05-03 RX ORDER — USTEKINUMAB 90 MG/ML
INJECTION, SOLUTION SUBCUTANEOUS
Qty: 1 ML | Refills: 5 | Status: SHIPPED | OUTPATIENT
Start: 2023-05-03 | End: 2023-09-27

## 2023-05-03 NOTE — TELEPHONE ENCOUNTER
PA Initiation    Medication: Stelara q42d initiated  Insurance Company: [employer] - Phone [phone] Fax [phone]  Pharmacy Filling the Rx:    Filling Pharmacy Phone:    Filling Pharmacy Fax:    Start Date: 5/3/2023    [id number]

## 2023-05-03 NOTE — TELEPHONE ENCOUNTER
Prior Authorization Not Needed per Insurance    Medication: Stelara q42d NOT NEEDED  Insurance Company: [employer] - Phone [phone] Fax [phone]  Expected CoPay:      Pharmacy Filling the Rx:    Pharmacy Notified:    Patient Notified:      PER THE INSURANCE THE PHARMACY WILL HAVE TO CALL TO GET AN OVERRIDE WITH THE NEW DOSAGE BUT NO PA IS NEEDED

## 2023-05-03 NOTE — PROGRESS NOTES
Patient reports a pattern of loose stools and increased frequency at weeks 6-7 of her Stelara cycle. She currently takes Stelara every 8 weeks. These symptoms are also accompanied by abdominal pain. Discussed with Dr. Pollock who recommends increasing Stelara to every 6 week administrations to better control her Crohn's disease. Order placed. Will initiate a new PA.      Detail Level: Zone Note Text (......Xxx Chief Complaint.): This diagnosis correlates with the Render Risk Assessment In Note?: no Other (Free Text): Case discussed with Dr. Diaz. Patient to see an eye doctor.

## 2023-05-09 NOTE — TELEPHONE ENCOUNTER
Called [facility]. They will add a note to the patient's prescription that they need to call for an override with the next order. Last dose was delivered one week ago. Patient should order her next dose for the Q6w interval.

## 2023-05-17 ASSESSMENT — ENCOUNTER SYMPTOMS
VOMITING: 0
CONSTIPATION: 0
CHILLS: 0
WEIGHT GAIN: 0
ABDOMINAL PAIN: 0
BLOOD IN STOOL: 0
EXERCISE INTOLERANCE: 0
HALLUCINATIONS: 0
NAUSEA: 1
SYNCOPE: 0
DECREASED APPETITE: 0
DIARRHEA: 1
ALTERED TEMPERATURE REGULATION: 0
NIGHT SWEATS: 0
RECTAL PAIN: 0
SLEEP DISTURBANCES DUE TO BREATHING: 0
PALPITATIONS: 1
LIGHT-HEADEDNESS: 0
LEG PAIN: 0
POLYDIPSIA: 0
POLYPHAGIA: 0
ORTHOPNEA: 0
BLOATING: 1
INCREASED ENERGY: 0
WEIGHT LOSS: 0
BOWEL INCONTINENCE: 0
HYPERTENSION: 0
HEARTBURN: 1
FATIGUE: 1
FEVER: 0
JAUNDICE: 0
HYPOTENSION: 0

## 2023-05-18 ENCOUNTER — TRANSFERRED RECORDS (OUTPATIENT)
Dept: MULTI SPECIALTY CLINIC | Facility: CLINIC | Age: 63
End: 2023-05-18

## 2023-05-18 LAB — PAP SMEAR - HIM PATIENT REPORTED: NORMAL

## 2023-05-23 ENCOUNTER — VIRTUAL VISIT (OUTPATIENT)
Dept: GASTROENTEROLOGY | Facility: CLINIC | Age: 63
End: 2023-05-23
Payer: COMMERCIAL

## 2023-05-23 ENCOUNTER — TELEPHONE (OUTPATIENT)
Dept: GASTROENTEROLOGY | Facility: CLINIC | Age: 63
End: 2023-05-23

## 2023-05-23 DIAGNOSIS — K50.018 CROHN'S DISEASE OF SMALL INTESTINE WITH OTHER COMPLICATION (H): Primary | ICD-10-CM

## 2023-05-23 PROCEDURE — 99215 OFFICE O/P EST HI 40 MIN: CPT | Mod: VID | Performed by: PHYSICIAN ASSISTANT

## 2023-05-23 RX ORDER — BUDESONIDE 3 MG/1
CAPSULE, COATED PELLETS ORAL
Qty: 42 CAPSULE | Refills: 0 | Status: SHIPPED | OUTPATIENT
Start: 2023-05-23 | End: 2023-10-09

## 2023-05-23 ASSESSMENT — PAIN SCALES - GENERAL: PAINLEVEL: NO PAIN (0)

## 2023-05-23 NOTE — TELEPHONE ENCOUNTER
Left Voicemail (1st Attempt) for the patient to call back and schedule the following:    Appointment type: 6 month follow up video  Provider: Benjamin Brooks PA-C  Return date: November 2023  Specialty phone number: n/a  Additional appointment(s) needed: n/a  Additonal Notes: n/a

## 2023-05-23 NOTE — PROGRESS NOTES
"Virtual Visit Details    Type of service:  Video Visit     Originating Location (pt. Location): Home    Distant Location (provider location):  Off-site  Platform used for Video Visit: David      IBD CLINIC VISIT, follow up    REASON FOR CONSULTATION: Crohn's Disease, GERD    HPI: 62 year old female w/ h/o ileal Crohn's Disease per 3/2017 ileocolonoscopy biopsies previously on PO 5-ASA, Lichen Planus (oral/perianal involvement), Hidradenitis Suppurativa s/p excision/drainage 1975, prior presumed diagnosis of Collagenous Colitis per colonic biopsies 2006 (though suspecting evolving IBD presentation in retrospect following 3/2017 highly-suggestive biopsies of terminal ileum + bland colonic biopsies), GERD presenting for f/u Crohn's Disease.  History summarized from her documentation.    8/2018 prompted an increase in 5ASA to 4.8g daily due to intermittent \"flares\" she had been experiencing, despite labs within normal limits.  Since the increased, she has had great symptomatic relieve and reports one incidence of loose stool, otherwise no episodes concerning for flares.  She is currently having 3 stools per day, soft and formed without blood in the stool.  No urgency or nighttime stools.      She had noted a \"tear\" by her anus and had this evaluated by PCP and by her GYN. It was thought to be due to lichen sclerosis (per GYN) and was prescribed a steroid cream.  She notes improvement already even with vaseline as instructed by her PCP. She did not have any pain with a BM associated with this finding.      She had noted a lump on right side of buttocks that has been very bothersome, inhibiting much of her activity.  It is adjacent to previous pilonidal cyst repair. No drainage and no fevers. MRI showed post-surgical cyst.      labs show elevated creatinine and slightly abnormal LFTs. Based on symptoms with concern for active flares (patient reported, despite normal labs) dose was increased Spring 2019. Normal Creatinine " 2/2019 prior to dose increase. Labs with primary care show elevated creatinine (0.6--> 1.29). LFT slightly increased, AST 72 and ALT 92. Normal alk phos. after last visit, plan was to hold Lialda.  Underwent colonoscopy 12/3/2019 with simple endoscopic score for Crohn's disease to be 0.    Interval history December 8, 2020:  Since the patient's last visit, overall she has been doing very well.  Did have one episode of symptoms in early October.  She woke up at 4 AM with abdominal pain, had 1 day of watery stools.  Then pain subsided later that day.  Did have urgency with bowel movements.  Denied melena or hematochezia during this episode.  After this her stools returned back to normal.  Has on average 2 bowel movements a day one before morning and 1 before work.  No abdominal pain at baseline.  Retching greasy foods can trigger symptoms.  Has maybe had 1 nocturnal bowel movement.  Consistency of stool is soft and formed.  Continues to take probiotic which she finds very helpful.    For her GERD, she is taking 40 mg of omeprazole.  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  This improves quickly with drinking water.  She is currently taking a multivitamin.    Interval history 4/20/2021   Kelly was last seen 12/8/2020 with Alice Gonzalez in GI clinic. She was doing well at that time and has been off mesalamine since 2019. Kelly reporting having a Crohn's flare in February of this year. Similar to her previous flares, she had abdominal pain and loose stools for 8 hours and then felt fatigued for 2 more days but had return of normal bowel pattern. No hematochezia, nausea/vomiting, fever, or abdominal pain. She called into clinic and was started on budesonide for 1 month. After 1-2 weeks of budesonide she had fecal calprotectin  "checked that was 95. Enterography was also done and did not show any evidence of active inflammation. Since this flare she had one day at the end of March where she had urgency but otherwise has been doing well. These have been her two \"flares\" of Crohn's this year. She had two similar flares in  and 3 in . Currently having her typical 2-3 soft formed stools per day. She does note some sores on her tongue which are not very painful but occur every few weeks. She is unsure if this is related to CD or Lichen planus which typically affects her oral cavity.    Of note last colonoscopy was in 2019 and showed Simple Endoscopic Score for Crohn's Disease: 0    Luigi Holloway Index:  2021  Well-bein (very well)  Abdomina pain: 0 (none)  # liquid/soft stools: 2   Abdominal mass 0 (none)  Extraluminal comlications    - Aphthous ulcers 1 - although unclear if from LP or CD  Score: 3      Interval history 2021   Recent cscope findings below.     Luigi Holloway Index:  Well-bein (very well)  Abdominal pain: 1 (mild)  # liquid/soft stools: occasionally, up to 6 per day   Abdominal mass 0 (none)  Extraluminal comlications    - Aphthous ulcers 1 - although unclear if from LP or CD    Dad with SCC. Son has MS. Mom with breast cancer in her 40s.     ROS: 10pt ROS performed and otherwise negative.    Interval history 2021 (video visit)  Overall feeling well, but with a few, self-limited episodes of loose stools and abd pain that lasts a few hours.  No weight loss.     High fiber foods might cause abd pain.      Luigi Holloway Index:  Well-bein (very well)  Abdominal pain: 1 (mild)  # liquid/soft stools: 0;  2-3 per day (formed)   Abdominal mass 0 (none)  Extraluminal complications    - Aphthous ulcers 1 - although unclear if from LP or CD. Has been present over the past few months.    Interval history 2021 (video visit)  Continues to feel well overall. Does have occasional urgency. Has 2-3 BMs per " day, soft, well-formed. No blood. No weight loss. No abdominal pain.    Interval history, 3/10/22  First injection was 2/27/22. Has been stressed as daughter has been going through some pregnancy losses. A month ago had a couple episodes abdominal cramping associated with BMs, now is back to baseline. Currently having 2 stools per day that are formed without blood. Some urgency. No nighttime stools or fecal incontinence. Would be due  for next injection April 24.    Interval hx 11/9/22  Feeling well, some stressors include recent mammogram requiring additional imaging, step mother broke hip and daughter going through infertility w/ multiple losses. Bowel pattern has been stable, no blood or urgency. Compliant with injections.    Interval hx 5/23/23  Urgency and frequency that started at the end of March, which was a week before she was due for her injection. She started entocort 4/29/23 at 9 mg which she continues. This also led to nighttime stools. Had to leave work due to symptoms. Last injection was 5/11 which was first 6 week interval. Started to feel better just as of last week. No new EIM. Fatigue is improving.       PERTINENT PAST MEDICAL/SURGICAL HISTORY:  As noted above.    ENDOSCOPIC EVALUATION:  icscope 11/2021 showed SES-CD 4    The Simple Endoscopic Score for Crohn's Disease was determined based on        the endoscopic appearance of the mucosa in the following segments:        - Ileum: Findings include aphthous ulcers less than 0.5 cm in size, less        than 10% ulcerated surfaces, less than 50% of surfaces affected and no        narrowings. Segment score: 3.        - Right Colon: Findings include no ulcers present, no ulcerated        surfaces, less than 50% of surfaces affected, no narrowings and no        ulcers present, no ulcerated surfaces, no affected surfaces and no        narrowings. Segment score: 1.        - Transverse Colon: Findings include no ulcers present, no ulcerated         surfaces, no affected surfaces and no narrowings. Segment score: 0.        - Left Colon: Findings include no ulcers present, no ulcerated surfaces,        no affected surfaces and no narrowings. Segment score: 0.        - Rectum: Findings include no ulcers present, no ulcerated surfaces, no        affected surfaces and no narrowings. Segment score: 0.        - Total SES-CD aggregate score: 4. Biopsies were taken with a cold        forceps for histology.     PATH:     A.  TERMINAL ILEUM, BIOPSY:  Ileal mucosa with no granulomas, cryptitis or other histologic evidence of active Crohn; negative for dysplasia     B. CECUM, BIOPSY:  Colonic mucosa with no granulomas, cryptitis or other histologic evidence of active Crohn; negative for dysplasia        icscope 5/24/2021 showed SES-CD 5 with small ulcers in the TI. Bx normal.  Bx:     SPECIMEN(S):   A: Ileum biopsy   B: Cecal biopsy     FINAL DIAGNOSIS:   A. Ileum, Biopsy:   Ileal mucosa with no granulomas, cryptitis or other histologic evidence of    active Crohn; negative for dysplasia     B. Cecum, Biopsy:   Colonic mucosa with no granulomas, cryptitis or other histologic evidence   of active Crohn; negative for dysplasia       12/2019 Impression:          - Simple Endoscopic Score for Crohn's Disease: 0,                        mucosal inflammatory changes secondary to Crohn's                        disease, in remission. Biopsied.                        - Diverticulosis in the sigmoid colon.                        - Non-bleeding internal hemorrhoids.   A. ILEUM, BIOPSY:   - Small intestinal mucosa with no significant histologic abnormality   - Negative for dysplasia     B. CECUM, BIOPSY:   - Colonic mucosa with no significant histologic abnormality   - Negative for dysplasia     C. COLON, ASCENDING, BIOPSY:   - Colonic mucosa with no significant histologic abnormality   - Negative for dysplasia     D. COLON, TRANSVERSE, BIOPSY:   - Colonic mucosa with no significant  histologic abnormality   - Negative for dysplasia     E. COLON, DESCENDING,  BIOPSY:   - Colonic mucosa with no significant histologic abnormality   - Negative for dysplasia     F. COLON, SIGMOID, BIOPSY:   - Colonic mucosa with no significant histologic abnormality   - Negative for dysplasia     G. RECTUM, BIOPSY:   - Colonic mucosa with no significant histologic abnormality   - Negative for dysplasia     PERTINENT MEDICATIONS:  -No current Crohn's medications  Medications reviewed with patient today, see Medication List/Assessment for details.  No other NSAID/anticoagulation reported by patient.  No other OTC/herbal/supplements reported by patient.    SOCIAL HISTORY: Tobacco: none.    PHYSICAL EXAMINATION: video visit exam     General appearance  Healthy appearing adult, in no acute distress     Eyes  Sclera anicteric  Pupils round and reactive to light     Ears, nose, mouth and throat  No obvious external lesions of ears and nose  Hearing intact     Neck  Symmetric  No obvious external lesions     Respiratory  Normal respiration, no use of accessory muscles      MSK  Gait normal     Skin  No rashes or jaundice      Psychiatric  Oriented to person, place and time  Appropriate mood and affect.       PERTINENT STUDIES:  Fecal calprotectin 226 (4/13/21) from 95 (2/19/21)   9/2021    MR ENTEROGRAPHY: 2/25/2021   No evidence of active inflammation of the small bowel. No evidence of  stricture or fistula.     ASSESSMENT/PLAN:  1. Ileal Crohn's Disease, moderate  Current medication: Stelara (started 12/27/21), budesonide 9mg daily  Current clinical disease activity: remission   Current endoscopic disease activity: icscope 10/2022 showed SES-CD 0    Diagnosed 3/2017. Disease limited to ileum. Initially on mesalamine until 11/2019 when it was stopped for RAMSEY.  Due to active disease on cscope 11/2021, started on stelara, achieving endoscopic and histologic remission colonoscopy 10/2022. New symptoms with elevated  fecal sammy 4/2023 prompted change to every 6 week dosing. Improving now.  We will plan for the following:    PLAN  ---Continue with stelara every 6 weeks  -- Continue budesonide 9mg x 1 month total, 6mg x 2 weeks then 3 mg x 2 weeks  -- Fecal sammy vs colonoscopy in 6 months (fecal sammy may not be sufficient with ileal disease, however she has responded to this marker in the past).  -- Labs to include CBC, CRP, ESR, LFTs every 3 months     Routine IBD care:   - recommend supplementation vitamin D 1000 units daily and calcium 500 mg twice daily.Patient confirmed she is taking.   -- Vaccines/immunizations to be updated: Recommend yearly flu shot, pneumonia vaccines (Prevnar 13 then 8 weeks later Pneumovax 23 then 5 years later Pneumovax 23), tetanus every 10 years. Kelly is UTD with Shingles vaccine (she did have optic shingles in the past)   - No NSAIDs (ibuprofen, or anything containing ibuprofen)     2.  GERD  Patient has longstanding GERD symptoms, overall well controlled on omeprazole 40 mg a day.      2. Cancer Screening  No PSC, no high-risk FH CRC (2nd cousin dx'd in 2017 w/ advanced CRC @ 57yo). Given colonoscopy in 2019 without adenomatous lesions, recommend repeat colonoscopy for routine screening in 2029 unless symptomatic sooner.    RTC in 6 months       Thank you for this consultation.  42 minutes spent on the date of the encounter doing chart review, history and exam, documentation and further activities as noted above.  It was a pleasure to participate in the care of this patient; please contact us with any further questions.      Benjamin Brooks PA-C  Division of Gastroenterology, Hepatology and Nutrition  [facility]        Answers for HPI/ROS submitted by the patient on 5/17/2023  General Symptoms: Yes  Skin Symptoms: No  HENT Symptoms: No  EYE SYMPTOMS: No  HEART SYMPTOMS: Yes  LUNG SYMPTOMS: No  INTESTINAL SYMPTOMS: Yes  URINARY SYMPTOMS: No  GYNECOLOGIC SYMPTOMS: No  BREAST SYMPTOMS:  No  SKELETAL SYMPTOMS: No  BLOOD SYMPTOMS: No  NERVOUS SYSTEM SYMPTOMS: No  MENTAL HEALTH SYMPTOMS: No  Fever: No  Loss of appetite: No  Weight loss: No  Weight gain: No  Fatigue: Yes  Night sweats: No  Chills: No  Increased stress: Yes  Excessive hunger: No  Excessive thirst: No  Feeling hot or cold when others believe the temperature is normal: No  Loss of height: No  Post-operative complications: No  Surgical site pain: No  Hallucinations: No  Change in or Loss of Energy: No  Hyperactivity: No  Confusion: No  Chest pain or pressure: No  Fast or irregular heartbeat: Yes  Pain in legs with walking: No  Trouble breathing while lying down: No  Fingers or toes appear blue: No  High blood pressure: No  Low blood pressure: No  Fainting: No  Murmurs: No  Pacemaker: No  Varicose veins: No  Edema or swelling: No  Wake up at night with shortness of breath: No  Light-headedness: No  Exercise intolerance: No  Heart burn or indigestion: Yes  Nausea: Yes  Vomiting: No  Abdominal pain: No  Bloating: Yes  Constipation: No  Diarrhea: Yes  Blood in stool: No  Black stools: No  Rectal or Anal pain: No  Fecal incontinence: No  Yellowing of skin or eyes: No  Vomit with blood: No  Change in stools: Yes

## 2023-05-23 NOTE — LETTER
"    5/23/2023         RE: Aileen Priest  [address]  [place] [address]        Dear Colleague,    Thank you for referring your patient, Aileen Priest, to the [facility] [place]. Please see a copy of my visit note below.    IBD CLINIC VISIT, follow up    REASON FOR CONSULTATION: Crohn's Disease, GERD    HPI: 62 year old female w/ h/o ileal Crohn's Disease per 3/2017 ileocolonoscopy biopsies previously on PO 5-ASA, Lichen Planus (oral/perianal involvement), Hidradenitis Suppurativa s/p excision/drainage 1975, prior presumed diagnosis of Collagenous Colitis per colonic biopsies 2006 (though suspecting evolving IBD presentation in retrospect following 3/2017 highly-suggestive biopsies of terminal ileum + bland colonic biopsies), GERD presenting for f/u Crohn's Disease.  History summarized from her documentation.    8/2018 prompted an increase in 5ASA to 4.8g daily due to intermittent \"flares\" she had been experiencing, despite labs within normal limits.  Since the increased, she has had great symptomatic relieve and reports one incidence of loose stool, otherwise no episodes concerning for flares.  She is currently having 3 stools per day, soft and formed without blood in the stool.  No urgency or nighttime stools.      She had noted a \"tear\" by her anus and had this evaluated by PCP and by her GYN. It was thought to be due to lichen sclerosis (per GYN) and was prescribed a steroid cream.  She notes improvement already even with vaseline as instructed by her PCP. She did not have any pain with a BM associated with this finding.      She had noted a lump on right side of buttocks that has been very bothersome, inhibiting much of her activity.  It is adjacent to previous pilonidal cyst repair. No drainage and no fevers. MRI showed post-surgical cyst.      labs show elevated creatinine and slightly abnormal LFTs. Based on symptoms with concern for active flares (patient " reported, despite normal labs) dose was increased Spring 2019. Normal Creatinine 2/2019 prior to dose increase. Labs with primary care show elevated creatinine (0.6--> 1.29). LFT slightly increased, AST 72 and ALT 92. Normal alk phos. after last visit, plan was to hold Lialda.  Underwent colonoscopy 12/3/2019 with simple endoscopic score for Crohn's disease to be 0.    Interval history December 8, 2020:  Since the patient's last visit, overall she has been doing very well.  Did have one episode of symptoms in early October.  She woke up at 4 AM with abdominal pain, had 1 day of watery stools.  Then pain subsided later that day.  Did have urgency with bowel movements.  Denied melena or hematochezia during this episode.  After this her stools returned back to normal.  Has on average 2 bowel movements a day one before morning and 1 before work.  No abdominal pain at baseline.  Retching greasy foods can trigger symptoms.  Has maybe had 1 nocturnal bowel movement.  Consistency of stool is soft and formed.  Continues to take probiotic which she finds very helpful.    For her GERD, she is taking 40 mg of omeprazole.  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  This improves quickly with drinking water.  She is currently taking a multivitamin.    Interval history 4/20/2021   Kelly was last seen 12/8/2020 with Alice Gonzalez in GI clinic. She was doing well at that time and has been off mesalamine since 2019. Kelly reporting having a Crohn's flare in February of this year. Similar to her previous flares, she had abdominal pain and loose stools for 8 hours and then felt fatigued for 2 more days but had return of normal bowel pattern. No hematochezia, nausea/vomiting, fever, or abdominal pain. She called into clinic and was started on  "budesonide for 1 month. After 1-2 weeks of budesonide she had fecal calprotectin checked that was 95. Enterography was also done and did not show any evidence of active inflammation. Since this flare she had one day at the end of March where she had urgency but otherwise has been doing well. These have been her two \"flares\" of Crohn's this year. She had two similar flares in  and 3 in . Currently having her typical 2-3 soft formed stools per day. She does note some sores on her tongue which are not very painful but occur every few weeks. She is unsure if this is related to CD or Lichen planus which typically affects her oral cavity.    Of note last colonoscopy was in 2019 and showed Simple Endoscopic Score for Crohn's Disease: 0    Luigi Holloway Index:  2021  Well-bein (very well)  Abdomina pain: 0 (none)  # liquid/soft stools: 2   Abdominal mass 0 (none)  Extraluminal comlications    - Aphthous ulcers 1 - although unclear if from LP or CD  Score: 3      Interval history 2021   Recent cscope findings below.     Luigi Holloway Index:  Well-bein (very well)  Abdominal pain: 1 (mild)  # liquid/soft stools: occasionally, up to 6 per day   Abdominal mass 0 (none)  Extraluminal comlications    - Aphthous ulcers 1 - although unclear if from LP or CD    Dad with SCC. Son has MS. Mom with breast cancer in her 40s.     ROS: 10pt ROS performed and otherwise negative.    Interval history 2021 (video visit)  Overall feeling well, but with a few, self-limited episodes of loose stools and abd pain that lasts a few hours.  No weight loss.     High fiber foods might cause abd pain.      Luigi Holloway Index:  Well-bein (very well)  Abdominal pain: 1 (mild)  # liquid/soft stools: 0;  2-3 per day (formed)   Abdominal mass 0 (none)  Extraluminal complications    - Aphthous ulcers 1 - although unclear if from LP or CD. Has been present over the past few months.    Interval history 2021 (video " visit)  Continues to feel well overall. Does have occasional urgency. Has 2-3 BMs per day, soft, well-formed. No blood. No weight loss. No abdominal pain.    Interval history, 3/10/22  First injection was 2/27/22. Has been stressed as daughter has been going through some pregnancy losses. A month ago had a couple episodes abdominal cramping associated with BMs, now is back to baseline. Currently having 2 stools per day that are formed without blood. Some urgency. No nighttime stools or fecal incontinence. Would be due  for next injection April 24.    Interval hx 11/9/22  Feeling well, some stressors include recent mammogram requiring additional imaging, step mother broke hip and daughter going through infertility w/ multiple losses. Bowel pattern has been stable, no blood or urgency. Compliant with injections.    Interval hx 5/23/23  Urgency and frequency that started at the end of March, which was a week before she was due for her injection. She started entocort 4/29/23 at 9 mg which she continues. This also led to nighttime stools. Had to leave work due to symptoms. Last injection was 5/11 which was first 6 week interval. Started to feel better just as of last week. No new EIM. Fatigue is improving.       PERTINENT PAST MEDICAL/SURGICAL HISTORY:  As noted above.    ENDOSCOPIC EVALUATION:  icscope 11/2021 showed SES-CD 4    The Simple Endoscopic Score for Crohn's Disease was determined based on        the endoscopic appearance of the mucosa in the following segments:        - Ileum: Findings include aphthous ulcers less than 0.5 cm in size, less        than 10% ulcerated surfaces, less than 50% of surfaces affected and no        narrowings. Segment score: 3.        - Right Colon: Findings include no ulcers present, no ulcerated        surfaces, less than 50% of surfaces affected, no narrowings and no        ulcers present, no ulcerated surfaces, no affected surfaces and no        narrowings. Segment score: 1.         - Transverse Colon: Findings include no ulcers present, no ulcerated        surfaces, no affected surfaces and no narrowings. Segment score: 0.        - Left Colon: Findings include no ulcers present, no ulcerated surfaces,        no affected surfaces and no narrowings. Segment score: 0.        - Rectum: Findings include no ulcers present, no ulcerated surfaces, no        affected surfaces and no narrowings. Segment score: 0.        - Total SES-CD aggregate score: 4. Biopsies were taken with a cold        forceps for histology.     PATH:     A.  TERMINAL ILEUM, BIOPSY:  Ileal mucosa with no granulomas, cryptitis or other histologic evidence of active Crohn; negative for dysplasia     B. CECUM, BIOPSY:  Colonic mucosa with no granulomas, cryptitis or other histologic evidence of active Crohn; negative for dysplasia        icscope 5/24/2021 showed SES-CD 5 with small ulcers in the TI. Bx normal.  Bx:     SPECIMEN(S):   A: Ileum biopsy   B: Cecal biopsy     FINAL DIAGNOSIS:   A. Ileum, Biopsy:   Ileal mucosa with no granulomas, cryptitis or other histologic evidence of    active Crohn; negative for dysplasia     B. Cecum, Biopsy:   Colonic mucosa with no granulomas, cryptitis or other histologic evidence   of active Crohn; negative for dysplasia       12/2019 Impression:          - Simple Endoscopic Score for Crohn's Disease: 0,                        mucosal inflammatory changes secondary to Crohn's                        disease, in remission. Biopsied.                        - Diverticulosis in the sigmoid colon.                        - Non-bleeding internal hemorrhoids.   A. ILEUM, BIOPSY:   - Small intestinal mucosa with no significant histologic abnormality   - Negative for dysplasia     B. CECUM, BIOPSY:   - Colonic mucosa with no significant histologic abnormality   - Negative for dysplasia     C. COLON, ASCENDING, BIOPSY:   - Colonic mucosa with no significant histologic abnormality   - Negative for  dysplasia     D. COLON, TRANSVERSE, BIOPSY:   - Colonic mucosa with no significant histologic abnormality   - Negative for dysplasia     E. COLON, DESCENDING,  BIOPSY:   - Colonic mucosa with no significant histologic abnormality   - Negative for dysplasia     F. COLON, SIGMOID, BIOPSY:   - Colonic mucosa with no significant histologic abnormality   - Negative for dysplasia     G. RECTUM, BIOPSY:   - Colonic mucosa with no significant histologic abnormality   - Negative for dysplasia     PERTINENT MEDICATIONS:  -No current Crohn's medications  Medications reviewed with patient today, see Medication List/Assessment for details.  No other NSAID/anticoagulation reported by patient.  No other OTC/herbal/supplements reported by patient.    SOCIAL HISTORY: Tobacco: none.    PHYSICAL EXAMINATION: video visit exam     General appearance  Healthy appearing adult, in no acute distress     Eyes  Sclera anicteric  Pupils round and reactive to light     Ears, nose, mouth and throat  No obvious external lesions of ears and nose  Hearing intact     Neck  Symmetric  No obvious external lesions     Respiratory  Normal respiration, no use of accessory muscles      MSK  Gait normal     Skin  No rashes or jaundice      Psychiatric  Oriented to person, place and time  Appropriate mood and affect.       PERTINENT STUDIES:  Fecal calprotectin 226 (4/13/21) from 95 (2/19/21)   9/2021    MR ENTEROGRAPHY: 2/25/2021   No evidence of active inflammation of the small bowel. No evidence of  stricture or fistula.     ASSESSMENT/PLAN:  1. Ileal Crohn's Disease, moderate  Current medication: Stelara (started 12/27/21), budesonide 9mg daily  Current clinical disease activity: remission   Current endoscopic disease activity: icscope 10/2022 showed SES-CD 0    Diagnosed 3/2017. Disease limited to ileum. Initially on mesalamine until 11/2019 when it was stopped for RAMSEY.  Due to active disease on cscope 11/2021, started on stelara, achieving  endoscopic and histologic remission colonoscopy 10/2022. New symptoms with elevated fecal sammy 4/2023 prompted change to every 6 week dosing. Improving now.  We will plan for the following:    PLAN  ---Continue with stelara every 8 weeks  -- Continue budesonide 9mg x 1 month total, 6mg x 2 weeks then 3 mg x 2 weeks  -- Fecal sammy vs colonoscopy in 6 months (fecal sammy may not be sufficient with ileal disease, however she has responded to this marker in the past).  -- Labs to include CBC, CRP, ESR, LFTs every 3 months     Routine IBD care:   - recommend supplementation vitamin D 1000 units daily and calcium 500 mg twice daily.Patient confirmed she is taking.   -- Vaccines/immunizations to be updated: Recommend yearly flu shot, pneumonia vaccines (Prevnar 13 then 8 weeks later Pneumovax 23 then 5 years later Pneumovax 23), tetanus every 10 years. Kelly is UTD with Shingles vaccine (she did have optic shingles in the past)   - No NSAIDs (ibuprofen, or anything containing ibuprofen)     2.  GERD  Patient has longstanding GERD symptoms, overall well controlled on omeprazole 40 mg a day.      2. Cancer Screening  No PSC, no high-risk FH CRC (2nd cousin dx'd in 2017 w/ advanced CRC @ 57yo). Given colonoscopy in 2019 without adenomatous lesions, recommend repeat colonoscopy for routine screening in 2029 unless symptomatic sooner.    RTC in 6 months       Thank you for this consultation.  42 minutes spent on the date of the encounter doing chart review, history and exam, documentation and further activities as noted above.  It was a pleasure to participate in the care of this patient; please contact us with any further questions.      Benjamin Brooks PA-C  Division of Gastroenterology, Hepatology and Nutrition  [facility]        Answers for HPI/ROS submitted by the patient on 5/17/2023  General Symptoms: Yes  Skin Symptoms: No  HENT Symptoms: No  EYE SYMPTOMS: No  HEART SYMPTOMS: Yes  LUNG SYMPTOMS: No  INTESTINAL  SYMPTOMS: Yes  URINARY SYMPTOMS: No  GYNECOLOGIC SYMPTOMS: No  BREAST SYMPTOMS: No  SKELETAL SYMPTOMS: No  BLOOD SYMPTOMS: No  NERVOUS SYSTEM SYMPTOMS: No  MENTAL HEALTH SYMPTOMS: No  Fever: No  Loss of appetite: No  Weight loss: No  Weight gain: No  Fatigue: Yes  Night sweats: No  Chills: No  Increased stress: Yes  Excessive hunger: No  Excessive thirst: No  Feeling hot or cold when others believe the temperature is normal: No  Loss of height: No  Post-operative complications: No  Surgical site pain: No  Hallucinations: No  Change in or Loss of Energy: No  Hyperactivity: No  Confusion: No  Chest pain or pressure: No  Fast or irregular heartbeat: Yes  Pain in legs with walking: No  Trouble breathing while lying down: No  Fingers or toes appear blue: No  High blood pressure: No  Low blood pressure: No  Fainting: No  Murmurs: No  Pacemaker: No  Varicose veins: No  Edema or swelling: No  Wake up at night with shortness of breath: No  Light-headedness: No  Exercise intolerance: No  Heart burn or indigestion: Yes  Nausea: Yes  Vomiting: No  Abdominal pain: No  Bloating: Yes  Constipation: No  Diarrhea: Yes  Blood in stool: No  Black stools: No  Rectal or Anal pain: No  Fecal incontinence: No  Yellowing of skin or eyes: No  Vomit with blood: No  Change in stools: Yes          Again, thank you for allowing me to participate in the care of your patient.      Sincerely,    Benjamin Brooks PA-C

## 2023-05-23 NOTE — NURSING NOTE
Is the patient currently in the state of [place]? YES    Visit mode:VIDEO    If the visit is dropped, the patient can be reconnected by: VIDEO VISIT: Text to cell phone: [phone]    Will anyone else be joining the visit? NO      How would you like to obtain your AVS? MyChart    Are changes needed to the allergy or medication list? NO    Reason for visit: No chief complaint on file.

## 2023-05-31 ENCOUNTER — TELEPHONE (OUTPATIENT)
Dept: GASTROENTEROLOGY | Facility: CLINIC | Age: 63
End: 2023-05-31
Payer: COMMERCIAL

## 2023-08-28 ENCOUNTER — TRANSFERRED RECORDS (OUTPATIENT)
Dept: HEALTH INFORMATION MANAGEMENT | Facility: CLINIC | Age: 63
End: 2023-08-28
Payer: COMMERCIAL

## 2023-09-11 ENCOUNTER — TRANSFERRED RECORDS (OUTPATIENT)
Dept: HEALTH INFORMATION MANAGEMENT | Facility: CLINIC | Age: 63
End: 2023-09-11
Payer: COMMERCIAL

## 2023-09-18 ENCOUNTER — LAB (OUTPATIENT)
Dept: LAB | Facility: CLINIC | Age: 63
End: 2023-09-18
Payer: COMMERCIAL

## 2023-09-18 DIAGNOSIS — K50.018 CROHN'S DISEASE OF SMALL INTESTINE WITH OTHER COMPLICATION (H): ICD-10-CM

## 2023-09-18 DIAGNOSIS — K50.00 CROHN'S DISEASE OF SMALL INTESTINE WITHOUT COMPLICATION (H): ICD-10-CM

## 2023-09-18 LAB
ALBUMIN SERPL BCG-MCNC: 4.2 G/DL (ref 3.5–5.2)
ALP SERPL-CCNC: 72 U/L (ref 35–104)
ALT SERPL W P-5'-P-CCNC: 12 U/L (ref 0–50)
ANION GAP SERPL CALCULATED.3IONS-SCNC: 11 MMOL/L (ref 7–15)
AST SERPL W P-5'-P-CCNC: 22 U/L (ref 0–45)
BASOPHILS # BLD AUTO: 0 10E3/UL (ref 0–0.2)
BASOPHILS NFR BLD AUTO: 0 %
BILIRUB SERPL-MCNC: 0.4 MG/DL
BUN SERPL-MCNC: 11.4 MG/DL (ref 8–23)
CALCIUM SERPL-MCNC: 9.5 MG/DL (ref 8.8–10.2)
CHLORIDE SERPL-SCNC: 98 MMOL/L (ref 98–107)
CREAT SERPL-MCNC: 0.64 MG/DL (ref 0.51–0.95)
CRP SERPL-MCNC: <3 MG/L
DEPRECATED HCO3 PLAS-SCNC: 23 MMOL/L (ref 22–29)
EGFRCR SERPLBLD CKD-EPI 2021: >90 ML/MIN/1.73M2
EOSINOPHIL # BLD AUTO: 0.1 10E3/UL (ref 0–0.7)
EOSINOPHIL NFR BLD AUTO: 2 %
ERYTHROCYTE [DISTWIDTH] IN BLOOD BY AUTOMATED COUNT: 12.6 % (ref 10–15)
ERYTHROCYTE [SEDIMENTATION RATE] IN BLOOD BY WESTERGREN METHOD: 9 MM/HR (ref 0–30)
GLUCOSE SERPL-MCNC: 100 MG/DL (ref 70–99)
HCT VFR BLD AUTO: 38.9 % (ref 35–47)
HGB BLD-MCNC: 13.1 G/DL (ref 11.7–15.7)
IMM GRANULOCYTES # BLD: 0 10E3/UL
IMM GRANULOCYTES NFR BLD: 0 %
LYMPHOCYTES # BLD AUTO: 1.7 10E3/UL (ref 0.8–5.3)
LYMPHOCYTES NFR BLD AUTO: 25 %
MCH RBC QN AUTO: 31 PG (ref 26.5–33)
MCHC RBC AUTO-ENTMCNC: 33.7 G/DL (ref 31.5–36.5)
MCV RBC AUTO: 92 FL (ref 78–100)
MONOCYTES # BLD AUTO: 0.7 10E3/UL (ref 0–1.3)
MONOCYTES NFR BLD AUTO: 10 %
NEUTROPHILS # BLD AUTO: 4.4 10E3/UL (ref 1.6–8.3)
NEUTROPHILS NFR BLD AUTO: 63 %
PLATELET # BLD AUTO: 289 10E3/UL (ref 150–450)
POTASSIUM SERPL-SCNC: 4.1 MMOL/L (ref 3.4–5.3)
PROT SERPL-MCNC: 7 G/DL (ref 6.4–8.3)
RBC # BLD AUTO: 4.23 10E6/UL (ref 3.8–5.2)
SODIUM SERPL-SCNC: 132 MMOL/L (ref 136–145)
WBC # BLD AUTO: 6.9 10E3/UL (ref 4–11)

## 2023-09-18 PROCEDURE — 86140 C-REACTIVE PROTEIN: CPT

## 2023-09-18 PROCEDURE — 85652 RBC SED RATE AUTOMATED: CPT

## 2023-09-18 PROCEDURE — 85025 COMPLETE CBC W/AUTO DIFF WBC: CPT

## 2023-09-18 PROCEDURE — 36415 COLL VENOUS BLD VENIPUNCTURE: CPT

## 2023-09-18 PROCEDURE — 80053 COMPREHEN METABOLIC PANEL: CPT

## 2023-09-19 LAB — C DIFF TOX B STL QL: NEGATIVE

## 2023-09-19 PROCEDURE — 83993 ASSAY FOR CALPROTECTIN FECAL: CPT

## 2023-09-19 PROCEDURE — 87493 C DIFF AMPLIFIED PROBE: CPT

## 2023-09-20 LAB
ADV 40+41 DNA STL QL NAA+NON-PROBE: NEGATIVE
ASTRO TYP 1-8 RNA STL QL NAA+NON-PROBE: NEGATIVE
C CAYETANENSIS DNA STL QL NAA+NON-PROBE: NEGATIVE
CALPROTECTIN STL-MCNT: 251 MG/KG (ref 0–49.9)
CAMPYLOBACTER DNA SPEC NAA+PROBE: NEGATIVE
CRYPTOSP DNA STL QL NAA+NON-PROBE: NEGATIVE
E COLI O157 DNA STL QL NAA+NON-PROBE: NORMAL
E HISTOLYT DNA STL QL NAA+NON-PROBE: NEGATIVE
EAEC ASTA GENE ISLT QL NAA+PROBE: NEGATIVE
EC STX1+STX2 GENES STL QL NAA+NON-PROBE: NEGATIVE
EPEC EAE GENE STL QL NAA+NON-PROBE: NEGATIVE
ETEC LTA+ST1A+ST1B TOX ST NAA+NON-PROBE: NEGATIVE
G LAMBLIA DNA STL QL NAA+NON-PROBE: NEGATIVE
NOROVIRUS GI+II RNA STL QL NAA+NON-PROBE: NEGATIVE
P SHIGELLOIDES DNA STL QL NAA+NON-PROBE: NEGATIVE
RVA RNA STL QL NAA+NON-PROBE: NEGATIVE
SALMONELLA SP RPOD STL QL NAA+PROBE: NEGATIVE
SAPO I+II+IV+V RNA STL QL NAA+NON-PROBE: NEGATIVE
SHIGELLA SP+EIEC IPAH ST NAA+NON-PROBE: NEGATIVE
V CHOLERAE DNA SPEC QL NAA+PROBE: NEGATIVE
VIBRIO DNA SPEC NAA+PROBE: NEGATIVE
Y ENTEROCOL DNA STL QL NAA+PROBE: NEGATIVE

## 2023-09-20 PROCEDURE — 87507 IADNA-DNA/RNA PROBE TQ 12-25: CPT

## 2023-09-25 ENCOUNTER — TRANSFERRED RECORDS (OUTPATIENT)
Dept: HEALTH INFORMATION MANAGEMENT | Facility: CLINIC | Age: 63
End: 2023-09-25
Payer: COMMERCIAL

## 2023-09-25 ENCOUNTER — TELEPHONE (OUTPATIENT)
Dept: GASTROENTEROLOGY | Facility: CLINIC | Age: 63
End: 2023-09-25
Payer: COMMERCIAL

## 2023-09-25 ENCOUNTER — TELEPHONE (OUTPATIENT)
Dept: GASTROENTEROLOGY | Facility: CLINIC | Age: 63
End: 2023-09-25

## 2023-09-25 DIAGNOSIS — K50.00 CROHN'S DISEASE OF SMALL INTESTINE WITHOUT COMPLICATION (H): Primary | ICD-10-CM

## 2023-09-25 DIAGNOSIS — K50.018 CROHN'S DISEASE OF SMALL INTESTINE WITH OTHER COMPLICATION (H): ICD-10-CM

## 2023-09-25 RX ORDER — BUDESONIDE 3 MG/1
CAPSULE, COATED PELLETS ORAL
Qty: 132 CAPSULE | Refills: 0 | Status: SHIPPED | OUTPATIENT
Start: 2023-09-25 | End: 2023-10-10

## 2023-09-25 NOTE — TELEPHONE ENCOUNTER
. Infectious stool studies negative. Discussed results with Benjamin Brooks. If patient continues to have symptoms, plan for a course of Entocort. Also, plan to escalate Stelara to Q4w regimen. Notified pharmacy liaison. MTM referral placed.    Stelara Q4w approved; see PA encounter.     Spoke with patient; she continues to have persistent GI symptoms. She is agreeable to a round of Entocort. We discussed escalating the frequency of her Stelara injections to every 4 weeks. She is amenable to this change. She is also agreeable to an MTM visit. Her next Stelara dose will be due 10/12/23 for her first Q4w interval.

## 2023-09-25 NOTE — TELEPHONE ENCOUNTER
Prior Authorization Not Needed per Insurance    Medication: USTEKINUMAB 90 MG/ML SC Viroblock  Insurance Company: [employer] - Phone [phone] Fax [phone]  Expected CoPay:    $5 after copay card  [facility]  Pharmacy Notified:    Patient Notified:

## 2023-09-27 RX ORDER — USTEKINUMAB 90 MG/ML
INJECTION, SOLUTION SUBCUTANEOUS
Qty: 1 ML | Refills: 0 | Status: SHIPPED | OUTPATIENT
Start: 2023-09-27 | End: 2023-10-09

## 2023-09-27 NOTE — TELEPHONE ENCOUNTER
MTM scheduled for 10/6/23. Per pharmacy, okay to send in single-dose prescription for Q4w interval so medication is available to patient on 10/12/23. Order placed appropriately. MTM to send further refills after visit.

## 2023-10-06 ENCOUNTER — VIRTUAL VISIT (OUTPATIENT)
Dept: GASTROENTEROLOGY | Facility: CLINIC | Age: 63
End: 2023-10-06
Attending: PHYSICIAN ASSISTANT
Payer: COMMERCIAL

## 2023-10-06 DIAGNOSIS — I10 HYPERTENSION, GOAL BELOW 140/90: Primary | ICD-10-CM

## 2023-10-06 DIAGNOSIS — K50.018 CROHN'S DISEASE OF SMALL INTESTINE WITH OTHER COMPLICATION (H): ICD-10-CM

## 2023-10-06 NOTE — Clinical Note
10/6/2023         RE: Aileen Priest  [address]  [place] [address]        Dear Colleague,    Thank you for referring your patient, Aileen Priest, to the [facility]. Please see a copy of my visit note below.    Medication Therapy Management (MTM) Encounter    ASSESSMENT:                            Medication Adherence/Access: {adherencechoices:883106}    Crohn's Disease:  ***      PLAN:                            Will send prescription to pharmacy for stelara every 4 weeks.  Screen for tuberculosis symptoms. Add tuberculosis to upcoming labs. Cough, fever, night sweats, fatigue, unexplained weight loss. High risk exposures?  Still on budesonide? Current dose?  Kelly will consider the following vaccinations:  Annual flu shot  Updated COVID-19 vaccine  Pneumococcal pneumonia vaccination (Prevnar-20)  Calcium supplement one serving once a day, increase to twice a day  Discuss DEXA with Benjamin  Herbals and supplements:  Magnesium glycinate 400 mg once a day  Emergenc-C   200 mg C, D3 and Zinc , start antibiotic   Referral to dietician     Follow-up: {followuptest2:671542}    SUBJECTIVE/OBJECTIVE:                          Kelly Priest is a 62 year old female { :127108} for a follow-up visit from 12/8/2021 with my colleague Valerie Nino, PharmD. This is my first time seeing the patient.. {mtmvisitdetails:090726}      Reason for visit: Stelara escalated to every 4 weeks + HCM.    Allergies/ADRs: Reviewed in chart  Past Medical History: {1/2/3/4/5:545253}  Tobacco: She reports that she quit smoking about 36 years ago. Her smoking use included cigarettes. She started smoking about 47 years ago. She has a 10.00 pack-year smoking history. She has never used smokeless tobacco.  Alcohol: 4-6 beverages / week  {Social and Goals:613625}    Medication Adherence/Access: {fumedadherence:927173}    Crohn's Disease:   Stelara 90 mg every 4 weeks    No side effects.   Given active GI  "symptoms patient underwent a work-up with infectious stool studies and fecal calprotectin. The following plan was made 23:  \". Infectious stool studies negative. Discussed results with Benjamin Brooks. If patient continues to have symptoms, plan for a course of Entocort. Also, plan to escalate Stelara to Q4w regimen. Notified pharmacy liaison. MTM referral placed.     Stelara Q4w approved; see PA encounter.      Spoke with patient; she continues to have persistent GI symptoms. She is agreeable to a round of Entocort. We discussed escalating the frequency of her Stelara injections to every 4 weeks. She is amenable to this change. She is also agreeable to an MTM visit. Her next Stelara dose will be due 10/12/23 for her first Q4w interval.\"    Last provider visit: 23  Next provider visit: 23  Last labs completed:- 23  Lab frequency: every 3 months   - standing labs ESR, CRP, CMP, CBC w/ lt and diff  24  Next labs due: mid December    PDC:    Last seen 2023 Benjamin Brooks PA-C with assessment/plan:  \"Diagnosed 3/2017. Disease limited to ileum. Initially on mesalamine until 2019 when it was stopped for RAMSEY.  Due to active disease on cscope 2021, started on stelara, achieving endoscopic and histologic remission colonoscopy 10/2022. New symptoms with elevated fecal sammy 2023 prompted change to every 6 week dosing. Improving now.  We will plan for the following:     PLAN  ---Continue with stelara every 6 weeks  -- Continue budesonide 9mg x 1 month total, 6mg x 2 weeks then 3 mg x 2 weeks  -- Fecal sammy vs colonoscopy in 6 months (fecal sammy may not be sufficient with ileal disease, however she has responded to this marker in the past).  -- Labs to include CBC, CRP, ESR, LFTs every 3 months \"      IBD Health Care Maintenance:    Vaccinations:  All patients on biologics should avoid live vaccines.    -- Influenza (every year) received   -- TdaP (every 10 years) 2022  -- " Pneumococcal Pneumonia    Prevnar-13: not on file   Pneumovax-23: not on file   Prevnar-20: not on file  -- COVID-19 12/2020, 1/2021, 9/2021, 5/2022, 10/2022  -- Yearly assessment for latent Tb (verbal screening and exam, PPD or QuantiFERON-Tb testing)      result: negative 11/2021    One time confirmation of immunity or serologies:  -- Hepatitis A (serologies or immunizations)  -- Hepatitis B (serologies or immunizations)  -- Varicella/Zoster    Varicella   Zoster- 12/2018, 11/2019  -- MMR- not on file  -- HPV (all aged 18-26)- not on file  -- Meningococcal meningitis (all patients at risk for meningitis)-- not on file    Due to the immunosuppression in this patient, I would not advise administration of live vaccines such as varicella/VZV, intranasal influenza, MMR, or yellow fever vaccine (if traveling).      Pre-Biologic Screening:  -- Hep B Surface Antibody serologies indicate immunity 11/2021  -- Hep B Surface Antigen non-reactive 11/2021  -- Hep B Core Antibody non-reactive 11/2021  -- Hep C Antibody non-reactive 1/2017    Bone mineral density screening   -- Recommend all patients supplement with calcium and vitamin D  -- Given prior steroid use recommend DEXA if not already done- no DEXA on file    Cancer Screening:  Colon cancer screening:  Per Benjamin Brooks PA-C 5/23/23: No PSC, no high-risk FH CRC (2nd cousin dx'd in 2017 w/ advanced CRC @ 55yo). Given colonoscopy in 2019 without adenomatous lesions, recommend repeat colonoscopy for routine screening in 2029 unless symptomatic sooner.     Cervical cancer screening: Annual due to immunosupression, maybe last year    Skin cancer screening: Annual visual exam of skin by dermatologist since patient is immunocompromised- Sees in November.     Depression Screening:  -- Over the last month, have you felt down, depressed, or hopeless? No  -- Over the last month, have you felt little interest or pleasure doing things? No     Misc:  -- Avoid tobacco use  -- Avoid  NSAIDs as there is potentially a 25% chance of causing an IBD flare- Tylenol (acetaminophen)             Today's Vitals: LMP  (LMP Unknown)   ----------------  {SUHA?:789021}    I spent {mtm total time 3:576218} with this patient today. { :482146}. A copy of the visit note was provided to the patient's provider(s).    A summary of these recommendations {GIVEN/NOT GIVEN:106144}.    ***    Telemedicine Visit Details  Type of service:  Video Conference via [employer]  Start Time:  10:59 Am  End Time: {video/phone visit end time:705013}     Medication Therapy Recommendations  No medication therapy recommendations to display       Medication Therapy Management (MTM) Encounter    ASSESSMENT:                            Medication Adherence/Access: No issues identified    Crohn's Disease:  Kelly would benefit from treatment of her Crohn's disease with Stelara 90 mg every 4 weeks. She has the next dose on-hand to administer at the new interval on 10/12/23. Additional refills were sent to her pharmacy. She is up to date on routine safety labs. She is indicated for a few vaccines which were recommended to her. She is not getting adequate calcium from her diet and supplements, and an increase was recommended. We discussed DEXA recommendations and she would like to discuss this with Benjamin Brooks PA-C at her next follow-up visit. We reviewed general dietary guidelines in Crohn's disease and she requested a referral to a dietician to discuss potential trigger foods.     Hypertension:   Patient is meeting blood pressure goal of < 140/90mmHg.    PLAN:                            You are up to date on routine safety labs. I will send additional refills to your pharmacy for Stelara every 4 weeks.  Kelly will consider the following vaccinations:  Annual flu shot  Updated COVID-19 vaccine  Pneumococcal pneumonia vaccination (Prevnar-20)  Consider starting a calcium supplement. I recommend calcium 600 mg/vitamin D 400 units 1 tablet by  mouth once daily with food. If this is tolerated after 1-2 weeks you can increase to 1 tablet by mouth twice daily with food.  You are indicated to have a DEXA scan to look at your bone density. Please discuss this at your upcoming follow-up appointment with Benjamin Brooks PA-C.   As discussed, I will place a referral with a dietician so you can discuss your dietary concerns in more depth.     Follow-up: [facility] visit in 3-6 months    SUBJECTIVE/OBJECTIVE:                          Kelly Priest is a 62 year old female contacted via secure video for a follow-up visit from 12/8/2021 with my colleague Valerie Nino, PharmD. This is my first time seeing the patient..       Reason for visit: Stelara escalated to every 4 weeks + HCM.    Allergies/ADRs: Reviewed in chart  Past Medical History: Reviewed in chart  Tobacco: She reports that she quit smoking about 36 years ago. Her smoking use included cigarettes. She started smoking about 47 years ago. She has a 10.00 pack-year smoking history. She has never used smokeless tobacco.  Alcohol: 4-6 beverages / week      Medication Adherence/Access: no issues reported    Crohn's Disease:   Stelara 90 mg every 4 weeks  Budesonide taper (currently on 9 mg once daily)    Kelly recently escalated to stelara 90 mg every 4 weeks. She had a delivery scheduled for today, 10/6 and plans to inject at this new dosing interval on 10/12. She denies injection site reactions or side effects.     She started the budesonide taper around 9/25. She feels comfortable with the instructions on completing the taper. She denies side effects. She reports that it started working about a week after starting.     She did have an interest in speaking with a dietician about some concerns she had about particular foods, specifically beer. I provided information that in general we recommend a well-rounded and balanced diet with whole grains, vegetables, fruits, and healthy fats, and to avoid processed foods,  "dairy products and fatty foods.    Last provider visit: 23  Next provider visit: 23  Last labs completed:- 23  Lab frequency: every 3 months   - standing labs ESR, CRP, CMP, CBC w/ lt and diff  24  Next labs due: mid December  PDC:    Kelly reached out on MyChart due to worsening symptoms and the following plan was made 23:  \". Infectious stool studies negative. Discussed results with Benjamin Brooks. If patient continues to have symptoms, plan for a course of Entocort. Also, plan to escalate Stelara to Q4w regimen. Notified pharmacy liaison. MTM referral placed. Stelara Q4w approved; see PA encounter. Spoke with patient; she continues to have persistent GI symptoms. She is agreeable to a round of Entocort. We discussed escalating the frequency of her Stelara injections to every 4 weeks. She is amenable to this change. She is also agreeable to an MTM visit. Her next Stelara dose will be due 10/12/23 for her first Q4w interval.\"      Last seen 2023 Benjamin Brooks PA-C with assessment/plan:  \"Diagnosed 3/2017. Disease limited to ileum. Initially on mesalamine until 2019 when it was stopped for RAMSEY.  Due to active disease on cscope 2021, started on stelara, achieving endoscopic and histologic remission colonoscopy 10/2022. New symptoms with elevated fecal sammy 2023 prompted change to every 6 week dosing. Improving now.  We will plan for the following:     PLAN 2023  ---Continue with stelara every 6 weeks  -- Continue budesonide 9mg x 1 month total, 6mg x 2 weeks then 3 mg x 2 weeks  -- Fecal sammy vs colonoscopy in 6 months (fecal sammy may not be sufficient with ileal disease, however she has responded to this marker in the past).  -- Labs to include CBC, CRP, ESR, LFTs every 3 months \"      IBD Health Care Maintenance:    Vaccinations:  All patients on biologics should avoid live vaccines.    -- Influenza (every year) received   -- TdaP (every 10 years) 2022  -- " Pneumococcal Pneumonia    Prevnar-13: not on file   Pneumovax-23: not on file   Prevnar-20: not on file  -- COVID-19 12/2020, 1/2021, 9/2021, 5/2022, 10/2022  -- Yearly assessment for latent Tb (verbal screening and exam, PPD or QuantiFERON-Tb testing)      result: negative 11/2021    One time confirmation of immunity or serologies:  -- Hepatitis A (serologies or immunizations)  -- Hepatitis B (serologies or immunizations)  -- Varicella/Zoster    Varicella   Zoster- 12/2018, 11/2019  -- MMR- not on file  -- HPV (all aged 18-26)- not on file  -- Meningococcal meningitis (all patients at risk for meningitis)-- not on file    Due to the immunosuppression in this patient, I would not advise administration of live vaccines such as varicella/VZV, intranasal influenza, MMR, or yellow fever vaccine (if traveling).      Pre-Biologic Screening:  -- Hep B Surface Antibody serologies indicate immunity 11/2021  -- Hep B Surface Antigen non-reactive 11/2021  -- Hep B Core Antibody non-reactive 11/2021  -- Hep C Antibody non-reactive 1/2017    Bone mineral density screening   -- Recommend all patients supplement with calcium and vitamin D  -- Given prior steroid use recommend DEXA if not already done- no DEXA on file    Cancer Screening:  Colon cancer screening:  Per Benjamin Brooks PA-C 5/23/23: No PSC, no high-risk FH CRC (2nd cousin dx'd in 2017 w/ advanced CRC @ 57yo). Given colonoscopy in 2019 without adenomatous lesions, recommend repeat colonoscopy for routine screening in 2029 unless symptomatic sooner.     Cervical cancer screening: Annual due to immunosupression, patient reports maybe had this done last year    Skin cancer screening: Annual visual exam of skin by dermatologist since patient is immunocompromised- Kelly reports she sees in November.     Depression Screening:  -- Over the last month, have you felt down, depressed, or hopeless? No  -- Over the last month, have you felt little interest or pleasure doing  things? No     Misc:  -- Avoid tobacco use  -- Avoid NSAIDs as there is potentially a 25% chance of causing an IBD flare- Tylenol (acetaminophen)     Hypertension:   Lisinopril 5 mg once daily    Patient reports no current medication side effects.  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 It is consistent with the readings <130/80 mmHg.  BP Readings from Last 3 Encounters:   10/17/22 119/89   01/13/22 126/81   12/27/21 (!) 132/90     Pulse Readings from Last 3 Encounters:   10/17/22 57   10/17/22 64   01/13/22 74       ----------------      I spent 32 minutes with this patient today. All changes were made via collaborative practice agreement with Benjamin Brooks PA-C. A copy of the visit note was provided to the patient's provider(s).    A summary of these recommendations was sent via [employer].    Smith Cano PharmD, BCPS  MTM Pharmacist   [facility]  Phone: [phone]    Telemedicine Visit Details  Type of service:  Video Conference via [email or web address]  Start Time:  10:59 Am  End Time:  11:31 AM     Medication Therapy Recommendations  No medication therapy recommendations to display         Again, thank you for allowing me to participate in the care of your patient.        Sincerely,        Smith Cano RPH

## 2023-10-06 NOTE — PROGRESS NOTES
Medication Therapy Management (MTM) Encounter    ASSESSMENT:                            Medication Adherence/Access: No issues identified    Crohn's Disease:  Kelly would benefit from treatment of her Crohn's disease with Stelara 90 mg every 4 weeks. She has the next dose on-hand to administer at the new interval on 10/12/23. Additional refills were sent to her pharmacy. She is up to date on routine safety labs. She is indicated for a few vaccines which were recommended to her. She is not getting adequate calcium from her diet and supplements, and an increase was recommended. We discussed DEXA recommendations and she would like to discuss this with Benjamin Brooks PA-C at her next follow-up visit. She is up to date on routine cancer screenings. We reviewed general dietary guidelines in Crohn's disease and she requested a referral to a dietician to discuss potential trigger foods.     Hypertension:   Patient is meeting blood pressure goal of < 140/90mmHg.    PLAN:                            You are up to date on routine safety labs. I will send additional refills to your pharmacy for Stelara every 4 weeks.  Kelly will consider the following vaccinations:  Annual flu shot  Updated COVID-19 vaccine  Pneumococcal pneumonia vaccination (Prevnar-20)  Consider starting a calcium supplement. I recommend calcium 600 mg/vitamin D 400 units 1 tablet by mouth once daily with food. If this is tolerated after 1-2 weeks you can increase to 1 tablet by mouth twice daily with food.  You are indicated to have a DEXA scan to look at your bone density. Please discuss this at your upcoming follow-up appointment with Benjamin Brooks PA-C.   As discussed, I will place a referral with a dietician so you can discuss your dietary concerns in more depth.     Follow-up: MTM visit in 3-6 months    SUBJECTIVE/OBJECTIVE:                          Kelly Priest is a 62 year old female contacted via secure video for a follow-up visit from 12/8/2021  "with my colleague Valerie Nino, PharmD. This is my first time seeing the patient..       Reason for visit: Stelara escalated to every 4 weeks + HCM.    Allergies/ADRs: Reviewed in chart  Past Medical History: Reviewed in chart  Tobacco: She reports that she quit smoking about 36 years ago. Her smoking use included cigarettes. She started smoking about 47 years ago. She has a 10.00 pack-year smoking history. She has never used smokeless tobacco.  Alcohol: 4-6 beverages / week      Medication Adherence/Access: no issues reported    Crohn's Disease:   Stelara 90 mg every 4 weeks  Budesonide taper (currently on 9 mg once daily)    Kelly recently escalated to stelara 90 mg every 4 weeks. She had a delivery scheduled for today, 10/6 and plans to inject at this new dosing interval on 10/12. She denies injection site reactions or side effects.     She started the budesonide taper around . She feels comfortable with the instructions on completing the taper. She denies side effects. She reports that it started working about a week after starting.     She did have an interest in speaking with a dietician about some concerns she had about particular foods, specifically beer. I provided information that in general we recommend a well-rounded and balanced diet with whole grains, vegetables, fruits, and healthy fats, and to avoid processed foods, dairy products and fatty foods.    Last provider visit: 23  Next provider visit: 23  Last labs completed:- 23  Lab frequency: every 3 months   - standing labs ESR, CRP, CMP, CBC w/ lt and diff  24  Next labs due: mid December  PDC:    Kelly reached out on MyChart due to worsening symptoms and the following plan was made 23:  \". Infectious stool studies negative. Discussed results with Benjamin Brooks. If patient continues to have symptoms, plan for a course of Entocort. Also, plan to escalate Stelara to Q4w regimen. Notified pharmacy liaison. MTM " "referral placed. Stelara Q4w approved; see PA encounter. Spoke with patient; she continues to have persistent GI symptoms. She is agreeable to a round of Entocort. We discussed escalating the frequency of her Stelara injections to every 4 weeks. She is amenable to this change. She is also agreeable to an MTM visit. Her next Stelara dose will be due 10/12/23 for her first Q4w interval.\"      Last seen 5/23/2023 Benjamin Brooks PA-C with assessment/plan:  \"Diagnosed 3/2017. Disease limited to ileum. Initially on mesalamine until 11/2019 when it was stopped for RAMSEY.  Due to active disease on cscope 11/2021, started on stelara, achieving endoscopic and histologic remission colonoscopy 10/2022. New symptoms with elevated fecal sammy 4/2023 prompted change to every 6 week dosing. Improving now.  We will plan for the following:     PLAN 5/23/2023  ---Continue with stelara every 6 weeks  -- Continue budesonide 9mg x 1 month total, 6mg x 2 weeks then 3 mg x 2 weeks  -- Fecal sammy vs colonoscopy in 6 months (fecal sammy may not be sufficient with ileal disease, however she has responded to this marker in the past).  -- Labs to include CBC, CRP, ESR, LFTs every 3 months \"      IBD Health Care Maintenance:    Vaccinations:  All patients on biologics should avoid live vaccines.    -- Influenza (every year) received 2022  -- TdaP (every 10 years) 1/2022  -- Pneumococcal Pneumonia    Prevnar-13: not on file   Pneumovax-23: not on file   Prevnar-20: not on file  -- COVID-19 12/2020, 1/2021, 9/2021, 5/2022, 10/2022  -- Yearly assessment for latent Tb (verbal screening and exam, PPD or QuantiFERON-Tb testing)      result: negative 11/2021    One time confirmation of immunity or serologies:  -- Hepatitis A (serologies or immunizations)  -- Hepatitis B (serologies or immunizations)  -- Varicella/Zoster    Varicella   Zoster- 12/2018, 11/2019  -- MMR- not on file  -- HPV (all aged 18-26)- not on file  -- Meningococcal meningitis (all patients " at risk for meningitis)-- not on file    Due to the immunosuppression in this patient, I would not advise administration of live vaccines such as varicella/VZV, intranasal influenza, MMR, or yellow fever vaccine (if traveling).      Pre-Biologic Screening:  -- Hep B Surface Antibody serologies indicate immunity 11/2021  -- Hep B Surface Antigen non-reactive 11/2021  -- Hep B Core Antibody non-reactive 11/2021  -- Hep C Antibody non-reactive 1/2017    Bone mineral density screening   -- Recommend all patients supplement with calcium and vitamin D  -- Given prior steroid use recommend DEXA if not already done- no DEXA on file    Cancer Screening:  Colon cancer screening:  Per Benjamin Brooks PA-C 5/23/23: No PSC, no high-risk FH CRC (2nd cousin dx'd in 2017 w/ advanced CRC @ 57yo). Given colonoscopy in 2019 without adenomatous lesions, recommend repeat colonoscopy for routine screening in 2029 unless symptomatic sooner.     Cervical cancer screening: Annual due to immunosupression, patient reports maybe had this done last year    Skin cancer screening: Annual visual exam of skin by dermatologist since patient is immunocompromised- Kelly reports she sees in November.     Depression Screening:  -- Over the last month, have you felt down, depressed, or hopeless? No  -- Over the last month, have you felt little interest or pleasure doing things? No     Misc:  -- Avoid tobacco use  -- Avoid NSAIDs as there is potentially a 25% chance of causing an IBD flare- Tylenol (acetaminophen)     Hypertension:   Lisinopril 5 mg once daily    Patient reports no current medication side effects.  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 It is consistent with the readings <130/80 mmHg.  BP Readings from Last 3 Encounters:   10/17/22 119/89   01/13/22 126/81   12/27/21 (!) 132/90     Pulse Readings from Last 3 Encounters:   10/17/22 57   10/17/22 64   01/13/22 74        ----------------      I spent 32 minutes with this patient today. All changes were made via collaborative practice agreement with Benjamin Brooks PA-C. A copy of the visit note was provided to the patient's provider(s).    A summary of these recommendations was sent via [employer].    Chandler NamD, BCPS  MTM Pharmacist   [facility]  Phone: [phone]    Telemedicine Visit Details  Type of service:  Video Conference via [employer]  Start Time:  10:59 Am  End Time:  11:31 AM     Medication Therapy Recommendations  Crohn's disease of small intestine with other complication (H)    Current Medication: ustekinumab (STELARA) 90 MG/ML   Rationale: Preventive therapy - Needs additional medication therapy - Indication   Recommendation: Prevnar 20 0.5 ML Britney   Status: Patient Agreed - Adherence/Education

## 2023-10-09 RX ORDER — USTEKINUMAB 90 MG/ML
INJECTION, SOLUTION SUBCUTANEOUS
Qty: 1 ML | Refills: 2 | Status: SHIPPED | OUTPATIENT
Start: 2023-10-09 | End: 2024-01-25

## 2023-10-10 NOTE — PATIENT INSTRUCTIONS
"Recommendations from today's MTM visit:                                                      You are up to date on routine safety labs. I will send additional refills to your pharmacy for Stelara every 4 weeks.  Kelly will consider the following vaccinations:  Annual flu shot  Updated COVID-19 vaccine  Pneumococcal pneumonia vaccination (Prevnar-20)  Consider starting a calcium supplement. I recommend calcium 600 mg/vitamin D 400 units 1 tablet by mouth once daily with food. If this is tolerated after 1-2 weeks you can increase to 1 tablet by mouth twice daily with food.  You are indicated to have a DEXA scan to look at your bone density. Please discuss this at your upcoming follow-up appointment with Benjamin Brooks PA-C.   As discussed, I will place a referral with a dietician so you can discuss your dietary concerns in more depth.     Follow-up: MTM visit in 3-6 months    It was great speaking with you today.  I value your experience and would be very thankful for your time in providing feedback in our clinic survey. In the next few days, you may receive an email or text message from [employer] with a link to a survey related to your  clinical pharmacist.\"     To schedule another MTM appointment, please call the clinic directly or you may call the MTM scheduling line at [phone] or toll-free at [phone].     My Clinical Pharmacist's contact information:                                                      Please feel free to contact me with any questions or concerns you have.      Smith Cano, PharmD, BCPS  MTM Pharmacist   [facility]  Phone: [phone]   "

## 2023-10-13 DIAGNOSIS — K50.018 CROHN'S DISEASE OF SMALL INTESTINE WITH OTHER COMPLICATION (H): Primary | ICD-10-CM

## 2023-10-13 NOTE — PROGRESS NOTES
As discussed with Vidal Cano RPH at [facility] visit 10/6/23, order placed for nutrition consult.

## 2023-10-23 ENCOUNTER — TRANSFERRED RECORDS (OUTPATIENT)
Dept: HEALTH INFORMATION MANAGEMENT | Facility: CLINIC | Age: 63
End: 2023-10-23
Payer: COMMERCIAL

## 2023-10-25 ENCOUNTER — PATIENT OUTREACH (OUTPATIENT)
Dept: GASTROENTEROLOGY | Facility: CLINIC | Age: 63
End: 2023-10-25
Payer: COMMERCIAL

## 2023-10-25 NOTE — PROGRESS NOTES
Received a call on the [employer] and the pharmacy is trying to refill the prednisone and unable to fill for 12 days   Call placed to the pharmacy and asked for then to try an override.   Override accepted and they will call the patient to  the refill

## 2023-11-07 ENCOUNTER — TELEPHONE (OUTPATIENT)
Dept: GASTROENTEROLOGY | Facility: CLINIC | Age: 63
End: 2023-11-07

## 2023-11-07 ENCOUNTER — VIRTUAL VISIT (OUTPATIENT)
Dept: GASTROENTEROLOGY | Facility: CLINIC | Age: 63
End: 2023-11-07
Attending: PHYSICIAN ASSISTANT
Payer: COMMERCIAL

## 2023-11-07 DIAGNOSIS — K50.018 CROHN'S DISEASE OF SMALL INTESTINE WITH OTHER COMPLICATION (H): Primary | ICD-10-CM

## 2023-11-07 PROCEDURE — 99214 OFFICE O/P EST MOD 30 MIN: CPT | Mod: VID | Performed by: PHYSICIAN ASSISTANT

## 2023-11-07 NOTE — TELEPHONE ENCOUNTER
Contacted pt to reschedule appointment as provider is not available at time. Lvmx1, sent myc

## 2023-11-07 NOTE — LETTER
"    11/7/2023         RE: Aileen Priest  [address]  [place] [address]        Dear Colleague,    Thank you for referring your patient, Aileen Priest, to the [facility] [place]. Please see a copy of my visit note below.      IBD CLINIC VISIT, follow up    REASON FOR CONSULTATION: Crohn's Disease, GERD    HPI: 62 year old female w/ h/o ileal Crohn's Disease per 3/2017 ileocolonoscopy biopsies previously on PO 5-ASA, Lichen Planus (oral/perianal involvement), Hidradenitis Suppurativa s/p excision/drainage 1975, prior presumed diagnosis of Collagenous Colitis per colonic biopsies 2006 (though suspecting evolving IBD presentation in retrospect following 3/2017 highly-suggestive biopsies of terminal ileum + bland colonic biopsies), GERD presenting for f/u Crohn's Disease.  History summarized from her documentation.    8/2018 prompted an increase in 5ASA to 4.8g daily due to intermittent \"flares\" she had been experiencing, despite labs within normal limits.  Since the increased, she has had great symptomatic relieve and reports one incidence of loose stool, otherwise no episodes concerning for flares.  She is currently having 3 stools per day, soft and formed without blood in the stool.  No urgency or nighttime stools.      She had noted a \"tear\" by her anus and had this evaluated by PCP and by her GYN. It was thought to be due to lichen sclerosis (per GYN) and was prescribed a steroid cream.  She notes improvement already even with vaseline as instructed by her PCP. She did not have any pain with a BM associated with this finding.      She had noted a lump on right side of buttocks that has been very bothersome, inhibiting much of her activity.  It is adjacent to previous pilonidal cyst repair. No drainage and no fevers. MRI showed post-surgical cyst.      labs show elevated creatinine and slightly abnormal LFTs. Based on symptoms with concern for active flares (patient " reported, despite normal labs) dose was increased Spring 2019. Normal Creatinine 2/2019 prior to dose increase. Labs with primary care show elevated creatinine (0.6--> 1.29). LFT slightly increased, AST 72 and ALT 92. Normal alk phos. after last visit, plan was to hold Lialda.  Underwent colonoscopy 12/3/2019 with simple endoscopic score for Crohn's disease to be 0.    Interval history December 8, 2020:  Since the patient's last visit, overall she has been doing very well.  Did have one episode of symptoms in early October.  She woke up at 4 AM with abdominal pain, had 1 day of watery stools.  Then pain subsided later that day.  Did have urgency with bowel movements.  Denied melena or hematochezia during this episode.  After this her stools returned back to normal.  Has on average 2 bowel movements a day one before morning and 1 before work.  No abdominal pain at baseline.  Retching greasy foods can trigger symptoms.  Has maybe had 1 nocturnal bowel movement.  Consistency of stool is soft and formed.  Continues to take probiotic which she finds very helpful.    For her GERD, she is taking 40 mg of omeprazole.  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  This improves quickly with drinking water.  She is currently taking a multivitamin.    Interval history 4/20/2021   Kelly was last seen 12/8/2020 with Alice Gonzalez in GI clinic. She was doing well at that time and has been off mesalamine since 2019. Kelly reporting having a Crohn's flare in February of this year. Similar to her previous flares, she had abdominal pain and loose stools for 8 hours and then felt fatigued for 2 more days but had return of normal bowel pattern. No hematochezia, nausea/vomiting, fever, or abdominal pain. She called into clinic and was started on  "budesonide for 1 month. After 1-2 weeks of budesonide she had fecal calprotectin checked that was 95. Enterography was also done and did not show any evidence of active inflammation. Since this flare she had one day at the end of March where she had urgency but otherwise has been doing well. These have been her two \"flares\" of Crohn's this year. She had two similar flares in  and 3 in . Currently having her typical 2-3 soft formed stools per day. She does note some sores on her tongue which are not very painful but occur every few weeks. She is unsure if this is related to CD or Lichen planus which typically affects her oral cavity.    Of note last colonoscopy was in 2019 and showed Simple Endoscopic Score for Crohn's Disease: 0    Luigi Holloway Index:  2021  Well-bein (very well)  Abdomina pain: 0 (none)  # liquid/soft stools: 2   Abdominal mass 0 (none)  Extraluminal comlications    - Aphthous ulcers 1 - although unclear if from LP or CD  Score: 3      Interval history 2021   Recent cscope findings below.     Luigi Holloway Index:  Well-bein (very well)  Abdominal pain: 1 (mild)  # liquid/soft stools: occasionally, up to 6 per day   Abdominal mass 0 (none)  Extraluminal comlications    - Aphthous ulcers 1 - although unclear if from LP or CD    Dad with SCC. Son has MS. Mom with breast cancer in her 40s.     ROS: 10pt ROS performed and otherwise negative.    Interval history 2021 (video visit)  Overall feeling well, but with a few, self-limited episodes of loose stools and abd pain that lasts a few hours.  No weight loss.     High fiber foods might cause abd pain.      Luigi Holloway Index:  Well-bein (very well)  Abdominal pain: 1 (mild)  # liquid/soft stools: 0;  2-3 per day (formed)   Abdominal mass 0 (none)  Extraluminal complications    - Aphthous ulcers 1 - although unclear if from LP or CD. Has been present over the past few months.    Interval history 2021 (video " visit)  Continues to feel well overall. Does have occasional urgency. Has 2-3 BMs per day, soft, well-formed. No blood. No weight loss. No abdominal pain.    Interval history, 3/10/22  First injection was 2/27/22. Has been stressed as daughter has been going through some pregnancy losses. A month ago had a couple episodes abdominal cramping associated with BMs, now is back to baseline. Currently having 2 stools per day that are formed without blood. Some urgency. No nighttime stools or fecal incontinence. Would be due  for next injection April 24.    Interval hx 11/9/22  Feeling well, some stressors include recent mammogram requiring additional imaging, step mother broke hip and daughter going through infertility w/ multiple losses. Bowel pattern has been stable, no blood or urgency. Compliant with injections.    Interval hx 5/23/23  Urgency and frequency that started at the end of March, which was a week before she was due for her injection. She started entocort 4/29/23 at 9 mg which she continues. This also led to nighttime stools. Had to leave work due to symptoms. Last injection was 5/11 which was first 6 week interval. Started to feel better just as of last week. No new EIM. Fatigue is improving.     Interval hx 11/7/23  During pred taper had return of sxs at 20mg and had to go back to 30mg. Turned the corner 10/28, now having formed stools, avg 2 BM daily no blood. No more nighttime stools. She is currently on 25 mg and moves to 20mg tomorrow. Back is healing and still doing PT. No new joint pain. No new skin manifestations. No mouth sores.     PERTINENT PAST MEDICAL/SURGICAL HISTORY:  As noted above.    ENDOSCOPIC EVALUATION:  icscope 11/2021 showed SES-CD 4    The Simple Endoscopic Score for Crohn's Disease was determined based on        the endoscopic appearance of the mucosa in the following segments:        - Ileum: Findings include aphthous ulcers less than 0.5 cm in size, less        than 10% ulcerated  surfaces, less than 50% of surfaces affected and no        narrowings. Segment score: 3.        - Right Colon: Findings include no ulcers present, no ulcerated        surfaces, less than 50% of surfaces affected, no narrowings and no        ulcers present, no ulcerated surfaces, no affected surfaces and no        narrowings. Segment score: 1.        - Transverse Colon: Findings include no ulcers present, no ulcerated        surfaces, no affected surfaces and no narrowings. Segment score: 0.        - Left Colon: Findings include no ulcers present, no ulcerated surfaces,        no affected surfaces and no narrowings. Segment score: 0.        - Rectum: Findings include no ulcers present, no ulcerated surfaces, no        affected surfaces and no narrowings. Segment score: 0.        - Total SES-CD aggregate score: 4. Biopsies were taken with a cold        forceps for histology.     PATH:     A.  TERMINAL ILEUM, BIOPSY:  Ileal mucosa with no granulomas, cryptitis or other histologic evidence of active Crohn; negative for dysplasia     B. CECUM, BIOPSY:  Colonic mucosa with no granulomas, cryptitis or other histologic evidence of active Crohn; negative for dysplasia        icscope 5/24/2021 showed SES-CD 5 with small ulcers in the TI. Bx normal.  Bx:     SPECIMEN(S):   A: Ileum biopsy   B: Cecal biopsy     FINAL DIAGNOSIS:   A. Ileum, Biopsy:   Ileal mucosa with no granulomas, cryptitis or other histologic evidence of    active Crohn; negative for dysplasia     B. Cecum, Biopsy:   Colonic mucosa with no granulomas, cryptitis or other histologic evidence   of active Crohn; negative for dysplasia       12/2019 Impression:          - Simple Endoscopic Score for Crohn's Disease: 0,                        mucosal inflammatory changes secondary to Crohn's                        disease, in remission. Biopsied.                        - Diverticulosis in the sigmoid colon.                        - Non-bleeding internal  hemorrhoids.   A. ILEUM, BIOPSY:   - Small intestinal mucosa with no significant histologic abnormality   - Negative for dysplasia     B. CECUM, BIOPSY:   - Colonic mucosa with no significant histologic abnormality   - Negative for dysplasia     C. COLON, ASCENDING, BIOPSY:   - Colonic mucosa with no significant histologic abnormality   - Negative for dysplasia     D. COLON, TRANSVERSE, BIOPSY:   - Colonic mucosa with no significant histologic abnormality   - Negative for dysplasia     E. COLON, DESCENDING,  BIOPSY:   - Colonic mucosa with no significant histologic abnormality   - Negative for dysplasia     F. COLON, SIGMOID, BIOPSY:   - Colonic mucosa with no significant histologic abnormality   - Negative for dysplasia     G. RECTUM, BIOPSY:   - Colonic mucosa with no significant histologic abnormality   - Negative for dysplasia     PERTINENT MEDICATIONS:  -No current Crohn's medications  Medications reviewed with patient today, see Medication List/Assessment for details.  No other NSAID/anticoagulation reported by patient.  No other OTC/herbal/supplements reported by patient.    SOCIAL HISTORY: Tobacco: none.    PHYSICAL EXAMINATION: video visit exam     General appearance  Healthy appearing adult, in no acute distress     Eyes  Sclera anicteric  Pupils round and reactive to light     Ears, nose, mouth and throat  No obvious external lesions of ears and nose  Hearing intact     Neck  Symmetric  No obvious external lesions     Respiratory  Normal respiration, no use of accessory muscles      MSK  Gait normal     Skin  No rashes or jaundice      Psychiatric  Oriented to person, place and time  Appropriate mood and affect.       PERTINENT STUDIES:  Fecal calprotectin 226 (4/13/21) from 95 (2/19/21)   9/2021    MR ENTEROGRAPHY: 2/25/2021   No evidence of active inflammation of the small bowel. No evidence of  stricture or fistula.     ASSESSMENT/PLAN:  1. Ileal Crohn's Disease, moderate  Current medication:  Stelara (started 12/27/21), prednisone 25mg, moved to Stelara q4 weeks Sept 2023.   Current clinical disease activity: remission   Current endoscopic disease activity: icscope 10/2022 showed SES-CD 0    Diagnosed 3/2017. Disease limited to ileum. Initially on mesalamine until 11/2019 when it was stopped for RAMSEY.  Due to active disease on cscope 11/2021, started on stelara, achieving endoscopic and histologic remission colonoscopy 10/2022. New symptoms with elevated fecal sammy 4/2023 prompted change to every 6 week dosing, which allowed for improvement until September 2023.  Repeat fecal calprotectin 9/19/2023 showed elevated to 251.  Proceeded with request to move to every 4 weeks and she has had 1, soon to be 2 injections this week at this interval.  She was unsuccessful with symptom management on Entocort and required transition to a prednisone taper.  Unfortunately she did not tolerate the decrease from 30 mg to 20 mg and has required a slower taper, currently at 25 mg.  With more doses at every 4, hopefully we can continue to taper prednisone successfully and moved to every 4 dosing as maintenance dose to allow for remission.  If this does not allow for continued remission, alternative therapies were discussed.  Patient would like to avoid anti-TNF therapy with family history of cancers, consideration for vedolizumab or staying in class with [facility]ian would be a reasonable alternative.    We will plan for the following:    PLAN  ---Continue with stelara every 4 weeks  ---Continue prednisone taper  --Repeat fecal calprotectin once prednisone taper is complete  ---Colonoscopy 6 months after moving to every 4-week dosing, March/April 2024  -- Continue budesonide 9mg x 1 month total, 6mg x 2 weeks then 3 mg x 2 weeks  -- Labs to include CBC, CRP, ESR, LFTs every 3 months   --- Discussed a variety of different diets in the setting of IBD.  Patient will meet with our dietitian to discuss a more formal approach.  Focused  primarily on SCD versus IBD aid.    Routine IBD care:   - recommend supplementation vitamin D 1000 units daily and calcium 500 mg twice daily.Patient confirmed she is taking.   -- Vaccines/immunizations to be updated: Recommend yearly flu shot, pneumonia vaccines (Prevnar 13 then 8 weeks later Pneumovax 23 then 5 years later Pneumovax 23), tetanus every 10 years. Kelly is UTD with Shingles vaccine (she did have optic shingles in the past)   - No NSAIDs (ibuprofen, or anything containing ibuprofen)     2.  GERD  Patient has longstanding GERD symptoms, overall well controlled on omeprazole 40 mg a day.      2. Cancer Screening  No PSC, no high-risk FH CRC (2nd cousin dx'd in 2017 w/ advanced CRC @ 57yo). Given colonoscopy in 2019 without adenomatous lesions, recommend repeat colonoscopy for routine screening in 2029 unless symptomatic sooner.    RTC in 6 months     Thank you for this consultation.  42 minutes spent on the date of the encounter doing chart review, history and exam, documentation and further activities as noted above.  It was a pleasure to participate in the care of this patient; please contact us with any further questions.              Again, thank you for allowing me to participate in the care of your patient.      Sincerely,    Benjamin Brooks PA-C

## 2023-11-07 NOTE — PROGRESS NOTES
"Virtual Visit Details    Type of service:  Video Visit     Originating Location (pt. Location): Home    Distant Location (provider location):  Off-site  Platform used for Video Visit: David    IBD CLINIC VISIT, follow up    REASON FOR CONSULTATION: Crohn's Disease, GERD    HPI: 62 year old female w/ h/o ileal Crohn's Disease per 3/2017 ileocolonoscopy biopsies previously on PO 5-ASA, Lichen Planus (oral/perianal involvement), Hidradenitis Suppurativa s/p excision/drainage 1975, prior presumed diagnosis of Collagenous Colitis per colonic biopsies 2006 (though suspecting evolving IBD presentation in retrospect following 3/2017 highly-suggestive biopsies of terminal ileum + bland colonic biopsies), GERD presenting for f/u Crohn's Disease.  History summarized from her documentation.    8/2018 prompted an increase in 5ASA to 4.8g daily due to intermittent \"flares\" she had been experiencing, despite labs within normal limits.  Since the increased, she has had great symptomatic relieve and reports one incidence of loose stool, otherwise no episodes concerning for flares.  She is currently having 3 stools per day, soft and formed without blood in the stool.  No urgency or nighttime stools.      She had noted a \"tear\" by her anus and had this evaluated by PCP and by her GYN. It was thought to be due to lichen sclerosis (per GYN) and was prescribed a steroid cream.  She notes improvement already even with vaseline as instructed by her PCP. She did not have any pain with a BM associated with this finding.      She had noted a lump on right side of buttocks that has been very bothersome, inhibiting much of her activity.  It is adjacent to previous pilonidal cyst repair. No drainage and no fevers. MRI showed post-surgical cyst.      labs show elevated creatinine and slightly abnormal LFTs. Based on symptoms with concern for active flares (patient reported, despite normal labs) dose was increased Spring 2019. Normal Creatinine " 2/2019 prior to dose increase. Labs with primary care show elevated creatinine (0.6--> 1.29). LFT slightly increased, AST 72 and ALT 92. Normal alk phos. after last visit, plan was to hold Lialda.  Underwent colonoscopy 12/3/2019 with simple endoscopic score for Crohn's disease to be 0.    Interval history December 8, 2020:  Since the patient's last visit, overall she has been doing very well.  Did have one episode of symptoms in early October.  She woke up at 4 AM with abdominal pain, had 1 day of watery stools.  Then pain subsided later that day.  Did have urgency with bowel movements.  Denied melena or hematochezia during this episode.  After this her stools returned back to normal.  Has on average 2 bowel movements a day one before morning and 1 before work.  No abdominal pain at baseline.  Retching greasy foods can trigger symptoms.  Has maybe had 1 nocturnal bowel movement.  Consistency of stool is soft and formed.  Continues to take probiotic which she finds very helpful.    For her GERD, she is taking 40 mg of omeprazole.  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  This improves quickly with drinking water.  She is currently taking a multivitamin.    Interval history 4/20/2021   Kelly was last seen 12/8/2020 with Alice Gonzalez in GI clinic. She was doing well at that time and has been off mesalamine since 2019. Kelly reporting having a Crohn's flare in February of this year. Similar to her previous flares, she had abdominal pain and loose stools for 8 hours and then felt fatigued for 2 more days but had return of normal bowel pattern. No hematochezia, nausea/vomiting, fever, or abdominal pain. She called into clinic and was started on budesonide for 1 month. After 1-2 weeks of budesonide she had fecal calprotectin  "checked that was 95. Enterography was also done and did not show any evidence of active inflammation. Since this flare she had one day at the end of March where she had urgency but otherwise has been doing well. These have been her two \"flares\" of Crohn's this year. She had two similar flares in  and 3 in . Currently having her typical 2-3 soft formed stools per day. She does note some sores on her tongue which are not very painful but occur every few weeks. She is unsure if this is related to CD or Lichen planus which typically affects her oral cavity.    Of note last colonoscopy was in 2019 and showed Simple Endoscopic Score for Crohn's Disease: 0    Luigi Holloway Index:  2021  Well-bein (very well)  Abdomina pain: 0 (none)  # liquid/soft stools: 2   Abdominal mass 0 (none)  Extraluminal comlications    - Aphthous ulcers 1 - although unclear if from LP or CD  Score: 3      Interval history 2021   Recent cscope findings below.     Luigi Holloway Index:  Well-bein (very well)  Abdominal pain: 1 (mild)  # liquid/soft stools: occasionally, up to 6 per day   Abdominal mass 0 (none)  Extraluminal comlications    - Aphthous ulcers 1 - although unclear if from LP or CD    Dad with SCC. Son has MS. Mom with breast cancer in her 40s.     ROS: 10pt ROS performed and otherwise negative.    Interval history 2021 (video visit)  Overall feeling well, but with a few, self-limited episodes of loose stools and abd pain that lasts a few hours.  No weight loss.     High fiber foods might cause abd pain.      Luigi Holloway Index:  Well-bein (very well)  Abdominal pain: 1 (mild)  # liquid/soft stools: 0;  2-3 per day (formed)   Abdominal mass 0 (none)  Extraluminal complications    - Aphthous ulcers 1 - although unclear if from LP or CD. Has been present over the past few months.    Interval history 2021 (video visit)  Continues to feel well overall. Does have occasional urgency. Has 2-3 BMs per " day, soft, well-formed. No blood. No weight loss. No abdominal pain.    Interval history, 3/10/22  First injection was 2/27/22. Has been stressed as daughter has been going through some pregnancy losses. A month ago had a couple episodes abdominal cramping associated with BMs, now is back to baseline. Currently having 2 stools per day that are formed without blood. Some urgency. No nighttime stools or fecal incontinence. Would be due  for next injection April 24.    Interval hx 11/9/22  Feeling well, some stressors include recent mammogram requiring additional imaging, step mother broke hip and daughter going through infertility w/ multiple losses. Bowel pattern has been stable, no blood or urgency. Compliant with injections.    Interval hx 5/23/23  Urgency and frequency that started at the end of March, which was a week before she was due for her injection. She started entocort 4/29/23 at 9 mg which she continues. This also led to nighttime stools. Had to leave work due to symptoms. Last injection was 5/11 which was first 6 week interval. Started to feel better just as of last week. No new EIM. Fatigue is improving.     Interval hx 11/7/23  During pred taper had return of sxs at 20mg and had to go back to 30mg. Turned the corner 10/28, now having formed stools, avg 2 BM daily no blood. No more nighttime stools. She is currently on 25 mg and moves to 20mg tomorrow. Back is healing and still doing PT. No new joint pain. No new skin manifestations. No mouth sores.     PERTINENT PAST MEDICAL/SURGICAL HISTORY:  As noted above.    ENDOSCOPIC EVALUATION:  icscope 11/2021 showed SES-CD 4    The Simple Endoscopic Score for Crohn's Disease was determined based on        the endoscopic appearance of the mucosa in the following segments:        - Ileum: Findings include aphthous ulcers less than 0.5 cm in size, less        than 10% ulcerated surfaces, less than 50% of surfaces affected and no        narrowings. Segment score:  3.        - Right Colon: Findings include no ulcers present, no ulcerated        surfaces, less than 50% of surfaces affected, no narrowings and no        ulcers present, no ulcerated surfaces, no affected surfaces and no        narrowings. Segment score: 1.        - Transverse Colon: Findings include no ulcers present, no ulcerated        surfaces, no affected surfaces and no narrowings. Segment score: 0.        - Left Colon: Findings include no ulcers present, no ulcerated surfaces,        no affected surfaces and no narrowings. Segment score: 0.        - Rectum: Findings include no ulcers present, no ulcerated surfaces, no        affected surfaces and no narrowings. Segment score: 0.        - Total SES-CD aggregate score: 4. Biopsies were taken with a cold        forceps for histology.     PATH:     A.  TERMINAL ILEUM, BIOPSY:  Ileal mucosa with no granulomas, cryptitis or other histologic evidence of active Crohn; negative for dysplasia     B. CECUM, BIOPSY:  Colonic mucosa with no granulomas, cryptitis or other histologic evidence of active Crohn; negative for dysplasia        icscope 5/24/2021 showed SES-CD 5 with small ulcers in the TI. Bx normal.  Bx:     SPECIMEN(S):   A: Ileum biopsy   B: Cecal biopsy     FINAL DIAGNOSIS:   A. Ileum, Biopsy:   Ileal mucosa with no granulomas, cryptitis or other histologic evidence of    active Crohn; negative for dysplasia     B. Cecum, Biopsy:   Colonic mucosa with no granulomas, cryptitis or other histologic evidence   of active Crohn; negative for dysplasia       12/2019 Impression:          - Simple Endoscopic Score for Crohn's Disease: 0,                        mucosal inflammatory changes secondary to Crohn's                        disease, in remission. Biopsied.                        - Diverticulosis in the sigmoid colon.                        - Non-bleeding internal hemorrhoids.   A. ILEUM, BIOPSY:   - Small intestinal mucosa with no significant histologic  abnormality   - Negative for dysplasia     B. CECUM, BIOPSY:   - Colonic mucosa with no significant histologic abnormality   - Negative for dysplasia     C. COLON, ASCENDING, BIOPSY:   - Colonic mucosa with no significant histologic abnormality   - Negative for dysplasia     D. COLON, TRANSVERSE, BIOPSY:   - Colonic mucosa with no significant histologic abnormality   - Negative for dysplasia     E. COLON, DESCENDING,  BIOPSY:   - Colonic mucosa with no significant histologic abnormality   - Negative for dysplasia     F. COLON, SIGMOID, BIOPSY:   - Colonic mucosa with no significant histologic abnormality   - Negative for dysplasia     G. RECTUM, BIOPSY:   - Colonic mucosa with no significant histologic abnormality   - Negative for dysplasia     PERTINENT MEDICATIONS:  -No current Crohn's medications  Medications reviewed with patient today, see Medication List/Assessment for details.  No other NSAID/anticoagulation reported by patient.  No other OTC/herbal/supplements reported by patient.    SOCIAL HISTORY: Tobacco: none.    PHYSICAL EXAMINATION: video visit exam     General appearance  Healthy appearing adult, in no acute distress     Eyes  Sclera anicteric  Pupils round and reactive to light     Ears, nose, mouth and throat  No obvious external lesions of ears and nose  Hearing intact     Neck  Symmetric  No obvious external lesions     Respiratory  Normal respiration, no use of accessory muscles      MSK  Gait normal     Skin  No rashes or jaundice      Psychiatric  Oriented to person, place and time  Appropriate mood and affect.       PERTINENT STUDIES:  Fecal calprotectin 226 (4/13/21) from 95 (2/19/21)   9/2021    MR ENTEROGRAPHY: 2/25/2021   No evidence of active inflammation of the small bowel. No evidence of  stricture or fistula.     ASSESSMENT/PLAN:  1. Ileal Crohn's Disease, moderate  Current medication: Stelara (started 12/27/21), prednisone 25mg, moved to Stelara q4 weeks Sept 2023.   Current  clinical disease activity: remission   Current endoscopic disease activity: icscope 10/2022 showed SES-CD 0    Diagnosed 3/2017. Disease limited to ileum. Initially on mesalamine until 11/2019 when it was stopped for RAMSEY.  Due to active disease on cscope 11/2021, started on stelara, achieving endoscopic and histologic remission colonoscopy 10/2022. New symptoms with elevated fecal sammy 4/2023 prompted change to every 6 week dosing, which allowed for improvement until September 2023.  Repeat fecal calprotectin 9/19/2023 showed elevated to 251.  Proceeded with request to move to every 4 weeks and she has had 1, soon to be 2 injections this week at this interval.  She was unsuccessful with symptom management on Entocort and required transition to a prednisone taper.  Unfortunately she did not tolerate the decrease from 30 mg to 20 mg and has required a slower taper, currently at 25 mg.  With more doses at every 4, hopefully we can continue to taper prednisone successfully and moved to every 4 dosing as maintenance dose to allow for remission.  If this does not allow for continued remission, alternative therapies were discussed.  Patient would like to avoid anti-TNF therapy with family history of cancers, consideration for vedolizumab or staying in class with Qian would be a reasonable alternative.    We will plan for the following:    PLAN  ---Continue with stelara every 4 weeks  ---Continue prednisone taper  --Repeat fecal calprotectin once prednisone taper is complete  ---Colonoscopy 6 months after moving to every 4-week dosing, March/April 2024  -- Continue budesonide 9mg x 1 month total, 6mg x 2 weeks then 3 mg x 2 weeks  -- Labs to include CBC, CRP, ESR, LFTs every 3 months   --- Discussed a variety of different diets in the setting of IBD.  Patient will meet with our dietitian to discuss a more formal approach.  Focused primarily on SCD versus IBD aid.    Routine IBD care:   - recommend supplementation vitamin  D 1000 units daily and calcium 500 mg twice daily.Patient confirmed she is taking.   -- Vaccines/immunizations to be updated: Recommend yearly flu shot, pneumonia vaccines (Prevnar 13 then 8 weeks later Pneumovax 23 then 5 years later Pneumovax 23), tetanus every 10 years. Kelly is UTD with Shingles vaccine (she did have optic shingles in the past)   - No NSAIDs (ibuprofen, or anything containing ibuprofen)     2.  GERD  Patient has longstanding GERD symptoms, overall well controlled on omeprazole 40 mg a day.      2. Cancer Screening  No PSC, no high-risk FH CRC (2nd cousin dx'd in 2017 w/ advanced CRC @ 55yo). Given colonoscopy in 2019 without adenomatous lesions, recommend repeat colonoscopy for routine screening in 2029 unless symptomatic sooner.    RTC in 6 months     Thank you for this consultation.  42 minutes spent on the date of the encounter doing chart review, history and exam, documentation and further activities as noted above.  It was a pleasure to participate in the care of this patient; please contact us with any further questions.      Benjamin Brooks PA-C  Division of Gastroenterology, Hepatology and Nutrition  [facility]

## 2023-11-07 NOTE — PATIENT INSTRUCTIONS
It was a pleasure taking care of you today.  I've included a brief summary of our discussion and care plan from today's visit below.  Please review this information with your primary care provider.  ______________________________________________________________________    My recommendations are summarized as follows:    -- Continue stelara every 8 weeks  -- Labs every 3 months   -- Fecal sammy in about 6 weeks  -- Next endoscopic assessment: 6 months  -- Patient with IBD we recommend supplementation vitamin D 1000 units daily and calcium 500 mg twice daily.  -- Vaccines/immunizations to be updated: Recommend yearly flu shot, pneumonia vaccines (Prevnar 13 then 8 weeks later Pneumovax 23 then 5 years later Pneumovax 23), tetanus every 10 years.  -- No NSAIDs (ibuprofen, or anything containing ibuprofen)     For additional resources about inflammatory bowel disease visit http://www.crohnscolitisfoundation.org/    To learn more about Diet and Nutrition in the setting of IBD, check out some of these resources:  https://www.crohnscolitisfoundation.org/diet-and-nutrition/what-should-i-eat  https://www.nimbal.org/  https://ntforibd.org/    Return to GI Clinic in 3 months to review your progress.    ______________________________________________________________________    How do I schedule labs, imaging studies, or procedures that were ordered in clinic today?     Labs: To schedule lab appointment at the [facility], use my chart or call [phone]. If you have a [place] lab closer to home where you are regularly seen you can give them a call.     Procedures: If a colonoscopy, upper endoscopy, breath test, esophageal manometry, or pH impedence was ordered today, our endoscopy team will call you to schedule this. If you have not heard from our endoscopy team within a week, please call [phone] to schedule.     Imaging Studies: If you were scheduled for a CT scan, X-ray, MRI, ultrasound, HIDA scan or  other imaging study, please call [phone] to have this scheduled.     Referral: If a referral to another specialty was ordered, expect a phone call or follow instructions above. If you have not heard from anyone regarding your referral in a week, please call our clinic to check the status.     Who do I call with any questions after my visit?  Please be in touch if there are any further questions that arise following today's visit.  There are multiple ways to contact your gastroenterology care team.      During business hours, you may reach a Gastroenterology nurse at [phone]    To schedule or reschedule an appointment, please call [phone].     You can always send a secure message through [facility].  [facility] messages are answered by your nurse or doctor typically within 24 hours.  Please allow extra time on weekends and holidays.      For urgent/emergent questions after business hours, you may reach the on-call GI Fellow by contacting the [facility]  at [phone].     How will I get the results of any tests ordered?    You will receive all of your results.  If you have signed up for [employer], any tests ordered at your visit will be available to you after your physician reviews them.  Typically this takes 1-2 weeks.  If there are urgent results that require a change in your care plan, your physician or nurse will call you to discuss the next steps.      What is [facility]?  [facility] is a secure way for you to access all of your healthcare records from the [facility].  It is a web based computer program, so you can sign on to it from any location.  It also allows you to send secure messages to your care team.  I recommend signing up for [facility] access if you have not already done so and are comfortable with using a computer.         Sincerely,    Benjamin Brooks PA-C  [facility]  Division of Gastroenterology

## 2023-11-07 NOTE — NURSING NOTE
Is the patient currently in the state of [place]? YES    Visit mode:VIDEO    If the visit is dropped, the patient can be reconnected by: VIDEO VISIT: Text to cell phone:   Telephone Information:   Mobile [phone]       Will anyone else be joining the visit? NO  (If patient encounters technical issues they should call [phone]    How would you like to obtain your AVS? MyChart    Are changes needed to the allergy or medication list? N/A    Reason for visit: FORTUNATO GARCIA

## 2023-11-09 ENCOUNTER — TELEPHONE (OUTPATIENT)
Dept: GASTROENTEROLOGY | Facility: CLINIC | Age: 63
End: 2023-11-09
Payer: COMMERCIAL

## 2023-11-09 NOTE — TELEPHONE ENCOUNTER
Contacted pt to schedule appointment with Georgia Rodrigez per IB received. Lvmx2, sent letter      *Hello CCs,     I got a message from Benjamin Brooks. She referred this patient to me to discuss diet for Crohn's. I believe she put in an official referral as well. Could you please reach out to her to get her scheduled. I had a cancellation tomorrow at 11/8 at 10:30 AM and will be opening a visit on 11/22 at 11:30 AM (so okay to use that held spot on 11/22).     I also sent a separate message about a different patient referral so these appt times are fine for either patient.     Thanks for your help,   Georgia

## 2023-11-09 NOTE — TELEPHONE ENCOUNTER
LVM and sent MyC regarding the following:     RET IBD, 2 mo follow-up ( around 01/07/2024 ) with Fuentes Brooks PA-C.  Left CC number

## 2023-11-13 ENCOUNTER — TELEPHONE (OUTPATIENT)
Dept: GASTROENTEROLOGY | Facility: CLINIC | Age: 63
End: 2023-11-13
Payer: COMMERCIAL

## 2023-11-13 NOTE — TELEPHONE ENCOUNTER
LVM regarding the following:    RET IBD, 2 mo follow-up ( around 01/07/2024 ) with Fuentes Brooks.  Left CC number

## 2023-11-17 ENCOUNTER — TELEPHONE (OUTPATIENT)
Dept: GASTROENTEROLOGY | Facility: CLINIC | Age: 63
End: 2023-11-17
Payer: COMMERCIAL

## 2023-11-17 ENCOUNTER — MYC MEDICAL ADVICE (OUTPATIENT)
Dept: FAMILY MEDICINE | Facility: CLINIC | Age: 63
End: 2023-11-17
Payer: COMMERCIAL

## 2023-11-17 NOTE — TELEPHONE ENCOUNTER
"Endoscopy Scheduling Screen    Have you had a positive Covid test in the last 14 days?  No    Are you active on MyChart?   Yes    What insurance is in the chart?  Other:  R     Ordering/Referring Provider: TORRI SHEN    (If ordering provider performs procedure, schedule with ordering provider unless otherwise instructed. )    BMI: Estimated body mass index is 22.14 kg/m  as calculated from the following:    Height as of 10/17/22: 1.6 m (5' 3\").    Weight as of 10/17/22: 56.7 kg (125 lb).     Sedation Ordered  MAC/deep sedation.   BMI<= 45 45 < BMI <= 48 48 < BMI < = 50  BMI > 50   No Restrictions No [facility]  No [facility]  [facility] with exceptions Hospital Only OR Only       Are you taking any prescription medications for pain 3 or more times per week?   No    Do you have a history of malignant hyperthermia or adverse reaction to anesthesia?  No    (Females) Are you currently pregnant?   No     Have you been diagnosed or told you have pulmonary hypertension?   No    Do you have an LVAD?  No    Have you been told you have moderate to severe sleep apnea?  No    Have you been told you have COPD, asthma, or any other lung disease?  No    Do you have any heart conditions?  No     Have you ever had an organ transplant?   No    Have you ever had or are you awaiting a heart or lung transplant?   No    Have you had a stroke or transient ischemic attack (TIA aka \"mini stroke\" in the last 6 months?   No    Have you been diagnosed with or been told you have cirrhosis of the liver?   No    Are you currently on dialysis?   No    Do you need assistance transferring?   No    BMI: Estimated body mass index is 22.14 kg/m  as calculated from the following:    Height as of 10/17/22: 1.6 m (5' 3\").    Weight as of 10/17/22: 56.7 kg (125 lb).     Is patients BMI > 40 and scheduling location [facility]?  No    Do you take an injectable medication for weight loss or diabetes (excluding insulin)?  No    Do you take the medication " Naltrexone?  No    Do you take blood thinners?  No       Prep   Are you currently on dialysis or do you have chronic kidney disease?  No    Do you have a diagnosis of diabetes?  No    Do you have a diagnosis of cystic fibrosis (CF)?  No    On a regular basis do you go 3 -5 days between bowel movements?  No    BMI > 40?  No    Preferred Pharmacy:    [place], [place] - [address]  [address]  [address]  [facility] [address]  Phone: [phone] Fax: [phone] Alternate Fax: [phone], [phone], [phone]      Final Scheduling Details   Colonoscopy prep sent?  Standard MiraLAX    Procedure scheduled  Colonoscopy    Surgeon:  CODY PER ORDER      Date of procedure:  04/02/2024     Pre-OP / PAC:   No - Not required for this site.    Location  [facility] - ASC - Per order.    Sedation   MAC/Deep Sedation - Per order.      Patient Reminders:   You will receive a call from a Nurse to review instructions and health history.  This assessment must be completed prior to your procedure.  Failure to complete the Nurse assessment may result in the procedure being cancelled.      On the day of your procedure, please designate an adult(s) who can drive you home stay with you for the next 24 hours. The medicines used in the exam will make you sleepy. You will not be able to drive.      You cannot take public transportation, ride share services, or non-medical taxi service without a responsible caregiver.  Medical transport services are allowed with the requirement that a responsible caregiver will receive you at your destination.  We require that drivers and caregivers are confirmed prior to your procedure.

## 2023-11-30 ENCOUNTER — TELEPHONE (OUTPATIENT)
Dept: GASTROENTEROLOGY | Facility: CLINIC | Age: 63
End: 2023-11-30
Payer: COMMERCIAL

## 2023-11-30 NOTE — TELEPHONE ENCOUNTER
PA Initiation    Medication: USTEKINUMAB 90 MG/ML [facility]  Insurance Company: [employer] - Phone [phone] Fax [phone]  Pharmacy Filling the Rx: [facility] - [place], [place] - [facility] [address]  Filling Pharmacy Phone:    Filling Pharmacy Fax:    Start Date: 11/30/2023  [id number]

## 2023-12-01 NOTE — TELEPHONE ENCOUNTER
Prior Authorization Approval    Medication: USTEKINUMAB 90 MG/ML [facility] SOSY  Authorization Effective Date: 11/30/2023  Authorization Expiration Date: 11/29/2024  Approved Dose/Quantity: 1/28  Reference #: [id number]   Insurance Company: [employer] - Phone [phone] Fax [phone]  Expected CoPay: $    CoPay Card Available:      Financial Assistance Needed:    Which Pharmacy is filling the prescription: [facility] - [place], [place] - [facility] [address]  Pharmacy Notified:    Patient Notified:  sent [employer] message

## 2023-12-05 ENCOUNTER — ANCILLARY PROCEDURE (OUTPATIENT)
Dept: MAMMOGRAPHY | Facility: CLINIC | Age: 63
End: 2023-12-05
Attending: FAMILY MEDICINE
Payer: COMMERCIAL

## 2023-12-05 DIAGNOSIS — Z12.31 VISIT FOR SCREENING MAMMOGRAM: ICD-10-CM

## 2023-12-05 PROCEDURE — 77067 SCR MAMMO BI INCL CAD: CPT

## 2023-12-05 PROCEDURE — 77063 BREAST TOMOSYNTHESIS BI: CPT | Mod: 26 | Performed by: STUDENT IN AN ORGANIZED HEALTH CARE EDUCATION/TRAINING PROGRAM

## 2023-12-05 PROCEDURE — 77067 SCR MAMMO BI INCL CAD: CPT | Mod: 26 | Performed by: STUDENT IN AN ORGANIZED HEALTH CARE EDUCATION/TRAINING PROGRAM

## 2023-12-08 ENCOUNTER — TELEPHONE (OUTPATIENT)
Dept: GASTROENTEROLOGY | Facility: CLINIC | Age: 63
End: 2023-12-08
Payer: COMMERCIAL

## 2024-01-01 ENCOUNTER — NURSE TRIAGE (OUTPATIENT)
Dept: NURSING | Facility: CLINIC | Age: 64
End: 2024-01-01
Payer: COMMERCIAL

## 2024-01-01 NOTE — TELEPHONE ENCOUNTER
COVID Positive/Requesting COVID treatment    Patient is positive for COVID and requesting treatment options.    Date of positive COVID test (PCR or at home)? 1/1/24  Current COVID symptoms: fever or chills, cough, fatigue, muscle or body aches, headache, congestion or runny nose, and diarrhea  Date COVID symptoms began: 12/31/23    Message should be routed to clinic RN pool. Best phone number to use for call back: [phone]    [facility].      Reason for Disposition   [1] HIGH RISK patient (e.g., weak immune system, age > 64 years, obesity with BMI 30 or higher, pregnant, chronic lung disease or other chronic medical condition) AND [2] COVID symptoms (e.g., cough, fever)  (Exceptions: Already seen by PCP and no new or worsening symptoms.)    Additional Information   Negative: SEVERE difficulty breathing (e.g., struggling for each breath, speaks in single words)   Negative: Difficult to awaken or acting confused (e.g., disoriented, slurred speech)   Negative: Bluish (or gray) lips or face now   Negative: Shock suspected (e.g., cold/pale/clammy skin, too weak to stand, low BP, rapid pulse)   Negative: Sounds like a life-threatening emergency to the triager   Negative: SEVERE or constant chest pain or pressure  (Exception: Mild central chest pain, present only when coughing.)   Negative: MODERATE difficulty breathing (e.g., speaks in phrases, SOB even at rest, pulse 100-120)   Negative: [1] Headache AND [2] stiff neck (can't touch chin to chest)   Negative: Oxygen level (e.g., pulse oximetry) 90 percent or lower   Negative: Chest pain or pressure  (Exception: MILD central chest pain, present only when coughing.)   Negative: [1] Drinking very little AND [2] dehydration suspected (e.g., no urine > 12 hours, very dry mouth, very lightheaded)   Negative: Patient sounds very sick or weak to the triager   Negative: MILD difficulty breathing (e.g., minimal/no SOB at rest, SOB with walking, pulse <100)   Negative:  Fever > 103 F (39.4 C)   Negative: [1] Fever > 101 F (38.3 C) AND [2] age > 60 years   Negative: [1] Fever > 100.0 F (37.8 C) AND [2] bedridden (e.g., CVA, chronic illness, recovering from surgery)   Negative: Oxygen level (e.g., pulse oximetry) 91 to 94 percent    Protocols used: Coronavirus (COVID-19) Diagnosed or [id number]      Lilibeth Mercado RN on 1/1/2024 at 12:23 PM

## 2024-01-02 ENCOUNTER — NURSE TRIAGE (OUTPATIENT)
Dept: NURSING | Facility: CLINIC | Age: 64
End: 2024-01-02

## 2024-01-02 ENCOUNTER — TELEPHONE (OUTPATIENT)
Dept: NURSING | Facility: CLINIC | Age: 64
End: 2024-01-02

## 2024-01-02 NOTE — TELEPHONE ENCOUNTER
Kelly returning a call from Agueda. Left message for Agueda to return call when available.  Audrey Villa RN on 1/2/2024 at 12:56 PM    Reason for Disposition   Follow-up information-only call to recent contact, no triage required    Protocols used: Information Only Call - No Triage-A-OH

## 2024-01-02 NOTE — TELEPHONE ENCOUNTER
Telephone call  Patient had questions about Paxlovid she keeps getting calls form the paxlovid team and it goes straight to voicemail she is not sure why.  She states she is feeling better  and really does not want to take it if she does not need it.  Explained that she needs to get it with in 5 days of symptoms  sp she will call back if her symptoms get worse she nows that day 5 is 1/4/2024    Michelle Rabago RN   [facility] Nurse Advisor  1:41 PM 1/2/2024

## 2024-01-02 NOTE — TELEPHONE ENCOUNTER
RN attempted to reach pt via phone x2 (previous nurse x2 as well) Phone goes immediately to vm. RN left detailed vm for pt to setup vv with provider. Covid treatment team does not have a direct call in number.       Okay to relay.    Agueda PEARSON RN

## 2024-01-02 NOTE — TELEPHONE ENCOUNTER
Pt calling back stating she got a voicemail to callback in order to get Paxlovid. Pt states she is feeling better and is unsure if needing paxlovid. Pt is requesting nurses call to go through paxlovid protocol as she is unsure if with her medication if she can even get paxlovid.    Callback- [phone]    Thanks!  Herminio Erickson RN   [facility]

## 2024-01-02 NOTE — TELEPHONE ENCOUNTER
RN attempted to reach pt phone provided. Phone went right to . RN left voicemail and gave pt [phone] number. If pt is unable to receive phone calls, may need to schedule a virtual visit.         Okay to relay msg.   Agueda PEARSON RN

## 2024-01-08 DIAGNOSIS — K21.9 GASTROESOPHAGEAL REFLUX DISEASE, UNSPECIFIED WHETHER ESOPHAGITIS PRESENT: ICD-10-CM

## 2024-01-10 ENCOUNTER — VIRTUAL VISIT (OUTPATIENT)
Dept: GASTROENTEROLOGY | Facility: CLINIC | Age: 64
End: 2024-01-10
Attending: PHYSICIAN ASSISTANT
Payer: COMMERCIAL

## 2024-01-10 DIAGNOSIS — I10 HYPERTENSION, GOAL BELOW 140/90: ICD-10-CM

## 2024-01-10 DIAGNOSIS — K50.018 CROHN'S DISEASE OF SMALL INTESTINE WITH OTHER COMPLICATION (H): Primary | ICD-10-CM

## 2024-01-10 DIAGNOSIS — Z78.9 TAKES DIETARY SUPPLEMENTS: ICD-10-CM

## 2024-01-10 NOTE — Clinical Note
1/10/2024         RE: Aileen Priest   [address]  [place] [address]        Dear Colleague,    Thank you for referring your patient, Aileen Priest, to the [facility]. Please see a copy of my visit note below.    Medication Therapy Management (MTM) Encounter    ASSESSMENT:                            Medication Adherence/Access: {adherencechoices:119340}    Crohn's Disease:  ***      PLAN:                            Once you are feeling better please make a lab appointment to have your routine labs completed.   Will make appointment in next 2-3 weeks  Plan to complete fecal calprotectin?  If she is not feelig better from a GI standpoint after he COVID settles down then will check   Confirm- Off prednisone?  Yes  Will make an appointment with PCP to discuss Dexa    Follow-up: MTM Visit in 3 months    SUBJECTIVE/OBJECTIVE:                          Kelly Priest is a 63 year old female { :392259} for {mtmvisit:230440}     Reason for visit: COVID-19 and Stelara.    Allergies/ADRs: Reviewed in chart  Past Medical History: Reviewed in chart  Tobacco: She reports that she quit smoking about 37 years ago. Her smoking use included cigarettes. She started smoking about 48 years ago. She has a 10 pack-year smoking history. She has never used smokeless tobacco.  Alcohol: 4-6 beverages / week      Medication Adherence/Access: no issues reported    Crohn's Disease:   -Stelara 90 mg every 28 days  - Prednisone - off    Last Stelara due:    Last Stelara: Negative   Restart every 28 days     Last provider visit:23  Next provider visit: 3/26/24  Last labs completed: 23  Lab frequency: every 3 months   - standing labs yes, esr, crp, cmp, cbc with platelets & diff  24  Next labs due: 10/6/23    Last IBD Health Maintenance Review: 10/6/23  PDC: 100%        COVID+ test:    COVID+ symptom start:   COVID respiratory symptoms? None- only symptoms  are cough, congestion, fatigue  COVID-19 risk factors: Age 50 years or older  COVID treatment: none  COVID vaccines: 12/2020, 1/2021, 9/2021, 5/2022, 10/2022 (bivalent)  Immunosuppression: stelara every 4 weeks  IBD disease activity: clinical remission  Patient preferences:  First negative test: 1/10/24    DEXA scan:     Supplements:   Citracal petitt 2 once daily   No separate vitamin D supplement  Magnesium glycinate-       Hypertension:   Lisinopril 5 mg   Patient reports no current medication side effects.  Patient self-monitors blood pressure.  Home BP monitoring ***..    BP Readings from Last 3 Encounters:   10/17/22 119/89   01/13/22 126/81   12/27/21 (!) 132/90     Pulse Readings from Last 3 Encounters:   10/17/22 57   10/17/22 64   01/13/22 74           Today's Vitals: LMP  (LMP Unknown)   ----------------  {SUHA?:233179}    I spent {mtm total time 3:198120} with this patient today. { :056235}. A copy of the visit note was provided to the patient's provider(s).    A summary of these recommendations {GIVEN/NOT GIVEN:970061}.    ***    Telemedicine Visit Details  Type of service:  Telephone visit  Start Time:  1:30 PM  End Time:  1:49 PM     Medication Therapy Recommendations  No medication therapy recommendations to display       Medication Therapy Management (MTM) Encounter    ASSESSMENT:                            Medication Adherence/Access: {adherencechoices:975632}    Crohn's Disease:  ***      PLAN:                            You are ok to resume the Stelara now. Reminder to get lab work done now and every 3 months.   Reminder to discuss DEXA scan with your primary care provider.    Follow-up: MTM Visit in 3 months    SUBJECTIVE/OBJECTIVE:                          Kelly Priest is a 63 year old female called for a follow-up visit from 10/6/23.       Reason for visit: COVID-19 and Stelara.    Allergies/ADRs: Reviewed in chart  Past Medical History: Reviewed in chart  Tobacco: She reports that she quit  smoking about 37 years ago. Her smoking use included cigarettes. She started smoking about 48 years ago. She has a 10 pack-year smoking history. She has never used smokeless tobacco.  Alcohol: 4-6 beverages / week      Medication Adherence/Access: no issues reported    Crohn's Disease:   Stelara 90 mg every 28 days    Met with Kelly to discuss resuming Stelara. She was holding it for a COVID-19 infection.     Last Stelara due:      Last provider visit:23  Next provider visit: 3/26/24  Last labs completed: 23  Lab frequency: every 3 months   - standing labs yes, esr, crp, cmp, cbc with platelets & diff  24  Next labs due: 10/6/23  Last IBD Health Maintenance Review: 10/6/23  PDC: 100%      COVID+ test:    COVID+ symptom start:   COVID respiratory symptoms? None- only symptoms are cough, congestion, fatigue  COVID-19 risk factors: Age 50 years or older  COVID treatment: none  COVID vaccines: 2020, 2021, 2021, 2022, 10/2022 (bivalent)  Immunosuppression: stelara every 4 weeks  IBD disease activity: clinical remission  First negative test: 1/10/24    Supplements:   Citracal Petittes 400 mg calcium/500 Vitamin D once daily  Magnesium glycinate- uses for general health        Hypertension:   Lisinopril 5 mg   Patient reports no current medication side effects.  Patient self-monitors blood pressure at work occasionally. Suggested she do this in near future.   BP Readings from Last 3 Encounters:   10/17/22 119/89   22 126/81   21 (!) 132/90     Pulse Readings from Last 3 Encounters:   10/17/22 57   10/17/22 64   22 74       ----------------      I spent 15 minutes with this patient today. All changes were made via collaborative practice agreement with Benjamin Brooks PA-C. A copy of the visit note was provided to the patient's provider(s).    A summary of these recommendations was given to the patient.    Smith Cano, PharmD, BCPS  MTM  Pharmacist   [facility]  Phone: [phone]    Telemedicine Visit Details  Type of service:  Telephone visit  Start Time:  1:30 PM  End Time:  1:49 PM     Medication Therapy Recommendations  No medication therapy recommendations to display         Again, thank you for allowing me to participate in the care of your patient.        Sincerely,        Smith Cano RPH

## 2024-01-10 NOTE — PROGRESS NOTES
Medication Therapy Management (MTM) Encounter    ASSESSMENT:                            Medication Adherence/Access: No issues identified    Crohn's Disease: Given negative COVID-19 test and resolving symptoms, I recommend resuming Stelara 90 mg every 28 days.     Supplements: Stable.    Hypertension: Patient is meeting blood pressure goal of < 140/90mmHg.    PLAN:                            You are ok to resume the Stelara now. Reminder to get lab work done now and every 3 months.   Reminder to discuss DEXA scan with your primary care provider.    Follow-up: MTM Visit in 3 months    SUBJECTIVE/OBJECTIVE:                          Kelly Priest is a 63 year old female called for a follow-up visit from 10/6/23.       Reason for visit: COVID-19 and Stelara.    Allergies/ADRs: Reviewed in chart  Past Medical History: Reviewed in chart  Tobacco: She reports that she quit smoking about 37 years ago. Her smoking use included cigarettes. She started smoking about 48 years ago. She has a 10 pack-year smoking history. She has never used smokeless tobacco.  Alcohol: 4-6 beverages / week      Medication Adherence/Access: no issues reported    Crohn's Disease:   Stelara 90 mg every 28 days    Met with Kelly to discuss resuming Stelara. She was holding it for a COVID-19 infection.     GI symptoms including frequency/urgency have been increasing lately but under control. She will closely monitor and reach out to GI clinc.     Last Stelara due:      Last provider visit:23  Next provider visit: 3/26/24  Last labs completed: 23  Lab frequency: every 3 months   - standing labs yes, esr, crp, cmp, cbc with platelets & diff  24  Next labs due: 10/6/23  Last IBD Health Maintenance Review: 10/6/23  PDC: 100%      COVID+ test:    COVID+ symptom start:   COVID respiratory symptoms? None- only symptoms are cough, congestion, fatigue  COVID-19 risk factors: Age 50 years or older  COVID  treatment: none  COVID vaccines: 12/2020, 1/2021, 9/2021, 5/2022, 10/2022 (bivalent)  Immunosuppression: stelara every 4 weeks  IBD disease activity: clinical remission  First negative test: 1/10/24    Supplements:   Citracal Petittes 400 mg calcium/500 Vitamin D once daily  Magnesium glycinate- uses for general health    Denies side effects or concerns.    Hypertension:   Lisinopril 5 mg   Patient reports no current medication side effects.  Patient self-monitors blood pressure at work occasionally. Suggested she do this in near future.   BP Readings from Last 3 Encounters:   10/17/22 119/89   01/13/22 126/81   12/27/21 (!) 132/90     Pulse Readings from Last 3 Encounters:   10/17/22 57   10/17/22 64   01/13/22 74       ----------------      I spent 15 minutes with this patient today. All changes were made via collaborative practice agreement with Benjamin Brooks PA-C. A copy of the visit note was provided to the patient's provider(s).    A summary of these recommendations was given to the patient.    Smith Cano, PharmD, BCPS  MTM Pharmacist   [facility]  Phone: [phone]    Telemedicine Visit Details  Type of service:  Telephone visit  Start Time:  1:30 PM  End Time:  1:49 PM     Medication Therapy Recommendations  No medication therapy recommendations to display

## 2024-01-11 ENCOUNTER — LAB (OUTPATIENT)
Dept: LAB | Facility: CLINIC | Age: 64
End: 2024-01-11
Payer: COMMERCIAL

## 2024-01-11 DIAGNOSIS — K50.018 CROHN'S DISEASE OF SMALL INTESTINE WITH OTHER COMPLICATION (H): ICD-10-CM

## 2024-01-11 RX ORDER — OMEPRAZOLE 40 MG/1
40 CAPSULE, DELAYED RELEASE ORAL DAILY
Qty: 90 CAPSULE | Refills: 3 | Status: SHIPPED | OUTPATIENT
Start: 2024-01-11

## 2024-01-11 NOTE — TELEPHONE ENCOUNTER
Last visit date 11/7/2023  [facility] [place]     Benjamin Brooks PA-C  Gastroenterology

## 2024-01-12 PROCEDURE — 83993 ASSAY FOR CALPROTECTIN FECAL: CPT

## 2024-01-15 LAB — CALPROTECTIN STL-MCNT: 335 MG/KG (ref 0–49.9)

## 2024-01-16 RX ORDER — BUDESONIDE 0.5 MG/2ML
0.5 INHALANT ORAL DAILY
Status: CANCELLED | OUTPATIENT
Start: 2024-01-16

## 2024-01-19 NOTE — PATIENT INSTRUCTIONS
"Recommendations from today's MTM visit:                                                      You are ok to resume the Stelara now. Reminder to get lab work done now and every 3 months.   Reminder to discuss DEXA scan with your primary care provider.    Follow-up: MTM Visit in 3 months    It was great speaking with you today.  I value your experience and would be very thankful for your time in providing feedback in our clinic survey. In the next few days, you may receive an email or text message from [facility] with a link to a survey related to your  clinical pharmacist.\"     To schedule another MTM appointment, please call the clinic directly or you may call the MTM scheduling line at [phone] or toll-free at [phone].     My Clinical Pharmacist's contact information:                                                      Please feel free to contact me with any questions or concerns you have.      Smith Cano, PharmD, BCPS  MTM Pharmacist   [facility]  Phone: [phone]   "

## 2024-01-20 DIAGNOSIS — K50.018 CROHN'S DISEASE OF SMALL INTESTINE WITH OTHER COMPLICATION (H): ICD-10-CM

## 2024-01-25 RX ORDER — USTEKINUMAB 90 MG/ML
INJECTION, SOLUTION SUBCUTANEOUS
Qty: 1 ML | Refills: 11 | Status: SHIPPED | OUTPATIENT
Start: 2024-01-25

## 2024-01-25 NOTE — TELEPHONE ENCOUNTER
ustekinumab (STELARA) 90 MG/ML   1 mL 2 10/9/2023       Last Office Visit : 1-  Future Office visit:  3-    Labs completed on :9-     Erythrocyte Sedimentation Rate  0 - 30 mm/hr 9     CRP Inflammation  <5.00 mg/L <3.00   Comprehensive metabolic panel   CBC with Platelets & Differential

## 2024-01-30 ENCOUNTER — OFFICE VISIT (OUTPATIENT)
Dept: FAMILY MEDICINE | Facility: CLINIC | Age: 64
End: 2024-01-30
Payer: COMMERCIAL

## 2024-01-30 VITALS
BODY MASS INDEX: 23.55 KG/M2 | WEIGHT: 128 LBS | RESPIRATION RATE: 17 BRPM | SYSTOLIC BLOOD PRESSURE: 139 MMHG | TEMPERATURE: 97.3 F | OXYGEN SATURATION: 97 % | DIASTOLIC BLOOD PRESSURE: 87 MMHG | HEIGHT: 62 IN | HEART RATE: 105 BPM

## 2024-01-30 DIAGNOSIS — K50.018 CROHN'S DISEASE OF SMALL INTESTINE WITH OTHER COMPLICATION (H): ICD-10-CM

## 2024-01-30 DIAGNOSIS — R25.1 TREMOR, UNSPECIFIED: ICD-10-CM

## 2024-01-30 DIAGNOSIS — R29.898 WEAKNESS OF RIGHT LOWER EXTREMITY: ICD-10-CM

## 2024-01-30 DIAGNOSIS — I10 HYPERTENSION GOAL BP (BLOOD PRESSURE) < 140/90: Primary | ICD-10-CM

## 2024-01-30 DIAGNOSIS — R29.898 RIGHT HAND WEAKNESS: ICD-10-CM

## 2024-01-30 LAB
ERYTHROCYTE [DISTWIDTH] IN BLOOD BY AUTOMATED COUNT: 12.1 % (ref 10–15)
ERYTHROCYTE [SEDIMENTATION RATE] IN BLOOD BY WESTERGREN METHOD: 7 MM/HR (ref 0–30)
HCT VFR BLD AUTO: 41.3 % (ref 35–47)
HGB BLD-MCNC: 13.7 G/DL (ref 11.7–15.7)
MCH RBC QN AUTO: 30.4 PG (ref 26.5–33)
MCHC RBC AUTO-ENTMCNC: 33.2 G/DL (ref 31.5–36.5)
MCV RBC AUTO: 92 FL (ref 78–100)
PLATELET # BLD AUTO: 306 10E3/UL (ref 150–450)
RBC # BLD AUTO: 4.5 10E6/UL (ref 3.8–5.2)
WBC # BLD AUTO: 10 10E3/UL (ref 4–11)

## 2024-01-30 PROCEDURE — 80053 COMPREHEN METABOLIC PANEL: CPT | Performed by: FAMILY MEDICINE

## 2024-01-30 PROCEDURE — 85027 COMPLETE CBC AUTOMATED: CPT | Performed by: FAMILY MEDICINE

## 2024-01-30 PROCEDURE — 85652 RBC SED RATE AUTOMATED: CPT | Performed by: FAMILY MEDICINE

## 2024-01-30 PROCEDURE — 84443 ASSAY THYROID STIM HORMONE: CPT | Performed by: FAMILY MEDICINE

## 2024-01-30 PROCEDURE — 86140 C-REACTIVE PROTEIN: CPT | Performed by: FAMILY MEDICINE

## 2024-01-30 PROCEDURE — 36415 COLL VENOUS BLD VENIPUNCTURE: CPT | Performed by: FAMILY MEDICINE

## 2024-01-30 PROCEDURE — 99214 OFFICE O/P EST MOD 30 MIN: CPT | Performed by: FAMILY MEDICINE

## 2024-01-30 RX ORDER — LISINOPRIL 5 MG/1
TABLET ORAL
Qty: 90 TABLET | Refills: 3 | Status: SHIPPED | OUTPATIENT
Start: 2024-01-30

## 2024-01-30 ASSESSMENT — PAIN SCALES - GENERAL: PAINLEVEL: NO PAIN (0)

## 2024-01-30 NOTE — PROGRESS NOTES
Assessment & Plan     Hypertension goal BP (blood pressure) < 140/90  Well controlled on diet/ lisinopril  - Comprehensive metabolic panel (BMP + Alb, Alk Phos, ALT, AST, Total. Bili, TP); Future  - TSH with free T4 reflex; Future  - lisinopril (ZESTRIL) 5 MG tablet; TAKE ONE TABLET BY MOUTH ONCE DAILY.  - Comprehensive metabolic panel (BMP + Alb, Alk Phos, ALT, AST, Total. Bili, TP)  - TSH with free T4 reflex    Crohn's disease of small intestine with other complication (H)  Yolanda rnow  - ESR: Erythrocyte sedimentation rate; Future  - CRP, inflammation; Future  - ESR: Erythrocyte sedimentation rate  - CRP, inflammation    Right hand weakness  See neurolgy  - CBC with platelets; Future  - CRP, inflammation; Future  - Adult Neurology  Referral; Future  - CBC with platelets  - CRP, inflammation    Weakness of right lower extremity  See neurolgy  - CBC with platelets; Future  - CRP, inflammation; Future  - Adult Neurology  Referral; Future  - CBC with platelets  - CRP, inflammation    Tremor, unspecified  intermittnet            Regular exercise  See Patient Instructions    Mandy Mendoza is a 63 year old, presenting for the following health issues:  Follow Up and Tremors      1/30/2024    10:31 AM   Additional Questions   Roomed by Reji WALSH     Via the Health Maintenance questionnaire, the patient has reported the following services have been completed -Cervical Cancer Screening, this information has been sent to the abstraction team.  History of Present Illness       Reason for visit:  Tremors right leg, weakness. Muscle fatigue when writing  Symptom onset:  More than a month  Symptoms include:  Tremors  Symptom intensity:  Mild  Symptom progression:  Staying the same  Had these symptoms before:  No  What makes it worse:  Exercise involving the legs.    She eats 2-3 servings of fruits and vegetables daily.She consumes 0 sweetened beverage(s) daily.She exercises with enough effort to increase  "her heart rate 9 or less minutes per day.  She exercises with enough effort to increase her heart rate 3 or less days per week.   She is taking medications regularly.         Hypertension Follow-up    Do you check your blood pressure regularly outside of the clinic? Yes   Are you following a low salt diet? Yes  Are your blood pressures ever more than 140 on the top number (systolic) OR more   than 90 on the bottom number (diastolic), for example 140/90? No          Review of Systems  Constitutional, HEENT, cardiovascular, pulmonary, gi and gu systems are negative, except as otherwise noted.      Objective    /87 (BP Location: Left arm, Patient Position: Sitting, Cuff Size: Adult Regular)   Pulse 105   Temp 97.3  F (36.3  C) (Temporal)   Resp 17   Ht 1.583 m (5' 2.32\")   Wt 58.1 kg (128 lb)   LMP  (LMP Unknown)   SpO2 97%   BMI 23.17 kg/m    Body mass index is 23.17 kg/m .  Physical Exam   GENERAL: alert and no distress  EYES: Eyes grossly normal to inspection, PERRL and conjunctivae and sclerae normal  HENT: ear canals and TM's normal, nose and mouth without ulcers or lesions  NECK: no adenopathy, no asymmetry, masses, or scars  RESP: lungs clear to auscultation - no rales, rhonchi or wheezes  CV: regular rate and rhythm, normal S1 S2, no S3 or S4, no murmur, click or rub, no peripheral edema  ABDOMEN: soft, nontender, no hepatosplenomegaly, no masses and bowel sounds normal  MS: no gross musculoskeletal defects noted, no edema  SKIN: no suspicious lesions or rashes  NEURO: Normal strength and tone, mentation intact and speech normal  PSYCH: mentation appears normal, affect normal/bright  LYMPH: no cervical, supraclavicular, axillary, or inguinal adenopathy  Diabetic foot exam: normal DP and PT pulses, no trophic changes or ulcerative lesions, and normal sensory exam    Lab on 01/11/2024   Component Date Value Ref Range Status    Calprotectin Feces 01/12/2024 335.0 (H)  0.0 - 49.9 mg/kg Final    " Abnormal, repeat as clinically indicated.    Fecal calprotectin is an indicator of neutrophil presence in the stool. It is not specific for IBD. Elevated calprotectin may also be seen in patients with other conditions, such as microscopic colitis, diverticular disease, gastrointestinal infections, and colorectal cancer. Some medications,such as NSAIDs and proton pump inhibitors may also result in elevated calprotectin levels.           Signed Electronically by: Bucky Hunter MD

## 2024-01-31 LAB
ALBUMIN SERPL BCG-MCNC: 4.4 G/DL (ref 3.5–5.2)
ALP SERPL-CCNC: 81 U/L (ref 40–150)
ALT SERPL W P-5'-P-CCNC: 16 U/L (ref 0–50)
ANION GAP SERPL CALCULATED.3IONS-SCNC: 9 MMOL/L (ref 7–15)
AST SERPL W P-5'-P-CCNC: 20 U/L (ref 0–45)
BILIRUB SERPL-MCNC: 0.4 MG/DL
BUN SERPL-MCNC: 10.8 MG/DL (ref 8–23)
CALCIUM SERPL-MCNC: 9.7 MG/DL (ref 8.8–10.2)
CHLORIDE SERPL-SCNC: 100 MMOL/L (ref 98–107)
CREAT SERPL-MCNC: 0.7 MG/DL (ref 0.51–0.95)
CRP SERPL-MCNC: <3 MG/L
DEPRECATED HCO3 PLAS-SCNC: 27 MMOL/L (ref 22–29)
EGFRCR SERPLBLD CKD-EPI 2021: >90 ML/MIN/1.73M2
GLUCOSE SERPL-MCNC: 85 MG/DL (ref 70–99)
POTASSIUM SERPL-SCNC: 4.4 MMOL/L (ref 3.4–5.3)
PROT SERPL-MCNC: 7 G/DL (ref 6.4–8.3)
SODIUM SERPL-SCNC: 136 MMOL/L (ref 135–145)
TSH SERPL DL<=0.005 MIU/L-ACNC: 1.21 UIU/ML (ref 0.3–4.2)

## 2024-02-09 ENCOUNTER — MYC MEDICAL ADVICE (OUTPATIENT)
Dept: GASTROENTEROLOGY | Facility: CLINIC | Age: 64
End: 2024-02-09
Payer: COMMERCIAL

## 2024-02-09 DIAGNOSIS — K50.00 CROHN'S DISEASE OF SMALL INTESTINE WITHOUT COMPLICATION (H): ICD-10-CM

## 2024-02-09 RX ORDER — BUDESONIDE 3 MG/1
9 CAPSULE, COATED PELLETS ORAL EVERY MORNING
Qty: 90 CAPSULE | Refills: 0 | Status: SHIPPED | OUTPATIENT
Start: 2024-02-09 | End: 2024-06-06

## 2024-02-09 NOTE — TELEPHONE ENCOUNTER
Per Benjamin Brooks, refill of Entocort provided to patient as a rescue medication to start if needed while traveling.

## 2024-03-16 ENCOUNTER — HEALTH MAINTENANCE LETTER (OUTPATIENT)
Age: 64
End: 2024-03-16

## 2024-03-19 ENCOUNTER — TELEPHONE (OUTPATIENT)
Dept: GASTROENTEROLOGY | Facility: CLINIC | Age: 64
End: 2024-03-19
Payer: COMMERCIAL

## 2024-03-19 NOTE — TELEPHONE ENCOUNTER
Pre assessment completed for upcoming procedure.   (Please see previous telephone encounter notes for complete details)      Procedure details:    Arrival time and facility location reviewed.    Pre op exam needed? N/A    Designated  policy reviewed. Instructed to have someone stay 24  hours post procedure.       Medication review:    Medications reviewed. Please see supporting documentation below. Holding recommendations discussed (if applicable).       Prep for procedure:     Procedure prep instructions reviewed.        Any additional information needed:  N/A      Patient  verbalized understanding and had no questions or concerns at this time.      Sabina Doyle RN  Endoscopy Procedure Pre Assessment RN  [phone] option 4

## 2024-03-19 NOTE — TELEPHONE ENCOUNTER
Pre visit planning completed.      Procedure details:    Patient scheduled for Colonoscopy  on 4/2/24.     Arrival time: 0715. Procedure time 0815    Facility location: [facility]; [address] [place] [address], [address] Floor, [place] [address]. Check in location: 5th Floor.    Sedation type: MAC    Pre op exam needed? N/A    Indication for procedure:   Crohn's disease of small intestine with other complication            Chart review:     Electronic implanted devices? No    Recent diagnosis of diverticulitis within the last 6 weeks? No    Diabetic? No      Medication review:    Anticoagulants? No    NSAIDS? No    Other medication HOLDING recommendations:  N/A      Prep for procedure:     Bowel prep recommendation: Standard Miralax  Due to: standard bowel prep.    Prep instructions sent via [employer]         Sabina Lundberg RN  Endoscopy Procedure Pre Assessment RN  [phone] option 4

## 2024-03-24 NOTE — PROGRESS NOTES
Subjective     Aileen Priest is a 58 year old female who presents to clinic today for the following health issues:    HPI   Back Pain       Duration: last month increasingly getting worse        Specific cause: none    Description:   Location of pain: low back right also buttock and leg on right side. Left knee bothered of and on. Motorized gurney hit her in the knee.  Character of pain: dull ache and limited range of motion in back difficulty crossing legs  Pain radiation:Pain in back achiness in hip and leg  New numbness or weakness in legs, not attributed to pain:  no     Intensity: Currently 8/10, if carefull it doesn't bother her. 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  History of back problems: back surgery ion 2005  Any previous MRI or X-rays: when had incident in 2005  Sees a specialist for back pain:  No  Therapies tried without relief:     Alleviating factors:   Improved by: acetaminophen (Tylenol) and cold      Precipitating factors:  Worsened by: crossing legs    Also bumped left knee a few months back, still painful at times over the patella      Accompanying Signs & Symptoms:  Risk of Fracture:  None  Risk of Cauda Equina:  None  Risk of Infection:  None  Risk of Cancer:  None  Risk of Ankylosing Spondylitis:  Onset at age <35, male, AND morning back stiffness. no                    Current Outpatient Medications   Medication Sig Dispense Refill     Calcium Carb-Cholecalciferol (CALCIUM + D3 PO)        dexamethasone 0.5 MG/5ML solution Take  by mouth daily.       Lactobacillus-Inulin ([facility] PO)        lisinopril (PRINIVIL/ZESTRIL) 10 MG tablet Take 1 tablet (10 mg) by mouth daily 90 tablet 3     mesalamine (LIALDA) 1.2 g EC tablet TAKE TWO TABLETS BY MOUTH TWICE A  tablet 3     Multiple Vitamins-Minerals (MULTIVITAMIN ADULT PO) Take 1 capsule by mouth daily       omeprazole (PRILOSEC) 40 MG DR capsule Take 1 capsule (40  mg) by mouth daily 90 capsule 2     Flaxseed, Linseed, (FLAX SEEDS PO)        omega 3 1000 MG CAPS Omega 3       polyethylene glycol (GAVILYTE-G) 236 g suspension GaviLyte-G 236 gram-22.74 gram-6.74 gram-5.86 gram oral solution         Hypertension Follow-up      Do you check your blood pressure regularly outside of the clinic? Yes     Are you following a low salt diet? Yes    Are your blood pressures ever more than 140 on the top number (systolic) OR more   than 90 on the bottom number (diastolic), for example 140/90? No  Reviewed and updated as needed this visit by Provider         Review of Systems   ROS COMP: Constitutional, HEENT, cardiovascular, pulmonary, gi and gu systems are negative, except as otherwise noted.      Objective    There were no vitals taken for this visit.  There is no height or weight on file to calculate BMI.  Physical Exam   GENERAL: alert and mild distress  CV: regular rate and rhythm, normal S1 S2, no S3 or S4, no murmur, click or rub, no peripheral edema and peripheral pulses strong  ABDOMEN: soft, nontender, no hepatosplenomegaly, no masses and bowel sounds normal  MS: normal muscle tone and tenderness to palpation left petalla  SKIN: no suspicious lesions or rashes  NEURO: Normal strength and tone, mentation intact and speech normal  Comprehensive back pain exam:  Tenderness of both SI regions, Range of motion not limited by pain, Lower extremity strength functional and equal on both sides, Lower extremity reflexes within normal limits bilaterally, Lower extremity sensation normal and equal on both sides and Straight leg raise negative bilaterally    Diagnostic Test Results:  Labs reviewed in Epic  Xray - pending        Assessment & Plan F  1. Acute midline low back pain without sciatica  Ice rest  - *UA reflex to Microscopic  - XR Pelvis and Hip Bilateral 2 Views; Future  - [facility] REFERRAL; Future    2. Acute pain of left knee  see  - [facility]  CHIROPRACTIC REFERRAL; Future     3. HTN   Well controlled    Regular exercise  Follow up by phone in 3 weeks if not improving.     No follow-ups on file.    Bucky Hunter [place]  [facility] [place]

## 2024-03-26 ENCOUNTER — VIRTUAL VISIT (OUTPATIENT)
Dept: GASTROENTEROLOGY | Facility: CLINIC | Age: 64
End: 2024-03-26
Payer: COMMERCIAL

## 2024-03-26 DIAGNOSIS — K50.00 CROHN'S DISEASE OF SMALL INTESTINE WITHOUT COMPLICATION (H): Primary | ICD-10-CM

## 2024-03-26 PROCEDURE — 99214 OFFICE O/P EST MOD 30 MIN: CPT | Mod: 95 | Performed by: PHYSICIAN ASSISTANT

## 2024-03-26 NOTE — PATIENT INSTRUCTIONS
It was a pleasure taking care of you today.  I've included a brief summary of our discussion and care plan from today's visit below.  Please review this information with your primary care provider.  ______________________________________________________________________    My recommendations are summarized as follows:    -- Continue stelara every 4 weeks  -- Labs every 3 months   -- Next endoscopic assessment: next week  -- Patient with IBD we recommend supplementation vitamin D 1000 units daily and calcium 500 mg twice daily.  -- Vaccines/immunizations to be updated: Recommend yearly flu shot, pneumonia vaccines (Prevnar 13 then 8 weeks later Pneumovax 23 then 5 years later Pneumovax 23), tetanus every 10 years.  -- No NSAIDs (ibuprofen, or anything containing ibuprofen)     For additional resources about inflammatory bowel disease visit http://www.crohnscolitisfoundation.org/    To learn more about Diet and Nutrition in the setting of IBD, check out some of these resources:  https://www.crohnscolitisfoundation.org/diet-and-nutrition/what-should-i-eat  https://www.nimbal.org/  https://ntforibd.org/    Return to GI Clinic in 6 months to review your progress.    ______________________________________________________________________    How do I schedule labs, imaging studies, or procedures that were ordered in clinic today?     Labs: To schedule lab appointment at the [facility], use my chart or call [phone]. If you have a [facility] closer to home where you are regularly seen you can give them a call.     Procedures: If a colonoscopy, upper endoscopy, breath test, esophageal manometry, or pH impedence was ordered today, our endoscopy team will call you to schedule this. If you have not heard from our endoscopy team within a week, please call [phone] to schedule.     Imaging Studies: If you were scheduled for a CT scan, X-ray, MRI, ultrasound, HIDA scan or other imaging study, please call  [phone] to have this scheduled.     Referral: If a referral to another specialty was ordered, expect a phone call or follow instructions above. If you have not heard from anyone regarding your referral in a week, please call our clinic to check the status.     Who do I call with any questions after my visit?  Please be in touch if there are any further questions that arise following today's visit.  There are multiple ways to contact your gastroenterology care team.      During business hours, you may reach a Gastroenterology nurse at [phone]    To schedule or reschedule an appointment, please call [phone].     You can always send a secure message through [employer].  [employer] messages are answered by your nurse or doctor typically within 24 hours.  Please allow extra time on weekends and holidays.      For urgent/emergent questions after business hours, you may reach the on-call GI Fellow by contacting the [facility]  at [phone].     How will I get the results of any tests ordered?    You will receive all of your results.  If you have signed up for [employer], any tests ordered at your visit will be available to you after your physician reviews them.  Typically this takes 1-2 weeks.  If there are urgent results that require a change in your care plan, your physician or nurse will call you to discuss the next steps.      What is [employer]?  [employer] is a secure way for you to access all of your healthcare records from the [facility].  It is a web based computer program, so you can sign on to it from any location.  It also allows you to send secure messages to your care team.  I recommend signing up for [employer] access if you have not already done so and are comfortable with using a computer.         Sincerely,    Benjamin Brooks PA-C  [facility]  Division of Gastroenterology

## 2024-03-26 NOTE — PROGRESS NOTES
"Virtual Visit Details    Type of service:  Video Visit     Originating Location (pt. Location): Home    Distant Location (provider location):  Off-site  Platform used for Video Visit: David    IBD CLINIC VISIT, follow up    REASON FOR CONSULTATION: Crohn's Disease, GERD    HPI: 63 year old female w/ h/o ileal Crohn's Disease per 3/2017 ileocolonoscopy biopsies previously on PO 5-ASA, Lichen Planus (oral/perianal involvement), Hidradenitis Suppurativa s/p excision/drainage 1975, prior presumed diagnosis of Collagenous Colitis per colonic biopsies 2006 (though suspecting evolving IBD presentation in retrospect following 3/2017 highly-suggestive biopsies of terminal ileum + bland colonic biopsies), GERD presenting for f/u Crohn's Disease.  History summarized from her documentation.    8/2018 prompted an increase in 5ASA to 4.8g daily due to intermittent \"flares\" she had been experiencing, despite labs within normal limits.  Since the increased, she has had great symptomatic relieve and reports one incidence of loose stool, otherwise no episodes concerning for flares.  She is currently having 3 stools per day, soft and formed without blood in the stool.  No urgency or nighttime stools.      She had noted a \"tear\" by her anus and had this evaluated by PCP and by her GYN. It was thought to be due to lichen sclerosis (per GYN) and was prescribed a steroid cream.  She notes improvement already even with vaseline as instructed by her PCP. She did not have any pain with a BM associated with this finding.      She had noted a lump on right side of buttocks that has been very bothersome, inhibiting much of her activity.  It is adjacent to previous pilonidal cyst repair. No drainage and no fevers. MRI showed post-surgical cyst.      labs show elevated creatinine and slightly abnormal LFTs. Based on symptoms with concern for active flares (patient reported, despite normal labs) dose was increased Spring 2019. Normal Creatinine " 2/2019 prior to dose increase. Labs with primary care show elevated creatinine (0.6--> 1.29). LFT slightly increased, AST 72 and ALT 92. Normal alk phos. after last visit, plan was to hold Lialda.  Underwent colonoscopy 12/3/2019 with simple endoscopic score for Crohn's disease to be 0.    Interval history December 8, 2020:  Since the patient's last visit, overall she has been doing very well.  Did have one episode of symptoms in early October.  She woke up at 4 AM with abdominal pain, had 1 day of watery stools.  Then pain subsided later that day.  Did have urgency with bowel movements.  Denied melena or hematochezia during this episode.  After this her stools returned back to normal.  Has on average 2 bowel movements a day one before morning and 1 before work.  No abdominal pain at baseline.  Retching greasy foods can trigger symptoms.  Has maybe had 1 nocturnal bowel movement.  Consistency of stool is soft and formed.  Continues to take probiotic which she finds very helpful.    For her GERD, she is taking 40 mg of omeprazole.  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  This improves quickly with drinking water.  She is currently taking a multivitamin.    Interval history 4/20/2021   Kelly was last seen 12/8/2020 with Alice Gonzalez in GI clinic. She was doing well at that time and has been off mesalamine since 2019. Kelly reporting having a Crohn's flare in February of this year. Similar to her previous flares, she had abdominal pain and loose stools for 8 hours and then felt fatigued for 2 more days but had return of normal bowel pattern. No hematochezia, nausea/vomiting, fever, or abdominal pain. She called into clinic and was started on budesonide for 1 month. After 1-2 weeks of budesonide she had fecal calprotectin  "checked that was 95. Enterography was also done and did not show any evidence of active inflammation. Since this flare she had one day at the end of March where she had urgency but otherwise has been doing well. These have been her two \"flares\" of Crohn's this year. She had two similar flares in  and 3 in . Currently having her typical 2-3 soft formed stools per day. She does note some sores on her tongue which are not very painful but occur every few weeks. She is unsure if this is related to CD or Lichen planus which typically affects her oral cavity.    Of note last colonoscopy was in 2019 and showed Simple Endoscopic Score for Crohn's Disease: 0    Luigi Holloway Index:  2021  Well-bein (very well)  Abdomina pain: 0 (none)  # liquid/soft stools: 2   Abdominal mass 0 (none)  Extraluminal comlications    - Aphthous ulcers 1 - although unclear if from LP or CD  Score: 3      Interval history 2021   Recent cscope findings below.     Luigi Holloway Index:  Well-bein (very well)  Abdominal pain: 1 (mild)  # liquid/soft stools: occasionally, up to 6 per day   Abdominal mass 0 (none)  Extraluminal comlications    - Aphthous ulcers 1 - although unclear if from LP or CD    Dad with SCC. Son has MS. Mom with breast cancer in her 40s.     ROS: 10pt ROS performed and otherwise negative.    Interval history 2021 (video visit)  Overall feeling well, but with a few, self-limited episodes of loose stools and abd pain that lasts a few hours.  No weight loss.     High fiber foods might cause abd pain.      Luigi Holloway Index:  Well-bein (very well)  Abdominal pain: 1 (mild)  # liquid/soft stools: 0;  2-3 per day (formed)   Abdominal mass 0 (none)  Extraluminal complications    - Aphthous ulcers 1 - although unclear if from LP or CD. Has been present over the past few months.    Interval history 2021 (video visit)  Continues to feel well overall. Does have occasional urgency. Has 2-3 BMs per " day, soft, well-formed. No blood. No weight loss. No abdominal pain.    Interval history, 3/10/22  First injection was 2/27/22. Has been stressed as daughter has been going through some pregnancy losses. A month ago had a couple episodes abdominal cramping associated with BMs, now is back to baseline. Currently having 2 stools per day that are formed without blood. Some urgency. No nighttime stools or fecal incontinence. Would be due  for next injection April 24.    Interval hx 11/9/22  Feeling well, some stressors include recent mammogram requiring additional imaging, step mother broke hip and daughter going through infertility w/ multiple losses. Bowel pattern has been stable, no blood or urgency. Compliant with injections.    Interval hx 5/23/23  Urgency and frequency that started at the end of March, which was a week before she was due for her injection. She started entocort 4/29/23 at 9 mg which she continues. This also led to nighttime stools. Had to leave work due to symptoms. Last injection was 5/11 which was first 6 week interval. Started to feel better just as of last week. No new EIM. Fatigue is improving.     Interval hx 11/7/23  During pred taper had return of sxs at 20mg and had to go back to 30mg. Turned the corner 10/28, now having formed stools, avg 2 BM daily no blood. No more nighttime stools. She is currently on 25 mg and moves to 20mg tomorrow. Back is healing and still doing PT. No new joint pain. No new skin manifestations. No mouth sores.     Interval hx 3/26/24  Feeling well at this time (for the last 3 weeks). 1-2 BMs daily. No blood or urgency. Off of prednisone since Dec. Never ended up needing entocort. No waning effect on stelara.   No new joint pain. Noticed a tremor in your leg, and notices that she cups her hand. Writing has been getting smaller. No other cognitive concerns.   Upton being a grandma!    PERTINENT PAST MEDICAL/SURGICAL HISTORY:  As noted above.    ENDOSCOPIC  EVALUATION:  icscope 11/2021 showed SES-CD 4    The Simple Endoscopic Score for Crohn's Disease was determined based on        the endoscopic appearance of the mucosa in the following segments:        - Ileum: Findings include aphthous ulcers less than 0.5 cm in size, less        than 10% ulcerated surfaces, less than 50% of surfaces affected and no        narrowings. Segment score: 3.        - Right Colon: Findings include no ulcers present, no ulcerated        surfaces, less than 50% of surfaces affected, no narrowings and no        ulcers present, no ulcerated surfaces, no affected surfaces and no        narrowings. Segment score: 1.        - Transverse Colon: Findings include no ulcers present, no ulcerated        surfaces, no affected surfaces and no narrowings. Segment score: 0.        - Left Colon: Findings include no ulcers present, no ulcerated surfaces,        no affected surfaces and no narrowings. Segment score: 0.        - Rectum: Findings include no ulcers present, no ulcerated surfaces, no        affected surfaces and no narrowings. Segment score: 0.        - Total SES-CD aggregate score: 4. Biopsies were taken with a cold        forceps for histology.     PATH:     A.  TERMINAL ILEUM, BIOPSY:  Ileal mucosa with no granulomas, cryptitis or other histologic evidence of active Crohn; negative for dysplasia     B. CECUM, BIOPSY:  Colonic mucosa with no granulomas, cryptitis or other histologic evidence of active Crohn; negative for dysplasia        icscope 5/24/2021 showed SES-CD 5 with small ulcers in the TI. Bx normal.  Bx:     SPECIMEN(S):   A: Ileum biopsy   B: Cecal biopsy     FINAL DIAGNOSIS:   A. Ileum, Biopsy:   Ileal mucosa with no granulomas, cryptitis or other histologic evidence of    active Crohn; negative for dysplasia     B. Cecum, Biopsy:   Colonic mucosa with no granulomas, cryptitis or other histologic evidence   of active Crohn; negative for dysplasia       12/2019 Impression:           - Simple Endoscopic Score for Crohn's Disease: 0,                        mucosal inflammatory changes secondary to Crohn's                        disease, in remission. Biopsied.                        - Diverticulosis in the sigmoid colon.                        - Non-bleeding internal hemorrhoids.   A. ILEUM, BIOPSY:   - Small intestinal mucosa with no significant histologic abnormality   - Negative for dysplasia     B. CECUM, BIOPSY:   - Colonic mucosa with no significant histologic abnormality   - Negative for dysplasia     C. COLON, ASCENDING, BIOPSY:   - Colonic mucosa with no significant histologic abnormality   - Negative for dysplasia     D. COLON, TRANSVERSE, BIOPSY:   - Colonic mucosa with no significant histologic abnormality   - Negative for dysplasia     E. COLON, DESCENDING,  BIOPSY:   - Colonic mucosa with no significant histologic abnormality   - Negative for dysplasia     F. COLON, SIGMOID, BIOPSY:   - Colonic mucosa with no significant histologic abnormality   - Negative for dysplasia     G. RECTUM, BIOPSY:   - Colonic mucosa with no significant histologic abnormality   - Negative for dysplasia     PERTINENT MEDICATIONS:  -No current Crohn's medications  Medications reviewed with patient today, see Medication List/Assessment for details.  No other NSAID/anticoagulation reported by patient.  No other OTC/herbal/supplements reported by patient.    SOCIAL HISTORY: Tobacco: none.    PHYSICAL EXAMINATION: video visit exam     General appearance  Healthy appearing adult, in no acute distress     Eyes  Sclera anicteric  Pupils round and reactive to light     Ears, nose, mouth and throat  No obvious external lesions of ears and nose  Hearing intact     Neck  Symmetric  No obvious external lesions     Respiratory  Normal respiration, no use of accessory muscles      MSK  Gait normal     Skin  No rashes or jaundice      Psychiatric  Oriented to person, place and time  Appropriate mood and affect.        PERTINENT STUDIES:  Fecal calprotectin 226 (4/13/21) from 95 (2/19/21)   9/2021    MR ENTEROGRAPHY: 2/25/2021   No evidence of active inflammation of the small bowel. No evidence of  stricture or fistula.     ASSESSMENT/PLAN:  1. Ileal Crohn's Disease, moderate  Current medication: Stelara (started 12/27/21), moved to q4 weeks Sept 2023.   Current clinical disease activity: remission   Current endoscopic disease activity: icscope 10/2022 showed SES-CD 0    Diagnosed 3/2017. Disease limited to ileum. Initially on mesalamine until 11/2019 when it was stopped for RAMSEY.  Due to active disease on cscope 11/2021, started on stelara, achieving endoscopic and histologic remission colonoscopy 10/2022. New symptoms with elevated fecal sammy 4/2023 prompted change to every 6 week dosing, which allowed for improvement until September 2023.  Repeat fecal calprotectin 9/19/2023 showed elevated to 251.  Proceeded with request to move to every 4 weeks and she has had 1, soon to be 2 injections this week at this interval.  She was unsuccessful with symptom management on Entocort and required transition to a prednisone taper.  Unfortunately she did not tolerate the decrease from 30 mg to 20 mg and required a slower taper, currently at 25 mg, ultimately discontinued end of 2023.  FC off of predniosone was 335. Clinically feeling quite well for the last few weeks. Cscope scheduled for next week to assess mucosal healing at the q4 week interval w/ stelara.    If this does not allow for continued remission, alternative therapies were discussed.  Patient would like to avoid anti-TNF therapy with family history of cancers, consideration for vedolizumab or staying in class with [facility] would be a reasonable alternative.    We will plan for the following:    PLAN  ---Continue with stelara every 4 weeks  ---Colonoscopy next week  -- Labs to include CBC, CRP, ESR, LFTs every 3 months     Routine IBD care:   - recommend supplementation  vitamin D 1000 units daily and calcium 500 mg twice daily.Patient confirmed she is taking.   -- Vaccines/immunizations to be updated: Recommend yearly flu shot, pneumonia vaccines (Prevnar 13 then 8 weeks later Pneumovax 23 then 5 years later Pneumovax 23), tetanus every 10 years. Kelly is UTD with Shingles vaccine (she did have optic shingles in the past)   - No NSAIDs (ibuprofen, or anything containing ibuprofen)     2.  GERD  Patient has longstanding GERD symptoms, overall well controlled on omeprazole 40 mg a day.      2. Cancer Screening  No PSC, no high-risk FH CRC (2nd cousin dx'd in 2017 w/ advanced CRC @ 55yo). Given colonoscopy in 2019 without adenomatous lesions, recommend repeat colonoscopy for routine screening in 2029 unless symptomatic sooner.    RTC in 6 months     Thank you for this consultation. It was a pleasure to participate in the care of this patient; please contact us with any further questions.      Benjamin Brooks PA-C  Division of Gastroenterology, Hepatology and Nutrition  [facility]

## 2024-03-26 NOTE — LETTER
"    3/26/2024         RE: Aileen Priest  [address]  [place] [address]        Dear Colleague,    Thank you for referring your patient, Aileen Priest, to the [facility]. Please see a copy of my visit note below.      IBD CLINIC VISIT, follow up    REASON FOR CONSULTATION: Crohn's Disease, GERD    HPI: 63 year old female w/ h/o ileal Crohn's Disease per 3/2017 ileocolonoscopy biopsies previously on PO 5-ASA, Lichen Planus (oral/perianal involvement), Hidradenitis Suppurativa s/p excision/drainage 1975, prior presumed diagnosis of Collagenous Colitis per colonic biopsies 2006 (though suspecting evolving IBD presentation in retrospect following 3/2017 highly-suggestive biopsies of terminal ileum + bland colonic biopsies), GERD presenting for f/u Crohn's Disease.  History summarized from her documentation.    8/2018 prompted an increase in 5ASA to 4.8g daily due to intermittent \"flares\" she had been experiencing, despite labs within normal limits.  Since the increased, she has had great symptomatic relieve and reports one incidence of loose stool, otherwise no episodes concerning for flares.  She is currently having 3 stools per day, soft and formed without blood in the stool.  No urgency or nighttime stools.      She had noted a \"tear\" by her anus and had this evaluated by PCP and by her GYN. It was thought to be due to lichen sclerosis (per GYN) and was prescribed a steroid cream.  She notes improvement already even with vaseline as instructed by her PCP. She did not have any pain with a BM associated with this finding.      She had noted a lump on right side of buttocks that has been very bothersome, inhibiting much of her activity.  It is adjacent to previous pilonidal cyst repair. No drainage and no fevers. MRI showed post-surgical cyst.      labs show elevated creatinine and slightly abnormal LFTs. Based on symptoms with concern for active flares (patient " reported, despite normal labs) dose was increased Spring 2019. Normal Creatinine 2/2019 prior to dose increase. Labs with primary care show elevated creatinine (0.6--> 1.29). LFT slightly increased, AST 72 and ALT 92. Normal alk phos. after last visit, plan was to hold Lialda.  Underwent colonoscopy 12/3/2019 with simple endoscopic score for Crohn's disease to be 0.    Interval history December 8, 2020:  Since the patient's last visit, overall she has been doing very well.  Did have one episode of symptoms in early October.  She woke up at 4 AM with abdominal pain, had 1 day of watery stools.  Then pain subsided later that day.  Did have urgency with bowel movements.  Denied melena or hematochezia during this episode.  After this her stools returned back to normal.  Has on average 2 bowel movements a day one before morning and 1 before work.  No abdominal pain at baseline.  Retching greasy foods can trigger symptoms.  Has maybe had 1 nocturnal bowel movement.  Consistency of stool is soft and formed.  Continues to take probiotic which she finds very helpful.    For her GERD, she is taking 40 mg of omeprazole.  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  This improves quickly with drinking water.  She is currently taking a multivitamin.    Interval history 4/20/2021   Kelly was last seen 12/8/2020 with Alice Gonzalez in GI clinic. She was doing well at that time and has been off mesalamine since 2019. Kelly reporting having a Crohn's flare in February of this year. Similar to her previous flares, she had abdominal pain and loose stools for 8 hours and then felt fatigued for 2 more days but had return of normal bowel pattern. No hematochezia, nausea/vomiting, fever, or abdominal pain. She called into clinic and was started on  "budesonide for 1 month. After 1-2 weeks of budesonide she had fecal calprotectin checked that was 95. Enterography was also done and did not show any evidence of active inflammation. Since this flare she had one day at the end of March where she had urgency but otherwise has been doing well. These have been her two \"flares\" of Crohn's this year. She had two similar flares in  and 3 in . Currently having her typical 2-3 soft formed stools per day. She does note some sores on her tongue which are not very painful but occur every few weeks. She is unsure if this is related to CD or Lichen planus which typically affects her oral cavity.    Of note last colonoscopy was in 2019 and showed Simple Endoscopic Score for Crohn's Disease: 0    Luigi Holloway Index:  2021  Well-bein (very well)  Abdomina pain: 0 (none)  # liquid/soft stools: 2   Abdominal mass 0 (none)  Extraluminal comlications    - Aphthous ulcers 1 - although unclear if from LP or CD  Score: 3      Interval history 2021   Recent cscope findings below.     Luigi Holloway Index:  Well-bein (very well)  Abdominal pain: 1 (mild)  # liquid/soft stools: occasionally, up to 6 per day   Abdominal mass 0 (none)  Extraluminal comlications    - Aphthous ulcers 1 - although unclear if from LP or CD    Dad with SCC. Son has MS. Mom with breast cancer in her 40s.     ROS: 10pt ROS performed and otherwise negative.    Interval history 2021 (video visit)  Overall feeling well, but with a few, self-limited episodes of loose stools and abd pain that lasts a few hours.  No weight loss.     High fiber foods might cause abd pain.      Luigi Holloway Index:  Well-bein (very well)  Abdominal pain: 1 (mild)  # liquid/soft stools: 0;  2-3 per day (formed)   Abdominal mass 0 (none)  Extraluminal complications    - Aphthous ulcers 1 - although unclear if from LP or CD. Has been present over the past few months.    Interval history 2021 (video " visit)  Continues to feel well overall. Does have occasional urgency. Has 2-3 BMs per day, soft, well-formed. No blood. No weight loss. No abdominal pain.    Interval history, 3/10/22  First injection was 2/27/22. Has been stressed as daughter has been going through some pregnancy losses. A month ago had a couple episodes abdominal cramping associated with BMs, now is back to baseline. Currently having 2 stools per day that are formed without blood. Some urgency. No nighttime stools or fecal incontinence. Would be due  for next injection April 24.    Interval hx 11/9/22  Feeling well, some stressors include recent mammogram requiring additional imaging, step mother broke hip and daughter going through infertility w/ multiple losses. Bowel pattern has been stable, no blood or urgency. Compliant with injections.    Interval hx 5/23/23  Urgency and frequency that started at the end of March, which was a week before she was due for her injection. She started entocort 4/29/23 at 9 mg which she continues. This also led to nighttime stools. Had to leave work due to symptoms. Last injection was 5/11 which was first 6 week interval. Started to feel better just as of last week. No new EIM. Fatigue is improving.     Interval hx 11/7/23  During pred taper had return of sxs at 20mg and had to go back to 30mg. Turned the corner 10/28, now having formed stools, avg 2 BM daily no blood. No more nighttime stools. She is currently on 25 mg and moves to 20mg tomorrow. Back is healing and still doing PT. No new joint pain. No new skin manifestations. No mouth sores.     Interval hx 3/26/24  Feeling well at this time (for the last 3 weeks). 1-2 BMs daily. No blood or urgency. Off of prednisone since Dec. Never ended up needing entocort. No waning effect on stelara.   No new joint pain. Noticed a tremor in your leg, and notices that she cups her hand. Writing has been getting smaller. No other cognitive concerns.   [place] being a  grandma!    PERTINENT PAST MEDICAL/SURGICAL HISTORY:  As noted above.    ENDOSCOPIC EVALUATION:  icscope 11/2021 showed SES-CD 4    The Simple Endoscopic Score for Crohn's Disease was determined based on        the endoscopic appearance of the mucosa in the following segments:        - Ileum: Findings include aphthous ulcers less than 0.5 cm in size, less        than 10% ulcerated surfaces, less than 50% of surfaces affected and no        narrowings. Segment score: 3.        - Right Colon: Findings include no ulcers present, no ulcerated        surfaces, less than 50% of surfaces affected, no narrowings and no        ulcers present, no ulcerated surfaces, no affected surfaces and no        narrowings. Segment score: 1.        - Transverse Colon: Findings include no ulcers present, no ulcerated        surfaces, no affected surfaces and no narrowings. Segment score: 0.        - Left Colon: Findings include no ulcers present, no ulcerated surfaces,        no affected surfaces and no narrowings. Segment score: 0.        - Rectum: Findings include no ulcers present, no ulcerated surfaces, no        affected surfaces and no narrowings. Segment score: 0.        - Total SES-CD aggregate score: 4. Biopsies were taken with a cold        forceps for histology.     PATH:     A.  TERMINAL ILEUM, BIOPSY:  Ileal mucosa with no granulomas, cryptitis or other histologic evidence of active Crohn; negative for dysplasia     B. CECUM, BIOPSY:  Colonic mucosa with no granulomas, cryptitis or other histologic evidence of active Crohn; negative for dysplasia        icscope 5/24/2021 showed SES-CD 5 with small ulcers in the TI. Bx normal.  Bx:     SPECIMEN(S):   A: Ileum biopsy   B: Cecal biopsy     FINAL DIAGNOSIS:   A. Ileum, Biopsy:   Ileal mucosa with no granulomas, cryptitis or other histologic evidence of    active Crohn; negative for dysplasia     B. Cecum, Biopsy:   Colonic mucosa with no granulomas, cryptitis or other histologic  evidence   of active Crohn; negative for dysplasia       12/2019 Impression:          - Simple Endoscopic Score for Crohn's Disease: 0,                        mucosal inflammatory changes secondary to Crohn's                        disease, in remission. Biopsied.                        - Diverticulosis in the sigmoid colon.                        - Non-bleeding internal hemorrhoids.   A. ILEUM, BIOPSY:   - Small intestinal mucosa with no significant histologic abnormality   - Negative for dysplasia     B. CECUM, BIOPSY:   - Colonic mucosa with no significant histologic abnormality   - Negative for dysplasia     C. COLON, ASCENDING, BIOPSY:   - Colonic mucosa with no significant histologic abnormality   - Negative for dysplasia     D. COLON, TRANSVERSE, BIOPSY:   - Colonic mucosa with no significant histologic abnormality   - Negative for dysplasia     E. COLON, DESCENDING,  BIOPSY:   - Colonic mucosa with no significant histologic abnormality   - Negative for dysplasia     F. COLON, SIGMOID, BIOPSY:   - Colonic mucosa with no significant histologic abnormality   - Negative for dysplasia     G. RECTUM, BIOPSY:   - Colonic mucosa with no significant histologic abnormality   - Negative for dysplasia     PERTINENT MEDICATIONS:  -No current Crohn's medications  Medications reviewed with patient today, see Medication List/Assessment for details.  No other NSAID/anticoagulation reported by patient.  No other OTC/herbal/supplements reported by patient.    SOCIAL HISTORY: Tobacco: none.    PHYSICAL EXAMINATION: video visit exam     General appearance  Healthy appearing adult, in no acute distress     Eyes  Sclera anicteric  Pupils round and reactive to light     Ears, nose, mouth and throat  No obvious external lesions of ears and nose  Hearing intact     Neck  Symmetric  No obvious external lesions     Respiratory  Normal respiration, no use of accessory muscles      MSK  Gait normal     Skin  No rashes or jaundice       Psychiatric  Oriented to person, place and time  Appropriate mood and affect.       PERTINENT STUDIES:  Fecal calprotectin 226 (4/13/21) from 95 (2/19/21)   9/2021    MR ENTEROGRAPHY: 2/25/2021   No evidence of active inflammation of the small bowel. No evidence of  stricture or fistula.     ASSESSMENT/PLAN:  1. Ileal Crohn's Disease, moderate  Current medication: Stelara (started 12/27/21), moved to q4 weeks Sept 2023.   Current clinical disease activity: remission   Current endoscopic disease activity: icscope 10/2022 showed SES-CD 0    Diagnosed 3/2017. Disease limited to ileum. Initially on mesalamine until 11/2019 when it was stopped for RAMSEY.  Due to active disease on cscope 11/2021, started on stelara, achieving endoscopic and histologic remission colonoscopy 10/2022. New symptoms with elevated fecal sammy 4/2023 prompted change to every 6 week dosing, which allowed for improvement until September 2023.  Repeat fecal calprotectin 9/19/2023 showed elevated to 251.  Proceeded with request to move to every 4 weeks and she has had 1, soon to be 2 injections this week at this interval.  She was unsuccessful with symptom management on Entocort and required transition to a prednisone taper.  Unfortunately she did not tolerate the decrease from 30 mg to 20 mg and required a slower taper, currently at 25 mg, ultimately discontinued end of 2023.  FC off of predniosone was 335. Clinically feeling quite well for the last few weeks. Cscope scheduled for next week to assess mucosal healing at the q4 week interval w/ stelara.    If this does not allow for continued remission, alternative therapies were discussed.  Patient would like to avoid anti-TNF therapy with family history of cancers, consideration for vedolizumab or staying in class with [facility] would be a reasonable alternative.    We will plan for the following:    PLAN  ---Continue with stelara every 4 weeks  ---Colonoscopy next week  -- Labs to include CBC,  CRP, ESR, LFTs every 3 months     Routine IBD care:   - recommend supplementation vitamin D 1000 units daily and calcium 500 mg twice daily.Patient confirmed she is taking.   -- Vaccines/immunizations to be updated: Recommend yearly flu shot, pneumonia vaccines (Prevnar 13 then 8 weeks later Pneumovax 23 then 5 years later Pneumovax 23), tetanus every 10 years. Kelly is UTD with Shingles vaccine (she did have optic shingles in the past)   - No NSAIDs (ibuprofen, or anything containing ibuprofen)     2.  GERD  Patient has longstanding GERD symptoms, overall well controlled on omeprazole 40 mg a day.      2. Cancer Screening  No PSC, no high-risk FH CRC (2nd cousin dx'd in 2017 w/ advanced CRC @ 57yo). Given colonoscopy in 2019 without adenomatous lesions, recommend repeat colonoscopy for routine screening in 2029 unless symptomatic sooner.    RTC in 6 months     Thank you for this consultation. It was a pleasure to participate in the care of this patient; please contact us with any further questions.            Again, thank you for allowing me to participate in the care of your patient.      Sincerely,    Benjamin Brooks PA-C

## 2024-03-26 NOTE — NURSING NOTE
Is the patient currently in the state of [place]? YES    Visit mode:VIDEO    If the visit is dropped, the patient can be reconnected by: VIDEO VISIT: Text to cell phone:   Telephone Information:   Mobile [phone]       Will anyone else be joining the visit? NO  (If patient encounters technical issues they should call [phone]    How would you like to obtain your AVS? MyChart    Are changes needed to the allergy or medication list? No    Reason for visit: RECHECK    Chari PATEF

## 2024-03-28 ENCOUNTER — TELEPHONE (OUTPATIENT)
Dept: GASTROENTEROLOGY | Facility: CLINIC | Age: 64
End: 2024-03-28
Payer: COMMERCIAL

## 2024-03-28 NOTE — TELEPHONE ENCOUNTER
Left Voicemail (1st Attempt) and Sent Mychart (1st Attempt) for the patient to call back and schedule the following:    Appointment type: Return IBD  Provider: Benjamin Brooks  Return date: September/October 2024  Specialty phone number: [phone]  Additional appointment(s) needed:   Additonal Notes:

## 2024-04-01 ENCOUNTER — TELEPHONE (OUTPATIENT)
Dept: GASTROENTEROLOGY | Facility: CLINIC | Age: 64
End: 2024-04-01
Payer: COMMERCIAL

## 2024-04-01 ENCOUNTER — ANESTHESIA EVENT (OUTPATIENT)
Dept: SURGERY | Facility: AMBULATORY SURGERY CENTER | Age: 64
End: 2024-04-01
Payer: COMMERCIAL

## 2024-04-01 NOTE — TELEPHONE ENCOUNTER
Left Voicemail (1st Attempt) and Sent Mychart (1st Attempt) for the patient to call back and schedule the following:    Appointment type: Return IBD  Provider: Benjamin Brooks PA-C  Return date: 09/26/24  Specialty phone number: [phone]  Additional appointment(s) needed: N/A  Additonal Notes: N/A

## 2024-04-01 NOTE — ANESTHESIA PREPROCEDURE EVALUATION
Anesthesia Pre-Procedure Evaluation    Patient: Aileen Priest   MRN: [id number] : 1960        Procedure : Procedure(s):  Colonoscopy          Past Medical History:   Diagnosis Date    Collagenous colitis     Lichen planus     Lichen planus       Past Surgical History:   Procedure Laterality Date    BACK SURGERY      Lumbar discectomy L5 S1    COLONOSCOPY N/A 12/3/2019    Procedure: COLONOSCOPY, WITH POLYPECTOMY AND BIOPSY;  Surgeon: Kassy Pollock MD;  Location: [facility]    COLONOSCOPY N/A 2021    Procedure: COLONOSCOPY, WITH BIOPSY;  Surgeon: Kassy Pollock MD;  Location: [facility] OR    COLONOSCOPY N/A 2021    Procedure: COLONOSCOPY, WITH  BIOPSY;  Surgeon: Kassy Pollock MD;  Location: [facility] OR    COLONOSCOPY N/A 10/17/2022    Procedure: COLONOSCOPY;  Surgeon: Kassy Pollock MD;  Location: [facility] OR    HC REMOVAL OF TONSILS,12+ Y/O      ZZC NONSPECIFIC PROCEDURE       X 2    ZZC NONSPECIFIC PROCEDURE      removal of growth from toe    ZZHC DRAIN PILONIDAL CYST SIMPLE        Allergies   Allergen Reactions    Sulfa Antibiotics Rash      Social History     Tobacco Use    Smoking status: Former     Packs/day: 1.00     Years: 10.00     Additional pack years: 0.00     Total pack years: 10.00     Types: Cigarettes     Start date: 1976     Quit date: 1987     Years since quittin.2    Smokeless tobacco: Never   Substance Use Topics    Alcohol use: Yes     Alcohol/week: 1.0 - 3.0 standard drink of alcohol     Types: 1 - 3 Cans of beer per week     Comment: socially      Wt Readings from Last 1 Encounters:   24 58.1 kg (128 lb)        Anesthesia Evaluation            ROS/MED HX  ENT/Pulmonary:  - neg pulmonary ROS     Neurologic:  - neg neurologic ROS     Cardiovascular:     (+)  hypertension- -   -  - -                                      METS/Exercise Tolerance:     Hematologic:  - neg hematologic  ROS     Musculoskeletal:  - neg musculoskeletal ROS     GI/Hepatic:     (+) GERD,  "     Inflammatory bowel disease, bowel prep,            Renal/Genitourinary:  - neg Renal ROS     Endo:  - neg endo ROS     Psychiatric/Substance Use:  - neg psychiatric ROS     Infectious Disease:  - neg infectious disease ROS     Malignancy:       Other:            Physical Exam    Airway  airway exam normal      Mallampati: I       Respiratory Devices and Support         Dental       (+) Minor Abnormalities - some fillings, tiny chips      Cardiovascular   cardiovascular exam normal          Pulmonary   pulmonary exam normal                OUTSIDE LABS:  CBC:   Lab Results   Component Value Date    WBC 10.0 01/30/2024    WBC 6.9 09/18/2023    HGB 13.7 01/30/2024    HGB 13.1 09/18/2023    HCT 41.3 01/30/2024    HCT 38.9 09/18/2023     01/30/2024     09/18/2023     BMP:   Lab Results   Component Value Date     01/30/2024     (L) 09/18/2023    POTASSIUM 4.4 01/30/2024    POTASSIUM 4.1 09/18/2023    CHLORIDE 100 01/30/2024    CHLORIDE 98 09/18/2023    CO2 27 01/30/2024    CO2 23 09/18/2023    BUN 10.8 01/30/2024    BUN 11.4 09/18/2023    CR 0.70 01/30/2024    CR 0.64 09/18/2023    GLC 85 01/30/2024     (H) 09/18/2023     COAGS: No results found for: \"PTT\", \"INR\", \"FIBR\"  POC: No results found for: \"BGM\", \"HCG\", \"HCGS\"  HEPATIC:   Lab Results   Component Value Date    ALBUMIN 4.4 01/30/2024    PROTTOTAL 7.0 01/30/2024    ALT 16 01/30/2024    AST 20 01/30/2024    ALKPHOS 81 01/30/2024    BILITOTAL 0.4 01/30/2024     OTHER:   Lab Results   Component Value Date    AYANA 9.7 01/30/2024    MAG 1.8 09/18/2018    LIPASE 90 03/05/2014    TSH 1.21 01/30/2024    T4 6.4 04/02/2008    CRP 4.6 04/20/2022    SED 7 01/30/2024       Anesthesia Plan    ASA Status:  2    NPO Status:  NPO Appropriate    Anesthesia Type: MAC.     - Reason for MAC: straight local not clinically adequate   Induction: Intravenous.   Maintenance: TIVA.        Consents    Anesthesia Plan(s) and associated risks, benefits, and " realistic alternatives discussed. Questions answered and patient/representative(s) expressed understanding.     - Discussed: Risks, Benefits and Alternatives for BOTH SEDATION and the PROCEDURE were discussed     - Discussed with:  Patient            Postoperative Care       PONV prophylaxis: Ondansetron (or other 5HT-3), Background Propofol Infusion     Comments:               Ryan Gonsalez MD    I have reviewed the pertinent notes and labs in the chart from the past 30 days and (re)examined the patient.  Any updates or changes from those notes are reflected in this note.

## 2024-04-02 ENCOUNTER — ANESTHESIA (OUTPATIENT)
Dept: SURGERY | Facility: AMBULATORY SURGERY CENTER | Age: 64
End: 2024-04-02
Payer: COMMERCIAL

## 2024-04-02 ENCOUNTER — TELEPHONE (OUTPATIENT)
Dept: GASTROENTEROLOGY | Facility: CLINIC | Age: 64
End: 2024-04-02
Payer: COMMERCIAL

## 2024-04-02 ENCOUNTER — HOSPITAL ENCOUNTER (OUTPATIENT)
Facility: AMBULATORY SURGERY CENTER | Age: 64
Discharge: HOME | End: 2024-04-02
Attending: INTERNAL MEDICINE
Payer: COMMERCIAL

## 2024-04-02 VITALS — HEART RATE: 73 BPM

## 2024-04-02 VITALS
WEIGHT: 120.7 LBS | SYSTOLIC BLOOD PRESSURE: 118 MMHG | HEIGHT: 63 IN | HEART RATE: 67 BPM | BODY MASS INDEX: 21.39 KG/M2 | OXYGEN SATURATION: 99 % | DIASTOLIC BLOOD PRESSURE: 86 MMHG | RESPIRATION RATE: 14 BRPM | TEMPERATURE: 96.6 F

## 2024-04-02 LAB — COLONOSCOPY: NORMAL

## 2024-04-02 PROCEDURE — 45378 DIAGNOSTIC COLONOSCOPY: CPT | Performed by: ANESTHESIOLOGY

## 2024-04-02 PROCEDURE — 45378 DIAGNOSTIC COLONOSCOPY: CPT | Performed by: INTERNAL MEDICINE

## 2024-04-02 PROCEDURE — 45378 DIAGNOSTIC COLONOSCOPY: CPT | Performed by: NURSE ANESTHETIST, CERTIFIED REGISTERED

## 2024-04-02 RX ORDER — NALOXONE HYDROCHLORIDE 0.4 MG/ML
0.4 INJECTION, SOLUTION INTRAMUSCULAR; INTRAVENOUS; SUBCUTANEOUS
Status: DISCONTINUED | OUTPATIENT
Start: 2024-04-02 | End: 2024-04-03 | Stop reason: HOSPADM

## 2024-04-02 RX ORDER — PROPOFOL 10 MG/ML
INJECTION, EMULSION INTRAVENOUS CONTINUOUS PRN
Status: DISCONTINUED | OUTPATIENT
Start: 2024-04-02 | End: 2024-04-02

## 2024-04-02 RX ORDER — SODIUM CHLORIDE, SODIUM LACTATE, POTASSIUM CHLORIDE, CALCIUM CHLORIDE 600; 310; 30; 20 MG/100ML; MG/100ML; MG/100ML; MG/100ML
INJECTION, SOLUTION INTRAVENOUS CONTINUOUS
Status: DISCONTINUED | OUTPATIENT
Start: 2024-04-02 | End: 2024-04-03 | Stop reason: HOSPADM

## 2024-04-02 RX ORDER — NALOXONE HYDROCHLORIDE 0.4 MG/ML
0.1 INJECTION, SOLUTION INTRAMUSCULAR; INTRAVENOUS; SUBCUTANEOUS
Status: DISCONTINUED | OUTPATIENT
Start: 2024-04-02 | End: 2024-04-03 | Stop reason: HOSPADM

## 2024-04-02 RX ORDER — LIDOCAINE 40 MG/G
CREAM TOPICAL
Status: DISCONTINUED | OUTPATIENT
Start: 2024-04-02 | End: 2024-04-03 | Stop reason: HOSPADM

## 2024-04-02 RX ORDER — LIDOCAINE HYDROCHLORIDE 20 MG/ML
INJECTION, SOLUTION INFILTRATION; PERINEURAL PRN
Status: DISCONTINUED | OUTPATIENT
Start: 2024-04-02 | End: 2024-04-02

## 2024-04-02 RX ORDER — PROPOFOL 10 MG/ML
INJECTION, EMULSION INTRAVENOUS PRN
Status: DISCONTINUED | OUTPATIENT
Start: 2024-04-02 | End: 2024-04-02

## 2024-04-02 RX ORDER — PROCHLORPERAZINE MALEATE 10 MG
10 TABLET ORAL EVERY 6 HOURS PRN
Status: DISCONTINUED | OUTPATIENT
Start: 2024-04-02 | End: 2024-04-03 | Stop reason: HOSPADM

## 2024-04-02 RX ORDER — ONDANSETRON 2 MG/ML
4 INJECTION INTRAMUSCULAR; INTRAVENOUS EVERY 30 MIN PRN
Status: DISCONTINUED | OUTPATIENT
Start: 2024-04-02 | End: 2024-04-03 | Stop reason: HOSPADM

## 2024-04-02 RX ORDER — ONDANSETRON 2 MG/ML
4 INJECTION INTRAMUSCULAR; INTRAVENOUS EVERY 6 HOURS PRN
Status: DISCONTINUED | OUTPATIENT
Start: 2024-04-02 | End: 2024-04-03 | Stop reason: HOSPADM

## 2024-04-02 RX ORDER — OXYCODONE HYDROCHLORIDE 5 MG/1
10 TABLET ORAL
Status: DISCONTINUED | OUTPATIENT
Start: 2024-04-02 | End: 2024-04-03 | Stop reason: HOSPADM

## 2024-04-02 RX ORDER — NALOXONE HYDROCHLORIDE 0.4 MG/ML
0.2 INJECTION, SOLUTION INTRAMUSCULAR; INTRAVENOUS; SUBCUTANEOUS
Status: DISCONTINUED | OUTPATIENT
Start: 2024-04-02 | End: 2024-04-03 | Stop reason: HOSPADM

## 2024-04-02 RX ORDER — ACETAMINOPHEN 325 MG/1
975 TABLET ORAL ONCE
Status: DISCONTINUED | OUTPATIENT
Start: 2024-04-02 | End: 2024-04-03 | Stop reason: HOSPADM

## 2024-04-02 RX ORDER — ONDANSETRON 4 MG/1
4 TABLET, ORALLY DISINTEGRATING ORAL EVERY 6 HOURS PRN
Status: DISCONTINUED | OUTPATIENT
Start: 2024-04-02 | End: 2024-04-03 | Stop reason: HOSPADM

## 2024-04-02 RX ORDER — ONDANSETRON 4 MG/1
4 TABLET, ORALLY DISINTEGRATING ORAL EVERY 30 MIN PRN
Status: DISCONTINUED | OUTPATIENT
Start: 2024-04-02 | End: 2024-04-03 | Stop reason: HOSPADM

## 2024-04-02 RX ORDER — FLUMAZENIL 0.1 MG/ML
0.2 INJECTION, SOLUTION INTRAVENOUS
Status: ACTIVE | OUTPATIENT
Start: 2024-04-02 | End: 2024-04-02

## 2024-04-02 RX ORDER — ONDANSETRON 2 MG/ML
4 INJECTION INTRAMUSCULAR; INTRAVENOUS
Status: DISCONTINUED | OUTPATIENT
Start: 2024-04-02 | End: 2024-04-03 | Stop reason: HOSPADM

## 2024-04-02 RX ORDER — OXYCODONE HYDROCHLORIDE 5 MG/1
5 TABLET ORAL
Status: DISCONTINUED | OUTPATIENT
Start: 2024-04-02 | End: 2024-04-03 | Stop reason: HOSPADM

## 2024-04-02 RX ADMIN — PROPOFOL 50 MG: 10 INJECTION, EMULSION INTRAVENOUS at 08:17

## 2024-04-02 RX ADMIN — SODIUM CHLORIDE, SODIUM LACTATE, POTASSIUM CHLORIDE, CALCIUM CHLORIDE: 600; 310; 30; 20 INJECTION, SOLUTION INTRAVENOUS at 07:37

## 2024-04-02 RX ADMIN — LIDOCAINE HYDROCHLORIDE 60 MG: 20 INJECTION, SOLUTION INFILTRATION; PERINEURAL at 08:15

## 2024-04-02 RX ADMIN — PROPOFOL 200 MCG/KG/MIN: 10 INJECTION, EMULSION INTRAVENOUS at 08:17

## 2024-04-02 NOTE — DISCHARGE INSTRUCTIONS
[facility]  Home Care Following Anesthesia  For 24 hours after surgery:  Get plenty of rest.  A responsible adult must stay with you for at least 24 hours after you leave the surgery center.  Do not drive or use heavy equipment.  If you have weakness or tingling, don't drive or use heavy equipment until this feeling goes away.   Do not drink alcohol.   Avoid strenuous or risky activities.  Ask for help when climbing stairs.  You may feel lightheaded.  IF so, sit for a few minutes before standing.  Have someone help you get up.   If you have nausea (feel sick to your stomach): Drink only clear liquids such as apple juice, ginger ale, broth or 7-Up.  Rest may also help.  Be sure to drink enough fluids.  Move to a regular diet as you feel able.   You may have a slight fever.  Call the doctor if your fever is over 100 F (37.7 C) (taken under the tongue) or lasts longer than 24 hours.  You may have a dry mouth, a sore throat, muscle aches or trouble sleeping. These should go away after 24 hours.  Do not make important or legal decisions.   It is recommended to avoid smoking.               Tips for taking pain medications  To get the best pain relief possible, remember these points:  Take pain medications as directed, before pain becomes severe.  Pain medication can upset your stomach: taking it with food may help.  Constipation is a common side effect of pain medication. Drink plenty of  fluids.  Eat foods high in fiber. Take a stool softener if recommended by your doctor or pharmacist.  Do not drink alcohol, drive or operate machinery while taking pain medications.  Ask about other ways to control pain, such as with heat, ice or relaxation.    Tylenol/Acetaminophen Consumption    If you feel your pain relief is insufficient, you may take Tylenol/Acetaminophen in addition to your narcotic pain medication.   Be careful not to exceed 4,000 mg of Tylenol/Acetaminophen in a 24 hour  period from all sources.  If you are taking extra strength Tylenol/acetaminophen (500 mg), the maximum dose is 8 tablets in 24 hours.  If you are taking regular strength acetaminophen (325 mg), the maximum dose is 12 tablets in 24 hours.    Call a doctor for any of the following:  Signs of infection (fever, growing tenderness at the surgery site, a large amount of drainage or bleeding, severe pain, foul-smelling drainage, redness, swelling).  It has been over 8 to 10 hours since surgery and you are still not able to urinate (pass water).  Headache for over 24 hours.  Numbness, tingling or weakness the day after surgery (if you had spinal anesthesia).  Signs of Covid-19 infection (temperature over 100 degrees, shortness of breath, cough, loss of taste/smell, generalized body aches, persistent headache, chills, sore throat, nausea/vomiting/diarrhea)  Your doctor is: MD Aj Grijalva

## 2024-04-02 NOTE — H&P
Aileen Priest  [id number]  female  63 year old      Reason for procedure/surgery: Crohn's disease    Patient Active Problem List   Diagnosis    Leg weakness    CARDIOVASCULAR SCREENING; LDL GOAL LESS THAN 160    Nasal folliculitis    Hypertension, goal below 140/90    Crohn's disease of small intestine with other complication (H)    History of hidradenitis suppurativa    Lichen planus    Crohn's disease of small intestine without complication (H)       Past Surgical History:    Past Surgical History:   Procedure Laterality Date    BACK SURGERY      Lumbar discectomy L5 S1    COLONOSCOPY N/A 12/3/2019    Procedure: COLONOSCOPY, WITH POLYPECTOMY AND BIOPSY;  Surgeon: Kassy Pollock MD;  [facility]    COLONOSCOPY N/A 2021    Procedure: COLONOSCOPY, WITH BIOPSY;  Surgeon: Kassy Pollock MD;  Location: [facility]    COLONOSCOPY N/A 2021    Procedure: COLONOSCOPY, WITH  BIOPSY;  Surgeon: Kassy Pollock MD;  Location: [facility] OR    COLONOSCOPY N/A 10/17/2022    Procedure: COLONOSCOPY;  Surgeon: Kassy Pollock MD;  Location: [facility] OR    HC REMOVAL OF TONSILS,12+ Y/O      ZZ NONSPECIFIC PROCEDURE       X 2    ZZC NONSPECIFIC PROCEDURE      removal of growth from toe    ZZ DRAIN PILONIDAL CYST SIMPLE         Past Medical History:   Past Medical History:   Diagnosis Date    Collagenous colitis     Lichen planus     Lichen planus        Social History:   Social History     Tobacco Use    Smoking status: Former     Packs/day: 1.00     Years: 10.00     Additional pack years: 0.00     Total pack years: 10.00     Types: Cigarettes     Start date: 1976     Quit date: 1987     Years since quittin.2    Smokeless tobacco: Never   Substance Use Topics    Alcohol use: Yes     Alcohol/week: 1.0 - 3.0 standard drink of alcohol     Types: 1 - 3 Cans of beer per week     Comment: socially       Family History:   Family History   Problem Relation Age of Onset    Breast Cancer Mother 42    Hypertension  "Father     Depression Sister     Psychotic Disorder Sister     Thyroid Disease Sister     Cancer Maternal Grandmother     Heart Disease Maternal Grandfather     Cancer Paternal Grandmother     Respiratory Paternal Grandfather     Asthma Brother     Asthma Son     Depression Son        Allergies:   Allergies   Allergen Reactions    Sulfa Antibiotics Rash       Active Medications:   Current Outpatient Medications   Medication Sig Dispense Refill    calcium citrate-vitamin D (CITRACAL) 200-6.25 MG-MCG TABS per tablet Take 2 tablets by mouth daily      dexamethasone 0.5 MG/5ML solution Swish and spit in mouth 3 times daily as needed       lisinopril (ZESTRIL) 5 MG tablet TAKE ONE TABLET BY MOUTH ONCE DAILY. 90 tablet 3    MAGNESIUM GLYCINATE PO Take 400 mg by mouth daily      ustekinumab (STELARA) 90 MG/ML Inject 1 ml ( 90 mg) subcutaneous every 4 weeks. 1 mL 11    budesonide (ENTOCORT EC) 3 MG EC capsule Take 3 capsules (9 mg) by mouth every morning 90 capsule 0    omeprazole (PRILOSEC) 40 MG DR capsule TAKE ONE CAPSULE BY MOUTH ONCE DAILY 90 capsule 3       Systemic Review:   CONSTITUTIONAL: NEGATIVE for fever, chills, change in weight  ENT/MOUTH: NEGATIVE for ear, mouth and throat problems  RESP: NEGATIVE for significant cough or SOB  CV: NEGATIVE for chest pain, palpitations or peripheral edema    Physical Examination:   Vital Signs: /89 (BP Location: Right arm)   Pulse 93   Temp 97  F (36.1  C) (Temporal)   Resp 18   Ht 1.6 m (5' 3\")   Wt 54.7 kg (120 lb 11.2 oz)   LMP  (LMP Unknown)   SpO2 97%   BMI 21.38 kg/m    GENERAL: healthy, alert and no distress  NECK: no adenopathy, no asymmetry, masses, or scars  RESP: lungs clear to auscultation - no rales, rhonchi or wheezes  CV: regular rate and rhythm, normal S1 S2, no S3 or S4, no murmur, click or rub, no peripheral edema and peripheral pulses strong  ABDOMEN: soft, nontender, no hepatosplenomegaly, no masses and bowel sounds normal  MS: no gross " musculoskeletal defects noted, no edema    Plan: Appropriate to proceed as scheduled.      Kassy Pollock MD  4/2/2024    PCP:  Bucky Hunter

## 2024-04-02 NOTE — ANESTHESIA CARE TRANSFER NOTE
Patient: Aileen Priest    Procedure: Procedure(s):  Colonoscopy       Diagnosis: Crohn's disease of small intestine with other complication (H) [K50.018]  Diagnosis Additional Information: No value filed.    Anesthesia Type:   MAC     Note:    Oropharynx: oropharynx clear of all foreign objects and spontaneously breathing  Level of Consciousness: awake and drowsy  Oxygen Supplementation: room air    Independent Airway: airway patency satisfactory and stable  Dentition: dentition unchanged  Vital Signs Stable: post-procedure vital signs reviewed and stable  Report to RN Given: handoff report given  Patient transferred to: Phase II  Comments: Uneventful transport   Report to RN - Alex  Exchanging well; color natl  Pt responds appropriately to command; comfortable and sleepy   IV patent  Lips/teeth/dentition as preop status  Questions answered      Handoff Report: Identifed the Patient, Identified the Reponsible Provider, Reviewed the pertinent medical history, Discussed the surgical course, Reviewed Intra-OP anesthesia mangement and issues during anesthesia, Set expectations for post-procedure period and Allowed opportunity for questions and acknowledgement of understanding      Vitals:  Vitals Value Taken Time   BP 91/63 0837   Temp 96.6    Pulse 75    Resp 16    SpO2 98% room air        Electronically Signed By: HERIBERTO HOWARD CRNA  April 2, 2024  8:37 AM

## 2024-04-02 NOTE — TELEPHONE ENCOUNTER
Patient confirmed scheduled appointment:  Date: 09/26/24  Time: 10:40am  Visit type: Return IBD  Provider: Benjamin Boroks PA-C  Location: Virtual  Testing/imaging: N/A  Additional notes: N/A

## 2024-04-03 NOTE — ANESTHESIA POSTPROCEDURE EVALUATION
Patient: Aileen Priest    Procedure: Procedure(s):  Colonoscopy       Anesthesia Type:  MAC    Note:  Disposition: Outpatient   Postop Pain Control: Uneventful            Sign Out: Well controlled pain   PONV: No   Neuro/Psych: Uneventful            Sign Out: Acceptable/Baseline neuro status   Airway/Respiratory: Uneventful            Sign Out: Acceptable/Baseline resp. status   CV/Hemodynamics: Uneventful            Sign Out: Acceptable CV status; No obvious hypovolemia; No obvious fluid overload   Other NRE: NONE   DID A NON-ROUTINE EVENT OCCUR?            Last vitals:  Vitals Value Taken Time   /86 04/02/24 0900   Temp 96.6  F (35.9  C) 04/02/24 0900   Pulse 67 04/02/24 0900   Resp 14 04/02/24 0900   SpO2 99 % 04/02/24 0900       Electronically Signed By: Ryan Gonsalez MD  April 3, 2024  8:37 AM

## 2024-05-15 ENCOUNTER — DOCUMENTATION ONLY (OUTPATIENT)
Dept: FAMILY MEDICINE | Facility: CLINIC | Age: 64
End: 2024-05-15
Payer: COMMERCIAL

## 2024-05-15 DIAGNOSIS — G57.63 MORTON'S NEUROMA OF BOTH FEET: Primary | ICD-10-CM

## 2024-05-15 DIAGNOSIS — R29.898 WEAKNESS OF RIGHT LOWER EXTREMITY: ICD-10-CM

## 2024-06-06 ENCOUNTER — VIRTUAL VISIT (OUTPATIENT)
Dept: GASTROENTEROLOGY | Facility: CLINIC | Age: 64
End: 2024-06-06
Attending: PHYSICIAN ASSISTANT
Payer: COMMERCIAL

## 2024-06-06 DIAGNOSIS — K50.018 CROHN'S DISEASE OF SMALL INTESTINE WITH OTHER COMPLICATION (H): Primary | ICD-10-CM

## 2024-06-06 NOTE — Clinical Note
6/6/2024      Aileen Priest  [address]  [place] [address]      Dear Colleague,    Thank you for referring your patient, Aileen Priest, to the [facility]. Please see a copy of my visit note below.    Medication Therapy Management (MTM) Encounter    ASSESSMENT:                            Medication Adherence/Access: {adherencechoices:849590}    Crohn's Disease:  ***      PLAN:                            Kelly will consider the following vaccines:  Pneumococcal pneumonia (Prevnar-20)- [facility] [place]   You are due for routine maintenance labs now. These include standing labs ordered under Benjamin Brooks PA-C and an annual tuberculosis screening. Please complete them at your soonest convenience at any [facility] lab.  DEXA scan, neurology, omeprazole     Follow-up: ***    SUBJECTIVE/OBJECTIVE:                          Kelly Priest is a 63 year old female called for a follow-up visit.       Reason for visit: IBD health maintenance follow-up visit.    Allergies/ADRs: Reviewed in chart  Past Medical History: Reviewed in chart  Tobacco: She reports that she quit smoking about 37 years ago. Her smoking use included cigarettes. She started smoking about 48 years ago. She has a 11 pack-year smoking history. She has never used smokeless tobacco.  Alcohol: 4-6 beverages / week    Medication Adherence/Access: no issues reported    Crohn's Disease:   Stelara 90 mg every 4 weeks  Dexamethasone 0.5 mg/5mL solution swish and spit 3 times daily as needed- Receding gums in back of mouth; hadn't used in a long time but used it last week due to a mouth    Keeps track of Stelara on a calendar. Denies injection site reactions.     Reports that she cups her hand, has a tremor, sees Neurologist in 9/13/2024.     We discussed RSV vaccine including risks of illness caused by RSV infection, FDA-approved vaccinations and risk factors. We discussed that individuals on  immunosuppression may have a diminished response from the vaccine and that the studies that demonstrated efficacy excluded immunocompromised individuals. We discussed common side effects including fever and soreness at injection site. There were a small number of inflammatory neurologic events (3 out of 17,922 for [employer] product, 3 out of 20,255 for Pfizer product) and more cases of atrial fibrillation showed in vaccine arm than placebo arm. Unknown at this time if these rare events are from chance or linked to the vaccine.    PRO-3 for Crohn's Disease    Please select the one best answer for the patient's ability at this time     Over the past week, how many liquid or soft stools have you had on average per day?   1 (When scoring, multiply number by 2)   Over the past week, please rate your average abdominal pain  None : 0 points  3. Over the past week, please rate your general well-being    Slightly Under Par: 7 points    Score: ***  <13: Remission  13-21: Mild Activity   22-52: Moderate Activity  >/= 53: Severe Activity     Prior authorization status: 1/28 approved through 11/29/2024  Original start date: 2/27/2022; Q6 weeks escalated April 2023; Q4 weeks escalated September 2023  Last dose: 5/31/2024   Next dose:    Therapeutic drug monitoring:    Last provider visit: 3/26/2024 KAMARI Brooks  Next provider visit: 9/26/2024 KAMARI Brooks  Last labs completed: 1/30/2024  Lab frequency: every 3 months   - standing labs ordered with this encounter (CRP, CMP, CBC with platelets & diff)  Next labs due: now    Last IBD Health Maintenance Review: 10/6/2023  PDC: 100%    IBD Health Maintenance    Vaccinations:  All patients on biologics should avoid live vaccines unless specifically indicated.    -- Influenza (every year)- last 11/2023  -- TdaP (every 10 years)- last 1/2022  -- Pneumococcal Pneumonia    Prevnar-13: not on file   Pneumovax-23: not on file   Prevnar-20: not on file  -- COVID-19- 12/2020, 1/2021, 9/2021, 5/2022,  "10/2022, decline    One time confirmation of immunity or serologies:  -- Hepatitis A (serologies or immunizations)- not immune by serology 2017  -- Hepatitis B (serologies or immunizations)- serology indicates immunity 2021  -- Varicella/Zoster    Varicella/Zoster- Shingrix 12/2018, 11/2019    Due to the immunosuppression in this patient, I would not advise administration of live vaccines such as varicella/VZV, intranasal influenza, MMR, or yellow fever vaccine (if traveling).      Immunosuppressive Screening:  -- Hep B Surface Antibody serologies indicate immunity 11/12/2021  -- Hep B Surface Antigen non-reactive 11/12/2021  -- Hep B Core Antibody non-reactive  11/12/2021  -- Hep C Antibody non-reactive 1/27/2017  -- Yearly assessment of TB not done; negative 11/12/2021    Lab Results   Component Value Date    AUSAB 23.03 (H) 11/12/2021    HEPBANG Nonreactive 11/12/2021    HBCAB Nonreactive 11/12/2021    HCVAB  01/27/2017     Nonreactive   Assay performance characteristics have not been established for newborns,   infants, and children      TBRES Negative 11/12/2021       Bone mineral density screening   -- Recommend all patients supplement with calcium and vitamin D  -- Repo   -- No prior DXA on file    Cancer Screening:  Colon cancer screening:  Per Benjamin Brooks PA-C 3/26/2024: \"No PSC, no high-risk FH CRC (2nd cousin dx'd in 2017 w/ advanced CRC @ 55yo). Given colonoscopy in 2019 without adenomatous lesions, recommend repeat colonoscopy for routine screening in 2029 unless symptomatic sooner.\"    Cervical cancer screening: Annual due to immunosupression last 5/18/2023 (see 1/30/24 encounter)    Skin cancer screening: Annual visual exam of skin by dermatologist since patient is immunocompromised- scheduled 11/2023    Depression Screening:    PHQ-2 Score:         1/30/2024    10:17 AM 5/23/2023    12:22 PM   PHQ-2 ( 1999 [employer])   Q1: Little interest or pleasure in doing things 0 0   Q2: Feeling down, depressed " "or hopeless 0 0   PHQ-2 Score 0 0   Q1: Little interest or pleasure in doing things Not at all Not at all   Q2: Feeling down, depressed or hopeless Not at all Not at all   PHQ-2 Score 0 0       Research:  Are you interested in being contacted about enrollment in clinical research studies? No    Misc:  -- Avoid tobacco use  -- Avoid NSAIDs as there is potentially a 25% chance of causing an IBD flare    Hypertension:   Lisinopril 5 mg once daily  Patient reports { :549860}.  Patient {HTNDoes/doesnot:761586}.      BP Readings from Last 3 Encounters:   04/02/24 118/86   01/30/24 139/87   10/17/22 119/89       GERD:  Omeprazole 40 mg once daily    Supplements:  Calcium citrate-vitamin D 200 mg-6.25 mcg 2 tablets once daily        Today's Vitals: LMP  (LMP Unknown)   ----------------  {SUHA?:764223}    I spent {mtm total time 3:348127} with this patient today. { :818169}. A copy of the visit note was provided to the patient's provider(s).    A summary of these recommendations {GIVEN/NOT GIVEN:735606}.    ***    Telemedicine Visit Details  Type of service:  {telemedvisitmtm:539076::\"Telephone visit\"}  Start Time: {video/phone visit start time:152948}  End Time: {video/phone visit end time:152948}     Medication Therapy Recommendations  No medication therapy recommendations to display       Medication Therapy Management (MTM) Encounter    ASSESSMENT:                            Medication Adherence/Access: No issues identified    Crohn's Disease:  Kelly would benefit from continued treatment with Stelara 90 mg every 4 weeks. They are due for routine maintenance labs. They are due for annual tuberculosis screening. No access issues for their advanced therapy are present. They are indicated for a few vaccinations which were recommended to them. She reports that per prior discussion with providers she is planning to get DEXA scan later this year at age 64. Reminders for routine cancer screening were provided.     Neurologic " "complications of Stelara are described below. Her reported symptoms do not seem to relate to any known neurologic adverse effects of Stelara (I.e., posterior reversible encephalopathy syndrome). I recommend she keep her neurology appointment for further work-up.    PLAN:                            Kelly will consider the following vaccines:  Pneumococcal pneumonia (Prevnar-20)- Prescription sent to [facility]   You are due for routine maintenance labs now. These include standing labs ordered under Benjamin Brooks PA-C and an annual tuberculosis screening. Please complete them at your soonest convenience at any [facility] lab.  I wanted to pass along information regarding neurological effects of Stelara that you asked about. As we discussed, it is not typically associated with these conditions. The only neurologic complication in the official prescribing information is for a rare condition called \"posterior reversible encephalopathy syndrome,\" symptoms include headaches, seizures, confusion, visual disturbances and onset is generally sudden. I can't find evidence of any relationship between Stelara and Parkinson's disease. There is some evidence that Stelara can safely be used in Multiple Sclerosis. Please reach out for clarification or follow-up questions if you have any.   I recommend a calcium goal of 1200 mg/day between diet and supplementation. Please review the foods you eat and see if you are getting at least 1200 mg/day of calcium from your diet. If you are not getting adequate calcium from your diet then consider supplementation. https://www.dietaryguidelines.gov/food-sources-calcium    Follow-up: Next MTM Visit in 6 months    SUBJECTIVE/OBJECTIVE:                          Kelly Priest is a 63 year old female called for a follow-up visit.       Reason for visit: IBD health maintenance follow-up visit.    Allergies/ADRs: Reviewed in chart  Past Medical History: Reviewed in " chart  Tobacco: She reports that she quit smoking about 37 years ago. Her smoking use included cigarettes. She started smoking about 48 years ago. She has a 11 pack-year smoking history. She has never used smokeless tobacco.  Alcohol: 4-6 beverages / week    Medication Adherence/Access: no issues reported    Crohn's Disease:   Stelara 90 mg every 4 weeks  Dexamethasone 0.5 mg/5mL solution swish and spit 3 times daily as needed- Receding gums in back of mouth; hadn't used in a long time but used it last week    Keeps track of Stelara on a calendar. Denies injection site reactions. No side effects or concerns.    Reports that she cups her hand, has a tremor, sees Neurologist in 9/13/2024. Rare but serious side effect of Stelara includes posterior reversible encephalopathy syndrome include headaches, vision problems, mental changes, seizures, and swelling in the brain, come on quickly and can be serious and life threatening.     We discussed RSV vaccine including risks of illness caused by RSV infection, FDA-approved vaccinations and risk factors. We discussed that individuals on immunosuppression may have a diminished response from the vaccine and that the studies that demonstrated efficacy excluded immunocompromised individuals. We discussed common side effects including fever and soreness at injection site. There were a small number of inflammatory neurologic events (3 out of 17,922 for [employer] product, 3 out of 20,255 for Pfizer product) and more cases of atrial fibrillation showed in vaccine arm than placebo arm. Unknown at this time if these rare events are from chance or linked to the vaccine.    PRO-3 for Crohn's Disease    Please select the one best answer for the patient's ability at this time     Over the past week, how many liquid or soft stools have you had on average per day?   1 (When scoring, multiply number by 2)   Over the past week, please rate your average abdominal pain  None : 0 points  3. Over the  past week, please rate your general well-being    Slightly Under Par: 7 points    Score: 9  <13: Remission  13-21: Mild Activity   22-52: Moderate Activity  >/= 53: Severe Activity     Prior authorization status: 1/28 approved through 11/29/2024  Original start date: 2/27/2022; Q6 weeks escalated April 2023; Q4 weeks escalated September 2023  Last dose: 5/31/2024   Next dose:    Therapeutic drug monitoring:    Last provider visit: 3/26/2024 KAMARI Brooks  Next provider visit: 9/26/2024 KAMARI Brooks  Last labs completed: 1/30/2024  Lab frequency: every 3 months   - standing labs ordered with this encounter (CRP, CMP, CBC with platelets & diff)  Next labs due: now    Last IBD Health Maintenance Review: 10/6/2023  PDC: 100%    IBD Health Maintenance    Vaccinations:  All patients on biologics should avoid live vaccines unless specifically indicated.    -- Influenza (every year)- last 11/2023  -- TdaP (every 10 years)- last 1/2022  -- Pneumococcal Pneumonia    Prevnar-13: not on file   Pneumovax-23: not on file   Prevnar-20: not on file  -- COVID-19- 12/2020, 1/2021, 9/2021, 5/2022, 10/2022, decline    One time confirmation of immunity or serologies:  -- Hepatitis A (serologies or immunizations)- not immune by serology 2017  -- Hepatitis B (serologies or immunizations)- serology indicates immunity 2021  -- Varicella/Zoster    Varicella/Zoster- Shingrix 12/2018, 11/2019    Due to the immunosuppression in this patient, I would not advise administration of live vaccines such as varicella/VZV, intranasal influenza, MMR, or yellow fever vaccine (if traveling).      Immunosuppressive Screening:  -- Hep B Surface Antibody serologies indicate immunity 11/12/2021  -- Hep B Surface Antigen non-reactive 11/12/2021  -- Hep B Core Antibody non-reactive  11/12/2021  -- Hep C Antibody non-reactive 1/27/2017  -- Yearly assessment of TB not done; negative 11/12/2021    Lab Results   Component Value Date    AUSAB 23.03 (H) 11/12/2021     "HEPBANG Nonreactive 11/12/2021    HBCAB Nonreactive 11/12/2021    HCVAB  01/27/2017     Nonreactive   Assay performance characteristics have not been established for newborns,   infants, and children      TBRES Negative 11/12/2021       Bone mineral density screening   -- Recommend all patients supplement with calcium and vitamin D  -- No prior DXA on file, per patient report she is planning to get one at age 64    Cancer Screening:  Colon cancer screening:  Per Benjamin Brooks PA-C 3/26/2024: \"No PSC, no high-risk FH CRC (2nd cousin dx'd in 2017 w/ advanced CRC @ 55yo). Given colonoscopy in 2019 without adenomatous lesions, recommend repeat colonoscopy for routine screening in 2029 unless symptomatic sooner.\"    Cervical cancer screening: Annual due to immunosupression last 5/18/2023 (see 1/30/24 encounter)    Skin cancer screening: Annual visual exam of skin by dermatologist since patient is immunocompromised    Depression Screening:    PHQ-2 Score:         1/30/2024    10:17 AM 5/23/2023    12:22 PM   PHQ-2 ( 1999 [employer])   Q1: Little interest or pleasure in doing things 0 0   Q2: Feeling down, depressed or hopeless 0 0   PHQ-2 Score 0 0   Q1: Little interest or pleasure in doing things Not at all Not at all   Q2: Feeling down, depressed or hopeless Not at all Not at all   PHQ-2 Score 0 0       Research:  Are you interested in being contacted about enrollment in clinical research studies? No    Misc:  -- Avoid tobacco use  -- Avoid NSAIDs as there is potentially a 25% chance of causing an IBD flare      ----------------      I spent 30 minutes with this patient today. All changes were made via collaborative practice agreement with Benjamin Brooks PA-C. A copy of the visit note was provided to the patient's provider(s).    A summary of these recommendations was sent via "[employer].    Smith Cano, PharmD, BCPS  MTM Pharmacist   [facility]  Phone: [phone]    Telemedicine Visit " Details  Type of service:  Telephone visit  Start Time:  8:30 AM  End Time:  9:00 AM     Medication Therapy Recommendations  No medication therapy recommendations to display         Again, thank you for allowing me to participate in the care of your patient.        Sincerely,        Smith Cano RPH

## 2024-06-06 NOTE — PROGRESS NOTES
"Medication Therapy Management (MTM) Encounter    ASSESSMENT:                            Medication Adherence/Access: No issues identified    Crohn's Disease:  Kelly would benefit from continued treatment with Stelara 90 mg every 4 weeks. They are due for routine maintenance labs. They are due for annual tuberculosis screening. No access issues for their advanced therapy are present. They are indicated for a few vaccinations which were recommended to them. She reports that per prior discussion with providers she is planning to get DEXA scan later this year at age 64. Reminders for routine cancer screening were provided.     Neurologic complications of Stelara are described below. Her reported symptoms do not seem to relate to any known neurologic adverse effects of Stelara (I.e., posterior reversible encephalopathy syndrome). I recommend she keep her neurology appointment for further work-up.    PLAN:                            Kelly will consider the following vaccines:  Pneumococcal pneumonia (Prevnar-20)- Prescription sent to [facility]   You are due for routine maintenance labs now. These include standing labs ordered under Benjamin Brooks PA-C and an annual tuberculosis screening. Please complete them at your soonest convenience at any [facility] lab.  I wanted to pass along information regarding neurological effects of Stelara that you asked about. As we discussed, it is not typically associated with these conditions. The only neurologic complication in the official prescribing information is for a rare condition called \"posterior reversible encephalopathy syndrome,\" symptoms include headaches, seizures, confusion, visual disturbances and onset is generally sudden. I can't find evidence of any relationship between Stelara and Parkinson's disease. There is some evidence that Stelara can safely be used in Multiple Sclerosis. Please reach out for clarification or follow-up questions if you " have any.   I recommend a calcium goal of 1200 mg/day between diet and supplementation. Please review the foods you eat and see if you are getting at least 1200 mg/day of calcium from your diet. If you are not getting adequate calcium from your diet then consider additional supplementation. https://www.dietaryguidelines.gov/food-sources-calcium    Follow-up: Next MTM Visit in 6 months    SUBJECTIVE/OBJECTIVE:                          Kelly Priest is a 63 year old female called for a follow-up visit.       Reason for visit: IBD health maintenance follow-up visit.    Allergies/ADRs: Reviewed in chart  Past Medical History: Reviewed in chart  Tobacco: She reports that she quit smoking about 37 years ago. Her smoking use included cigarettes. She started smoking about 48 years ago. She has a 11 pack-year smoking history. She has never used smokeless tobacco.  Alcohol: 4-6 beverages / week    Medication Adherence/Access: no issues reported    Crohn's Disease:   Stelara 90 mg every 4 weeks  Dexamethasone 0.5 mg/5mL solution swish and spit 3 times daily as needed- Receding gums in back of mouth; hadn't used in a long time but used it last week    Keeps track of Stelara on a calendar. Denies injection site reactions. No side effects or concerns.    Reports that she cups her hand, has a tremor, sees Neurologist in 9/13/2024. Rare but serious side effect of Stelara includes posterior reversible encephalopathy syndrome include headaches, vision problems, mental changes, seizures, and swelling in the brain, come on quickly and can be serious and life threatening.     We discussed RSV vaccine including risks of illness caused by RSV infection, FDA-approved vaccinations and risk factors. We discussed that individuals on immunosuppression may have a diminished response from the vaccine and that the studies that demonstrated efficacy excluded immunocompromised individuals. We discussed common side effects including fever and  soreness at injection site. There were a small number of inflammatory neurologic events (3 out of 17,922 for GSK product, 3 out of 20,255 for Pfizer product) and more cases of atrial fibrillation showed in vaccine arm than placebo arm. Unknown at this time if these rare events are from chance or linked to the vaccine.    PRO-3 for Crohn's Disease    Please select the one best answer for the patient's ability at this time     Over the past week, how many liquid or soft stools have you had on average per day?   1 (When scoring, multiply number by 2)   Over the past week, please rate your average abdominal pain  None : 0 points  3. Over the past week, please rate your general well-being    Slightly Under Par: 7 points    Score: 9  <13: Remission  13-21: Mild Activity   22-52: Moderate Activity  >/= 53: Severe Activity     Prior authorization status: 1/28 approved through 11/29/2024  Original start date: 2/27/2022; Q6 weeks escalated April 2023; Q4 weeks escalated September 2023  Last dose: 5/31/2024   Next dose:    Therapeutic drug monitoring:    Last provider visit: 3/26/2024 KAMARI Brooks  Next provider visit: 9/26/2024 KAMARI Brooks  Last labs completed: 1/30/2024  Lab frequency: every 3 months   - standing labs ordered with this encounter (CRP, CMP, CBC with platelets & diff)  Next labs due: now    Last IBD Health Maintenance Review: 10/6/2023  PDC: 100%    IBD Health Maintenance    Vaccinations:  All patients on biologics should avoid live vaccines unless specifically indicated.    -- Influenza (every year)- last 11/2023  -- TdaP (every 10 years)- last 1/2022  -- Pneumococcal Pneumonia    Prevnar-13: not on file   Pneumovax-23: not on file   Prevnar-20: not on file  -- COVID-19- 12/2020, 1/2021, 9/2021, 5/2022, 10/2022, decline    One time confirmation of immunity or serologies:  -- Hepatitis A (serologies or immunizations)- not immune by serology 2017  -- Hepatitis B (serologies or immunizations)- serology indicates  "immunity 2021  -- Varicella/Zoster    Varicella/Zoster- Shingrix 12/2018, 11/2019    Due to the immunosuppression in this patient, I would not advise administration of live vaccines such as varicella/VZV, intranasal influenza, MMR, or yellow fever vaccine (if traveling).      Immunosuppressive Screening:  -- Hep B Surface Antibody serologies indicate immunity 11/12/2021  -- Hep B Surface Antigen non-reactive 11/12/2021  -- Hep B Core Antibody non-reactive  11/12/2021  -- Hep C Antibody non-reactive 1/27/2017  -- Yearly assessment of TB not done; negative 11/12/2021    Lab Results   Component Value Date    AUSAB 23.03 (H) 11/12/2021    HEPBANG Nonreactive 11/12/2021    HBCAB Nonreactive 11/12/2021    HCVAB  01/27/2017     Nonreactive   Assay performance characteristics have not been established for newborns,   infants, and children      TBRES Negative 11/12/2021       Bone mineral density screening   -- Recommend all patients supplement with calcium and vitamin D  -- No prior DXA on file, per patient report she is planning to get one at age 64    Cancer Screening:  Colon cancer screening:  Per Benjamin Brooks PA-C 3/26/2024: \"No PSC, no high-risk FH CRC (2nd cousin dx'd in 2017 w/ advanced CRC @ 57yo). Given colonoscopy in 2019 without adenomatous lesions, recommend repeat colonoscopy for routine screening in 2029 unless symptomatic sooner.\"    Cervical cancer screening: Annual due to immunosupression last 5/18/2023 (see 1/30/24 encounter)    Skin cancer screening: Annual visual exam of skin by dermatologist since patient is immunocompromised    Depression Screening:    PHQ-2 Score:         1/30/2024    10:17 AM 5/23/2023    12:22 PM   PHQ-2 ( 1999 Pfizer)   Q1: Little interest or pleasure in doing things 0 0   Q2: Feeling down, depressed or hopeless 0 0   PHQ-2 Score 0 0   Q1: Little interest or pleasure in doing things Not at all Not at all   Q2: Feeling down, depressed or hopeless Not at all Not at all   PHQ-2 Score " 0 0       Research:  Are you interested in being contacted about enrollment in clinical research studies? No    Misc:  -- Avoid tobacco use  -- Avoid NSAIDs as there is potentially a 25% chance of causing an IBD flare      ----------------      I spent 30 minutes with this patient today. All changes were made via collaborative practice agreement with Benjamin Brooks PA-C. A copy of the visit note was provided to the patient's provider(s).    A summary of these recommendations was sent via [employer].    Chandler NamD, BCPS  MT Pharmacist   [facility]  Phone: [phone]    Telemedicine Visit Details  Type of service:  Telephone visit  Start Time:  8:30 AM  End Time:  9:00 AM     Medication Therapy Recommendations  No medication therapy recommendations to display

## 2024-06-13 RX ORDER — PNEUMOCOCCAL 20-VALENT CONJUGATE VACCINE 2.2; 2.2; 2.2; 2.2; 2.2; 2.2; 2.2; 2.2; 2.2; 2.2; 2.2; 2.2; 2.2; 2.2; 2.2; 2.2; 4.4; 2.2; 2.2; 2.2 UG/.5ML; UG/.5ML; UG/.5ML; UG/.5ML; UG/.5ML; UG/.5ML; UG/.5ML; UG/.5ML; UG/.5ML; UG/.5ML; UG/.5ML; UG/.5ML; UG/.5ML; UG/.5ML; UG/.5ML; UG/.5ML; UG/.5ML; UG/.5ML; UG/.5ML; UG/.5ML
0.5 INJECTION, SUSPENSION INTRAMUSCULAR ONCE
Qty: 0.5 ML | Refills: 0 | Status: SHIPPED | OUTPATIENT
Start: 2024-06-13 | End: 2024-06-13

## 2024-06-13 NOTE — PATIENT INSTRUCTIONS
"Recommendations from today's MTM visit:                                                      Kelly will consider the following vaccines:  Pneumococcal pneumonia (Prevnar-20)- Prescription sent to [facility]   You are due for routine maintenance labs now. These include standing labs ordered under Benjamin Brooks PA-C and an annual tuberculosis screening. Please complete them at your soonest convenience at any [facility] lab.  I wanted to pass along information regarding neurological effects of Stelara that you asked about. As we discussed, it is not typically associated with these conditions. The only neurologic complication in the official prescribing information is for a rare condition called \"posterior reversible encephalopathy syndrome,\" symptoms include headaches, seizures, confusion, visual disturbances and onset is generally sudden. I can't find evidence of any relationship between Stelara and Parkinson's disease. There is some evidence that Stelara can safely be used in Multiple Sclerosis. Please reach out for clarification or follow-up questions if you have any.   I recommend a calcium goal of 1000 mg/day between diet and supplementation. Please review the foods you eat and see if you are getting at least 1200 mg/day of calcium from your diet. If you are not getting adequate calcium from your diet then consider additional supplementation. https://www.dietaryguidelines.gov/food-sources-calcium    Follow-up: Next MTM Visit in 6 months    It was great speaking with you today.  I value your experience and would be very thankful for your time in providing feedback in our clinic survey. In the next few days, you may receive an email or text message from [employer] with a link to a survey related to your  clinical pharmacist.\"     To schedule another MTM appointment, please call the clinic directly or you may call the MTM scheduling line at [phone] or toll-free at [phone].     My " Clinical Pharmacist's contact information:                                                      Please feel free to contact me with any questions or concerns you have.      Chandler NamD, BCPS  MT Pharmacist   [facility]  Phone: [phone]

## 2024-07-11 NOTE — TELEPHONE ENCOUNTER
RECORDS RECEIVED FROM: Internal    REASON FOR VISIT: Right hand weakness, Weakness of right lower extremity   PROVIDER: La Platt MD   DATE OF APPT: 9/13/24 @ 1:15 pm    NOTES (FOR ALL VISITS) STATUS DETAILS   OFFICE NOTE from referring provider Internal 1/30/24 Bucky Hunter MD @[facility]     MEDICATION LIST Internal    IMAGING  (FOR ALL VISITS)     MRI (HEAD, NECK, SPINE) Internal [facility]  11/6/19 MR Pelvis Muscular Tissue

## 2024-09-10 ENCOUNTER — TRANSFERRED RECORDS (OUTPATIENT)
Dept: HEALTH INFORMATION MANAGEMENT | Facility: CLINIC | Age: 64
End: 2024-09-10
Payer: COMMERCIAL

## 2024-09-13 ENCOUNTER — OFFICE VISIT (OUTPATIENT)
Dept: NEUROLOGY | Facility: CLINIC | Age: 64
End: 2024-09-13
Attending: FAMILY MEDICINE
Payer: COMMERCIAL

## 2024-09-13 ENCOUNTER — PRE VISIT (OUTPATIENT)
Dept: NEUROLOGY | Facility: CLINIC | Age: 64
End: 2024-09-13

## 2024-09-13 VITALS
RESPIRATION RATE: 16 BRPM | HEART RATE: 73 BPM | SYSTOLIC BLOOD PRESSURE: 147 MMHG | OXYGEN SATURATION: 99 % | DIASTOLIC BLOOD PRESSURE: 85 MMHG

## 2024-09-13 DIAGNOSIS — R29.898 RIGHT HAND WEAKNESS: ICD-10-CM

## 2024-09-13 DIAGNOSIS — F40.240 CLAUSTROPHOBIA: ICD-10-CM

## 2024-09-13 DIAGNOSIS — G20.C PARKINSONISM, UNSPECIFIED PARKINSONISM TYPE (H): Primary | ICD-10-CM

## 2024-09-13 PROCEDURE — 99204 OFFICE O/P NEW MOD 45 MIN: CPT | Performed by: PSYCHIATRY & NEUROLOGY

## 2024-09-13 RX ORDER — DIAZEPAM 10 MG
10 TABLET ORAL
Qty: 1 TABLET | Refills: 0 | Status: SHIPPED | OUTPATIENT
Start: 2024-09-13

## 2024-09-13 ASSESSMENT — PAIN SCALES - GENERAL: PAINLEVEL: NO PAIN (0)

## 2024-09-13 NOTE — LETTER
9/13/2024       RE: Aileen Priest  [address]  [place] [address]     Dear Colleague,    Thank you for referring your patient, Aileen Priest, to the [facility] [place] at [facility]. Please see a copy of my visit note below.            Aileen Priest MRN# [id number]   Age: 63 year old YOB: 1960     Requesting physician: Bucky Daniels            Assessment and Plan:     (G20.C) Parkinsonism, unspecified Parkinsonism type (H)  (primary encounter diagnosis)  Comment: On exam it appears that Kelly has parkinsonism (rest tremor, bradykinesia and rigidity). To work up for secondary causes   Plan: MRI Brain w/o contrast. I will call. If negative consider SPARX3 trial vs levodopa            (R29.898) Right hand weakness  Comment: In light of neck pain and past back issues we will rule out cervical radiculopathy.   Plan: MRI Cervical spine w/o contrast          La Platt MD           History of Present Illness:     chief complaint: Referred by Dr Hunter for Right Hand and leg weakness.      Aileen Priest is a 63 year old patient presenting for assessment of right hand weakness and right lower extremity weakness. She is accompanied by her sister.     Symptoms have been present for at least one year  She notices the following symptoms:   Harder time writing and cupping hand  Muscles feel fatigued (3-5th digit and up ulnar side)  Right leg tremor a little over one year. 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 Residual weakness in leg after surgery L5-S1. Due to accident that happened  working as an ob/gyn.  Impingement one year ago (L4-5?) Seen at [place]. These symptoms unlikely to be related.     Neck pain is present, no radicular features into the arm just handwriting and fatigue feeling.     Normal bladder and bowel function    Foot issue throws balance off. Just seen at [place] about a tendon issue from an old fracture.            Physical Exam:     BP (!) 147/85 (BP Location: Left arm)   Pulse 73   Resp 16   LMP  (LMP Unknown)   SpO2 99%   General Appearance:  The patient is awake and alert, NAD  Neurological Examination:  Cognition and Orientation: The patient is oriented to person, place and self. Normal attention and recall. No aphasia or dysarthria  Cranial Nerves: 2-12 intact. Funduscopic examination was normal with sharp disc margins visualized.  General Motor Survey: The patient has normal muscle bulk and strength x 4. Rest tremor in right leg. Rigidity in right arm.   Reflexes: Upper and lower extremity reflexes are within normal limits and bilaterally symmetric except for missing right ankle jerk Plantar responses are flexor.  Coordination: Normal coordination of finger to nose and heel-knee-shin.Slowed finger taps and alternating hand movements on the right  Gait: Normal gait. Romberg negative. Limited arm swing on the right. Pull test negative.   Sensory: Normal sensation to vibration, pinprick, light touch and joint position sense in all four ext           Pertinent history/data     Component      Latest Ref Rng 1/30/2024  11:12 AM   Sodium      135 - 145 mmol/L 136    Potassium      3.4 - 5.3 mmol/L 4.4    Carbon Dioxide (CO2)      22 - 29 mmol/L 27    Anion Gap      7 - 15 mmol/L 9    Urea Nitrogen      8.0 - 23.0 mg/dL 10.8    Creatinine      0.51 - 0.95 mg/dL 0.70    GFR Estimate      >60 mL/min/1.73m2 >90    Calcium      8.8 - 10.2 mg/dL 9.7    Chloride      98 - 107 mmol/L 100    Glucose      70 - 99 mg/dL 85    Alkaline Phosphatase      40 - 150 U/L 81    AST      0 -  45 U/L 20    ALT      0 - 50 U/L 16    Protein Total      6.4 - 8.3 g/dL 7.0    Albumin      3.5 - 5.2 g/dL 4.4    Bilirubin Total      <=1.2 mg/dL 0.4    WBC      4.0 - 11.0 10e3/uL 10.0    RBC Count      3.80 - 5.20 10e6/uL 4.50    Hemoglobin      11.7 - 15.7 g/dL 13.7    Hematocrit      35.0 - 47.0 % 41.3    MCV      78 - 100 fL 92    MCH      26.5 - 33.0 pg 30.4    MCHC      31.5 - 36.5 g/dL 33.2    RDW      10.0 - 15.0 % 12.1    Platelet Count      150 - 450 10e3/uL 306    TSH      0.30 - 4.20 uIU/mL 1.21    Sed Rate      0 - 30 mm/hr 7    CRP Inflammation      <5.00 mg/L <3.00            Again, thank you for allowing me to participate in the care of your patient.      Sincerely,    La Platt MD

## 2024-09-13 NOTE — PATIENT INSTRUCTIONS
Imaging Center Scheduling  Please contact us to schedule your imaging visit at any of our [facility] Locations  [facility]  (All Imaging Center locations)  Phone: [phone]    [facility]  ([facility], [facility], [facility])  Phone: [phone]    [facility]  ([facility], [facility])  Phone: [phone]    [facility]   Phone: [phone]

## 2024-09-13 NOTE — PROGRESS NOTES
Aileen Priest MRN# [id number]   Age: 63 year old YOB: 1960     Requesting physician: Bucky Daniels            Assessment and Plan:     (G20.C) Parkinsonism, unspecified Parkinsonism type (H)  (primary encounter diagnosis)  Comment: On exam it appears that Kelly has parkinsonism (rest tremor, bradykinesia and rigidity). To work up for secondary causes   Plan: MRI Brain w/o contrast. I will call. If negative consider SPARX3 trial vs levodopa            (R29.898) Right hand weakness  Comment: In light of neck pain and past back issues we will rule out cervical radiculopathy.   Plan: MRI Cervical spine w/o contrast          La Platt MD           History of Present Illness:     chief complaint: Referred by Dr Hunter for Right Hand and leg weakness.      Aileen Priest is a 63 year old patient presenting for assessment of right hand weakness and right lower extremity weakness. She is accompanied by her sister.     Symptoms have been present for at least one year  She notices the following symptoms:   Harder time writing and cupping hand  Muscles feel fatigued (3-5th digit and up ulnar side)  Right leg tremor a little over one year. 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 Residual weakness in leg after surgery L5-S1. 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  Impingement one year ago (L4-5?) Seen at [place]. These symptoms unlikely to be related.     Neck pain is present, no radicular features into the arm just handwriting and fatigue feeling.     Normal bladder and bowel function    Foot issue throws balance off. Just seen at [place] about a tendon  issue from an old fracture.            Physical Exam:     BP (!) 147/85 (BP Location: Left arm)   Pulse 73   Resp 16   LMP  (LMP Unknown)   SpO2 99%   General Appearance:  The patient is awake and alert, NAD  Neurological Examination:  Cognition and Orientation: The patient is oriented to person, place and self. Normal attention and recall. No aphasia or dysarthria  Cranial Nerves: 2-12 intact. Funduscopic examination was normal with sharp disc margins visualized.  General Motor Survey: The patient has normal muscle bulk and strength x 4. Rest tremor in right leg. Rigidity in right arm.   Reflexes: Upper and lower extremity reflexes are within normal limits and bilaterally symmetric except for missing right ankle jerk Plantar responses are flexor.  Coordination: Normal coordination of finger to nose and heel-knee-shin.Slowed finger taps and alternating hand movements on the right  Gait: Normal gait. Romberg negative. Limited arm swing on the right. Pull test negative.   Sensory: Normal sensation to vibration, pinprick, light touch and joint position sense in all four ext           Pertinent history/data     Component      Latest Ref Rn 1/30/2024  11:12 AM   Sodium      135 - 145 mmol/L 136    Potassium      3.4 - 5.3 mmol/L 4.4    Carbon Dioxide (CO2)      22 - 29 mmol/L 27    Anion Gap      7 - 15 mmol/L 9    Urea Nitrogen      8.0 - 23.0 mg/dL 10.8    Creatinine      0.51 - 0.95 mg/dL 0.70    GFR Estimate      >60 mL/min/1.73m2 >90    Calcium      8.8 - 10.2 mg/dL 9.7    Chloride      98 - 107 mmol/L 100    Glucose      70 - 99 mg/dL 85    Alkaline Phosphatase      40 - 150 U/L 81    AST      0 - 45 U/L 20    ALT      0 - 50 U/L 16    Protein Total      6.4 - 8.3 g/dL 7.0    Albumin      3.5 - 5.2 g/dL 4.4    Bilirubin Total      <=1.2 mg/dL 0.4    WBC      4.0 - 11.0 10e3/uL 10.0    RBC Count      3.80 - 5.20 10e6/uL 4.50    Hemoglobin      11.7 - 15.7 g/dL 13.7    Hematocrit      35.0 - 47.0 % 41.3    MCV       78 - 100 fL 92    MCH      26.5 - 33.0 pg 30.4    MCHC      31.5 - 36.5 g/dL 33.2    RDW      10.0 - 15.0 % 12.1    Platelet Count      150 - 450 10e3/uL 306    TSH      0.30 - 4.20 uIU/mL 1.21    Sed Rate      0 - 30 mm/hr 7    CRP Inflammation      <5.00 mg/L <3.00

## 2024-09-19 ENCOUNTER — LAB (OUTPATIENT)
Dept: LAB | Facility: CLINIC | Age: 64
End: 2024-09-19
Payer: COMMERCIAL

## 2024-09-19 DIAGNOSIS — K50.018 CROHN'S DISEASE OF SMALL INTESTINE WITH OTHER COMPLICATION (H): ICD-10-CM

## 2024-09-19 LAB
BASOPHILS # BLD AUTO: 0.1 10E3/UL (ref 0–0.2)
BASOPHILS NFR BLD AUTO: 1 %
EOSINOPHIL # BLD AUTO: 0.7 10E3/UL (ref 0–0.7)
EOSINOPHIL NFR BLD AUTO: 8 %
ERYTHROCYTE [DISTWIDTH] IN BLOOD BY AUTOMATED COUNT: 12.4 % (ref 10–15)
HCT VFR BLD AUTO: 40.4 % (ref 35–47)
HGB BLD-MCNC: 14 G/DL (ref 11.7–15.7)
IMM GRANULOCYTES # BLD: 0 10E3/UL
IMM GRANULOCYTES NFR BLD: 0 %
LYMPHOCYTES # BLD AUTO: 2.5 10E3/UL (ref 0.8–5.3)
LYMPHOCYTES NFR BLD AUTO: 32 %
MCH RBC QN AUTO: 31.5 PG (ref 26.5–33)
MCHC RBC AUTO-ENTMCNC: 34.7 G/DL (ref 31.5–36.5)
MCV RBC AUTO: 91 FL (ref 78–100)
MONOCYTES # BLD AUTO: 0.6 10E3/UL (ref 0–1.3)
MONOCYTES NFR BLD AUTO: 8 %
NEUTROPHILS # BLD AUTO: 3.9 10E3/UL (ref 1.6–8.3)
NEUTROPHILS NFR BLD AUTO: 50 %
PLATELET # BLD AUTO: 326 10E3/UL (ref 150–450)
RBC # BLD AUTO: 4.44 10E6/UL (ref 3.8–5.2)
WBC # BLD AUTO: 7.7 10E3/UL (ref 4–11)

## 2024-09-19 PROCEDURE — 36415 COLL VENOUS BLD VENIPUNCTURE: CPT

## 2024-09-19 PROCEDURE — 85025 COMPLETE CBC W/AUTO DIFF WBC: CPT

## 2024-09-19 PROCEDURE — 86481 TB AG RESPONSE T-CELL SUSP: CPT

## 2024-09-19 PROCEDURE — 86140 C-REACTIVE PROTEIN: CPT

## 2024-09-19 PROCEDURE — 80053 COMPREHEN METABOLIC PANEL: CPT

## 2024-09-20 LAB
ALBUMIN SERPL BCG-MCNC: 4.5 G/DL (ref 3.5–5.2)
ALP SERPL-CCNC: 92 U/L (ref 40–150)
ALT SERPL W P-5'-P-CCNC: 18 U/L (ref 0–50)
ANION GAP SERPL CALCULATED.3IONS-SCNC: 10 MMOL/L (ref 7–15)
AST SERPL W P-5'-P-CCNC: 23 U/L (ref 0–45)
BILIRUB SERPL-MCNC: 0.3 MG/DL
BUN SERPL-MCNC: 13.9 MG/DL (ref 8–23)
CALCIUM SERPL-MCNC: 9.7 MG/DL (ref 8.8–10.4)
CHLORIDE SERPL-SCNC: 101 MMOL/L (ref 98–107)
CREAT SERPL-MCNC: 0.73 MG/DL (ref 0.51–0.95)
CRP SERPL-MCNC: <3 MG/L
EGFRCR SERPLBLD CKD-EPI 2021: >90 ML/MIN/1.73M2
GLUCOSE SERPL-MCNC: 92 MG/DL (ref 70–99)
HCO3 SERPL-SCNC: 26 MMOL/L (ref 22–29)
POTASSIUM SERPL-SCNC: 4 MMOL/L (ref 3.4–5.3)
PROT SERPL-MCNC: 7.2 G/DL (ref 6.4–8.3)
SODIUM SERPL-SCNC: 137 MMOL/L (ref 135–145)

## 2024-09-23 LAB
GAMMA INTERFERON BACKGROUND BLD IA-ACNC: 0.05 IU/ML
M TB IFN-G BLD-IMP: NEGATIVE
M TB IFN-G CD4+ BCKGRND COR BLD-ACNC: 9.95 IU/ML
MITOGEN IGNF BCKGRD COR BLD-ACNC: 0.02 IU/ML
MITOGEN IGNF BCKGRD COR BLD-ACNC: 0.07 IU/ML
QUANTIFERON MITOGEN: 10 IU/ML
QUANTIFERON NIL TUBE: 0.05 IU/ML
QUANTIFERON TB1 TUBE: 0.12 IU/ML
QUANTIFERON TB2 TUBE: 0.07

## 2024-09-26 ENCOUNTER — VIRTUAL VISIT (OUTPATIENT)
Dept: GASTROENTEROLOGY | Facility: CLINIC | Age: 64
End: 2024-09-26
Attending: PHYSICIAN ASSISTANT
Payer: COMMERCIAL

## 2024-09-26 VITALS — HEIGHT: 63 IN | WEIGHT: 125 LBS | BODY MASS INDEX: 22.15 KG/M2

## 2024-09-26 DIAGNOSIS — K50.018 CROHN'S DISEASE OF SMALL INTESTINE WITH OTHER COMPLICATION (H): Primary | ICD-10-CM

## 2024-09-26 PROCEDURE — 99214 OFFICE O/P EST MOD 30 MIN: CPT | Mod: 95 | Performed by: PHYSICIAN ASSISTANT

## 2024-09-26 ASSESSMENT — PAIN SCALES - GENERAL: PAINLEVEL: NO PAIN (0)

## 2024-09-26 NOTE — PROGRESS NOTES
"Virtual Visit Details    Type of service:  Video Visit     Originating Location (pt. Location): Home    Distant Location (provider location):  Off-site  Platform used for Video Visit: David    IBD CLINIC VISIT, follow up    REASON FOR CONSULTATION: Crohn's Disease, GERD    HPI: 63 year old female w/ h/o ileal Crohn's Disease per 3/2017 ileocolonoscopy biopsies previously on PO 5-ASA, Lichen Planus (oral/perianal involvement), Hidradenitis Suppurativa s/p excision/drainage 1975, prior presumed diagnosis of Collagenous Colitis per colonic biopsies 2006 (though suspecting evolving IBD presentation in retrospect following 3/2017 highly-suggestive biopsies of terminal ileum + bland colonic biopsies), GERD presenting for f/u Crohn's Disease.  History summarized from her documentation.    8/2018 prompted an increase in 5ASA to 4.8g daily due to intermittent \"flares\" she had been experiencing, despite labs within normal limits.  Since the increased, she has had great symptomatic relieve and reports one incidence of loose stool, otherwise no episodes concerning for flares.  She is currently having 3 stools per day, soft and formed without blood in the stool.  No urgency or nighttime stools.      She had noted a \"tear\" by her anus and had this evaluated by PCP and by her GYN. It was thought to be due to lichen sclerosis (per GYN) and was prescribed a steroid cream.  She notes improvement already even with vaseline as instructed by her PCP. She did not have any pain with a BM associated with this finding.      She had noted a lump on right side of buttocks that has been very bothersome, inhibiting much of her activity.  It is adjacent to previous pilonidal cyst repair. No drainage and no fevers. MRI showed post-surgical cyst.      labs show elevated creatinine and slightly abnormal LFTs. Based on symptoms with concern for active flares (patient reported, despite normal labs) dose was increased Spring 2019. Normal Creatinine " 2/2019 prior to dose increase. Labs with primary care show elevated creatinine (0.6--> 1.29). LFT slightly increased, AST 72 and ALT 92. Normal alk phos. after last visit, plan was to hold Lialda.  Underwent colonoscopy 12/3/2019 with simple endoscopic score for Crohn's disease to be 0.    Interval history December 8, 2020:  Since the patient's last visit, overall she has been doing very well.  Did have one episode of symptoms in early October.  She woke up at 4 AM with abdominal pain, had 1 day of watery stools.  Then pain subsided later that day.  Did have urgency with bowel movements.  Denied melena or hematochezia during this episode.  After this her stools returned back to normal.  Has on average 2 bowel movements a day one before morning and 1 before work.  No abdominal pain at baseline.  Retching greasy foods can trigger symptoms.  Has maybe had 1 nocturnal bowel movement.  Consistency of stool is soft and formed.  Continues to take probiotic which she finds very helpful.    For her GERD, she is taking 40 mg of omeprazole.  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  This improves quickly with drinking water.  She is currently taking a multivitamin.    Interval history 4/20/2021   Kelly was last seen 12/8/2020 with Alice Gonzalez in GI clinic. She was doing well at that time and has been off mesalamine since 2019. Kelly reporting having a Crohn's flare in February of this year. Similar to her previous flares, she had abdominal pain and loose stools for 8 hours and then felt fatigued for 2 more days but had return of normal bowel pattern. No hematochezia, nausea/vomiting, fever, or abdominal pain. She called into clinic and was started on budesonide for 1 month. After 1-2 weeks of budesonide she had fecal calprotectin  "checked that was 95. Enterography was also done and did not show any evidence of active inflammation. Since this flare she had one day at the end of March where she had urgency but otherwise has been doing well. These have been her two \"flares\" of Crohn's this year. She had two similar flares in  and 3 in . Currently having her typical 2-3 soft formed stools per day. She does note some sores on her tongue which are not very painful but occur every few weeks. She is unsure if this is related to CD or Lichen planus which typically affects her oral cavity.    Of note last colonoscopy was in 2019 and showed Simple Endoscopic Score for Crohn's Disease: 0    Luigi Holloway Index:  2021  Well-bein (very well)  Abdomina pain: 0 (none)  # liquid/soft stools: 2   Abdominal mass 0 (none)  Extraluminal comlications    - Aphthous ulcers 1 - although unclear if from LP or CD  Score: 3      Interval history 2021   Recent cscope findings below.     Luigi Holloway Index:  Well-bein (very well)  Abdominal pain: 1 (mild)  # liquid/soft stools: occasionally, up to 6 per day   Abdominal mass 0 (none)  Extraluminal comlications    - Aphthous ulcers 1 - although unclear if from LP or CD    Dad with SCC. Son has MS. Mom with breast cancer in her 40s.     ROS: 10pt ROS performed and otherwise negative.    Interval history 2021 (video visit)  Overall feeling well, but with a few, self-limited episodes of loose stools and abd pain that lasts a few hours.  No weight loss.     High fiber foods might cause abd pain.      Luigi Holloway Index:  Well-bein (very well)  Abdominal pain: 1 (mild)  # liquid/soft stools: 0;  2-3 per day (formed)   Abdominal mass 0 (none)  Extraluminal complications    - Aphthous ulcers 1 - although unclear if from LP or CD. Has been present over the past few months.    Interval history 2021 (video visit)  Continues to feel well overall. Does have occasional urgency. Has 2-3 BMs per " day, soft, well-formed. No blood. No weight loss. No abdominal pain.    Interval history, 3/10/22  First injection was 2/27/22. Has been stressed as daughter has been going through some pregnancy losses. A month ago had a couple episodes abdominal cramping associated with BMs, now is back to baseline. Currently having 2 stools per day that are formed without blood. Some urgency. No nighttime stools or fecal incontinence. Would be due  for next injection April 24.    Interval hx 11/9/22  Feeling well, some stressors include recent mammogram requiring additional imaging, step mother broke hip and daughter going through infertility w/ multiple losses. Bowel pattern has been stable, no blood or urgency. Compliant with injections.    Interval hx 5/23/23  Urgency and frequency that started at the end of March, which was a week before she was due for her injection. She started entocort 4/29/23 at 9 mg which she continues. This also led to nighttime stools. Had to leave work due to symptoms. Last injection was 5/11 which was first 6 week interval. Started to feel better just as of last week. No new EIM. Fatigue is improving.     Interval hx 11/7/23  During pred taper had return of sxs at 20mg and had to go back to 30mg. Turned the corner 10/28, now having formed stools, avg 2 BM daily no blood. No more nighttime stools. She is currently on 25 mg and moves to 20mg tomorrow. Back is healing and still doing PT. No new joint pain. No new skin manifestations. No mouth sores.     Interval hx 3/26/24  Feeling well at this time (for the last 3 weeks). 1-2 BMs daily. No blood or urgency. Off of prednisone since Dec. Never ended up needing entocort. No waning effect on stelara.   No new joint pain. Noticed a tremor in your leg, and notices that she cups her hand. Writing has been getting smaller. No other cognitive concerns.   Crook being a grandma! (Granddaughter mai)     Interval hx 9/26/24:  One incident July 23 with symptoms  diarrhea after taking magnesium supp which she stopped. 1-2 stools per day, stools are formed, no blood or urgency. Saw neurology and sharita diagnosed with parkinsons. She has a brain MRI next week.  Refractured foot, stretch and will see for follow up in 6 weeks. May need surgery.    PERTINENT PAST MEDICAL/SURGICAL HISTORY:  As noted above.    ENDOSCOPIC EVALUATION:  icscope 11/2021 showed SES-CD 4    The Simple Endoscopic Score for Crohn's Disease was determined based on        the endoscopic appearance of the mucosa in the following segments:        - Ileum: Findings include aphthous ulcers less than 0.5 cm in size, less        than 10% ulcerated surfaces, less than 50% of surfaces affected and no        narrowings. Segment score: 3.        - Right Colon: Findings include no ulcers present, no ulcerated        surfaces, less than 50% of surfaces affected, no narrowings and no        ulcers present, no ulcerated surfaces, no affected surfaces and no        narrowings. Segment score: 1.        - Transverse Colon: Findings include no ulcers present, no ulcerated        surfaces, no affected surfaces and no narrowings. Segment score: 0.        - Left Colon: Findings include no ulcers present, no ulcerated surfaces,        no affected surfaces and no narrowings. Segment score: 0.        - Rectum: Findings include no ulcers present, no ulcerated surfaces, no        affected surfaces and no narrowings. Segment score: 0.        - Total SES-CD aggregate score: 4. Biopsies were taken with a cold        forceps for histology.     PATH:     A.  TERMINAL ILEUM, BIOPSY:  Ileal mucosa with no granulomas, cryptitis or other histologic evidence of active Crohn; negative for dysplasia     B. CECUM, BIOPSY:  Colonic mucosa with no granulomas, cryptitis or other histologic evidence of active Crohn; negative for dysplasia        icscope 5/24/2021 showed SES-CD 5 with small ulcers in the TI. Bx normal.  Bx:     SPECIMEN(S):   A: Ileum  biopsy   B: Cecal biopsy     FINAL DIAGNOSIS:   A. Ileum, Biopsy:   Ileal mucosa with no granulomas, cryptitis or other histologic evidence of    active Crohn; negative for dysplasia     B. Cecum, Biopsy:   Colonic mucosa with no granulomas, cryptitis or other histologic evidence   of active Crohn; negative for dysplasia       12/2019 Impression:          - Simple Endoscopic Score for Crohn's Disease: 0,                        mucosal inflammatory changes secondary to Crohn's                        disease, in remission. Biopsied.                        - Diverticulosis in the sigmoid colon.                        - Non-bleeding internal hemorrhoids.   A. ILEUM, BIOPSY:   - Small intestinal mucosa with no significant histologic abnormality   - Negative for dysplasia     B. CECUM, BIOPSY:   - Colonic mucosa with no significant histologic abnormality   - Negative for dysplasia     C. COLON, ASCENDING, BIOPSY:   - Colonic mucosa with no significant histologic abnormality   - Negative for dysplasia     D. COLON, TRANSVERSE, BIOPSY:   - Colonic mucosa with no significant histologic abnormality   - Negative for dysplasia     E. COLON, DESCENDING,  BIOPSY:   - Colonic mucosa with no significant histologic abnormality   - Negative for dysplasia     F. COLON, SIGMOID, BIOPSY:   - Colonic mucosa with no significant histologic abnormality   - Negative for dysplasia     G. RECTUM, BIOPSY:   - Colonic mucosa with no significant histologic abnormality   - Negative for dysplasia     PERTINENT MEDICATIONS:  -No current Crohn's medications  Medications reviewed with patient today, see Medication List/Assessment for details.  No other NSAID/anticoagulation reported by patient.  No other OTC/herbal/supplements reported by patient.    SOCIAL HISTORY: Tobacco: none.    PHYSICAL EXAMINATION: video visit exam     General appearance  Healthy appearing adult, in no acute distress     Eyes  Sclera anicteric  Pupils round and reactive to  light     Ears, nose, mouth and throat  No obvious external lesions of ears and nose  Hearing intact     Neck  Symmetric  No obvious external lesions     Respiratory  Normal respiration, no use of accessory muscles      MSK  Gait normal     Skin  No rashes or jaundice      Psychiatric  Oriented to person, place and time  Appropriate mood and affect.       PERTINENT STUDIES:  Fecal calprotectin 226 (4/13/21) from 95 (2/19/21)   9/2021    MR ENTEROGRAPHY: 2/25/2021   No evidence of active inflammation of the small bowel. No evidence of  stricture or fistula.     ASSESSMENT/PLAN:  1. Ileal Crohn's Disease, moderate  Current medication: Stelara (started 12/27/21), moved to q4 weeks Sept 2023.   Current clinical disease activity: remission   Current endoscopic disease activity: icscope 10/2022 showed SES-CD 0    Diagnosed 3/2017. Disease limited to ileum. Initially on mesalamine until 11/2019 when it was stopped for RAMSEY.  Due to active disease on cscope 11/2021, started on stelara, achieving endoscopic and histologic remission colonoscopy 10/2022. New symptoms with elevated fecal sammy 4/2023 prompted change to every 6 week dosing, which allowed for improvement until September 2023.  Repeat fecal calprotectin 9/19/2023 showed elevated to 251.  Proceeded with request to move to every 4 weeks and she has had 1, soon to be 2 injections this week at this interval.  She was unsuccessful with symptom management on Entocort and required transition to a prednisone taper.  Unfortunately she did not tolerate the decrease from 30 mg to 20 mg and required a slower taper, currently at 25 mg, ultimately discontinued end of 2023.  Cscope confirmed mucosal healing at the q4 week interval w/ stelara.      She was recently seen by neurology and it is suspected she has parkinsons. She has an MRI next week.    We will plan for the following:    PLAN  ---Continue with stelara every 4 weeks  -- Labs to include CBC, CRP, ESR, LFTs every 3  months     Routine IBD care:   - recommend supplementation vitamin D 1000 units daily and calcium 500 mg twice daily.Patient confirmed she is taking.   -- Vaccines/immunizations to be updated: Recommend yearly flu shot, pneumonia vaccines (Prevnar 13 then 8 weeks later Pneumovax 23 then 5 years later Pneumovax 23), tetanus every 10 years. Kelly is UTD with Shingles vaccine (she did have optic shingles in the past)   - No NSAIDs (ibuprofen, or anything containing ibuprofen)     2.  GERD  Patient has longstanding GERD symptoms, overall well controlled on omeprazole 40 mg a day.      2. Cancer Screening  No PSC, no high-risk FH CRC (2nd cousin dx'd in 2017 w/ advanced CRC @ 57yo). Given colonoscopy in 2019 without adenomatous lesions, recommend repeat colonoscopy for routine screening in 2029 unless symptomatic sooner.    RTC in 6 months     Thank you for this consultation. It was a pleasure to participate in the care of this patient; please contact us with any further questions.      Benjamin Brooks PA-C  Division of Gastroenterology, Hepatology and Nutrition  [facility]

## 2024-09-26 NOTE — NURSING NOTE
Current patient location: Patient declined to provide     Is the patient currently in the state of [place]? YES    Visit mode:VIDEO    If the visit is dropped, the patient can be reconnected by: VIDEO VISIT: Text to cell phone:   Telephone Information:   Mobile [phone]       Will anyone else be joining the visit? NO  (If patient encounters technical issues they should call [phone] :[phone])    How would you like to obtain your AVS? MyChart    Are changes needed to the allergy or medication list? No    Are refills needed on medications prescribed by this physician? NO    Rooming Documentation:  Questionnaire(s) completed    Reason for visit: Video Visit (Recheck)    Zoila GARCIA

## 2024-09-26 NOTE — PATIENT INSTRUCTIONS
It was a pleasure taking care of you today.  I've included a brief summary of our discussion and care plan from today's visit below.  Please review this information with your primary care provider.  ______________________________________________________________________    My recommendations are summarized as follows:    -- Continue stelara every 4 weeks  -- Labs every 3 months   -- Patient with IBD we recommend supplementation vitamin D 1000 units daily and calcium 500 mg twice daily.  -- Vaccines/immunizations to be updated: Recommend yearly flu shot, pneumonia vaccines (Prevnar 13 then 8 weeks later Pneumovax 23 then 5 years later Pneumovax 23), tetanus every 10 years.  -- No NSAIDs (ibuprofen, or anything containing ibuprofen)     For additional resources about inflammatory bowel disease visit http://www.crohnscolitisfoundation.org/    To learn more about Diet and Nutrition in the setting of IBD, check out some of these resources:  https://www.crohnscolitisfoundation.org/diet-and-nutrition/what-should-i-eat  https://www.nimbal.org/  https://ntforibd.org/    Return to GI Clinic in 6 months to review your progress.    ______________________________________________________________________    How do I schedule labs, imaging studies, or procedures that were ordered in clinic today?     Labs: To schedule lab appointment at the [facility], use my chart or call [phone]. If you have a [place] lab closer to home where you are regularly seen you can give them a call.     Procedures: If a colonoscopy, upper endoscopy, breath test, esophageal manometry, or pH impedence was ordered today, our endoscopy team will call you to schedule this. If you have not heard from our endoscopy team within a week, please call [phone] to schedule.     Imaging Studies: If you were scheduled for a CT scan, X-ray, MRI, ultrasound, HIDA scan or other imaging study, please call [phone] to have this scheduled.      Referral: If a referral to another specialty was ordered, expect a phone call or follow instructions above. If you have not heard from anyone regarding your referral in a week, please call our clinic to check the status.     Who do I call with any questions after my visit?  Please be in touch if there are any further questions that arise following today's visit.  There are multiple ways to contact your gastroenterology care team.      During business hours, you may reach a Gastroenterology nurse at [phone]    To schedule or reschedule an appointment, please call [phone].     You can always send a secure message through Vadio.  [employer]adio messages are answered by your nurse or doctor typically within 24 hours.  Please allow extra time on weekends and holidays.      For urgent/emergent questions after business hours, you may reach the on-call GI Fellow by contacting the [facility]  at [phone].     How will I get the results of any tests ordered?    You will receive all of your results.  If you have signed up for [employer], any tests ordered at your visit will be available to you after your physician reviews them.  Typically this takes 1-2 weeks.  If there are urgent results that require a change in your care plan, your physician or nurse will call you to discuss the next steps.      What is [employer]io?  [employer]adio is a secure way for you to access all of your healthcare records from the [facility].  It is a web based computer program, so you can sign on to it from any location.  It also allows you to send secure messages to your care team.  I recommend signing up for Vadio access if you have not already done so and are comfortable with using a computer.         Sincerely,    Benjamin Brooks PA-C  [facility]  Division of Gastroenterology

## 2024-09-26 NOTE — LETTER
"9/26/2024      Aileen Priest  [address]  [place] [address]      Dear Colleague,    Thank you for referring your patient, Aileen Priest, to the [facility] [place]. Please see a copy of my visit note below.    Virtual Visit Details    Type of service:  Video Visit     Originating Location (pt. Location): Home    Distant Location (provider location):  Off-site  Platform used for Video Visit: [facility]    IBD CLINIC VISIT, follow up    REASON FOR CONSULTATION: Crohn's Disease, GERD    HPI: 63 year old female w/ h/o ileal Crohn's Disease per 3/2017 ileocolonoscopy biopsies previously on PO 5-ASA, Lichen Planus (oral/perianal involvement), Hidradenitis Suppurativa s/p excision/drainage 1975, prior presumed diagnosis of Collagenous Colitis per colonic biopsies 2006 (though suspecting evolving IBD presentation in retrospect following 3/2017 highly-suggestive biopsies of terminal ileum + bland colonic biopsies), GERD presenting for f/u Crohn's Disease.  History summarized from her documentation.    8/2018 prompted an increase in 5ASA to 4.8g daily due to intermittent \"flares\" she had been experiencing, despite labs within normal limits.  Since the increased, she has had great symptomatic relieve and reports one incidence of loose stool, otherwise no episodes concerning for flares.  She is currently having 3 stools per day, soft and formed without blood in the stool.  No urgency or nighttime stools.      She had noted a \"tear\" by her anus and had this evaluated by PCP and by her GYN. It was thought to be due to lichen sclerosis (per GYN) and was prescribed a steroid cream.  She notes improvement already even with vaseline as instructed by her PCP. She did not have any pain with a BM associated with this finding.      She had noted a lump on right side of buttocks that has been very bothersome, inhibiting much of her activity.  It is adjacent to previous pilonidal cyst repair. No " drainage and no fevers. MRI showed post-surgical cyst.      labs show elevated creatinine and slightly abnormal LFTs. Based on symptoms with concern for active flares (patient reported, despite normal labs) dose was increased Spring 2019. Normal Creatinine 2/2019 prior to dose increase. Labs with primary care show elevated creatinine (0.6--> 1.29). LFT slightly increased, AST 72 and ALT 92. Normal alk phos. after last visit, plan was to hold Lialda.  Underwent colonoscopy 12/3/2019 with simple endoscopic score for Crohn's disease to be 0.    Interval history December 8, 2020:  Since the patient's last visit, overall she has been doing very well.  Did have one episode of symptoms in early October.  She woke up at 4 AM with abdominal pain, had 1 day of watery stools.  Then pain subsided later that day.  Did have urgency with bowel movements.  Denied melena or hematochezia during this episode.  After this her stools returned back to normal.  Has on average 2 bowel movements a day one before morning and 1 before work.  No abdominal pain at baseline.  Retching greasy foods can trigger symptoms.  Has maybe had 1 nocturnal bowel movement.  Consistency of stool is soft and formed.  Continues to take probiotic which she finds very helpful.    For her GERD, she is taking 40 mg of omeprazole.  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  This improves quickly with drinking water.  She is currently taking a multivitamin.    Interval history 4/20/2021   Kelly was last seen 12/8/2020 with Alice Gonzalez in GI clinic. She was doing well at that time and has been off mesalamine since 2019. Kelly reporting having a Crohn's flare in February of this year. Similar to her previous flares, she had abdominal pain and loose stools for 8 hours and  "then felt fatigued for 2 more days but had return of normal bowel pattern. No hematochezia, nausea/vomiting, fever, or abdominal pain. She called into clinic and was started on budesonide for 1 month. After 1-2 weeks of budesonide she had fecal calprotectin checked that was 95. Enterography was also done and did not show any evidence of active inflammation. Since this flare she had one day at the end of March where she had urgency but otherwise has been doing well. These have been her two \"flares\" of Crohn's this year. She had two similar flares in  and 3 in . Currently having her typical 2-3 soft formed stools per day. She does note some sores on her tongue which are not very painful but occur every few weeks. She is unsure if this is related to CD or Lichen planus which typically affects her oral cavity.    Of note last colonoscopy was in 2019 and showed Simple Endoscopic Score for Crohn's Disease: 0    Luigi Holloway Index:  2021  Well-bein (very well)  Abdomina pain: 0 (none)  # liquid/soft stools: 2   Abdominal mass 0 (none)  Extraluminal comlications    - Aphthous ulcers 1 - although unclear if from LP or CD  Score: 3      Interval history 2021   Recent cscope findings below.     Luigi Holloway Index:  Well-bein (very well)  Abdominal pain: 1 (mild)  # liquid/soft stools: occasionally, up to 6 per day   Abdominal mass 0 (none)  Extraluminal comlications    - Aphthous ulcers 1 - although unclear if from LP or CD    Dad with SCC. Son has MS. Mom with breast cancer in her 40s.     ROS: 10pt ROS performed and otherwise negative.    Interval history 2021 (video visit)  Overall feeling well, but with a few, self-limited episodes of loose stools and abd pain that lasts a few hours.  No weight loss.     High fiber foods might cause abd pain.      Luigi Holloway Index:  Well-bein (very well)  Abdominal pain: 1 (mild)  # liquid/soft stools: 0;  2-3 per day (formed)   Abdominal mass 0 " (none)  Extraluminal complications    - Aphthous ulcers 1 - although unclear if from LP or CD. Has been present over the past few months.    Interval history 11/2021 (video visit)  Continues to feel well overall. Does have occasional urgency. Has 2-3 BMs per day, soft, well-formed. No blood. No weight loss. No abdominal pain.    Interval history, 3/10/22  First injection was 2/27/22. Has been stressed as daughter has been going through some pregnancy losses. A month ago had a couple episodes abdominal cramping associated with BMs, now is back to baseline. Currently having 2 stools per day that are formed without blood. Some urgency. No nighttime stools or fecal incontinence. Would be due  for next injection April 24.    Interval hx 11/9/22  Feeling well, some stressors include recent mammogram requiring additional imaging, step mother broke hip and daughter going through infertility w/ multiple losses. Bowel pattern has been stable, no blood or urgency. Compliant with injections.    Interval hx 5/23/23  Urgency and frequency that started at the end of March, which was a week before she was due for her injection. She started entocort 4/29/23 at 9 mg which she continues. This also led to nighttime stools. Had to leave work due to symptoms. Last injection was 5/11 which was first 6 week interval. Started to feel better just as of last week. No new EIM. Fatigue is improving.     Interval hx 11/7/23  During pred taper had return of sxs at 20mg and had to go back to 30mg. Turned the corner 10/28, now having formed stools, avg 2 BM daily no blood. No more nighttime stools. She is currently on 25 mg and moves to 20mg tomorrow. Back is healing and still doing PT. No new joint pain. No new skin manifestations. No mouth sores.     Interval hx 3/26/24  Feeling well at this time (for the last 3 weeks). 1-2 BMs daily. No blood or urgency. Off of prednisone since Dec. Never ended up needing entocort. No waning effect on stelara.    No new joint pain. Noticed a tremor in your leg, and notices that she cups her hand. Writing has been getting smaller. No other cognitive concerns.   Helper being a grandma! (Granddaughter mai)     Interval hx 9/26/24:  One incident July 23 with symptoms diarrhea after taking magnesium supp which she stopped. 1-2 stools per day, stools are formed, no blood or urgency. Saw neurology and sharita diagnosed with parkinsons. She has a brain MRI next week.  Refractured foot, stretch and will see for follow up in 6 weeks. May need surgery.    PERTINENT PAST MEDICAL/SURGICAL HISTORY:  As noted above.    ENDOSCOPIC EVALUATION:  icscope 11/2021 showed SES-CD 4    The Simple Endoscopic Score for Crohn's Disease was determined based on        the endoscopic appearance of the mucosa in the following segments:        - Ileum: Findings include aphthous ulcers less than 0.5 cm in size, less        than 10% ulcerated surfaces, less than 50% of surfaces affected and no        narrowings. Segment score: 3.        - Right Colon: Findings include no ulcers present, no ulcerated        surfaces, less than 50% of surfaces affected, no narrowings and no        ulcers present, no ulcerated surfaces, no affected surfaces and no        narrowings. Segment score: 1.        - Transverse Colon: Findings include no ulcers present, no ulcerated        surfaces, no affected surfaces and no narrowings. Segment score: 0.        - Left Colon: Findings include no ulcers present, no ulcerated surfaces,        no affected surfaces and no narrowings. Segment score: 0.        - Rectum: Findings include no ulcers present, no ulcerated surfaces, no        affected surfaces and no narrowings. Segment score: 0.        - Total SES-CD aggregate score: 4. Biopsies were taken with a cold        forceps for histology.     PATH:     A.  TERMINAL ILEUM, BIOPSY:  Ileal mucosa with no granulomas, cryptitis or other histologic evidence of active Crohn; negative for  dysplasia     B. CECUM, BIOPSY:  Colonic mucosa with no granulomas, cryptitis or other histologic evidence of active Crohn; negative for dysplasia        icscope 5/24/2021 showed SES-CD 5 with small ulcers in the TI. Bx normal.  Bx:     SPECIMEN(S):   A: Ileum biopsy   B: Cecal biopsy     FINAL DIAGNOSIS:   A. Ileum, Biopsy:   Ileal mucosa with no granulomas, cryptitis or other histologic evidence of    active Crohn; negative for dysplasia     B. Cecum, Biopsy:   Colonic mucosa with no granulomas, cryptitis or other histologic evidence   of active Crohn; negative for dysplasia       12/2019 Impression:          - Simple Endoscopic Score for Crohn's Disease: 0,                        mucosal inflammatory changes secondary to Crohn's                        disease, in remission. Biopsied.                        - Diverticulosis in the sigmoid colon.                        - Non-bleeding internal hemorrhoids.   A. ILEUM, BIOPSY:   - Small intestinal mucosa with no significant histologic abnormality   - Negative for dysplasia     B. CECUM, BIOPSY:   - Colonic mucosa with no significant histologic abnormality   - Negative for dysplasia     C. COLON, ASCENDING, BIOPSY:   - Colonic mucosa with no significant histologic abnormality   - Negative for dysplasia     D. COLON, TRANSVERSE, BIOPSY:   - Colonic mucosa with no significant histologic abnormality   - Negative for dysplasia     E. COLON, DESCENDING,  BIOPSY:   - Colonic mucosa with no significant histologic abnormality   - Negative for dysplasia     F. COLON, SIGMOID, BIOPSY:   - Colonic mucosa with no significant histologic abnormality   - Negative for dysplasia     G. RECTUM, BIOPSY:   - Colonic mucosa with no significant histologic abnormality   - Negative for dysplasia     PERTINENT MEDICATIONS:  -No current Crohn's medications  Medications reviewed with patient today, see Medication List/Assessment for details.  No other NSAID/anticoagulation reported by  patient.  No other OTC/herbal/supplements reported by patient.    SOCIAL HISTORY: Tobacco: none.    PHYSICAL EXAMINATION: video visit exam     General appearance  Healthy appearing adult, in no acute distress     Eyes  Sclera anicteric  Pupils round and reactive to light     Ears, nose, mouth and throat  No obvious external lesions of ears and nose  Hearing intact     Neck  Symmetric  No obvious external lesions     Respiratory  Normal respiration, no use of accessory muscles      MSK  Gait normal     Skin  No rashes or jaundice      Psychiatric  Oriented to person, place and time  Appropriate mood and affect.       PERTINENT STUDIES:  Fecal calprotectin 226 (4/13/21) from 95 (2/19/21)   9/2021    MR ENTEROGRAPHY: 2/25/2021   No evidence of active inflammation of the small bowel. No evidence of  stricture or fistula.     ASSESSMENT/PLAN:  1. Ileal Crohn's Disease, moderate  Current medication: Stelara (started 12/27/21), moved to q4 weeks Sept 2023.   Current clinical disease activity: remission   Current endoscopic disease activity: icscope 10/2022 showed SES-CD 0    Diagnosed 3/2017. Disease limited to ileum. Initially on mesalamine until 11/2019 when it was stopped for RAMSEY.  Due to active disease on cscope 11/2021, started on stelara, achieving endoscopic and histologic remission colonoscopy 10/2022. New symptoms with elevated fecal sammy 4/2023 prompted change to every 6 week dosing, which allowed for improvement until September 2023.  Repeat fecal calprotectin 9/19/2023 showed elevated to 251.  Proceeded with request to move to every 4 weeks and she has had 1, soon to be 2 injections this week at this interval.  She was unsuccessful with symptom management on Entocort and required transition to a prednisone taper.  Unfortunately she did not tolerate the decrease from 30 mg to 20 mg and required a slower taper, currently at 25 mg, ultimately discontinued end of 2023.  Cscope confirmed mucosal healing at the  q4 week interval w/ stelara.      She was recently seen by neurology and it is suspected she has parkinsons. She has an MRI next week.    We will plan for the following:    PLAN  ---Continue with stelara every 4 weeks  -- Labs to include CBC, CRP, ESR, LFTs every 3 months     Routine IBD care:   - recommend supplementation vitamin D 1000 units daily and calcium 500 mg twice daily.Patient confirmed she is taking.   -- Vaccines/immunizations to be updated: Recommend yearly flu shot, pneumonia vaccines (Prevnar 13 then 8 weeks later Pneumovax 23 then 5 years later Pneumovax 23), tetanus every 10 years. Kelly is UTD with Shingles vaccine (she did have optic shingles in the past)   - No NSAIDs (ibuprofen, or anything containing ibuprofen)     2.  GERD  Patient has longstanding GERD symptoms, overall well controlled on omeprazole 40 mg a day.      2. Cancer Screening  No PSC, no high-risk FH CRC (2nd cousin dx'd in 2017 w/ advanced CRC @ 55yo). Given colonoscopy in 2019 without adenomatous lesions, recommend repeat colonoscopy for routine screening in 2029 unless symptomatic sooner.    RTC in 6 months     Thank you for this consultation. It was a pleasure to participate in the care of this patient; please contact us with any further questions.      Benjamin Brooks PA-C  Division of Gastroenterology, Hepatology and Nutrition  [facility]        Again, thank you for allowing me to participate in the care of your patient.        Sincerely,        Benjamin Brooks PA-C

## 2024-10-02 ENCOUNTER — HOSPITAL ENCOUNTER (OUTPATIENT)
Dept: MRI IMAGING | Facility: CLINIC | Age: 64
Discharge: HOME | End: 2024-10-02
Attending: PSYCHIATRY & NEUROLOGY | Admitting: PSYCHIATRY & NEUROLOGY
Payer: COMMERCIAL

## 2024-10-02 DIAGNOSIS — G20.C PARKINSONISM, UNSPECIFIED PARKINSONISM TYPE (H): ICD-10-CM

## 2024-10-02 DIAGNOSIS — R29.898 RIGHT HAND WEAKNESS: ICD-10-CM

## 2024-10-02 PROCEDURE — 72141 MRI NECK SPINE W/O DYE: CPT

## 2024-10-02 PROCEDURE — 72141 MRI NECK SPINE W/O DYE: CPT | Mod: 26 | Performed by: RADIOLOGY

## 2024-10-02 PROCEDURE — 70551 MRI BRAIN STEM W/O DYE: CPT | Mod: 26 | Performed by: STUDENT IN AN ORGANIZED HEALTH CARE EDUCATION/TRAINING PROGRAM

## 2024-10-02 PROCEDURE — 70551 MRI BRAIN STEM W/O DYE: CPT

## 2024-10-07 ENCOUNTER — MYC MEDICAL ADVICE (OUTPATIENT)
Dept: NEUROLOGY | Facility: CLINIC | Age: 64
End: 2024-10-07
Payer: COMMERCIAL

## 2024-10-07 DIAGNOSIS — M48.02 CERVICAL STENOSIS OF SPINAL CANAL: ICD-10-CM

## 2024-10-07 DIAGNOSIS — G20.A1 PARKINSON'S DISEASE WITHOUT DYSKINESIA OR FLUCTUATING MANIFESTATIONS (H): ICD-10-CM

## 2024-10-07 DIAGNOSIS — H40.20X0 NARROW ANGLE GLAUCOMA OF RIGHT EYE: Primary | ICD-10-CM

## 2024-10-10 ENCOUNTER — TELEPHONE (OUTPATIENT)
Dept: GASTROENTEROLOGY | Facility: CLINIC | Age: 64
End: 2024-10-10
Payer: COMMERCIAL

## 2024-10-10 NOTE — TELEPHONE ENCOUNTER
Patient Contacted for the patient to call back and schedule the following:    Appointment type: Return IBD  Provider: Benjamin Brooks or Dr. Pollock  Return date: 6 mo (around 3/26/25)  Specialty phone number: [phone]  Additional appointment(s) needed: NA  Additonal Notes: Spoke with patient and scheduled appointment- patient then remembered they will be switching to medicare and wanted to cancel appointment at this time and will schedule on their own later once they figure out insurance. Verified they have CC number.

## 2024-10-11 PROBLEM — H40.20X0 NARROW ANGLE GLAUCOMA OF RIGHT EYE: Status: ACTIVE | Noted: 2024-10-11

## 2024-10-11 PROBLEM — G20.A1 PARKINSON'S DISEASE WITHOUT DYSKINESIA OR FLUCTUATING MANIFESTATIONS (H): Status: ACTIVE | Noted: 2024-10-11

## 2024-10-11 NOTE — TELEPHONE ENCOUNTER
Called Kelly.  Reviewed MRI brain results. Also reviewed MRI c spine results  Likely early Parkinsons disease  She is recovering from a iridotomy    Will refer to neurosurgery for cervical stenosis  I will contact [id number] coordinatr to review if the foot surgery will preclude enrollment    We will review treatment options at November appt.     La Platt MD

## 2024-10-14 ENCOUNTER — PATIENT OUTREACH (OUTPATIENT)
Dept: CARE COORDINATION | Facility: CLINIC | Age: 64
End: 2024-10-14
Payer: COMMERCIAL

## 2024-10-22 ENCOUNTER — PATIENT OUTREACH (OUTPATIENT)
Dept: CARE COORDINATION | Facility: CLINIC | Age: 64
End: 2024-10-22
Payer: COMMERCIAL

## 2024-10-22 ENCOUNTER — TRANSFERRED RECORDS (OUTPATIENT)
Dept: HEALTH INFORMATION MANAGEMENT | Facility: CLINIC | Age: 64
End: 2024-10-22
Payer: COMMERCIAL

## 2024-10-24 ENCOUNTER — VIRTUAL VISIT (OUTPATIENT)
Dept: PHARMACY | Facility: CLINIC | Age: 64
End: 2024-10-24
Attending: PHYSICIAN ASSISTANT
Payer: COMMERCIAL

## 2024-10-24 VITALS — HEIGHT: 63 IN | BODY MASS INDEX: 22.15 KG/M2 | WEIGHT: 125 LBS

## 2024-10-24 DIAGNOSIS — I10 HYPERTENSION, GOAL BELOW 140/90: ICD-10-CM

## 2024-10-24 DIAGNOSIS — K50.018 CROHN'S DISEASE OF SMALL INTESTINE WITH OTHER COMPLICATION (H): Primary | ICD-10-CM

## 2024-10-24 DIAGNOSIS — R12 HEARTBURN: ICD-10-CM

## 2024-10-24 DIAGNOSIS — Z78.9 TAKES DIETARY SUPPLEMENTS: ICD-10-CM

## 2024-10-24 RX ORDER — USTEKINUMAB 90 MG/ML
INJECTION, SOLUTION SUBCUTANEOUS
Qty: 1 ML | Refills: 5 | Status: SHIPPED | OUTPATIENT
Start: 2024-10-24

## 2024-10-24 ASSESSMENT — PAIN SCALES - GENERAL: PAINLEVEL_OUTOF10: NO PAIN (0)

## 2024-10-24 NOTE — PROGRESS NOTES
Medication Therapy Management (MTM) Encounter    ASSESSMENT:                            Medication Adherence/Access: No issues identified.    Crohn's Disease:  Kelly would benefit from continued treatment with Stelara 90 mg every 4 weeks. They are up to date on routine maintenance labs. They are up to date on annual tuberculosis screening.  No access issues for their advanced therapy are present. They are indicated for a few vaccinations which were recommended to them. She is getting adequate calcium from diet and supplementation. They are indicated for a DEXA scan and I recommend discussing this with PCP. Reminders for routine cancer screening were provided.     Hypertension: Home blood pressures at goal <140/90 mmHg per patient report. Continue management per PCP.    GERD: Stable.     Supplements: Stable. Recommend avoiding magnesium supplementation in future unless treating deficiency or otherwise medically indicated.    PLAN:                            Kelly will consider the following vaccines:  Annual flu shot  Pneumococcal pneumonia (Prevnar-20)- Prescription sent to [facility]  RSV vaccines  Updated COVID-19 vaccine   After discussing with my team, you can select any part D plan as they do not restrict patients to a particular specialty pharmacy. There are shipping restrictions on [facility] and [facility] ([facility]).   Discuss with your primary care provider when your next cervical cancer screening is due.   Reminder to discuss DEXA scan with your primary care provider.    Follow-up: 12/26/2024 at 11:30 AM (telephone)    SUBJECTIVE/OBJECTIVE:                          Kelly Priest is a 63 year old female seen for a follow-up visit.       Reason for visit: Stelara + IBD health maintenance.    Allergies/ADRs: Reviewed in chart  Past Medical History: Reviewed in chart  Tobacco: She reports that she quit smoking about 37 years ago. Her smoking use included cigarettes. She started smoking about 48  years ago. She has a 11 pack-year smoking history. She has never used smokeless tobacco.  Alcohol: 1-3 beverages / week      Medication Adherence/Access: no issues reported.    Crohn's Disease:  -Stelara 90 mg every 4 weeks  -Dexamethasone 0.5 mg/mL 5 mL swish and spit three to four times daily. It is important to keep the medication in the mouth for five minutes prior to spitting it out. Do not rinse afterward and avoid eating or drinking for 30 minutes.    Kelly's last day of work is  and has current insurance through . Will start medicare Part D. When she contacted [facility] they said they would need a benefit investigation form and enrollment form for Stelara.     Denies side effects or concerns with the Stelara.     Working on Medicare next Wednesday.     PRO-3 for Crohn's Disease    Please select the one best answer for the patient's ability at this time     Over the past week, how many liquid or soft stools have you had on average per day?   0 (When scoring, multiply number by 2=0)   Over the past week, please rate your average abdominal pain  None : 0 points  3. Over the past week, please rate your general well-being    Generally Well: 0 points    Score: 0  <13: Remission  13-21: Mild Activity   22-52: Moderate Activity  >/= 53: Severe Activity     Specialty medication department: [facility]  Prior authorization status: approved through 2024  Original start date: Stelara (started 21), moved to q4 weeks 2023   Last dose: 10/17/2024  Next dose: 2024  Subsequent dose: 2024  3rd dose from now: 2025    Last provider visit: 2024 KAMARI Brooks  Next provider visit: 2025 KAMARI Brooks  Last labs completed: 2024  Last Tb screenin2024  Lab frequency: every 3 months   - standing labs yes cbc with platelets & diff, CMP, CRP  2025  Next labs due: 2024    Last IBD Health Maintenance Review: 2024    IBD Health  "Maintenance    Vaccinations:  All patients on immunosuppression should avoid live vaccines unless specifically indicated.    -- Influenza (every year)- last 11/2023  -- TdaP (every 10 years)- last 1/2022  -- Pneumococcal Pneumonia               Prevnar-13: not on file              Pneumovax-23: not on file              Prevnar-20: not on file  -- COVID-19- 12/2020, 1/2021, 9/2021, 5/2022, 10/2022, decline  -- RSV- not on file, expresses hesitation    One time confirmation of immunity or serologies:  -- Hepatitis A (serologies or immunizations)- not immune by serology 2017  -- Hepatitis B (serologies or immunizations)- serology indicates immunity 2021  -- Varicella/Zoster               Varicella/Zoster- Shingrix 12/2018, 11/2019    Due to the immunosuppression in this patient, I would not advise administration of live vaccines such as varicella/VZV, intranasal influenza, MMR, or yellow fever vaccine (if traveling).      Immunosuppressive Screening:  -- Hep B Surface Antibody serologies indicate immunity 11/12/2021  -- Hep B Surface Antigen non-reactive 11/12/2021  -- Hep B Core Antibody non-reactive  11/12/2021  -- Hep C Antibody non-reactive 1/27/2017  -- Yearly assessment of TB negative 9/19/2024       Lab Results   Component Value Date    AUSAB 23.03 (H) 11/12/2021    HEPBANG Nonreactive 11/12/2021    HBCAB Nonreactive 11/12/2021    HCVAB  01/27/2017     Nonreactive   Assay performance characteristics have not been established for newborns,   infants, and children      TBRES Negative 09/19/2024       Bone mineral density screening   -- Recommend all patients supplement with calcium and vitamin D  -- Patient reports 1-2 servings of dairy/day   -- No prior DXA on file, per patient report she is planning to get one at age 64    Cancer Screening:  Colon cancer screening:  Per Benjamin Brooks PA-C 3/26/2024: \"No PSC, no high-risk FH CRC (2nd cousin dx'd in 2017 w/ advanced CRC @ 57yo). Given colonoscopy in 2019 without " "adenomatous lesions, recommend repeat colonoscopy for routine screening in 2029 unless symptomatic sooner.\"     Cervical cancer screening: Annual due to immunosupression last 5/18/2023 (see 1/30/24 encounter)     Skin cancer screening: Annual visual exam of skin by dermatologist since patient is immunocompromised- Derm appointment in November 2024     Depression Screening:    PHQ-2 Score:         9/26/2024    10:13 AM 1/30/2024    10:17 AM   PHQ-2 ( 1999 [employer])   Q1: Little interest or pleasure in doing things 0 0    Q2: Feeling down, depressed or hopeless 0 0    PHQ-2 Score 0 0   Q1: Little interest or pleasure in doing things  Not at all   Q2: Feeling down, depressed or hopeless  Not at all   PHQ-2 Score  0       Patient-reported       Research:  Are you interested in being contacted about enrollment in clinical research studies? No      Misc:  -- Avoid tobacco use  -- Avoid NSAIDs as there is potentially a 25% chance of causing an IBD flare    Hypertension:  Lisinopril 5 mg once daily  Patient self-monitors blood pressure at work occasionally. Suggested she do this in near future.     Patient reports goal of <130/80 mmHg. Has been measuring at work and it is.  Denies side effects or cocnerns.     BP Readings from Last 3 Encounters:   09/13/24 (!) 147/85   04/02/24 118/86   01/30/24 139/87     GERD:  Omeprazole 40 mg once daily    Patient has longstanding GERD symptoms, overall well controlled on omeprazole 40 mg a day.  Denies side effects or concerns.     Supplements:  Calcium citrate-Vitamin D 500 mg-12.5 mcg once daily    Stopped magnesium supplement, may have been causing loose stools/diarrhea.     Today's Vitals: Ht 5' 3\" (1.6 m)   Wt 125 lb (56.7 kg)   LMP  (LMP Unknown)   BMI 22.14 kg/m    ----------------      I spent 30 minutes with this patient today. All changes were made via collaborative practice agreement with Benjamin Brooks PA-C. A copy of the visit note was provided to the patient's " provider(s).    A summary of these recommendations was sent via raksul.    Smith Cano, PharmD, BCPS  [facility] Pharmacist   [facility]  Phone: [phone]    Telemedicine Visit Details  The patient's medications can be safely assessed via a telemedicine encounter.  Type of service:  Telephone visit  Originating Location (pt. Location): Home    Distant Location (provider location):  Off-site  Start Time:  8:30 AM  End Time:  9:00 AM     Medication Therapy Recommendations  No medication therapy recommendations to display

## 2024-10-24 NOTE — PROGRESS NOTES
"Virtual Visit Details    Type of service:  Telephone Visit   Phone call duration: *** minutes   Originating Location (pt. Location): {patient location:056727::\"Home\"}  {PROVIDER LOCATION On-site should be selected for visits conducted from your clinic location or adjoining [facility] hospital, academic office, or other nearby [facility] building. Off-site should be selected for all other provider locations, including home:[id number]}  Distant Location (provider location):  {virtual location provider:[id number]}  "

## 2024-10-24 NOTE — PATIENT INSTRUCTIONS
"Recommendations from today's MTM visit:                                                      Kelly will consider the following vaccines:  Annual flu shot  Pneumococcal pneumonia (Prevnar-20)- Prescription sent to [facility]  RSV vaccines  Updated COVID-19 vaccine   After discussing with my team, you can select any part D plan as they do not restrict patients to a particular specialty pharmacy. There are shipping restrictions on [facility] and [facility] ([facility]).   Discuss with your primary care provider when your next cervical cancer screening is due.   Reminder to discuss DEXA scan with your primary care provider.    Follow-up: 12/26/2024 at 11:30 AM (telephone)    It was great speaking with you today.  I value your experience and would be very thankful for your time in providing feedback in our clinic survey. In the next few days, you may receive an email or text message from [employer] with a link to a survey related to your  clinical pharmacist.\"     To schedule another MTM appointment, please call the clinic directly or you may call the MTM scheduling line at [phone] or toll-free at [phone].     My Clinical Pharmacist's contact information:                                                      Please feel free to contact me with any questions or concerns you have.      Smith Cano, PharmD, BCPS  MTM Pharmacist   [facility]astro[facility]  Phone: [phone]   "

## 2024-10-30 NOTE — TELEPHONE ENCOUNTER
SPINE PATIENTS - NEW PROTOCOL PREVISIT    RECORDS RECEIVED FROM: internal   REASON FOR VISIT: C5-6 cervical spinal stenosis and neural formainal stenosis    PROVIDER: Dr. Navarro   DATE OF APPT: 11/8/24   NOTES (FOR ALL VISITS) STATUS DETAILS   OFFICE NOTE from referring provider Internal Dr La Platt @ [facility]:  9/13/24   OFFICE NOTE from other specialist Care Everywhere Dr Homero Amezcua @ [facility]:  10/23/24  9/25/23  9/11/23   MEDICATION LIST Internal    IMAGING  (FOR ALL VISITS)     MRI (HEAD, NECK, SPINE) Internal [facility]:  MRI Cervical Spine 10/2/24

## 2024-11-02 NOTE — NURSING NOTE
Current patient location: [address] [place] [address]    Is the patient currently in the state of [place]? YES    Visit mode:TELEPHONE    If the visit is dropped, the patient can be reconnected by: TELEPHONE VISIT: Phone number:   Telephone Information:   Mobile [phone]       Will anyone else be joining the visit? NO  (If patient encounters technical issues they should call [phone])    Are changes needed to the allergy or medication list? No    Are refills needed on medications prescribed by this physician? NO    Rooming Documentation:  Questionnaire(s) not done per department protocol    Reason for visit: Consult    Anila GARCIA      Unknown

## 2024-11-08 ENCOUNTER — OFFICE VISIT (OUTPATIENT)
Dept: NEUROSURGERY | Facility: CLINIC | Age: 64
End: 2024-11-08
Attending: PSYCHIATRY & NEUROLOGY
Payer: COMMERCIAL

## 2024-11-08 ENCOUNTER — PRE VISIT (OUTPATIENT)
Dept: NEUROSURGERY | Facility: CLINIC | Age: 64
End: 2024-11-08

## 2024-11-08 ENCOUNTER — TELEPHONE (OUTPATIENT)
Dept: GASTROENTEROLOGY | Facility: CLINIC | Age: 64
End: 2024-11-08

## 2024-11-08 VITALS — DIASTOLIC BLOOD PRESSURE: 95 MMHG | OXYGEN SATURATION: 98 % | HEART RATE: 79 BPM | SYSTOLIC BLOOD PRESSURE: 171 MMHG

## 2024-11-08 DIAGNOSIS — M48.02 CERVICAL STENOSIS OF SPINAL CANAL: ICD-10-CM

## 2024-11-08 NOTE — Clinical Note
"11/8/2024       RE: Aileen Priest  [address]  [place] [address]     Dear Colleague,    Thank you for referring your patient, Aileen Priest, to the [facility] [place] [facility]. Please see a copy of my visit note below.    11/8/2024  Neurosurgery Clinic Visit    Chief Complaint: Cervical Stenosis    History of present illness:  Aileen Priest is a 64 year old female recently diagnosed with parkinson's disease, presenting on referral from her neurologist for concerns of intermittent neck pain and right upper extremity weakness. Patient works as a bedside nurse, and reports neck discomfort with associated numbness to the right 3rd-5th digits at the end of her work shift, which she attributes to neck hyperextension while looking up at the monitors, as well as helping to move patients. She describes this as \"fatigue\" in the right hand. At its worst, the neck discomfort is rated 9/10, takes Tylenol which does not completely resolve the ache.     Denies radicular pain to the upper extremities, and clumsiness. No recent history of dropping items, or gait instability. Patient also denies bladder or bowel incontinence and saddle anaesthesia.     To complete her workup, MR imaging of the brain and cervical spine was requested by the neurologist. No acute concerns noted on brain imaging, however, cervical spine imaging was concerning for multilevel cervical spondylosis most significant at C5-6, with moderate bilateral neural foraminal narrowing and moderate to severe spinal canal stenosis. Referral was placed to neurosurgery spine clinic in light of the above findings.     Physical exam:   BP (!) 171/95 (BP Location: Right arm, Patient Position: Sitting, Cuff Size: Adult Regular)   Pulse 79   LMP  (LMP Unknown)   SpO2 98%   General: Awake and alert and in no acute distress.  Pulm: Breathing comfortably on room air  Neurologic:  - " Face symmetric  - Pupils reactive, extraocular movements intact  - Full strength in upper extremities bilaterally   - Subtle right hip weakness 4+, otherwise full strength in the bilateral lower extremities  - Intact sensation to all extremities  - No clonus, patellar and bicep reflexes 3+  - Negative casie's  - Intact coordination    Imaging:  MR CERVICAL SPINE W/O CONTRAST 10/2/2024     IMPRESSION: Multilevel cervical spondylosis most significant at C5-6  with moderate bilateral neural foraminal narrowing and moderate to  severe spinal canal stenosis. No abnormal myelopathic signal within  the cord at any level.     Assessment:  Aileen Priest is a 64 year old female recently diagnosed with parkinson's disease, presenting on referral from her neurologist for concerns of intermittent neck pain and right upper extremity numbness and weakness. Imaging findings reviewed with patient, advised that while there is some cervical stenosis, there's no high grade compression or cord signal changes at this time. Patient is currently not reporting any symptoms of myelopathy, and we're unconvinced that her intermittent upper extremity fatigue and numbness is related to her MRI findings. As such, we do not recommend surgical intervention at this time. Patient was counseled on symptoms of myelopathy to look out for, including clumsiness, gait instability, bilateral upper extremity weakness & numbness, and in extreme circumstances, bladder/bowel incontinence and encouraged to re-present or contact the clinic if experiencing any of these, and we can schedule a procedure to decompress the canal. Patient was also advised to avoid hyperextending the neck as this maneuver further tightens the spinal canal and could reproduce symptoms of myelopathy. All questions and concerns raised by the patient were addressed, and she was comfortable with the plan.     Plan:  - Referral to Physical therapy in the interim     Patient seen and  discussed with Dr. Melanie MD.  Approximately 40 minutes were spent in chart and image review, evaluation of the patient and assessment of next steps.    Kia Miller MD, PGY-1  Department of Neurosurgery  Pager: [phone]    Please contact neurosurgery resident on call with questions.    Dial * * *[phone], enter [phone] when prompted.       REVIEWED ASSOCIATED CLINICAL DATA AND HISTORY FOUND IN THE MEDICAL RECORD:  Past Medical History:   Past Medical History:   Diagnosis Date    Collagenous colitis     Lichen planus     Lichen planus        Surgical History:   Past Surgical History:   Procedure Laterality Date    BACK SURGERY      Lumbar discectomy L5 S1    COLONOSCOPY N/A 2019    Procedure: COLONOSCOPY, WITH POLYPECTOMY AND BIOPSY;  Surgeon: Kassy Pollock MD;  [facility]    COLONOSCOPY N/A 2021    Procedure: COLONOSCOPY, WITH BIOPSY;  Surgeon: Kassy Pollock MD;  [facility]    COLONOSCOPY N/A 2021    Procedure: COLONOSCOPY, WITH  BIOPSY;  Surgeon: Kassy Pollock MD;  Location: [facility]    COLONOSCOPY N/A 10/17/2022    Procedure: COLONOSCOPY;  Surgeon: Kassy Pollock MD;  Location: [facility] OR    COLONOSCOPY N/A 2024    Procedure: Colonoscopy;  Surgeon: Kassy Pollock MD;  Location: [facility] OR    HC REMOVAL OF TONSILS,12+ Y/O  1995    IRIDOTOMY Right     ZZC NONSPECIFIC PROCEDURE       X 2    ZZC NONSPECIFIC PROCEDURE      removal of growth from toe    ZZHC DRAIN PILONIDAL CYST SIMPLE         Social history:   Social History     Tobacco Use    Smoking status: Former     Current packs/day: 0.00     Average packs/day: 1 pack/day for 11.0 years (11.0 ttl pk-yrs)     Types: Cigarettes     Start date: 1976     Quit date: 1987     Years since quittin.8    Smokeless tobacco: Never   Vaping Use    Vaping status: Never Used   Substance Use Topics    Alcohol use: Yes     Alcohol/week: 1.0 - 3.0 standard drink of alcohol     Types: 1 - 3 Cans of beer per week      Comment: socially    Drug use: No       Family history:   Family History   Problem Relation Age of Onset    Breast Cancer Mother 42    Hypertension Father     Tremor Father         Family questions PD diagnosis    Depression Sister     Psychotic Disorder Sister     Thyroid Disease Sister     Asthma Brother     Cancer Maternal Grandmother     Heart Disease Maternal Grandfather     Cancer Paternal Grandmother     Respiratory Paternal Grandfather     Multiple Sclerosis Son     Asthma Son     Depression Son     Parkinsonism Paternal Uncle        Medications:  Current Outpatient Medications   Medication Sig Dispense Refill    calcium citrate-vitamin D (CITRACAL) 200-6.25 MG-MCG TABS per tablet Take 2 tablets by mouth daily      dexamethasone 0.5 MG/5ML solution Swish and spit in mouth 3 times daily as needed       lisinopril (ZESTRIL) 5 MG tablet TAKE ONE TABLET BY MOUTH ONCE DAILY. 90 tablet 3    omeprazole (PRILOSEC) 40 MG DR capsule TAKE ONE CAPSULE BY MOUTH ONCE DAILY 90 capsule 3    ustekinumab (STELARA) 90 MG/ML Inject 1 ml ( 90 mg) subcutaneous every 4 weeks. 1 mL 5     No current facility-administered medications for this visit.       Allergies:     Allergies   Allergen Reactions    Sulfa Antibiotics Rash           Again, thank you for allowing me to participate in the care of your patient.      Sincerely,    Mallorie Navarro MD

## 2024-11-08 NOTE — PATIENT INSTRUCTIONS
Dr Navarro entered orders for physical therapy.  Location at your preference.  Because your symptoms are not disruptive to your daily life, Dr Navarro does not recommend surgery at this time.  Please return to clinic with Dr Navarro if your symptoms increase and you decide to move forward with surgery.

## 2024-11-08 NOTE — PROGRESS NOTES
"11/8/2024  Neurosurgery Clinic Visit    Chief Complaint: Cervical Stenosis    History of present illness:  Aileen Priest is a 64 year old female recently diagnosed with parkinson's disease, presenting on referral from her neurologist for concerns of intermittent neck pain and right upper extremity weakness. Patient works as a bedside nurse, and reports neck discomfort with associated numbness to the right 3rd-5th digits at the end of her work shift, which she attributes to neck hyperextension while looking up at the monitors, as well as helping to move patients. She describes this as \"fatigue\" in the right hand. At its worst, the neck discomfort is rated 9/10, takes Tylenol which does not completely resolve the ache.     Denies radicular pain to the upper extremities, and clumsiness. No recent history of dropping items, or gait instability. Patient also denies bladder or bowel incontinence and saddle anaesthesia.     To complete her workup, MR imaging of the brain and cervical spine was requested by the neurologist. No acute concerns noted on brain imaging, however, cervical spine imaging was concerning for multilevel cervical spondylosis most significant at C5-6, with moderate bilateral neural foraminal narrowing and moderate to severe spinal canal stenosis. Referral was placed to neurosurgery spine clinic in light of the above findings.     Physical exam:   BP (!) 171/95 (BP Location: Right arm, Patient Position: Sitting, Cuff Size: Adult Regular)   Pulse 79   LMP  (LMP Unknown)   SpO2 98%   General: Awake and alert and in no acute distress.  Pulm: Breathing comfortably on room air  Neurologic:  - Face symmetric  - Pupils reactive, extraocular movements intact  - Full strength in upper extremities bilaterally   - Subtle right hip weakness 4+, otherwise full strength in the bilateral lower extremities  - Intact sensation to all extremities  - No clonus, patellar and bicep reflexes 3+  - Negative casie's  - " If you receive a survey via e-mail or mail, please take the time to complete and return as your feedback is very helpful to our practice.     ########################################    If your neurological testing is not going to be scheduled today our Pre-Service Department will contact you Adriel schedule within one to two days. If you would like to call and schedule when it is convenient for you or if you need to reschedule your appointment please call the Pre-Service Department at [phone].    Your tests will be reviewed by the Benefits Department and if there are any concerns regarding prior authorization or financial obligation they will contact you. We recommend you still contact your insurance company to see if the test, provider, and location of service are covered, and if so, will there be any out of pocket expenses.     If you should have any concerns regarding the financial obligation of the tests please contact our Financial Advocates at [phone] and they will be happy to assist you.                 Thank you for letting us care for you today.       #######################################    In order to make sure we are meeting our patients' needs, we require a 36 hour notice from you prior to picking up your medications. Attached is a table that will help you determine when your prescriptions will be ready for pick-up.                      If the refill is requested between when the clinic opens and 12 pm on  You can  your prescription at the pharmacy after 6 PM on   Monday Tuesday Tuesday Wednesday Wednesday Thursday Thursday Friday Friday Monday     If the refill is requested between 12 pm and after the clinic closes on You can  your prescription at the pharmacy after 12 PM on   Monday Wednesday Tuesday Thursday Wednesday Friday Thursday Monday Friday Tuesday       Intact coordination    Imaging:  MR CERVICAL SPINE W/O CONTRAST 10/2/2024     IMPRESSION: Multilevel cervical spondylosis most significant at C5-6  with moderate bilateral neural foraminal narrowing and moderate to  severe spinal canal stenosis. No abnormal myelopathic signal within  the cord at any level.     Assessment:  Aileen Priest is a 64 year old female recently diagnosed with parkinson's disease, presenting on referral from her neurologist for concerns of intermittent neck pain and right upper extremity numbness and weakness. Imaging findings reviewed with patient, advised that while there is some cervical stenosis, there's no high grade compression or cord signal changes at this time. Patient is currently not reporting any symptoms of myelopathy, and we're unconvinced that her intermittent upper extremity fatigue and numbness is related to her MRI findings. As such, we do not recommend surgical intervention at this time. Patient was counseled on symptoms of myelopathy to look out for, including clumsiness, gait instability, bilateral upper extremity weakness & numbness, and in extreme circumstances, bladder/bowel incontinence and encouraged to re-present or contact the clinic if experiencing any of these, and we can schedule a procedure to decompress the canal. Patient was also advised to avoid hyperextending the neck as this maneuver further tightens the spinal canal and could reproduce symptoms of myelopathy. All questions and concerns raised by the patient were addressed, and she was comfortable with the plan.     Plan:  - Referral to Physical therapy in the interim     Patient seen and discussed with Dr. Melanie MD.  Approximately 40 minutes were spent in chart and image review, evaluation of the patient and assessment of next steps.    Kia Miller MD, PGY-1  Department of Neurosurgery  Pager: [phone]    Please contact neurosurgery resident on call with questions.    Dial * * *[phone], enter  [phone] when prompted.       REVIEWED ASSOCIATED CLINICAL DATA AND HISTORY FOUND IN THE MEDICAL RECORD:  Past Medical History:   Past Medical History:   Diagnosis Date    Collagenous colitis     Lichen planus     Lichen planus        Surgical History:   Past Surgical History:   Procedure Laterality Date    BACK SURGERY      Lumbar discectomy L5 S1    COLONOSCOPY N/A 2019    Procedure: COLONOSCOPY, WITH POLYPECTOMY AND BIOPSY;  Surgeon: Kassy Pollock MD;  Location: [facility]    COLONOSCOPY N/A 2021    Procedure: COLONOSCOPY, WITH BIOPSY;  Surgeon: Kassy Pollock MD;  Location: [facility] OR    COLONOSCOPY N/A 2021    Procedure: COLONOSCOPY, WITH  BIOPSY;  Surgeon: Kassy Pollock MD;  Location: [facility] OR    COLONOSCOPY N/A 10/17/2022    Procedure: COLONOSCOPY;  Surgeon: Kassy Pollock MD;  Location: [facility] OR    COLONOSCOPY N/A 2024    Procedure: Colonoscopy;  Surgeon: Kassy Pollock MD;  Location: [facility] OR    HC REMOVAL OF TONSILS,12+ Y/O  1995    IRIDOTOMY Right     ZZC NONSPECIFIC PROCEDURE       X 2    ZZC NONSPECIFIC PROCEDURE      removal of growth from toe    ZZHC DRAIN PILONIDAL CYST SIMPLE         Social history:   Social History     Tobacco Use    Smoking status: Former     Current packs/day: 0.00     Average packs/day: 1 pack/day for 11.0 years (11.0 ttl pk-yrs)     Types: Cigarettes     Start date: 1976     Quit date: 1987     Years since quittin.8    Smokeless tobacco: Never   Vaping Use    Vaping status: Never Used   Substance Use Topics    Alcohol use: Yes     Alcohol/week: 1.0 - 3.0 standard drink of alcohol     Types: 1 - 3 Cans of beer per week     Comment: socially    Drug use: No       Family history:   Family History   Problem Relation Age of Onset    Breast Cancer Mother 42    Hypertension Father     Tremor Father         Family questions PD diagnosis    Depression Sister     Psychotic Disorder Sister     Thyroid Disease Sister     Asthma Brother     Cancer  Maternal Grandmother     Heart Disease Maternal Grandfather     Cancer Paternal Grandmother     Respiratory Paternal Grandfather     Multiple Sclerosis Son     Asthma Son     Depression Son     Parkinsonism Paternal Uncle        Medications:  Current Outpatient Medications   Medication Sig Dispense Refill    calcium citrate-vitamin D (CITRACAL) 200-6.25 MG-MCG TABS per tablet Take 2 tablets by mouth daily      dexamethasone 0.5 MG/5ML solution Swish and spit in mouth 3 times daily as needed       lisinopril (ZESTRIL) 5 MG tablet TAKE ONE TABLET BY MOUTH ONCE DAILY. 90 tablet 3    omeprazole (PRILOSEC) 40 MG DR capsule TAKE ONE CAPSULE BY MOUTH ONCE DAILY 90 capsule 3    ustekinumab (STELARA) 90 MG/ML Inject 1 ml ( 90 mg) subcutaneous every 4 weeks. 1 mL 5     No current facility-administered medications for this visit.       Allergies:     Allergies   Allergen Reactions    Sulfa Antibiotics Rash

## 2024-11-08 NOTE — TELEPHONE ENCOUNTER
PA Initiation    Medication: USTEKINUMAB 90 MG/ML [facility]  Insurance Company: [employer] - Phone [phone] Fax [phone]  Pharmacy Filling the Rx:    Filling Pharmacy Phone:    Filling Pharmacy Fax:    Start Date: 11/8/2024  [id number]

## 2024-11-11 NOTE — TELEPHONE ENCOUNTER
Prior Authorization Approval    Medication: USTEKINUMAB 90 MG/ML [facility]  Authorization Effective Date: 11/11/2024  Authorization Expiration Date: 11/10/2025  Approved Dose/Quantity: 1 per 56 days  Reference #: [id number]   Insurance Company: [employer] - Phone [phone] Fax [phone]  Expected CoPay: $    CoPay Card Available:      Financial Assistance Needed: Not needed  Which Pharmacy is filling the prescription: [facility] - [place], [place] - [address]  Pharmacy Notified: Not needed  Patient Notified: Not needed

## 2024-11-12 ENCOUNTER — OFFICE VISIT (OUTPATIENT)
Dept: NEUROLOGY | Facility: CLINIC | Age: 64
End: 2024-11-12
Payer: COMMERCIAL

## 2024-11-12 VITALS
DIASTOLIC BLOOD PRESSURE: 90 MMHG | WEIGHT: 127 LBS | HEART RATE: 72 BPM | OXYGEN SATURATION: 100 % | SYSTOLIC BLOOD PRESSURE: 147 MMHG | BODY MASS INDEX: 22.5 KG/M2

## 2024-11-12 DIAGNOSIS — M48.02 CERVICAL STENOSIS OF SPINAL CANAL: ICD-10-CM

## 2024-11-12 DIAGNOSIS — G20.A1 PARKINSON'S DISEASE WITHOUT DYSKINESIA OR FLUCTUATING MANIFESTATIONS (H): Primary | ICD-10-CM

## 2024-11-12 NOTE — PROGRESS NOTES
Neurology Progress Note    [facility]    Aileen Priest MRN# [id number]   Age: 64 year old YOB: 1960     PCP: Bucky Hunter            Assessment and Plan:     (G20.A1) Parkinson's disease without dyskinesia or fluctuating manifestations (H)  (primary encounter diagnosis)  Comment: New diagnosis.   We spent time today reviewing the implications of this diagnosis. She has very mild symptoms, exacerbated by stress she thinks [facility] will help. She is interested in hearing more about clinical trials and establishing care with a movement disorder specialist (referral made)   Plan: Adult Neurology  Referral-movement disorder            (M48.02) Cervical stenosis of spinal canal  Comment: PT and conservative management for now.   Plan: Monitor    40 minutes spent caring for the patient on the day of the visit including visit time, coordinating care and documentation.    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 She is getting ready to reitre with her last day of work Dec 5th.    She was seen in September with an exam consistent with rest tremor, bradykinesia and rigidity. MRI brain did not suggest a vascular cause this most likely represents Early stage Parkinson's disease.      She also hand some neck pain and right hand weakness complaints. I suspect the hand is more related to PD but we did send for MRI cervical spine which came back revealing multilevel cervical spondylosis most significant at C5-6. She has seen Dr Navarro last week who felt she shold go to PT and return if symptoms get worse.     The patient is scheduled for left foot surgery on 12/6 and will be non weight bearing for 2-6 weeks. She is interested in SPARX3 trial.           Physical Exam:     BP (!) 147/90 (BP Location: Right arm, Patient Position: Sitting, Cuff Size: Adult Regular)   Pulse 72   Wt  57.6 kg (127 lb)   LMP  (LMP Unknown)   SpO2 100%   BMI 22.50 kg/m    Yesterday 132/80 at work.     Deferred exam today         Pertinent History/Data for appointment:     EXAM: MR BRAIN W/O CONTRAST  10/2/2024 6:40 PM      HISTORY:  Parkinsonism, 3T please assess for loss of swallow tail;  Parkinsonism, unspecified Parkinsonism type (H)                                                                        IMPRESSION:  1. Relatively symmetric and possibly normal appearing substantia nigra bilaterally with no definite swallow tail sign loss although exam is obtained with 1.5 Love MRI and limited regarding the assessment of swallow tail sign as the sensitivity is lower compared with higher field magnetic strength.  2. Mild global cerebral volume loss and leukoaraiosis.  3. No acute intracranial pathology.     I have personally reviewed the examination and initial interpretation and I agree with the findings.     HILARY MAN MD     EXAM: MR CERVICAL SPINE W/O CONTRAST  10/2/2024 6:40 PM                                                                      IMPRESSION: Multilevel cervical spondylosis most significant at C5-6 with moderate bilateral neural foraminal narrowing and moderate to severe spinal canal stenosis. No abnormal myelopathic signal within the cord at any level.     I have personally reviewed the examination and initial interpretation and I agree with the findings.     CHRISS BENNETT MD

## 2024-11-12 NOTE — LETTER
11/12/2024       RE: Aileen Priest  [address]  [place] [address]     Dear Colleague,    Thank you for referring your patient, Aileen Priest, to the [facility] [place] at [facility]. Please see a copy of my visit note below.    Neurology Progress Note    [facility]    Aileen Priest MRN# [id number]   Age: 64 year old YOB: 1960     PCP: Bucky Hunter            Assessment and Plan:     (G20.A1) Parkinson's disease without dyskinesia or fluctuating manifestations (H)  (primary encounter diagnosis)  Comment: New diagnosis.   We spent time today reviewing the implications of this diagnosis. She has very mild symptoms, exacerbated by stress she thinks prison will help. She is interested in hearing more about clinical trials and establishing care with a movement disorder specialist (referral made)   Plan: Adult Neurology  Referral-movement disorder            (M48.02) Cervical stenosis of spinal canal  Comment: PT and conservative management for now.   Plan: Monitor    40 minutes spent caring for the patient on the day of the visit including visit time, coordinating care and documentation.    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    She was seen in September with an exam consistent with rest tremor, bradykinesia and rigidity. MRI brain did not suggest a vascular cause this most likely represents Early stage Parkinson's disease.      She also hand some neck pain and right hand weakness complaints. I suspect the hand is more related to PD but we did send for MRI cervical spine which came back revealing multilevel cervical spondylosis most significant at C5-6. She has seen Dr Navarro last week who felt she shold go to  PT and return if symptoms get worse.     The patient is scheduled for left foot surgery on 12/6 and will be non weight bearing for 2-6 weeks. She is interested in SPARX3 trial.           Physical Exam:     BP (!) 147/90 (BP Location: Right arm, Patient Position: Sitting, Cuff Size: Adult Regular)   Pulse 72   Wt 57.6 kg (127 lb)   LMP  (LMP Unknown)   SpO2 100%   BMI 22.50 kg/m    Yesterday 132/80 at work.     Deferred exam today         Pertinent History/Data for appointment:     EXAM: MR BRAIN W/O CONTRAST  10/2/2024 6:40 PM      HISTORY:  Parkinsonism, 3T please assess for loss of swallow tail;  Parkinsonism, unspecified Parkinsonism type (H)                                                                        IMPRESSION:  1. Relatively symmetric and possibly normal appearing substantia nigra bilaterally with no definite swallow tail sign loss although exam is obtained with 1.5 Love MRI and limited regarding the assessment of swallow tail sign as the sensitivity is lower compared with higher field magnetic strength.  2. Mild global cerebral volume loss and leukoaraiosis.  3. No acute intracranial pathology.     I have personally reviewed the examination and initial interpretation and I agree with the findings.     HILARY MAN MD     EXAM: MR CERVICAL SPINE W/O CONTRAST  10/2/2024 6:40 PM                                                                      IMPRESSION: Multilevel cervical spondylosis most significant at C5-6 with moderate bilateral neural foraminal narrowing and moderate to severe spinal canal stenosis. No abnormal myelopathic signal within the cord at any level.     I have personally reviewed the examination and initial interpretation and I agree with the findings.     CHRISS BENNETT MD       Again, thank you for allowing me to participate in the care of your patient.      Sincerely,    La Platt MD

## 2024-11-12 NOTE — PATIENT INSTRUCTIONS
Navigating Life with Parkinson's Disease- by Es Edwards.     Weblinks:  [email or web address]

## 2024-11-14 DIAGNOSIS — M48.02 CERVICAL STENOSIS OF SPINAL CANAL: Primary | ICD-10-CM

## 2024-11-18 ENCOUNTER — OFFICE VISIT (OUTPATIENT)
Dept: FAMILY MEDICINE | Facility: CLINIC | Age: 64
End: 2024-11-18
Payer: COMMERCIAL

## 2024-11-18 VITALS
SYSTOLIC BLOOD PRESSURE: 146 MMHG | DIASTOLIC BLOOD PRESSURE: 86 MMHG | HEART RATE: 84 BPM | OXYGEN SATURATION: 96 % | HEIGHT: 63 IN | WEIGHT: 127.5 LBS | RESPIRATION RATE: 16 BRPM | BODY MASS INDEX: 22.59 KG/M2 | TEMPERATURE: 97.6 F

## 2024-11-18 DIAGNOSIS — I10 HYPERTENSION, GOAL BELOW 140/90: ICD-10-CM

## 2024-11-18 DIAGNOSIS — Z01.818 PREOP GENERAL PHYSICAL EXAM: Primary | ICD-10-CM

## 2024-11-18 DIAGNOSIS — G20.A1 PARKINSON'S DISEASE WITHOUT DYSKINESIA OR FLUCTUATING MANIFESTATIONS (H): ICD-10-CM

## 2024-11-18 DIAGNOSIS — S92.902S FOOT FRACTURE, LEFT, SEQUELA: ICD-10-CM

## 2024-11-18 LAB
ANION GAP SERPL CALCULATED.3IONS-SCNC: 11 MMOL/L (ref 7–15)
BUN SERPL-MCNC: 15.2 MG/DL (ref 8–23)
CALCIUM SERPL-MCNC: 9.9 MG/DL (ref 8.8–10.4)
CHLORIDE SERPL-SCNC: 103 MMOL/L (ref 98–107)
CREAT SERPL-MCNC: 0.71 MG/DL (ref 0.51–0.95)
EGFRCR SERPLBLD CKD-EPI 2021: >90 ML/MIN/1.73M2
ERYTHROCYTE [DISTWIDTH] IN BLOOD BY AUTOMATED COUNT: 12.4 % (ref 10–15)
GLUCOSE SERPL-MCNC: 95 MG/DL (ref 70–99)
HCO3 SERPL-SCNC: 25 MMOL/L (ref 22–29)
HCT VFR BLD AUTO: 41.2 % (ref 35–47)
HGB BLD-MCNC: 13.7 G/DL (ref 11.7–15.7)
MCH RBC QN AUTO: 30.3 PG (ref 26.5–33)
MCHC RBC AUTO-ENTMCNC: 33.3 G/DL (ref 31.5–36.5)
MCV RBC AUTO: 91 FL (ref 78–100)
PLATELET # BLD AUTO: 279 10E3/UL (ref 150–450)
POTASSIUM SERPL-SCNC: 4.4 MMOL/L (ref 3.4–5.3)
RBC # BLD AUTO: 4.52 10E6/UL (ref 3.8–5.2)
SODIUM SERPL-SCNC: 139 MMOL/L (ref 135–145)
VIT D+METAB SERPL-MCNC: 25 NG/ML (ref 20–50)
WBC # BLD AUTO: 9 10E3/UL (ref 4–11)

## 2024-11-18 PROCEDURE — 93000 ELECTROCARDIOGRAM COMPLETE: CPT | Performed by: FAMILY MEDICINE

## 2024-11-18 PROCEDURE — 82306 VITAMIN D 25 HYDROXY: CPT | Performed by: FAMILY MEDICINE

## 2024-11-18 PROCEDURE — 36415 COLL VENOUS BLD VENIPUNCTURE: CPT | Performed by: FAMILY MEDICINE

## 2024-11-18 PROCEDURE — 99214 OFFICE O/P EST MOD 30 MIN: CPT | Performed by: FAMILY MEDICINE

## 2024-11-18 PROCEDURE — 85027 COMPLETE CBC AUTOMATED: CPT | Performed by: FAMILY MEDICINE

## 2024-11-18 PROCEDURE — 80048 BASIC METABOLIC PNL TOTAL CA: CPT | Performed by: FAMILY MEDICINE

## 2024-11-18 RX ORDER — LISINOPRIL 10 MG/1
10 TABLET ORAL DAILY
Qty: 90 TABLET | Refills: 3 | Status: SHIPPED | OUTPATIENT
Start: 2024-11-18

## 2024-11-18 ASSESSMENT — PAIN SCALES - GENERAL: PAINLEVEL_OUTOF10: NO PAIN (0)

## 2024-11-18 NOTE — PROGRESS NOTES
Preoperative Evaluation  [facility]  [address]  [address]  [facility] [address]  Phone: [phone]  Fax: [phone]  Primary Provider: Bucky Hunter MD  Pre-op Performing Provider: Bucky Hunter MD  Nov 18, 2024 11/13/2024   Surgical Information   What procedure is being done? Foot surgery on December 6, 2024    Facility or Hospital where procedure/surgery will be performed: [facility]    Who is doing the procedure / surgery? Dr. Jason Montes    Date of surgery / procedure: December 6, 2024    Time of surgery / procedure: TBD    Where do you plan to recover after surgery? at home with family        Patient-reported     Fax number for surgical facility: [phone]    Assessment & Plan     The proposed surgical procedure is considered LOW risk.    Preop general physical exam  Ok for procedure  - lisinopril (ZESTRIL) 10 MG tablet; Take 1 tablet (10 mg) by mouth daily.  - EKG 12-lead complete w/read - Clinics  - Vitamin D Deficiency; Future  - Basic metabolic panel  (Ca, Cl, CO2, Creat, Gluc, K, Na, BUN); Future  - CBC with platelets; Future      Foot fracture, left, sequela  Non healing    Hypertension, goal below 140/90  Increase lisinopil to 10 mg  Follow up by phone in 10 days,  if not improving adjust medication .     Parkinson's disease without dyskinesia or fluctuating manifestations (H)  Follow up with consultant as planned.             - No identified additional risk factors other than previously addressed         Recommendation  Approval given to proceed with proposed procedure, without further diagnostic evaluation.    Mnady Mendoza is a 64 year old, presenting for the following:  Pre-Op Exam          11/18/2024    11:07 AM   Additional Questions   Roomed by Pietro   Accompanied by Self         11/18/2024   Forms   Any forms needing to be completed Yes    No       Multiple values from one day are sorted in  reverse-chronological order     HPI related to upcoming procedure:   Encounter Diagnoses   Name Primary?    Preop general physical exam Yes    Foot fracture, left, sequela     Hypertension, goal below 140/90     Parkinson's disease without dyskinesia or fluctuating manifestations (H)             11/13/2024   Pre-Op Questionnaire   Have you ever had a heart attack or stroke? No    Have you ever had surgery on your heart or blood vessels, such as a stent placement, a coronary artery bypass, or surgery on an artery in your head, neck, heart, or legs? No    Do you have chest pain with activity? No    Do you have a history of heart failure? No    Do you currently have a cold, bronchitis or symptoms of other infection? No    Do you have a cough, shortness of breath, or wheezing? No    Do you or anyone in your family have previous history of blood clots? No    Do you or does anyone in your family have a serious bleeding problem such as prolonged bleeding following surgeries or cuts? No    Have you ever had problems with anemia or been told to take iron pills? No    Have you had any abnormal blood loss such as black, tarry or bloody stools, or abnormal vaginal bleeding? No    Have you ever had a blood transfusion? No    Are you willing to have a blood transfusion if it is medically needed before, during, or after your surgery? Yes    Have you or any of your relatives ever had problems with anesthesia? No    Do you have sleep apnea, excessive snoring or daytime drowsiness? No    Do you have any artifical heart valves or other implanted medical devices like a pacemaker, defibrillator, or continuous glucose monitor? No    Do you have artificial joints? No    Are you allergic to latex? No        Patient-reported     Health Care Directive  Patient does not have a Health Care Directive: Discussed advance care planning with patient; however, patient declined at this time.    Preoperative Review of    reviewed - controlled  substances reflected in medication list.      Status of Chronic Conditions:  HYPERTENSION - Patient has longstanding history of HTN , currently denies any symptoms referable to elevated blood pressure. Specifically denies chest pain, palpitations, dyspnea, orthopnea, PND or peripheral edema. Blood pressure readings have been in normal range. Current medication regimen is as listed below. Patient denies any side effects of medication.     Patient Active Problem List    Diagnosis Date Noted    Cervical stenosis of spinal canal 11/12/2024     Priority: Medium    Narrow angle glaucoma of right eye 10/11/2024     Priority: Medium    Parkinson's disease without dyskinesia or fluctuating manifestations (H) 10/11/2024     Priority: Medium    Crohn's disease of small intestine without complication (H) 02/23/2018     Priority: Medium    Crohn's disease of small intestine with other complication (H) 04/18/2017     Priority: Medium    History of hidradenitis suppurativa 04/18/2017     Priority: Medium    Lichen planus 04/18/2017     Priority: Medium    Hypertension, goal below 140/90 02/08/2017     Priority: Medium    Nasal folliculitis 03/12/2015     Priority: Medium    CARDIOVASCULAR SCREENING; LDL GOAL LESS THAN 160 03/05/2014     Priority: Medium    Leg weakness 06/11/2012     Priority: Medium      Past Medical History:   Diagnosis Date    Collagenous colitis     Lichen planus     Lichen planus      Past Surgical History:   Procedure Laterality Date    BACK SURGERY      Lumbar discectomy L5 S1    COLONOSCOPY N/A 12/03/2019    Procedure: COLONOSCOPY, WITH POLYPECTOMY AND BIOPSY;  Surgeon: Kassy Pollock MD;  [facility]    COLONOSCOPY N/A 05/24/2021    Procedure: COLONOSCOPY, WITH BIOPSY;  Surgeon: Kassy Pollock MD;  Location[facility]    COLONOSCOPY N/A 11/16/2021    Procedure: COLONOSCOPY, WITH  BIOPSY;  Surgeon: Kassy Pollock MD;  Location: [facility] OR    COLONOSCOPY N/A 10/17/2022    Procedure: COLONOSCOPY;   "Surgeon: Kassy Pollock MD;  Location: [facility] OR    COLONOSCOPY N/A 2024    Procedure: Colonoscopy;  Surgeon: Kassy Pollock MD;  Location: [facility] OR    HC REMOVAL OF TONSILS,12+ Y/O  1995    IRIDOTOMY Right     ZZC NONSPECIFIC PROCEDURE       X 2    ZZC NONSPECIFIC PROCEDURE      removal of growth from toe    ZZHC DRAIN PILONIDAL CYST SIMPLE       Current Outpatient Medications   Medication Sig Dispense Refill    calcium citrate-vitamin D (CITRACAL) 200-6.25 MG-MCG TABS per tablet Take 2 tablets by mouth daily      dexamethasone 0.5 MG/5ML solution Swish and spit in mouth 3 times daily as needed       lisinopril (ZESTRIL) 10 MG tablet Take 1 tablet (10 mg) by mouth daily. 90 tablet 3    lisinopril (ZESTRIL) 5 MG tablet TAKE ONE TABLET BY MOUTH ONCE DAILY. 90 tablet 3    omeprazole (PRILOSEC) 40 MG DR capsule TAKE ONE CAPSULE BY MOUTH ONCE DAILY 90 capsule 3    ustekinumab (STELARA) 90 MG/ML Inject 1 ml ( 90 mg) subcutaneous every 4 weeks. 1 mL 5       Allergies   Allergen Reactions    Sulfa Antibiotics Rash        Social History     Tobacco Use    Smoking status: Former     Current packs/day: 0.00     Average packs/day: 1 pack/day for 11.0 years (11.0 ttl pk-yrs)     Types: Cigarettes     Start date: 1976     Quit date: 1987     Years since quittin.9    Smokeless tobacco: Never   Substance Use Topics    Alcohol use: Yes     Alcohol/week: 1.0 - 3.0 standard drink of alcohol     Types: 1 - 3 Cans of beer per week     Comment: socially       History   Drug Use No             Review of Systems  Constitutional, HEENT, cardiovascular, pulmonary, gi and gu systems are negative, except as otherwise noted.    Objective    BP (!) 146/86   Pulse 84   Temp 97.6  F (36.4  C) (Temporal)   Resp 16   Ht 1.6 m (5' 3\")   Wt 57.8 kg (127 lb 8 oz)   LMP  (LMP Unknown)   SpO2 96%   BMI 22.59 kg/m     Estimated body mass index is 22.59 kg/m  as calculated from the following:    Height as of this " "encounter: 1.6 m (5' 3\").    Weight as of this encounter: 57.8 kg (127 lb 8 oz).  Physical Exam  GENERAL: alert and no distress  EYES: Eyes grossly normal to inspection, PERRL and conjunctivae and sclerae normal  HENT: ear canals and TM's normal, nose and mouth without ulcers or lesions  NECK: no adenopathy, no asymmetry, masses, or scars  RESP: lungs clear to auscultation - no rales, rhonchi or wheezes  CV: regular rate and rhythm, normal S1 S2, no S3 or S4, no murmur, click or rub, no peripheral edema  ABDOMEN: soft, nontender, no hepatosplenomegaly, no masses and bowel sounds normal  SKIN: no suspicious lesions or rashes  NEURO: Normal strength and tone, mentation intact and speech normal  PSYCH: mentation appears normal, affect normal/bright  LYMPH: no cervical, supraclavicular, axillary, or inguinal adenopathy  Diabetic foot exam: normal DP and PT pulses, no trophic changes or ulcerative lesions, and normal sensory exam    Recent Labs   Lab Test 09/19/24  1656 01/30/24  1112   HGB 14.0 13.7    306    136   POTASSIUM 4.0 4.4   CR 0.73 0.70        Diagnostics  Recent Results (from the past 720 hours)   CBC with platelets    Collection Time: 11/18/24 12:22 PM   Result Value Ref Range    WBC Count 9.0 4.0 - 11.0 10e3/uL    RBC Count 4.52 3.80 - 5.20 10e6/uL    Hemoglobin 13.7 11.7 - 15.7 g/dL    Hematocrit 41.2 35.0 - 47.0 %    MCV 91 78 - 100 fL    MCH 30.3 26.5 - 33.0 pg    MCHC 33.3 31.5 - 36.5 g/dL    RDW 12.4 10.0 - 15.0 %    Platelet Count 279 150 - 450 10e3/uL      EKG: appears normal, NSR, normal axis, normal intervals, no acute ST/T changes c/w ischemia, no LVH by voltage criteria, unchanged from previous tracings    Revised Cardiac Risk Index (RCRI)  The patient has the following serious cardiovascular risks for perioperative complications:   - No serious cardiac risks = 0 points     RCRI Interpretation: 0 points: Class I (very low risk - 0.4% complication rate)         Signed Electronically " by: Bucky Hunter MD  A copy of this evaluation report is provided to the requesting physician.

## 2024-12-02 ENCOUNTER — TELEPHONE (OUTPATIENT)
Dept: FAMILY MEDICINE | Facility: CLINIC | Age: 64
End: 2024-12-02
Payer: COMMERCIAL
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
## 2024-12-03 ENCOUNTER — OFFICE VISIT (OUTPATIENT)
Dept: FAMILY MEDICINE | Facility: CLINIC | Age: 64
End: 2024-12-03
Payer: COMMERCIAL

## 2024-12-03 VITALS
DIASTOLIC BLOOD PRESSURE: 87 MMHG | RESPIRATION RATE: 20 BRPM | TEMPERATURE: 98.2 F | HEIGHT: 62 IN | BODY MASS INDEX: 23.1 KG/M2 | OXYGEN SATURATION: 97 % | SYSTOLIC BLOOD PRESSURE: 132 MMHG | WEIGHT: 125.5 LBS | HEART RATE: 91 BPM
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
## 2024-12-03 DIAGNOSIS — S16.1XXD STRAIN OF NECK MUSCLE, SUBSEQUENT ENCOUNTER: ICD-10-CM

## 2024-12-03 DIAGNOSIS — S00.83XD CONTUSION OF FACE, SCALP AND NECK, SUBSEQUENT ENCOUNTER: ICD-10-CM

## 2024-12-03 DIAGNOSIS — S10.93XD CONTUSION OF FACE, SCALP AND NECK, SUBSEQUENT ENCOUNTER: ICD-10-CM

## 2024-12-03 DIAGNOSIS — S00.03XD CONTUSION OF FACE, SCALP AND NECK, SUBSEQUENT ENCOUNTER: ICD-10-CM

## 2024-12-03 PROCEDURE — 99213 OFFICE O/P EST LOW 20 MIN: CPT | Performed by: FAMILY MEDICINE

## 2024-12-03 ASSESSMENT — PAIN SCALES - GENERAL: PAINLEVEL_OUTOF10: MILD PAIN (3)

## 2024-12-03 NOTE — LETTER
December 3, 2024      Aileen Priest  [address] [place] [address]        To Whom It May Concern:    Aileen Priest was seen in our clinic. She needs to rest at home today and tomorrow and may return to work this Thursday 12/5/24.      Sincerely,      Bucky Hunter
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
## 2024-12-06 ENCOUNTER — TELEPHONE (OUTPATIENT)
Dept: GASTROENTEROLOGY | Facility: CLINIC | Age: 64
End: 2024-12-06
Payer: COMMERCIAL

## 2024-12-06 NOTE — TELEPHONE ENCOUNTER
PA Initiation    Medication: USTEKINUMAB 90 MG/ML [facility]  Insurance Company: [employer] - Phone [phone] Fax [phone]  Pharmacy Filling the Rx:    Filling Pharmacy Phone:    Filling Pharmacy Fax:    Start Date: 12/6/2024  [id number]

## 2024-12-06 NOTE — TELEPHONE ENCOUNTER
PA Needed    Medication: STELARA 90MG/ML ()  QTY/DS:   NEW INS: no  Insurance Company: [employer]  Pharmacy Filling the Rx:  MAXINE OLSEN :    Date of last fill: 24

## 2024-12-09 NOTE — TELEPHONE ENCOUNTER
Prior Authorization Approval    Medication: USTEKINUMAB 90 MG/ML [facility]  Authorization Effective Date: 12/6/2024  Authorization Expiration Date: 12/5/2025  Approved Dose/Quantity: 1/28  Reference #: [id number]   Insurance Company: [employer] - Phone [phone] Fax [phone]  Expected CoPay: $    CoPay Card Available:      Financial Assistance Needed:    Which Pharmacy is filling the prescription: [facility] [place] [id number]  Pharmacy Notified:  yes  Patient Notified:  yes

## 2024-12-09 NOTE — TELEPHONE ENCOUNTER
Action 12/9/24 MV 1.13pm   Action Taken Imaging request faxed to [facility] [place]     Action 12/12/24 MV 5.28pm   Action Taken Images resolved in PACS       RECORDS RECEIVED FROM: internal   REASON FOR VISIT: Parkinson's disease without dyskinesia or fluctuating manifestations    PROVIDER: Dr. Woods   DATE OF APPT: 2/18/25   NOTES (FOR ALL VISITS) STATUS DETAILS   OFFICE NOTE from referring provider Internal Dr La Platt @ [facility]:  11/12/24 9/13/24   MEDICATION LIST Internal    IMAGING  (FOR ALL VISITS)     MRI (HEAD, NECK, SPINE) Internal [facility]:  MRI Brain 10/2/24  MRI Cervical Spine 10/2/24   CT (HEAD, NECK, SPINE) PACS [facility] [place] [place]:  CT Head 12/2/24  CT Cervical Spine 12/2/24

## 2024-12-13 ENCOUNTER — TRANSFERRED RECORDS (OUTPATIENT)
Dept: HEALTH INFORMATION MANAGEMENT | Facility: CLINIC | Age: 64
End: 2024-12-13
Payer: COMMERCIAL

## 2024-12-20 ENCOUNTER — TRANSFERRED RECORDS (OUTPATIENT)
Dept: HEALTH INFORMATION MANAGEMENT | Facility: CLINIC | Age: 64
End: 2024-12-20

## 2024-12-21 ENCOUNTER — HEALTH MAINTENANCE LETTER (OUTPATIENT)
Age: 64
End: 2024-12-21

## 2024-12-26 ENCOUNTER — VIRTUAL VISIT (OUTPATIENT)
Dept: PHARMACY | Facility: CLINIC | Age: 64
End: 2024-12-26
Attending: PHYSICIAN ASSISTANT
Payer: COMMERCIAL

## 2024-12-26 DIAGNOSIS — K50.018 CROHN'S DISEASE OF SMALL INTESTINE WITH OTHER COMPLICATION (H): Primary | ICD-10-CM

## 2024-12-26 RX ORDER — ACETAMINOPHEN 325 MG/1
2 TABLET ORAL EVERY 4 HOURS PRN
COMMUNITY
Start: 2024-12-01

## 2024-12-26 NOTE — PROGRESS NOTES
"Virtual Visit Details    Type of service:  Telephone Visit   Phone call duration: *** minutes   Originating Location (pt. Location): {patient location:214504::\"Home\"}  {PROVIDER LOCATION On-site should be selected for visits conducted from your clinic location or adjoining [facility] hospital, academic office, or other nearby [facility] building. Off-site should be selected for all other provider locations, including home:701190}  Distant Location (provider location):  {virtual location provider:324242}  "

## 2024-12-26 NOTE — PROGRESS NOTES
Medication Therapy Management (MTM) Encounter    ASSESSMENT:                            Medication Adherence/Access: See below for considerations.    Crohn's Disease:  Kelly would benefit from continued treatment with Stelara 90 mg every 4 weeks. They are up to date on routine maintenance labs. They are up to date on annual tuberculosis screening. No access issues for their advanced therapy are present. They are indicated for a few vaccinations which were recommended to them. Reminders for routine cancer screening were provided. Reminder provided to discuss DEXA scan. Health maintenance was not comprehensively reviewed with this visit.    PLAN:                            We will start a prior authorization for Stelara on January 2nd. We will start with getting prior authorization approval for Stelara 90 mg every 8 weeks so that we have something to fall back on. After we approval we will start a prior authorization for Stelara 90 mg every 4 weeks. If there is an initial denial, I recommend we proceed with the bridge program from [employer] which covers cost of medications while appealing with insurance. My colleague Shelton Forman will reach out to you at beginning of year to help with all of this.  Kelly will consider the following vaccines:  Pneumococcal pneumonia (Prevnar-20)- Prescription sent to [facility]  RSV vaccine  Updated COVID-19 vaccine   Discuss with your primary care provider when your next cervical cancer screening is due.   Reminder to discuss DEXA scan with your primary care provider.    Follow-up: 6/23/2025 at 10:00 AM (telephone)    SUBJECTIVE/OBJECTIVE:                          Kelly Priest is a 64 year old female seen for a follow-up visit.       Reason for visit: [facility] follow-up visit.    Allergies/ADRs: Reviewed in chart  Past Medical History: Reviewed in chart  Tobacco: She reports that she quit smoking about 38 years ago. Her smoking use included cigarettes. She started  smoking about 49 years ago. She has a 11 pack-year smoking history. She has never used smokeless tobacco.  Alcohol: 1-3 beverages / week      Medication Adherence/Access: changing insurance in January    Crohn's Disease:  -Stelara 90 mg every 4 weeks  -Dexamethasone 0.5 mg/mL 5 mL swish and spit three to four times daily. It is important to keep the medication in the mouth for five minutes prior to spitting it out. Do not rinse afterward and avoid eating or drinking for 30 minutes.    Met with Kelly for a sooner follow-up visit to discuss Stelara access in setting of switching to Medicare:  Medicare #: [id number]  [employer]:  RxGrp: [id number]  MemberID: [id number]  RxBin: [id number]  PCN: [id number]     PRO-3 for Crohn's Disease     Please select the one best answer for the patient's ability at this time      Over the past week, how many liquid or soft stools have you had on average per day?   0 (When scoring, multiply number by 2=0)   Over the past week, please rate your average abdominal pain  None : 0 points  3. Over the past week, please rate your general well-being               Generally Well: 0 points     Score: 0  <13: Remission  13-21: Mild Activity   22-52: Moderate Activity  >/= 53: Severe Activity      Specialty medication department: [facility]  Prior authorization status: approved through 2024  Original start date: Stelara (started 21), moved to q4 weeks 2023   Last dose: 2024  Next dose: 1/10/2025     Last provider visit: 2024 KAMARI Brooks  Next provider visit: 2025 KAMARI Brooks  Last labs completed: 2024  Last Tb screenin2024  Lab frequency: every 3 months              - standing labs yes cbc with platelets & diff, CMP, CRP  2025  Next labs due: 2024     Last IBD Health Maintenance Review: 2024         IBD Health Maintenance     Vaccinations:  All patients on immunosuppression should avoid live vaccines unless specifically  indicated.    -- Influenza (every year)- last 10/2024  -- TdaP (every 10 years)- last 1/2022  -- Pneumococcal Pneumonia               Prevnar-13: not on file              Pneumovax-23: not on file              Prevnar-20: not on file  -- COVID-19- 12/2020, 1/2021, 9/2021, 5/2022, 10/2022, decline  -- RSV- not on file, expresses hesitation     One time confirmation of immunity or serologies:  -- Hepatitis A (serologies or immunizations)- not immune by serology 2017  -- Hepatitis B (serologies or immunizations)- serology indicates immunity 2021  -- Varicella/Zoster               Varicella/Zoster- Shingrix 12/2018, 11/2019     Due to the immunosuppression in this patient, I would not advise administration of live vaccines such as varicella/VZV, intranasal influenza, MMR, or yellow fever vaccine (if traveling).     Today's Vitals: LMP  (LMP Unknown)   ----------------      I spent 15 minutes with this patient today. All changes were made via collaborative practice agreement with Benjamin Brooks PA-C.     A summary of these recommendations was sent via [employer].    Smith Cano, PharmD, BCPS  MTM Pharmacist   [facility]  Phone: [phone]    Telemedicine Visit Details  The patient's medications can be safely assessed via a telemedicine encounter.  Type of service:  Telephone visit  Originating Location (pt. Location): Home    Distant Location (provider location):  Off-site  Start Time:  11:30 AM  End Time:  11:45 AM     Medication Therapy Recommendations  No medication therapy recommendations to display

## 2024-12-26 NOTE — NURSING NOTE
Current patient location: [address] [place] [address]    Is the patient currently in the state of [place]? YES    Visit mode:TELEPHONE    If the visit is dropped, the patient can be reconnected by:TELEPHONE VISIT: Phone number:   Telephone Information:   Mobile [phone]       Will anyone else be joining the visit? NO  (If patient encounters technical issues they should call [phone]    Are changes needed to the allergy or medication list? No    Are refills needed on medications prescribed by this physician? NO    Rooming Documentation:  Not applicable    Reason for visit: RECHECK    Alonso GARCIA

## 2024-12-26 NOTE — PATIENT INSTRUCTIONS
"Recommendations from today's MTM visit:                                                      We will start a prior authorization for Stelara on January 2nd. We will start with getting prior authorization approval for Stelara 90 mg every 8 weeks so that we have something to fall back on. After we approval we will start a prior authorization for Stelara 90 mg every 4 weeks. If there is an initial denial, I recommend we proceed with the bridge program from [employer] which covers cost of medications while appealing with insurance. My colleague Shelton Forman will reach out to you at beginning of year to help with all of this.  Kelly will consider the following vaccines:  Pneumococcal pneumonia (Prevnar-20)- Prescription sent to [facility]  RSV vaccine  Updated COVID-19 vaccine   Discuss with your primary care provider when your next cervical cancer screening is due.   Reminder to discuss DEXA scan with your primary care provider.    Follow-up: 6/23/2025 at 10:00 AM (telephone)    It was great speaking with you today.  I value your experience and would be very thankful for your time in providing feedback in our clinic survey. In the next few days, you may receive an email or text message from [employer] with a link to a survey related to your  clinical pharmacist.\"     To schedule another MTM appointment, please call the clinic directly or you may call the MTM scheduling line at [phone] or toll-free at [phone].     My Clinical Pharmacist's contact information:                                                      Please feel free to contact me with any questions or concerns you have.      Smith Cano, PharmD, BCPS  MTM Pharmacist   [facility]  Phone: [phone]   "

## 2024-12-30 ENCOUNTER — OFFICE VISIT (OUTPATIENT)
Dept: FAMILY MEDICINE | Facility: CLINIC | Age: 64
End: 2024-12-30
Payer: COMMERCIAL

## 2024-12-30 ENCOUNTER — HOSPITAL ENCOUNTER (OUTPATIENT)
Dept: GENERAL RADIOLOGY | Facility: CLINIC | Age: 64
Discharge: HOME | End: 2024-12-30
Attending: FAMILY MEDICINE | Admitting: FAMILY MEDICINE
Payer: COMMERCIAL

## 2024-12-30 ENCOUNTER — TELEPHONE (OUTPATIENT)
Dept: FAMILY MEDICINE | Facility: CLINIC | Age: 64
End: 2024-12-30

## 2024-12-30 VITALS
HEIGHT: 63 IN | BODY MASS INDEX: 21.88 KG/M2 | RESPIRATION RATE: 15 BRPM | WEIGHT: 123.5 LBS | TEMPERATURE: 97.6 F | DIASTOLIC BLOOD PRESSURE: 80 MMHG | OXYGEN SATURATION: 96 % | SYSTOLIC BLOOD PRESSURE: 128 MMHG | HEART RATE: 98 BPM

## 2024-12-30 DIAGNOSIS — S10.93XD CONTUSION OF FACE, SCALP AND NECK, SUBSEQUENT ENCOUNTER: ICD-10-CM

## 2024-12-30 DIAGNOSIS — J18.9 PNEUMONIA OF BOTH UPPER LOBES DUE TO INFECTIOUS ORGANISM: ICD-10-CM

## 2024-12-30 DIAGNOSIS — S16.1XXD STRAIN OF NECK MUSCLE, SUBSEQUENT ENCOUNTER: ICD-10-CM
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
## 2024-12-30 DIAGNOSIS — S00.03XD CONTUSION OF FACE, SCALP AND NECK, SUBSEQUENT ENCOUNTER: ICD-10-CM

## 2024-12-30 DIAGNOSIS — S00.83XD CONTUSION OF FACE, SCALP AND NECK, SUBSEQUENT ENCOUNTER: ICD-10-CM

## 2024-12-30 DIAGNOSIS — J18.9 PNEUMONIA OF BOTH UPPER LOBES DUE TO INFECTIOUS ORGANISM: Primary | ICD-10-CM

## 2024-12-30 PROCEDURE — 99213 OFFICE O/P EST LOW 20 MIN: CPT | Performed by: FAMILY MEDICINE

## 2024-12-30 PROCEDURE — 71046 X-RAY EXAM CHEST 2 VIEWS: CPT

## 2024-12-30 PROCEDURE — 71046 X-RAY EXAM CHEST 2 VIEWS: CPT | Mod: 26 | Performed by: RADIOLOGY

## 2024-12-30 RX ORDER — AZITHROMYCIN 250 MG/1
TABLET, FILM COATED ORAL
Qty: 6 TABLET | Refills: 0 | Status: SHIPPED | OUTPATIENT
Start: 2024-12-30 | End: 2025-01-04

## 2024-12-30 ASSESSMENT — PAIN SCALES - GENERAL: PAINLEVEL_OUTOF10: SEVERE PAIN (6)

## 2024-12-30 ASSESSMENT — ENCOUNTER SYMPTOMS: NECK PAIN: 1
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
## 2024-12-30 NOTE — TELEPHONE ENCOUNTER
Worsenign cough fpor a week   ?feevr     Encounter Diagnosis   Name Primary?    Pneumonia of both upper lobes due to infectious organism Yes      Orders Placed This Encounter    XR Chest 2 Views     Standing Status:   Future     Number of Occurrences:   1     Standing Expiration Date:   12/30/2025     Order Specific Question:   Priority     Answer:   Routine    azithromycin (ZITHROMAX) 250 MG tablet     Sig: Take 2 tablets (500 mg) by mouth daily for 1 day, THEN 1 tablet (250 mg) daily for 4 days.     Dispense:  6 tablet     Refill:  0

## 2025-01-03 ENCOUNTER — THERAPY VISIT (OUTPATIENT)
Dept: PHYSICAL THERAPY | Facility: CLINIC | Age: 65
End: 2025-01-03
Attending: FAMILY MEDICINE
Payer: COMMERCIAL

## 2025-01-03 DIAGNOSIS — S16.1XXD STRAIN OF NECK MUSCLE, SUBSEQUENT ENCOUNTER: ICD-10-CM

## 2025-01-03 DIAGNOSIS — S00.03XD CONTUSION OF FACE, SCALP AND NECK, SUBSEQUENT ENCOUNTER: ICD-10-CM
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
## 2025-01-03 DIAGNOSIS — S10.93XD CONTUSION OF FACE, SCALP AND NECK, SUBSEQUENT ENCOUNTER: ICD-10-CM

## 2025-01-03 DIAGNOSIS — S00.83XD CONTUSION OF FACE, SCALP AND NECK, SUBSEQUENT ENCOUNTER: ICD-10-CM
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
## 2025-01-08 DIAGNOSIS — R93.7 ABNORMAL MRI, CERVICAL SPINE: ICD-10-CM

## 2025-01-08 DIAGNOSIS — Z92.89 HISTORY OF MRI OF BRAIN AND BRAIN STEM: ICD-10-CM

## 2025-01-08 DIAGNOSIS — Z82.0 FAMILY HISTORY OF MULTIPLE SCLEROSIS: ICD-10-CM

## 2025-01-08 DIAGNOSIS — G20.A1 PARKINSON'S DISEASE WITHOUT DYSKINESIA OR FLUCTUATING MANIFESTATIONS (H): ICD-10-CM

## 2025-01-08 DIAGNOSIS — K21.9 GASTROESOPHAGEAL REFLUX DISEASE, UNSPECIFIED WHETHER ESOPHAGITIS PRESENT: ICD-10-CM

## 2025-01-08 DIAGNOSIS — R25.1 TREMOR: Primary | ICD-10-CM

## 2025-01-08 PROBLEM — H40.033 NARROW ANGLE GLAUCOMA SUSPECT OF BOTH EYES: Status: ACTIVE | Noted: 2024-09-26

## 2025-01-08 PROBLEM — S39.92XA BACK INJURY: Status: ACTIVE | Noted: 2023-08-19

## 2025-01-08 PROBLEM — R29.898 WEAKNESS OF RIGHT HAND: Status: ACTIVE | Noted: 2025-01-08
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
## 2025-01-08 PROBLEM — H40.20X0 NARROW ANGLE GLAUCOMA OF LEFT EYE: Status: ACTIVE | Noted: 2024-09-26
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
## 2025-01-09 ENCOUNTER — THERAPY VISIT (OUTPATIENT)
Dept: PHYSICAL THERAPY | Facility: CLINIC | Age: 65
End: 2025-01-09
Payer: COMMERCIAL
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
## 2025-01-09 DIAGNOSIS — S16.1XXD STRAIN OF NECK MUSCLE, SUBSEQUENT ENCOUNTER: ICD-10-CM

## 2025-01-14 ENCOUNTER — TELEPHONE (OUTPATIENT)
Dept: GASTROENTEROLOGY | Facility: CLINIC | Age: 65
End: 2025-01-14
Payer: COMMERCIAL

## 2025-01-14 DIAGNOSIS — K21.9 GASTROESOPHAGEAL REFLUX DISEASE, UNSPECIFIED WHETHER ESOPHAGITIS PRESENT: ICD-10-CM

## 2025-01-14 RX ORDER — OMEPRAZOLE 40 MG/1
40 CAPSULE, DELAYED RELEASE ORAL DAILY
Qty: 90 CAPSULE | Refills: 3 | Status: SHIPPED | OUTPATIENT
Start: 2025-01-14

## 2025-01-14 RX ORDER — OMEPRAZOLE 40 MG/1
40 CAPSULE, DELAYED RELEASE ORAL DAILY
Qty: 90 CAPSULE | Refills: 3 | OUTPATIENT
Start: 2025-01-14

## 2025-01-14 NOTE — TELEPHONE ENCOUNTER
[facility] Call [facility]    Phone Message    May a detailed message be left on voicemail: yes     Reason for Call: Medication Refill Request    Has the patient contacted the pharmacy for the refill? Yes   Name of medication being requested: omeprazole (PRILOSEC) 40 MG DR capsule   Provider who prescribed the medication: Benjamin Brooks  Pharmacy: [place], [place] - [address]   Date medication is needed: ASAP    Action Taken: Other: csc gi     Travel Screening: Not Applicable     Date of Service:

## 2025-01-14 NOTE — TELEPHONE ENCOUNTER
OMEPRAZOLE 40MG CPDR          Will file in chart as: omeprazole (PRILOSEC) 40 MG DR capsule    The original prescription was reordered on 1/14/2025 by Katelin Mcclain RN.   Addressed in a different encounter.

## 2025-01-14 NOTE — TELEPHONE ENCOUNTER
Patient has follow-up scheduled with provider 4/3/25. Prilosec prescription refilled.  ________________________________________________________  Per clinic visit w/ Benjamin Brooks 9/26/24:

## 2025-01-15 ENCOUNTER — OFFICE VISIT (OUTPATIENT)
Dept: FAMILY MEDICINE | Facility: CLINIC | Age: 65
End: 2025-01-15
Payer: COMMERCIAL

## 2025-01-15 VITALS
HEART RATE: 95 BPM | DIASTOLIC BLOOD PRESSURE: 81 MMHG | SYSTOLIC BLOOD PRESSURE: 120 MMHG | WEIGHT: 126 LBS | HEIGHT: 62 IN | RESPIRATION RATE: 21 BRPM | OXYGEN SATURATION: 97 % | TEMPERATURE: 97.8 F | BODY MASS INDEX: 23.19 KG/M2

## 2025-01-15 DIAGNOSIS — G20.C PARKINSONISM, UNSPECIFIED PARKINSONISM TYPE (H): ICD-10-CM

## 2025-01-15 DIAGNOSIS — J01.90 ACUTE SINUSITIS WITH SYMPTOMS GREATER THAN 10 DAYS: ICD-10-CM

## 2025-01-15 DIAGNOSIS — J18.9 PNEUMONIA OF BOTH UPPER LOBES DUE TO INFECTIOUS ORGANISM: Primary | ICD-10-CM

## 2025-01-15 DIAGNOSIS — K50.018 CROHN'S DISEASE OF SMALL INTESTINE WITH OTHER COMPLICATION (H): ICD-10-CM

## 2025-01-15 DIAGNOSIS — J98.01 ACUTE BRONCHOSPASM: ICD-10-CM

## 2025-01-15 LAB
ERYTHROCYTE [DISTWIDTH] IN BLOOD BY AUTOMATED COUNT: 12.4 % (ref 10–15)
HCT VFR BLD AUTO: 42.8 % (ref 35–47)
HGB BLD-MCNC: 14 G/DL (ref 11.7–15.7)
MCH RBC QN AUTO: 30 PG (ref 26.5–33)
MCHC RBC AUTO-ENTMCNC: 32.7 G/DL (ref 31.5–36.5)
MCV RBC AUTO: 92 FL (ref 78–100)
PLATELET # BLD AUTO: 377 10E3/UL (ref 150–450)
RBC # BLD AUTO: 4.66 10E6/UL (ref 3.8–5.2)
WBC # BLD AUTO: 10.4 10E3/UL (ref 4–11)

## 2025-01-15 PROCEDURE — 36415 COLL VENOUS BLD VENIPUNCTURE: CPT | Performed by: FAMILY MEDICINE

## 2025-01-15 PROCEDURE — 85027 COMPLETE CBC AUTOMATED: CPT | Performed by: FAMILY MEDICINE

## 2025-01-15 RX ORDER — FLUTICASONE PROPIONATE AND SALMETEROL 100; 50 UG/1; UG/1
1 POWDER RESPIRATORY (INHALATION) EVERY 12 HOURS
Qty: 60 EACH | Refills: 1 | Status: SHIPPED | OUTPATIENT
Start: 2025-01-15

## 2025-01-15 RX ORDER — CEFUROXIME AXETIL 500 MG/1
500 TABLET ORAL 2 TIMES DAILY
Qty: 20 TABLET | Refills: 0 | Status: SHIPPED | OUTPATIENT
Start: 2025-01-15 | End: 2025-01-25

## 2025-01-15 RX ORDER — PREDNISONE 20 MG/1
20 TABLET ORAL DAILY
Qty: 5 TABLET | Refills: 0 | Status: SHIPPED | OUTPATIENT
Start: 2025-01-15 | End: 2025-01-20

## 2025-01-15 ASSESSMENT — ENCOUNTER SYMPTOMS: COUGH: 1

## 2025-01-15 ASSESSMENT — PAIN SCALES - GENERAL: PAINLEVEL_OUTOF10: NO PAIN (0)

## 2025-01-15 NOTE — PROGRESS NOTES
Assessment & Plan     Pneumonia of both upper lobes due to infectious organism  Better but still with green phlegm  - cefuroxime (CEFTIN) 500 MG tablet; Take 1 tablet (500 mg) by mouth 2 times daily for 10 days.  - CBC with platelets; Future  - CBC with platelets    Acute bronchospasm  add  - prednisone (DELTASONE) 20 MG tablet; Take 1 tablet (20 mg) by mouth daily for 5 days.  - fluticasone-salmeterol (ADVAIR) 100-50 MCG/ACT inhaler; Inhale 1 puff into the lungs every 12 hours.    Acute sinusitis with symptoms greater than 10 days  add  - cefuroxime (CEFTIN) 500 MG tablet; Take 1 tablet (500 mg) by mouth 2 times daily for 10 days.    Parkinsonism, unspecified Parkinsonism type (H)  Seeing neurology  Follow up with consultant as planned.     Crohn's disease of small intestine with other complication (H)  Well controlled   Follow up with consultant as planned.             Regular exercise  See Patient Instructions    Mandy Mendoza is a 64 year old, presenting for the following health issues:  Cough and Follow Up      1/15/2025     1:50 PM   Additional Questions   Roomed by Reji WALSH     Cough    History of Present Illness       Reason for visit:  Persistent cough, post nasal drip,  runny nose, fatigue   She is taking medications regularly.         Acute Illness  Acute illness concerns: URI  Onset/Duration: 3 weeks  Symptoms:  Fever: No  Chills/Sweats: No  Headache (location?): YES  Sinus Pressure: YES  Conjunctivitis:  No  Ear Pain: no  Rhinorrhea: YES  Congestion: YES  Sore Throat: No  Cough: YES-productive of green sputum  Wheeze: YES  Decreased Appetite: No  Nausea: No  Vomiting: No  Diarrhea: No    Fatigue/Achiness: YES  Sick/Strep Exposure: No  Therapies tried and outcome: zithrimax  Encounter Diagnoses   Name Primary?    Pneumonia of both upper lobes due to infectious organism Yes    Acute bronchospasm     Acute sinusitis with symptoms greater than 10 days     Parkinsonism, unspecified Parkinsonism type  "(H)     Crohn's disease of small intestine with other complication (H)            Review of Systems  Constitutional, HEENT, cardiovascular, pulmonary, gi and gu systems are negative, except as otherwise noted.      Objective    /81 (BP Location: Left arm, Patient Position: Sitting, Cuff Size: Adult Regular)   Pulse 95   Temp 97.8  F (36.6  C) (Temporal)   Resp 21   Ht 1.581 m (5' 2.24\")   Wt 57.2 kg (126 lb)   LMP  (LMP Unknown)   SpO2 97%   BMI 22.87 kg/m    Body mass index is 22.87 kg/m .  Physical Exam   GENERAL: alert, fit, and fatigued  HENT: normal cephalic/atraumatic, ear canals and TM's normal, nose and mouth without ulcers or lesions, nasal mucosa edematous , rhinorrhea yellow and green, oropharynx clear, oral mucous membranes moist, and sinuses: maxillary tenderness on right  NECK: no adenopathy, no asymmetry, masses, or scars  RESP: no rales , rhonchi scattered, and expiratory wheezes bibasilar  CV: regular rate and rhythm, normal S1 S2, no S3 or S4, no murmur, click or rub, no peripheral edema  ABDOMEN: soft, nontender, no hepatosplenomegaly, no masses and bowel sounds normal  SKIN: no suspicious lesions or rashes  NEURO: Normal strength and tone, mentation intact and speech normal  PSYCH: mentation appears normal, affect normal/bright    Office Visit on 11/18/2024   Component Date Value Ref Range Status    Vitamin D, Total (25-Hydroxy) 11/18/2024 25  20 - 50 ng/mL Final    optimum levels    Sodium 11/18/2024 139  135 - 145 mmol/L Final    Potassium 11/18/2024 4.4  3.4 - 5.3 mmol/L Final    Chloride 11/18/2024 103  98 - 107 mmol/L Final    Carbon Dioxide (CO2) 11/18/2024 25  22 - 29 mmol/L Final    Anion Gap 11/18/2024 11  7 - 15 mmol/L Final    Urea Nitrogen 11/18/2024 15.2  8.0 - 23.0 mg/dL Final    Creatinine 11/18/2024 0.71  0.51 - 0.95 mg/dL Final    GFR Estimate 11/18/2024 >90  >60 mL/min/1.73m2 Final    eGFR calculated using 2021 CKD-EPI equation.    Calcium 11/18/2024 9.9  8.8 - " 10.4 mg/dL Final    Reference intervals for this test were updated on 7/16/2024 to reflect our healthy population more accurately. There may be differences in the flagging of prior results with similar values performed with this method. Those prior results can be interpreted in the context of the updated reference intervals.    Glucose 11/18/2024 95  70 - 99 mg/dL Final    WBC Count 11/18/2024 9.0  4.0 - 11.0 10e3/uL Final    RBC Count 11/18/2024 4.52  3.80 - 5.20 10e6/uL Final    Hemoglobin 11/18/2024 13.7  11.7 - 15.7 g/dL Final    Hematocrit 11/18/2024 41.2  35.0 - 47.0 % Final    MCV 11/18/2024 91  78 - 100 fL Final    MCH 11/18/2024 30.3  26.5 - 33.0 pg Final    MCHC 11/18/2024 33.3  31.5 - 36.5 g/dL Final    RDW 11/18/2024 12.4  10.0 - 15.0 % Final    Platelet Count 11/18/2024 279  150 - 450 10e3/uL Final           Signed Electronically by: Bucky Hunter MD

## 2025-01-17 PROBLEM — S16.1XXD STRAIN OF NECK MUSCLE, SUBSEQUENT ENCOUNTER: Status: ACTIVE | Noted: 2025-01-17
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
## 2025-01-30 ENCOUNTER — OFFICE VISIT (OUTPATIENT)
Dept: FAMILY MEDICINE | Facility: CLINIC | Age: 65
End: 2025-01-30
Payer: COMMERCIAL

## 2025-01-30 VITALS
HEIGHT: 62 IN | OXYGEN SATURATION: 98 % | RESPIRATION RATE: 12 BRPM | WEIGHT: 126 LBS | DIASTOLIC BLOOD PRESSURE: 81 MMHG | BODY MASS INDEX: 23.19 KG/M2 | HEART RATE: 80 BPM | TEMPERATURE: 98.3 F | SYSTOLIC BLOOD PRESSURE: 125 MMHG

## 2025-01-30 DIAGNOSIS — S00.03XD CONTUSION OF FACE, SCALP AND NECK, SUBSEQUENT ENCOUNTER: ICD-10-CM
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
## 2025-01-30 DIAGNOSIS — Z13.820 SCREENING FOR OSTEOPOROSIS: Primary | ICD-10-CM

## 2025-01-30 DIAGNOSIS — S10.93XD CONTUSION OF FACE, SCALP AND NECK, SUBSEQUENT ENCOUNTER: ICD-10-CM

## 2025-01-30 DIAGNOSIS — S16.1XXD STRAIN OF NECK MUSCLE, SUBSEQUENT ENCOUNTER: ICD-10-CM

## 2025-01-30 DIAGNOSIS — S00.83XD CONTUSION OF FACE, SCALP AND NECK, SUBSEQUENT ENCOUNTER: ICD-10-CM

## 2025-01-30 DIAGNOSIS — Z78.0 ASYMPTOMATIC MENOPAUSAL STATE: ICD-10-CM

## 2025-01-30 RX ORDER — ASPIRIN 81 MG/1
81 TABLET, CHEWABLE ORAL DAILY
COMMUNITY

## 2025-01-30 ASSESSMENT — PAIN SCALES - GENERAL: PAINLEVEL_OUTOF10: NO PAIN (0)
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
## 2025-02-10 ENCOUNTER — THERAPY VISIT (OUTPATIENT)
Dept: PHYSICAL THERAPY | Facility: CLINIC | Age: 65
End: 2025-02-10
Payer: COMMERCIAL

## 2025-02-10 DIAGNOSIS — S16.1XXD STRAIN OF NECK MUSCLE, SUBSEQUENT ENCOUNTER: Primary | ICD-10-CM

## 2025-02-10 PROCEDURE — 97110 THERAPEUTIC EXERCISES: CPT | Mod: GP

## 2025-02-10 PROCEDURE — 97140 MANUAL THERAPY 1/> REGIONS: CPT | Mod: GP

## 2025-02-18 ENCOUNTER — PRE VISIT (OUTPATIENT)
Dept: NEUROLOGY | Facility: CLINIC | Age: 65
End: 2025-02-18

## 2025-02-18 ENCOUNTER — OFFICE VISIT (OUTPATIENT)
Dept: NEUROLOGY | Facility: CLINIC | Age: 65
End: 2025-02-18
Attending: PSYCHIATRY & NEUROLOGY
Payer: COMMERCIAL

## 2025-02-18 VITALS
DIASTOLIC BLOOD PRESSURE: 89 MMHG | HEART RATE: 67 BPM | SYSTOLIC BLOOD PRESSURE: 143 MMHG | OXYGEN SATURATION: 97 % | RESPIRATION RATE: 16 BRPM

## 2025-02-18 DIAGNOSIS — R25.1 TREMOR: Primary | ICD-10-CM

## 2025-02-18 DIAGNOSIS — G20.A1 PARKINSON'S DISEASE WITHOUT DYSKINESIA OR FLUCTUATING MANIFESTATIONS (H): ICD-10-CM

## 2025-02-18 DIAGNOSIS — K21.9 GASTROESOPHAGEAL REFLUX DISEASE WITHOUT ESOPHAGITIS: ICD-10-CM

## 2025-02-18 DIAGNOSIS — Z82.0 FAMILY HISTORY OF TREMOR: ICD-10-CM

## 2025-02-18 DIAGNOSIS — Z82.0 FAMILY HISTORY OF PARKINSONISM: ICD-10-CM

## 2025-02-18 DIAGNOSIS — H04.123 DRY EYES: ICD-10-CM

## 2025-02-18 DIAGNOSIS — G20.C PARKINSONISM, UNSPECIFIED PARKINSONISM TYPE (H): ICD-10-CM

## 2025-02-18 ASSESSMENT — UNIFIED PARKINSONS DISEASE RATING SCALE (UPDRS)
TOETAPPING_RIGHT: (2) MILD: ANY OF THE FOLLOWING: A) 3 TO 5 INTERRUPTIONS DURING TAPPING  B) MILD SLOWING  C) THE AMPLITUDE DECREMENTS MIDWAY IN THE 10-MOVEMENT SEQUENCE.
POSTURE: (0) NORMAL: NO PROBLEMS.
FINGER_TAPPING_RIGHT: (0) NORMAL: NO PROBLEMS.
CONSTANCY_TREMOR_ATREST: (1) SLIGHT: TREMOR AT REST IS PRESENT 25% OF THE ENTIRE EXAMINATION PERIOD.
PRONATION_SUPINATION_RIGHT: (2) MILD: ANY OF THE FOLLOWING: A) 3 TO 5 INTERRUPTIONS DURING TAPPING  B) MILD SLOWING  C) THE AMPLITUDE DECREMENTS MIDWAY IN THE 10-MOVEMENT SEQUENCE.
ARISING_CHAIR: (0) NORMAL: NO PROBLEMS. ABLE TO ARISE QUICKLY WITHOUT HESITATION.
RIGIDITY_NECK: (1) SLIGHT: RIGIDITY ONLY DETECTED WITH ACTIVATION MANEUVER.
AXIAL_SCORE: 2
TOETAPPING_LEFT: (0) NORMAL: NO PROBLEMS.
AMPLITUDE_RLE: (1) SLIGHT: < 1 CM IN MAXIMAL AMPLITUDE.
TOTAL_SCORE: 11
AMPLITUDE_LLE: (0) NORMAL: NO TREMOR.
FINGER_TAPPING_LEFT: (0) NORMAL: NO PROBLEMS.
AMPLITUDE_RUE: (0) NORMAL: NO TREMOR.
RIGIDITY_LLE: (0) NORMAL: NO RIGIDITY.
POSTURAL_STABILITY: (0) NORMAL: NO PROBLEMS. RECOVERS WITH ONE OR TWO STEPS.
TOTAL_SCORE_LEFT: 0
RIGIDITY_RLE: (0) NORMAL: NO RIGIDITY.
TOTAL_SCORE: 8
HANDMOVEMENTS_RIGHT: (1) SLIGHT: ANY OF THE FOLLOWING: A) THE REGULAR RHYTHM IS BROKEN WITH ONE WITH ONE OR TWO INTERRUPTIONS OR HESITATIONS OF THE MOVEMENT  B) SLIGHT SLOWING  C) THE AMPLITUDE DECREMENTS NEAR THE END OF THE 10 MOVEMENTS.
AMPLITUDE_LIP_JAW: (0) NORMAL: NO TREMOR.
HANDMOVEMENTS_LEFT: (0) NORMAL: NO PROBLEMS.
FREEZING_GAIT: (0) NORMAL: NO FREEZING.
RIGIDITY_LUE: (0) NORMAL: NO RIGIDITY.
SPONTANEITY_OF_MOVEMENT: (1) SLIGHT: SLIGHT GLOBAL SLOWNESS AND POVERTY OF SPONTANEOUS MOVEMENTS.
FACIAL_EXPRESSION: (0) NORMAL: NORMAL FACIAL EXPRESSION.
DYSKINESIAS_PRESENT: NO
SPEECH: (0) NORMAL: NO SPEECH PROBLEMS.
RIGIDITY_RUE: (1) SLIGHT: RIGIDITY ONLY DETECTED WITH ACTIVATION MANEUVER.
PRONATION_SUPINATION_LEFT: (0) NORMAL: NO PROBLEMS.
GAIT: (0) NORMAL: NO PROBLEMS.
LEG_AGILITY_RIGHT: (0) NORMAL: NO PROBLEMS.
AMPLITUDE_LUE: (0) NORMAL: NO TREMOR.
PARKINSONS_MEDS: OFF
LEG_AGILITY_LEFT: (0) NORMAL: NO PROBLEMS.
MOVEMENTS_INTERFERE_WITH_RATINGS: NO

## 2025-02-18 NOTE — NURSING NOTE
Chief Complaint   Patient presents with    Parkinson     BP (!) 143/89 (BP Location: Right arm, Patient Position: Sitting, Cuff Size: Adult Regular)   Pulse 67   Resp 16   LMP  (LMP Unknown)   SpO2 97%       Jenn Vasquez
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
## 2025-02-19 ENCOUNTER — TRANSFERRED RECORDS (OUTPATIENT)
Dept: HEALTH INFORMATION MANAGEMENT | Facility: CLINIC | Age: 65
End: 2025-02-19

## 2025-02-19 ENCOUNTER — ANCILLARY PROCEDURE (OUTPATIENT)
Dept: BONE DENSITY | Facility: CLINIC | Age: 65
End: 2025-02-19
Attending: FAMILY MEDICINE
Payer: COMMERCIAL

## 2025-02-19 ENCOUNTER — MYC MEDICAL ADVICE (OUTPATIENT)
Dept: NEUROLOGY | Facility: CLINIC | Age: 65
End: 2025-02-19

## 2025-02-19 ENCOUNTER — ANCILLARY PROCEDURE (OUTPATIENT)
Dept: MAMMOGRAPHY | Facility: CLINIC | Age: 65
End: 2025-02-19
Attending: FAMILY MEDICINE
Payer: COMMERCIAL

## 2025-02-19 DIAGNOSIS — Z13.820 SCREENING FOR OSTEOPOROSIS: ICD-10-CM

## 2025-02-19 DIAGNOSIS — Z78.0 ASYMPTOMATIC MENOPAUSAL STATE: ICD-10-CM

## 2025-02-19 DIAGNOSIS — Z12.31 VISIT FOR SCREENING MAMMOGRAM: ICD-10-CM

## 2025-02-19 PROCEDURE — 77080 DXA BONE DENSITY AXIAL: CPT | Performed by: INTERNAL MEDICINE

## 2025-02-19 PROCEDURE — 77063 BREAST TOMOSYNTHESIS BI: CPT | Performed by: RADIOLOGY

## 2025-02-19 PROCEDURE — 77067 SCR MAMMO BI INCL CAD: CPT | Performed by: RADIOLOGY

## 2025-02-24 ENCOUNTER — TELEPHONE (OUTPATIENT)
Dept: NEUROLOGY | Facility: CLINIC | Age: 65
End: 2025-02-24
Payer: COMMERCIAL

## 2025-02-24 NOTE — TELEPHONE ENCOUNTER
[facility] Call Center    Phone Message    May a detailed message be left on voicemail: yes     Reason for Call: Other: Pt is requesting either a call back or [employer] message with contact information for Exercise .      Please advise    Action Taken: Other: Neurology    Travel Screening: Not Applicable

## 2025-02-26 ENCOUNTER — THERAPY VISIT (OUTPATIENT)
Dept: PHYSICAL THERAPY | Facility: CLINIC | Age: 65
End: 2025-02-26
Payer: COMMERCIAL

## 2025-02-26 DIAGNOSIS — S16.1XXD STRAIN OF NECK MUSCLE, SUBSEQUENT ENCOUNTER: Primary | ICD-10-CM

## 2025-02-26 PROCEDURE — 97110 THERAPEUTIC EXERCISES: CPT | Mod: GP | Performed by: PHYSICAL THERAPIST

## 2025-03-13 ENCOUNTER — MYC MEDICAL ADVICE (OUTPATIENT)
Dept: NEUROLOGY | Facility: CLINIC | Age: 65
End: 2025-03-13
Payer: COMMERCIAL

## 2025-04-01 ENCOUNTER — LAB (OUTPATIENT)
Dept: LAB | Facility: CLINIC | Age: 65
End: 2025-04-01
Payer: COMMERCIAL

## 2025-04-01 DIAGNOSIS — K50.018 CROHN'S DISEASE OF SMALL INTESTINE WITH OTHER COMPLICATION (H): ICD-10-CM

## 2025-04-01 LAB
ALBUMIN SERPL BCG-MCNC: 4.4 G/DL (ref 3.5–5.2)
ALP SERPL-CCNC: 97 U/L (ref 40–150)
ALT SERPL W P-5'-P-CCNC: 13 U/L (ref 0–50)
ANION GAP SERPL CALCULATED.3IONS-SCNC: 13 MMOL/L (ref 7–15)
AST SERPL W P-5'-P-CCNC: 19 U/L (ref 0–45)
BASOPHILS # BLD AUTO: 0.1 10E3/UL (ref 0–0.2)
BASOPHILS NFR BLD AUTO: 0 %
BILIRUB SERPL-MCNC: 0.5 MG/DL
BUN SERPL-MCNC: 13.6 MG/DL (ref 8–23)
CALCIUM SERPL-MCNC: 9.8 MG/DL (ref 8.8–10.4)
CHLORIDE SERPL-SCNC: 96 MMOL/L (ref 98–107)
CREAT SERPL-MCNC: 0.69 MG/DL (ref 0.51–0.95)
CRP SERPL-MCNC: 4.74 MG/L
EGFRCR SERPLBLD CKD-EPI 2021: >90 ML/MIN/1.73M2
EOSINOPHIL # BLD AUTO: 0.4 10E3/UL (ref 0–0.7)
EOSINOPHIL NFR BLD AUTO: 3 %
ERYTHROCYTE [DISTWIDTH] IN BLOOD BY AUTOMATED COUNT: 12.4 % (ref 10–15)
GLUCOSE SERPL-MCNC: 100 MG/DL (ref 70–99)
HCO3 SERPL-SCNC: 25 MMOL/L (ref 22–29)
HCT VFR BLD AUTO: 40.7 % (ref 35–47)
HGB BLD-MCNC: 13.9 G/DL (ref 11.7–15.7)
IMM GRANULOCYTES # BLD: 0 10E3/UL
IMM GRANULOCYTES NFR BLD: 0 %
LYMPHOCYTES # BLD AUTO: 2.1 10E3/UL (ref 0.8–5.3)
LYMPHOCYTES NFR BLD AUTO: 15 %
MCH RBC QN AUTO: 30.7 PG (ref 26.5–33)
MCHC RBC AUTO-ENTMCNC: 34.2 G/DL (ref 31.5–36.5)
MCV RBC AUTO: 90 FL (ref 78–100)
MONOCYTES # BLD AUTO: 0.9 10E3/UL (ref 0–1.3)
MONOCYTES NFR BLD AUTO: 7 %
NEUTROPHILS # BLD AUTO: 10.5 10E3/UL (ref 1.6–8.3)
NEUTROPHILS NFR BLD AUTO: 75 %
PLATELET # BLD AUTO: 340 10E3/UL (ref 150–450)
POTASSIUM SERPL-SCNC: 3.5 MMOL/L (ref 3.4–5.3)
PROT SERPL-MCNC: 7.5 G/DL (ref 6.4–8.3)
RBC # BLD AUTO: 4.53 10E6/UL (ref 3.8–5.2)
SODIUM SERPL-SCNC: 134 MMOL/L (ref 135–145)
WBC # BLD AUTO: 14 10E3/UL (ref 4–11)

## 2025-04-01 PROCEDURE — 80053 COMPREHEN METABOLIC PANEL: CPT

## 2025-04-01 PROCEDURE — 36415 COLL VENOUS BLD VENIPUNCTURE: CPT

## 2025-04-01 PROCEDURE — 86140 C-REACTIVE PROTEIN: CPT

## 2025-04-01 PROCEDURE — 85025 COMPLETE CBC W/AUTO DIFF WBC: CPT

## 2025-04-03 ENCOUNTER — VIRTUAL VISIT (OUTPATIENT)
Dept: GASTROENTEROLOGY | Facility: CLINIC | Age: 65
End: 2025-04-03
Attending: PHYSICIAN ASSISTANT
Payer: COMMERCIAL

## 2025-04-03 VITALS — WEIGHT: 125 LBS | BODY MASS INDEX: 22.15 KG/M2 | HEIGHT: 63 IN

## 2025-04-03 DIAGNOSIS — K21.9 GASTROESOPHAGEAL REFLUX DISEASE, UNSPECIFIED WHETHER ESOPHAGITIS PRESENT: ICD-10-CM

## 2025-04-03 RX ORDER — OMEPRAZOLE 40 MG/1
40 CAPSULE, DELAYED RELEASE ORAL DAILY
Qty: 90 CAPSULE | Refills: 3 | Status: SHIPPED | OUTPATIENT
Start: 2025-04-03

## 2025-04-03 ASSESSMENT — PAIN SCALES - GENERAL: PAINLEVEL_OUTOF10: NO PAIN (0)
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
## 2025-04-03 NOTE — NURSING NOTE
Current patient location: [address] [place] [address]    Is the patient currently in the state of [place]? YES    Visit mode: VIDEO    If the visit is dropped, the patient can be reconnected by:VIDEO VISIT: Text to cell phone:   Telephone Information:   Mobile [phone]       Will anyone else be joining the visit? NO  (If patient encounters technical issues they should call [phone])    Are changes needed to the allergy or medication list? No    Are refills needed on medications prescribed by this physician? NO    Rooming Documentation:  Questionnaire(s) completed    Reason for visit: RECHECK (IBD)    Zoila GARCIA

## 2025-04-03 NOTE — PATIENT INSTRUCTIONS
It was a pleasure taking care of you today.  I've included a brief summary of our discussion and care plan from today's visit below.  Please review this information with your primary care provider.  ______________________________________________________________________    My recommendations are summarized as follows:    -- Continue stelara every 4 weeks  -- Labs every 3 months   -- Patient with IBD we recommend supplementation vitamin D 1000 units daily and calcium 500 mg twice daily.  -- Vaccines/immunizations to be updated: Recommend yearly flu shot, pneumonia vaccines (Prevnar 13 then 8 weeks later Pneumovax 23 then 5 years later Pneumovax 23), tetanus every 10 years.  -- No NSAIDs (ibuprofen, or anything containing ibuprofen)     For additional resources about inflammatory bowel disease visit http://www.crohnscolitisfoundation.org/    To learn more about Diet and Nutrition in the setting of IBD, check out some of these resources:  https://www.crohnscolitisfoundation.org/diet-and-nutrition/what-should-i-eat  https://www.nimbal.org/  https://ntforibd.org/    Return to GI Clinic in 6 months to review your progress.    ______________________________________________________________________    How do I schedule labs, imaging studies, or procedures that were ordered in clinic today?     Labs: To schedule lab appointment at the [facility], use my chart or call [phone]. If you have a [facility] lab closer to home where you are regularly seen you can give them a call.     Procedures: If a colonoscopy, upper endoscopy, breath test, esophageal manometry, or pH impedence was ordered today, our endoscopy team will call you to schedule this. If you have not heard from our endoscopy team within a week, please call [phone] to schedule.     Imaging Studies: If you were scheduled for a CT scan, X-ray, MRI, ultrasound, HIDA scan or other imaging study, please call [phone] to have this scheduled.      Referral: If a referral to another specialty was ordered, expect a phone call or follow instructions above. If you have not heard from anyone regarding your referral in a week, please call our clinic to check the status.     Who do I call with any questions after my visit?  Please be in touch if there are any further questions that arise following today's visit.  There are multiple ways to contact your gastroenterology care team.      During business hours, you may reach a Gastroenterology nurse at [phone]    To schedule or reschedule an appointment, please call [phone].     You can always send a secure message through [facility].  [facility] messages are answered by your nurse or doctor typically within 24 hours.  Please allow extra time on weekends and holidays.      For urgent/emergent questions after business hours, you may reach the on-call GI Fellow by contacting the [facility]  at [phone].     How will I get the results of any tests ordered?    You will receive all of your results.  If you have signed up for [employer], any tests ordered at your visit will be available to you after your physician reviews them.  Typically this takes 1-2 weeks.  If there are urgent results that require a change in your care plan, your physician or nurse will call you to discuss the next steps.      What is [facility] [employer]?  [employer] [facility] [employer] is a secure way for you to access all of your healthcare records from the [facility].  It is a web based computer program, so you can sign on to it from any location.  It also allows you to send secure messages to your care team.  I recommend signing up for [facility] [employer] access if you have not already done so and are comfortable with using a computer.         Sincerely,    Benjamin Brooks PA-C  [facility]  Division of Gastroenterology
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
## 2025-04-07 ENCOUNTER — TELEPHONE (OUTPATIENT)
Dept: GASTROENTEROLOGY | Facility: CLINIC | Age: 65
End: 2025-04-07
Payer: COMMERCIAL

## 2025-04-07 ENCOUNTER — TELEPHONE (OUTPATIENT)
Dept: NEUROLOGY | Facility: CLINIC | Age: 65
End: 2025-04-07
Payer: COMMERCIAL

## 2025-04-07 DIAGNOSIS — G20.A1 PARKINSON'S DISEASE WITHOUT DYSKINESIA OR FLUCTUATING MANIFESTATIONS (H): Primary | ICD-10-CM

## 2025-04-07 DIAGNOSIS — R49.8 HYPOPHONIA: ICD-10-CM

## 2025-04-07 DIAGNOSIS — R49.9 VOICE COMPLAINT: ICD-10-CM

## 2025-04-07 NOTE — TELEPHONE ENCOUNTER
"[facility] Call Center    Phone Message    May a detailed message be left on voicemail: yes     Reason for Call: Other: Marzena Kirkland, RN  patient would like an order for \"[employer]\" and more information about it. Patient can be reached at . Or a [employer] message would be okay     Action Taken: Message routed to:  [facility] neurology    Travel Screening: Not Applicable                                                                    "

## 2025-04-07 NOTE — TELEPHONE ENCOUNTER
Left Voicemail (1st Attempt) for the patient to call back and schedule the following:     Appointment type: return   Provider: Benjamin Brooks or Dr. Pollock   Return date: next available  Specialty phone number: [phone]  Additional appointment(s) needed:   Additional Notes:

## 2025-04-10 ENCOUNTER — MYC MEDICAL ADVICE (OUTPATIENT)
Dept: NEUROLOGY | Facility: CLINIC | Age: 65
End: 2025-04-10
Payer: COMMERCIAL

## 2025-04-24 ENCOUNTER — MYC REFILL (OUTPATIENT)
Dept: PHARMACY | Facility: CLINIC | Age: 65
End: 2025-04-24
Payer: COMMERCIAL

## 2025-04-24 DIAGNOSIS — K50.018 CROHN'S DISEASE OF SMALL INTESTINE WITH OTHER COMPLICATION (H): ICD-10-CM

## 2025-04-24 RX ORDER — USTEKINUMAB 90 MG/ML
INJECTION, SOLUTION SUBCUTANEOUS
Qty: 1 ML | Refills: 5 | OUTPATIENT
Start: 2025-04-24

## 2025-04-24 NOTE — TELEPHONE ENCOUNTER
Stelara refilled using CPA with Benjamin Brooks PA-C.    Last provider visit: 4/3/2025 KAMARI Brooks  Next provider visit: 10/14/2025 KAMARI Brooks  Last labs completed: 2025  Lab frequency: every 3 months   - standing labs available until 2025  Next labs due: 2025  Last TB screenin2024  PDC: 74% (unclear why, has filled appropriate last few months)    Vidal Cano, PharmD, BCPS  MTM Pharmacist   [facility]  Phone: [phone]

## 2025-05-04 ENCOUNTER — PATIENT OUTREACH (OUTPATIENT)
Dept: CARE COORDINATION | Facility: CLINIC | Age: 65
End: 2025-05-04
Payer: COMMERCIAL

## 2025-05-06 ENCOUNTER — MYC MEDICAL ADVICE (OUTPATIENT)
Dept: GASTROENTEROLOGY | Facility: CLINIC | Age: 65
End: 2025-05-06
Payer: COMMERCIAL

## 2025-05-07 ENCOUNTER — THERAPY VISIT (OUTPATIENT)
Dept: PHYSICAL THERAPY | Facility: CLINIC | Age: 65
End: 2025-05-07
Payer: COMMERCIAL

## 2025-05-07 DIAGNOSIS — R25.1 TREMOR: ICD-10-CM

## 2025-05-07 DIAGNOSIS — R29.898 RIGIDITY: ICD-10-CM

## 2025-05-07 DIAGNOSIS — R26.89 IMPAIRMENT OF BALANCE: Primary | ICD-10-CM

## 2025-05-07 PROCEDURE — 97110 THERAPEUTIC EXERCISES: CPT | Mod: GP

## 2025-05-29 ENCOUNTER — THERAPY VISIT (OUTPATIENT)
Dept: PHYSICAL THERAPY | Facility: CLINIC | Age: 65
End: 2025-05-29
Payer: COMMERCIAL

## 2025-05-29 DIAGNOSIS — R26.89 IMPAIRMENT OF BALANCE: Primary | ICD-10-CM

## 2025-05-29 DIAGNOSIS — R26.89 IMPAIRED GAIT AND MOBILITY: ICD-10-CM

## 2025-05-29 DIAGNOSIS — R29.898 RIGIDITY: ICD-10-CM

## 2025-05-29 DIAGNOSIS — R25.1 TREMOR: ICD-10-CM

## 2025-06-05 ENCOUNTER — THERAPY VISIT (OUTPATIENT)
Dept: PHYSICAL THERAPY | Facility: CLINIC | Age: 65
End: 2025-06-05
Payer: COMMERCIAL

## 2025-06-05 DIAGNOSIS — R26.89 IMPAIRMENT OF BALANCE: Primary | ICD-10-CM

## 2025-06-05 DIAGNOSIS — R25.1 TREMOR: ICD-10-CM

## 2025-06-05 DIAGNOSIS — R26.89 IMPAIRED GAIT AND MOBILITY: ICD-10-CM

## 2025-06-05 DIAGNOSIS — R29.898 RIGIDITY: ICD-10-CM

## 2025-06-12 ENCOUNTER — THERAPY VISIT (OUTPATIENT)
Dept: PHYSICAL THERAPY | Facility: CLINIC | Age: 65
End: 2025-06-12
Payer: COMMERCIAL

## 2025-06-12 DIAGNOSIS — R26.89 IMPAIRMENT OF BALANCE: Primary | ICD-10-CM

## 2025-06-12 DIAGNOSIS — R29.898 RIGIDITY: ICD-10-CM

## 2025-06-12 DIAGNOSIS — R26.89 IMPAIRED GAIT AND MOBILITY: ICD-10-CM

## 2025-06-12 DIAGNOSIS — R25.1 TREMOR: ICD-10-CM

## 2025-06-23 ENCOUNTER — VIRTUAL VISIT (OUTPATIENT)
Dept: PHARMACY | Facility: CLINIC | Age: 65
End: 2025-06-23
Attending: PHYSICIAN ASSISTANT
Payer: COMMERCIAL

## 2025-06-23 DIAGNOSIS — K50.018 CROHN'S DISEASE OF SMALL INTESTINE WITH OTHER COMPLICATION (H): Primary | ICD-10-CM

## 2025-06-23 DIAGNOSIS — K21.9 GASTROESOPHAGEAL REFLUX DISEASE, UNSPECIFIED WHETHER ESOPHAGITIS PRESENT: ICD-10-CM

## 2025-06-23 RX ORDER — OMEPRAZOLE 20 MG/1
20 CAPSULE, DELAYED RELEASE ORAL DAILY
Qty: 90 CAPSULE | Refills: 1 | Status: SHIPPED | OUTPATIENT
Start: 2025-06-23

## 2025-06-23 NOTE — NURSING NOTE
Current patient location: Patient declined to provide     Is the patient currently in the state of [place]? YES    Visit mode: TELEPHONE    If the visit is dropped, the patient can be reconnected by:TELEPHONE VISIT: Phone number:   Telephone Information:   Mobile [phone]       Will anyone else be joining the visit? NO  (If patient encounters technical issues they should call [phone] :[phone])    Are changes needed to the allergy or medication list? Pt stated no changes to allergies and Pt stated no med changes    Are refills needed on medications prescribed by this physician? NO    Rooming Documentation:  Not applicable    Reason for visit: RECHECK    Alonso GARCIA

## 2025-06-23 NOTE — PROGRESS NOTES
"Virtual Visit Details    Type of service:  Telephone Visit   Phone call duration: *** minutes   Originating Location (pt. Location): {patient location:942954::\"Home\"}  {PROVIDER LOCATION On-site should be selected for visits conducted from your clinic location or adjoining [facility] hospital, academic office, or other nearby [facility] building. Off-site should be selected for all other provider locations, including home:[id number]}  Distant Location (provider location):  {virtual location provider:235221}  Telephone visit completed due to {audio only reason:777288}  "

## 2025-06-23 NOTE — PATIENT INSTRUCTIONS
"Recommendations from today's MT visit:                                                      Kelly will consider the following vaccines:  Pneumococcal pneumonia (Prevnar-20)  Updated COVID-19 vaccine  RSV vaccine  Avoid LIVE vaccines  Start Vitamin D 2000 units once daily.  The preferred form of calcium while taking acid-reducing medications is calcium citrate. You can try the Citracal Petites product which are designed to be easy-to-swallow.  Decrease to omeprazole 20 mg once daily. Prescription sent to [facility] [place].  I've placed a referral to dermatology for a routine annual skin check. If you don't hear from a  in 2-3 business days, you can call [phone] to schedule an appointment.    Follow-up: 12/10/2025 at 1:30 PM (telephone)    It was great speaking with you today.  I value your experience and would be very thankful for your time in providing feedback in our clinic survey. In the next few days, you may receive an email or text message from [employer] with a link to a survey related to your  clinical pharmacist.\"     To schedule another MT appointment, please call the clinic directly or you may call the MT scheduling line at [phone] or toll-free at [phone].     My Clinical Pharmacist's contact information:                                                      Please feel free to contact me with any questions or concerns you have.      Chandler NamD, BCPS  [facility] Pharmacist   [facility]  Phone: [phone]   "

## 2025-06-23 NOTE — PROGRESS NOTES
Medication Therapy Management (MTM) Encounter    ASSESSMENT:                            Medication Adherence/Access: No issues identified.    Crohn's Disease:  Kelly would benefit from continued treatment with Stelara 90 mg every 4 weeks. They are up to date on routine maintenance labs. They are up to date on annual tuberculosis screening. No access issues for their advanced therapy are present. They are indicated for a few vaccinations which were recommended to them. Recommend supplementation with calcium citrate given concomitant proton pump inhibitor therapy. Briefly discussed following up with her PCP on DXA results. Continue routine skin checks with dermatology.    GERD: She will try to decrease to omeprazole 20 mg once daily in interest of finding lowest effective dose.    PLAN:                            Kelly will consider the following vaccines:  Pneumococcal pneumonia (Prevnar-20)  Updated COVID-19 vaccine  RSV vaccine  Avoid LIVE vaccines  Start Vitamin D 2000 units once daily.  The preferred form of calcium while taking acid-reducing medications is calcium citrate. You can try the Citracal Petites product which are designed to be easy-to-swallow.  Decrease to omeprazole 20 mg once daily. Prescription sent to [facility].  I've placed a referral to dermatology for a routine annual skin check. If you don't hear from a  in 2-3 business days, you can call [phone] to schedule an appointment.    Follow-up: 12/10/2025 at 1:30 PM (telephone)    SUBJECTIVE/OBJECTIVE:                          Kelly Priest is a 64 year old female seen for a follow-up visit.       Reason for visit: [facility] follow-up visit.    Allergies/ADRs: Reviewed in chart  Past Medical History: Reviewed in chart  Tobacco: She reports that she quit smoking about 37 years ago. Her smoking use included cigarettes. She started smoking about 49 years ago. She has a 11 pack-year smoking history. She has  never used smokeless tobacco.  Alcohol: 1-3 beverages / week      Medication Adherence/Access: no issues reported.    Crohn's Disease:  -Stelara 90 mg every 4 weeks  -Calcium carbonate-vitamin D chewable     Considering starting medication for Parkinson's. Thinking about coming off omeprazole. Going to [facility], will be due for Stelara 2 days before so will not need to bring.     PRO-3 for Crohn's Disease    Please select the one best answer for the patient's ability at this time     Over the past week, how many liquid or soft stools have you had on average per day?   <1 (When scoring, multiply number by 2=1)   Over the past week, please rate your average abdominal pain  None : 0 points  3. Over the past week, please rate your general well-being    Generally Well: 0 points    Score: 1  <13: Remission  13-21: Mild Activity   22-52: Moderate Activity  >/= 53: Severe Activity      Specialty medication department: [facility]  Prior authorization status: approved through 1/3/2026  Original start date: Stelara (started 21), moved to q4 weeks 2023       Last provider visit: 4/3/2025 KAMARI Brooks  Next provider visit: 10/14/2025 KAMARI Brooks  Last labs completed: 2025  Last Tb screenin2024  Lab frequency: every 3 months              - standing labs yes cbc with platelets & diff, CMP, CRP  2025  Next labs due: 2025     Last IBD Health Maintenance Review: 2024    IBD Health Maintenance    Vaccinations:  All patients on immunosuppression should avoid live vaccines unless specifically indicated.    -- Influenza (every year)- last 10/2024  -- TdaP (every 10 years)- last 2022  -- Pneumococcal Pneumonia               Prevnar-13: not on file              Pneumovax-23: not on file              Prevnar-20: not on file  -- COVID-19- 2020, 2021, 2021, 2022, 10/2022, decline  -- RSV- not on file, expresses hesitation    One time confirmation of immunity or serologies:  -- Hepatitis A  "(serologies or immunizations)- not immune by serology 2017  -- Hepatitis B (serologies or immunizations)- serology indicates immunity 2021  -- Varicella/Zoster               Varicella/Zoster- Shingrix 12/2018, 11/2019    Due to the immunosuppression in this patient, I would not advise administration of live vaccines such as varicella/VZV, intranasal influenza, MMR, or yellow fever vaccine (if traveling).      Immunosuppressive Screening:  -- Hep B Surface Antibody serologies indicate immunity 11/12/2021  -- Hep B Surface Antigen non-reactive 11/12/2021  -- Hep B Core Antibody non-reactive  11/12/2021  -- Hep C Antibody non-reactive 1/27/2017  -- Yearly assessment of TB negative 9/19/2024    Bone mineral density screening   -- Recommend all patients supplement with calcium and vitamin D  -- Patient reports 1-2 servings of dairy/day   -- Last DXA 2025, showed osteoporosis of lumbar spine    Cancer Screening:  Colon cancer screening:  Per Benjamin Brooks PA-C 3/26/2024: \"No PSC, no high-risk FH CRC (2nd cousin dx'd in 2017 w/ advanced CRC @ 55yo). Given colonoscopy in 2019 without adenomatous lesions, recommend repeat colonoscopy for routine screening in 2029 unless symptomatic sooner.\"     Cervical cancer screening: Per PCP/ObGyn-- per patient report, final Pap test in 2025 complete    Skin cancer screening: Annual visual exam of skin by dermatologist since patient is immunocompromised- Last 11/2024 per patient report    Depression Screening: social support system     PHQ-2 Score:         4/3/2025     9:39 AM 1/29/2025     5:08 AM   PHQ-2 ( 1999 [employer])   Q1: Little interest or pleasure in doing things 1 0   Q2: Feeling down, depressed or hopeless 1 0   PHQ-2 Score 2 0    Q1: Little interest or pleasure in doing things  Not at all   Q2: Feeling down, depressed or hopeless  Not at all   PHQ-2 Score  0       Patient-reported       Research:  Are you interested in being contacted about enrollment in clinical research " studies? No      Misc:  -- Avoid tobacco use  -- Avoid NSAIDs as there is potentially a 25% chance of causing an IBD flare    GERD  Omeprazole 40 mg once daily   Patient reports no current symptoms.   Patient feels that current regimen is effective.  The patient does not have a history of GI bleed.  Patient has not tried a trial off of therapy and is interested in doing so.     Today's Vitals: LMP  (LMP Unknown)   ----------------      I spent 15 minutes with this patient today. All changes were made via collaborative practice agreement with Benjamin Brooks PA-C.     A summary of these recommendations was sent via [employer].    Chandler NamD, BCPS  [facility] Pharmacist   [facility]  Phone: [phone]    Telemedicine Visit Details  The patient's medications can be safely assessed via a telemedicine encounter.  Type of service:  Telephone visit  Originating Location (pt. Location): Home    Distant Location (provider location):  Off-site  Start Time: 10:00 AM  End Time: 10:15 AM     Medication Therapy Recommendations  No medication therapy recommendations to display

## 2025-06-24 ENCOUNTER — PATIENT OUTREACH (OUTPATIENT)
Dept: CARE COORDINATION | Facility: CLINIC | Age: 65
End: 2025-06-24
Payer: COMMERCIAL

## 2025-06-26 ENCOUNTER — PATIENT OUTREACH (OUTPATIENT)
Dept: CARE COORDINATION | Facility: CLINIC | Age: 65
End: 2025-06-26
Payer: COMMERCIAL

## 2025-06-27 ENCOUNTER — MYC MEDICAL ADVICE (OUTPATIENT)
Dept: NEUROLOGY | Facility: CLINIC | Age: 65
End: 2025-06-27
Payer: COMMERCIAL

## 2025-07-10 ENCOUNTER — THERAPY VISIT (OUTPATIENT)
Dept: PHYSICAL THERAPY | Facility: CLINIC | Age: 65
End: 2025-07-10
Payer: COMMERCIAL

## 2025-07-10 DIAGNOSIS — R25.1 TREMOR: ICD-10-CM

## 2025-07-10 DIAGNOSIS — R26.89 IMPAIRMENT OF BALANCE: Primary | ICD-10-CM

## 2025-07-10 DIAGNOSIS — R26.89 IMPAIRED GAIT AND MOBILITY: ICD-10-CM

## 2025-07-10 DIAGNOSIS — R29.898 RIGIDITY: ICD-10-CM

## 2025-07-17 ENCOUNTER — PATIENT OUTREACH (OUTPATIENT)
Dept: ONCOLOGY | Facility: CLINIC | Age: 65
End: 2025-07-17

## 2025-07-17 ENCOUNTER — OFFICE VISIT (OUTPATIENT)
Dept: FAMILY MEDICINE | Facility: CLINIC | Age: 65
End: 2025-07-17
Payer: COMMERCIAL

## 2025-07-17 VITALS
DIASTOLIC BLOOD PRESSURE: 90 MMHG | OXYGEN SATURATION: 99 % | SYSTOLIC BLOOD PRESSURE: 144 MMHG | WEIGHT: 121 LBS | BODY MASS INDEX: 21.44 KG/M2 | TEMPERATURE: 97.1 F | HEART RATE: 67 BPM | HEIGHT: 63 IN | RESPIRATION RATE: 16 BRPM

## 2025-07-17 DIAGNOSIS — M81.0 AGE-RELATED OSTEOPOROSIS WITHOUT CURRENT PATHOLOGICAL FRACTURE: ICD-10-CM

## 2025-07-17 DIAGNOSIS — Z80.3 FH: BREAST CANCER IN FIRST DEGREE RELATIVE: Primary | ICD-10-CM

## 2025-07-17 DIAGNOSIS — Z13.6 CARDIOVASCULAR SCREENING; LDL GOAL LESS THAN 160: ICD-10-CM

## 2025-07-17 DIAGNOSIS — G20.C PARKINSONISM, UNSPECIFIED PARKINSONISM TYPE (H): ICD-10-CM

## 2025-07-17 DIAGNOSIS — I10 ESSENTIAL (PRIMARY) HYPERTENSION: ICD-10-CM

## 2025-07-17 DIAGNOSIS — Z13.1 SCREENING FOR DIABETES MELLITUS: ICD-10-CM

## 2025-07-17 DIAGNOSIS — Z13.6 SCREENING FOR CARDIOVASCULAR CONDITION: ICD-10-CM

## 2025-07-17 DIAGNOSIS — K50.018 CROHN'S DISEASE OF SMALL INTESTINE WITH OTHER COMPLICATION (H): ICD-10-CM

## 2025-07-17 DIAGNOSIS — Z23 NEED FOR PNEUMOCOCCAL 20-VALENT CONJUGATE VACCINATION: ICD-10-CM

## 2025-07-17 ASSESSMENT — PAIN SCALES - GENERAL: PAINLEVEL_OUTOF10: NO PAIN (0)

## 2025-07-17 NOTE — PROGRESS NOTES
"  Assessment & Plan     Age-related osteoporosis without current pathological fracture  Reviewed DEXA  \"Fracture risk 4  Fracture risk is high or very high considering bone density being in the osteoporotic range     Repeat  Recommend repeat DXA in 2 years.    Rec trying fosomax   Continue daily exercise/ calcum/ Vit D  Work on fallprevetion   - Adult Endocrinology  Referral; Future  - TSH with free T4 reflex; Future  - Vitamin D Deficiency; Future    FH: breast cancer in first degree relative  see  - Adult Genetics & Metabolism  Referral; Future    Essential (primary) hypertension  High today   Check daily  Follow up by phone in 3 weeks,   if not improving. Increase lisinopril  - TSH with free T4 reflex; Future    Parkinsonism, unspecified Parkinsonism type (H)  Follow up with consultant as planned.     Need for pneumococcal 20-valent conjugate vaccination  Do this summer    Crohn's disease of small intestine with other complication (H)  Well controlled   Follow up with consultant as planned.     CARDIOVASCULAR SCREENING; LDL GOAL LESS THAN 160  recheck  - Lipid panel reflex to direct LDL Fasting; Future    Screening for cardiovascular condition      Screening for diabetes mellitus    - Hemoglobin A1c; Future    Follow up in 6 months.     Mandy Mendoza is a 64 year old, presenting for the following health issues:  Follow Up (Dexa scan results/Family hx of breast cancer)        7/17/2025    11:31 AM   Additional Questions   Roomed by Kimberley PEARSON   Accompanied by Sister     History of Present Illness       Reason for visit:  Discus DEXA scan  results         Encounter Diagnoses   Name Primary?    Age-related osteoporosis without current pathological fracture     FH: breast cancer in first degree relative Yes    Essential (primary) hypertension     Parkinsonism, unspecified Parkinsonism type (H)     Need for pneumococcal 20-valent conjugate vaccination     Crohn's disease of small intestine with " "other complication (H)     CARDIOVASCULAR SCREENING; LDL GOAL LESS THAN 160     Screening for cardiovascular condition     Screening for diabetes mellitus        Hypertension Follow-up    Do you check your blood pressure regularly outside of the clinic? Yes   Are you following a low salt diet? Yes  Are your blood pressures ever more than 140 on the top number (systolic) OR more   than 90 on the bottom number (diastolic), for example 140/90? No    BP Readings from Last 2 Encounters:   07/17/25 (!) 144/90   02/18/25 (!) 143/89             Review of Systems  Constitutional, HEENT, cardiovascular, pulmonary, gi and gu systems are negative, except as otherwise noted.      Objective    BP (!) 144/90   Pulse 67   Temp 97.1  F (36.2  C) (Temporal)   Resp 16   Ht 1.589 m (5' 2.56\")   Wt 54.9 kg (121 lb)   LMP  (LMP Unknown)   SpO2 99%   BMI 21.74 kg/m    Body mass index is 21.74 kg/m .  Physical Exam   GENERAL: alert and no distress  NECK: no adenopathy, no asymmetry, masses, or scars  RESP: lungs clear to auscultation - no rales, rhonchi or wheezes  CV: regular rate and rhythm, normal S1 S2, no S3 or S4, no murmur, click or rub, no peripheral edema  ABDOMEN: soft, nontender, no hepatosplenomegaly, no masses and bowel sounds normal  MS: no gross musculoskeletal defects noted, no edema  NEURO: Normal strength and tone, mentation intact and speech normal  PSYCH: mentation appears normal, affect normal/bright    Lab on 04/01/2025   Component Date Value Ref Range Status    Sodium 04/01/2025 134 (L)  135 - 145 mmol/L Final    Potassium 04/01/2025 3.5  3.4 - 5.3 mmol/L Final    Carbon Dioxide (CO2) 04/01/2025 25  22 - 29 mmol/L Final    Anion Gap 04/01/2025 13  7 - 15 mmol/L Final    Urea Nitrogen 04/01/2025 13.6  8.0 - 23.0 mg/dL Final    Creatinine 04/01/2025 0.69  0.51 - 0.95 mg/dL Final    GFR Estimate 04/01/2025 >90  >60 mL/min/1.73m2 Final    eGFR calculated using 2021 CKD-EPI equation.    Calcium 04/01/2025 9.8  8.8 " - 10.4 mg/dL Final    Chloride 04/01/2025 96 (L)  98 - 107 mmol/L Final    Glucose 04/01/2025 100 (H)  70 - 99 mg/dL Final    Alkaline Phosphatase 04/01/2025 97  40 - 150 U/L Final    AST 04/01/2025 19  0 - 45 U/L Final    ALT 04/01/2025 13  0 - 50 U/L Final    Protein Total 04/01/2025 7.5  6.4 - 8.3 g/dL Final    Albumin 04/01/2025 4.4  3.5 - 5.2 g/dL Final    Bilirubin Total 04/01/2025 0.5  <=1.2 mg/dL Final    CRP Inflammation 04/01/2025 4.74  <5.00 mg/L Final    WBC Count 04/01/2025 14.0 (H)  4.0 - 11.0 10e3/uL Final    RBC Count 04/01/2025 4.53  3.80 - 5.20 10e6/uL Final    Hemoglobin 04/01/2025 13.9  11.7 - 15.7 g/dL Final    Hematocrit 04/01/2025 40.7  35.0 - 47.0 % Final    MCV 04/01/2025 90  78 - 100 fL Final    MCH 04/01/2025 30.7  26.5 - 33.0 pg Final    MCHC 04/01/2025 34.2  31.5 - 36.5 g/dL Final    RDW 04/01/2025 12.4  10.0 - 15.0 % Final    Platelet Count 04/01/2025 340  150 - 450 10e3/uL Final    % Neutrophils 04/01/2025 75  % Final    % Lymphocytes 04/01/2025 15  % Final    % Monocytes 04/01/2025 7  % Final    % Eosinophils 04/01/2025 3  % Final    % Basophils 04/01/2025 0  % Final    % Immature Granulocytes 04/01/2025 0  % Final    Absolute Neutrophils 04/01/2025 10.5 (H)  1.6 - 8.3 10e3/uL Final    Absolute Lymphocytes 04/01/2025 2.1  0.8 - 5.3 10e3/uL Final    Absolute Monocytes 04/01/2025 0.9  0.0 - 1.3 10e3/uL Final    Absolute Eosinophils 04/01/2025 0.4  0.0 - 0.7 10e3/uL Final    Absolute Basophils 04/01/2025 0.1  0.0 - 0.2 10e3/uL Final    Absolute Immature Granulocytes 04/01/2025 0.0  <=0.4 10e3/uL Final           Signed Electronically by: Bucky Hunter MD

## 2025-07-17 NOTE — PROGRESS NOTES
New Patient Oncology Nurse Navigator Note     Referring provider: Bucky Hunter MD      Referring Clinic/Organization: [facility]      Referred to (specialty:) Genetic Counseling and Cancer Risk Management     Requested provider (if applicable): NA     Date Referral Received: July 17, 2025     Evaluation for:  Z80.3 (ICD-10-CM) - FH: breast cancer in first degree relative     Patient seen by Monet Oakley MS [facility] because of your family history of breast in her mother and ovarian cancer in your maternal grandmother.         Payor: [employer] / Plan: [employer] MEDICARE ADVANTAGE / Product Type: Medicare /      July 17, 2025  Referral received and reviewed.  Sent to NPS to schedule.     Ly GARCIA, RN, OCN  Oncology Nurse Navigator   [facility]  Cancer Care Service Line   New Patient Hem/Onc Scheduling / Referrals: [phone] (fax: [phone] )

## 2025-07-22 PROBLEM — M81.0 OSTEOPOROSIS: Status: ACTIVE | Noted: 2025-07-22

## 2025-07-24 ENCOUNTER — THERAPY VISIT (OUTPATIENT)
Dept: PHYSICAL THERAPY | Facility: CLINIC | Age: 65
End: 2025-07-24
Payer: COMMERCIAL

## 2025-07-24 DIAGNOSIS — R26.89 IMPAIRED GAIT AND MOBILITY: ICD-10-CM

## 2025-07-24 DIAGNOSIS — R25.1 TREMOR: ICD-10-CM

## 2025-07-24 DIAGNOSIS — R29.898 RIGIDITY: ICD-10-CM

## 2025-07-24 DIAGNOSIS — R26.89 IMPAIRMENT OF BALANCE: Primary | ICD-10-CM

## 2025-07-29 ENCOUNTER — TRANSFERRED RECORDS (OUTPATIENT)
Dept: HEALTH INFORMATION MANAGEMENT | Facility: CLINIC | Age: 65
End: 2025-07-29
Payer: COMMERCIAL

## 2025-08-07 ENCOUNTER — THERAPY VISIT (OUTPATIENT)
Dept: PHYSICAL THERAPY | Facility: CLINIC | Age: 65
End: 2025-08-07
Payer: COMMERCIAL

## 2025-08-07 DIAGNOSIS — R25.1 TREMOR: ICD-10-CM

## 2025-08-07 DIAGNOSIS — R26.89 IMPAIRED GAIT AND MOBILITY: ICD-10-CM

## 2025-08-07 DIAGNOSIS — R29.898 RIGIDITY: ICD-10-CM

## 2025-08-07 DIAGNOSIS — R26.89 IMPAIRMENT OF BALANCE: Primary | ICD-10-CM

## 2025-08-19 ENCOUNTER — OFFICE VISIT (OUTPATIENT)
Dept: NEUROLOGY | Facility: CLINIC | Age: 65
End: 2025-08-19
Payer: COMMERCIAL

## 2025-08-19 VITALS
BODY MASS INDEX: 20.94 KG/M2 | OXYGEN SATURATION: 97 % | SYSTOLIC BLOOD PRESSURE: 146 MMHG | DIASTOLIC BLOOD PRESSURE: 90 MMHG | HEART RATE: 73 BPM | WEIGHT: 118.2 LBS | HEIGHT: 63 IN

## 2025-08-19 DIAGNOSIS — G20.A1 PARKINSON'S DISEASE WITHOUT DYSKINESIA OR FLUCTUATING MANIFESTATIONS (H): Primary | ICD-10-CM

## 2025-08-19 ASSESSMENT — PAIN SCALES - GENERAL: PAINLEVEL_OUTOF10: NO PAIN (0)

## 2025-08-21 ENCOUNTER — THERAPY VISIT (OUTPATIENT)
Dept: PHYSICAL THERAPY | Facility: CLINIC | Age: 65
End: 2025-08-21
Payer: COMMERCIAL

## 2025-08-21 ENCOUNTER — LAB (OUTPATIENT)
Dept: LAB | Facility: CLINIC | Age: 65
End: 2025-08-21
Payer: COMMERCIAL

## 2025-08-21 ENCOUNTER — ALLIED HEALTH/NURSE VISIT (OUTPATIENT)
Dept: FAMILY MEDICINE | Facility: CLINIC | Age: 65
End: 2025-08-21
Payer: COMMERCIAL

## 2025-08-21 DIAGNOSIS — M81.0 AGE-RELATED OSTEOPOROSIS WITHOUT CURRENT PATHOLOGICAL FRACTURE: ICD-10-CM

## 2025-08-21 DIAGNOSIS — R29.898 RIGIDITY: ICD-10-CM

## 2025-08-21 DIAGNOSIS — K50.018 CROHN'S DISEASE OF SMALL INTESTINE WITH OTHER COMPLICATION (H): ICD-10-CM

## 2025-08-21 DIAGNOSIS — R26.89 IMPAIRED GAIT AND MOBILITY: ICD-10-CM

## 2025-08-21 DIAGNOSIS — K50.018 CROHN'S DISEASE OF SMALL INTESTINE WITH OTHER COMPLICATION (H): Primary | ICD-10-CM

## 2025-08-21 DIAGNOSIS — I10 ESSENTIAL (PRIMARY) HYPERTENSION: ICD-10-CM

## 2025-08-21 DIAGNOSIS — R26.89 IMPAIRMENT OF BALANCE: Primary | ICD-10-CM

## 2025-08-21 DIAGNOSIS — R25.1 TREMOR: ICD-10-CM

## 2025-08-21 DIAGNOSIS — Z13.6 CARDIOVASCULAR SCREENING; LDL GOAL LESS THAN 160: ICD-10-CM

## 2025-08-21 DIAGNOSIS — Z13.1 SCREENING FOR DIABETES MELLITUS: ICD-10-CM

## 2025-08-21 DIAGNOSIS — Z23 ENCOUNTER FOR IMMUNIZATION: Primary | ICD-10-CM

## 2025-08-21 LAB
ALBUMIN SERPL BCG-MCNC: 4.3 G/DL (ref 3.5–5.2)
ALP SERPL-CCNC: 80 U/L (ref 40–150)
ALT SERPL W P-5'-P-CCNC: 17 U/L (ref 0–50)
ANION GAP SERPL CALCULATED.3IONS-SCNC: 9 MMOL/L (ref 7–15)
AST SERPL W P-5'-P-CCNC: 23 U/L (ref 0–45)
BASOPHILS # BLD AUTO: 0.05 10E3/UL (ref 0–0.2)
BASOPHILS NFR BLD AUTO: 0.8 %
BILIRUB SERPL-MCNC: 0.7 MG/DL
BUN SERPL-MCNC: 16.9 MG/DL (ref 8–23)
CALCIUM SERPL-MCNC: 9.5 MG/DL (ref 8.8–10.4)
CHLORIDE SERPL-SCNC: 95 MMOL/L (ref 98–107)
CHOLEST SERPL-MCNC: 223 MG/DL
CREAT SERPL-MCNC: 0.67 MG/DL (ref 0.51–0.95)
CRP SERPL-MCNC: <3 MG/L
EGFRCR SERPLBLD CKD-EPI 2021: >90 ML/MIN/1.73M2
EOSINOPHIL # BLD AUTO: 0.39 10E3/UL (ref 0–0.7)
EOSINOPHIL NFR BLD AUTO: 6 %
ERYTHROCYTE [DISTWIDTH] IN BLOOD BY AUTOMATED COUNT: 12.1 % (ref 10–15)
EST. AVERAGE GLUCOSE BLD GHB EST-MCNC: 108 MG/DL
FASTING STATUS PATIENT QL REPORTED: YES
GLUCOSE SERPL-MCNC: 100 MG/DL (ref 70–99)
HBA1C MFR BLD: 5.4 % (ref 0–5.6)
HCO3 SERPL-SCNC: 26 MMOL/L (ref 22–29)
HCT VFR BLD AUTO: 40 % (ref 35–47)
HDLC SERPL-MCNC: 69 MG/DL
HGB BLD-MCNC: 13.5 G/DL (ref 11.7–15.7)
HOLD SPECIMEN: NORMAL
HOLD SPECIMEN: NORMAL
IMM GRANULOCYTES # BLD: <0.04 10E3/UL
IMM GRANULOCYTES NFR BLD: 0.2 %
LDLC SERPL CALC-MCNC: 139 MG/DL
LYMPHOCYTES # BLD AUTO: 1.7 10E3/UL (ref 0.8–5.3)
LYMPHOCYTES NFR BLD AUTO: 26.2 %
MCH RBC QN AUTO: 30.4 PG (ref 26.5–33)
MCHC RBC AUTO-ENTMCNC: 33.8 G/DL (ref 31.5–36.5)
MCV RBC AUTO: 90.1 FL (ref 78–100)
MONOCYTES # BLD AUTO: 0.61 10E3/UL (ref 0–1.3)
MONOCYTES NFR BLD AUTO: 9.4 %
NEUTROPHILS # BLD AUTO: 3.74 10E3/UL (ref 1.6–8.3)
NEUTROPHILS NFR BLD AUTO: 57.4 %
NONHDLC SERPL-MCNC: 154 MG/DL
PLATELET # BLD AUTO: 287 10E3/UL (ref 150–450)
POTASSIUM SERPL-SCNC: 4.3 MMOL/L (ref 3.4–5.3)
PROT SERPL-MCNC: 7.1 G/DL (ref 6.4–8.3)
RBC # BLD AUTO: 4.44 10E6/UL (ref 3.8–5.2)
SODIUM SERPL-SCNC: 130 MMOL/L (ref 135–145)
TRIGL SERPL-MCNC: 73 MG/DL
TSH SERPL DL<=0.005 MIU/L-ACNC: 1.52 UIU/ML (ref 0.3–4.2)
VIT D+METAB SERPL-MCNC: 40 NG/ML (ref 20–50)
WBC # BLD AUTO: 6.5 10E3/UL (ref 4–11)

## 2025-08-27 DIAGNOSIS — E87.1 HYPONATREMIA: Primary | ICD-10-CM

## (undated) DEVICE — KIT ENDO TURNOVER/PROCEDURE CARRY-ON 101822

## (undated) DEVICE — SNARE CAPIVATOR ROUND COLD SNR BX10 M00561101

## (undated) DEVICE — SOL WATER IRRIG 1000ML BOTTLE 2F7114

## (undated) DEVICE — SOL WATER IRRIG 500ML BOTTLE 2F7113

## (undated) DEVICE — SPECIMEN CONTAINER 3OZ W/FORMALIN 59901

## (undated) DEVICE — GOWN IMPERVIOUS 2XL BLUE

## (undated) DEVICE — SUCTION MANIFOLD NEPTUNE 2 SYS 1 PORT 702-025-000

## (undated) DEVICE — TUBING SUCTION MEDI-VAC 1/4"X20' N620A

## (undated) DEVICE — TUBING SUCTION 12"X1/4" N612

## (undated) DEVICE — ENDO FORCEP ENDOJAW BIOPSY 2.8MMX230CM FB-220U

## (undated) DEVICE — ENDO FORCEP BX CAPTURA PRO SPIKE G50696

## (undated) RX ORDER — FENTANYL CITRATE 50 UG/ML
INJECTION, SOLUTION INTRAMUSCULAR; INTRAVENOUS
Status: DISPENSED
Start: 2021-05-24

## (undated) RX ORDER — FENTANYL CITRATE 50 UG/ML
INJECTION, SOLUTION INTRAMUSCULAR; INTRAVENOUS
Status: DISPENSED
Start: 2019-12-03